# Patient Record
Sex: FEMALE | Race: WHITE | Employment: OTHER | URBAN - METROPOLITAN AREA
[De-identification: names, ages, dates, MRNs, and addresses within clinical notes are randomized per-mention and may not be internally consistent; named-entity substitution may affect disease eponyms.]

---

## 2017-04-27 ENCOUNTER — OFFICE VISIT (OUTPATIENT)
Dept: ORTHOPEDIC SURGERY | Age: 72
End: 2017-04-27

## 2017-04-27 VITALS
SYSTOLIC BLOOD PRESSURE: 132 MMHG | BODY MASS INDEX: 36.96 KG/M2 | HEART RATE: 65 BPM | HEIGHT: 66 IN | RESPIRATION RATE: 16 BRPM | WEIGHT: 230 LBS | TEMPERATURE: 97.1 F | DIASTOLIC BLOOD PRESSURE: 60 MMHG

## 2017-04-27 DIAGNOSIS — Z96.642 H/O TOTAL HIP ARTHROPLASTY, LEFT: Primary | ICD-10-CM

## 2017-04-27 PROCEDURE — 99212 OFFICE O/P EST SF 10 MIN: CPT | Performed by: PHYSICIAN ASSISTANT

## 2017-05-16 ENCOUNTER — HOSPITAL ENCOUNTER (OUTPATIENT)
Dept: OTHER | Age: 72
Discharge: HOME | End: 2017-05-17

## 2017-05-17 ENCOUNTER — HOSPITAL ENCOUNTER (OUTPATIENT)
Dept: MAMMOGRAPHY | Age: 72
Discharge: HOME | End: 2017-05-17

## 2017-05-17 DIAGNOSIS — Z12.31 VISIT FOR SCREENING MAMMOGRAM: ICD-10-CM

## 2018-04-26 ENCOUNTER — OFFICE VISIT (OUTPATIENT)
Dept: ORTHOPEDIC SURGERY | Age: 73
End: 2018-04-26

## 2018-04-26 VITALS
SYSTOLIC BLOOD PRESSURE: 125 MMHG | DIASTOLIC BLOOD PRESSURE: 69 MMHG | TEMPERATURE: 97.6 F | WEIGHT: 230 LBS | HEIGHT: 66 IN | HEART RATE: 69 BPM | BODY MASS INDEX: 36.96 KG/M2 | RESPIRATION RATE: 16 BRPM

## 2018-04-26 DIAGNOSIS — Z96.642 HISTORY OF TOTAL LEFT HIP REPLACEMENT: Primary | ICD-10-CM

## 2018-04-26 PROCEDURE — 99213 OFFICE O/P EST LOW 20 MIN: CPT | Performed by: PHYSICIAN ASSISTANT

## 2018-08-01 ENCOUNTER — OFFICE VISIT (OUTPATIENT)
Dept: ORTHOPEDIC SURGERY | Age: 73
End: 2018-08-01

## 2018-08-01 VITALS — BODY MASS INDEX: 36.96 KG/M2 | TEMPERATURE: 97.4 F | RESPIRATION RATE: 18 BRPM | WEIGHT: 230 LBS | HEIGHT: 66 IN

## 2018-08-01 DIAGNOSIS — M17.11 ARTHRITIS OF RIGHT KNEE: ICD-10-CM

## 2018-08-01 DIAGNOSIS — M25.561 RIGHT KNEE PAIN, UNSPECIFIED CHRONICITY: Primary | ICD-10-CM

## 2018-08-01 PROCEDURE — 99213 OFFICE O/P EST LOW 20 MIN: CPT | Performed by: PHYSICIAN ASSISTANT

## 2018-08-01 NOTE — LETTER
PRE-OP ORDERS:  ? CBC WITH DIFFERENTIAL                                                ? TYPE AND SCREEN                                                            ? HgB A1C                                                                               ? EKG                                                                                        ? NASAL CULUTRE MRSA  ? UAR/if positive repeat UAR on admission  ? BMP           ALBUMIN AND PREALBUMIN           VITAMIN D LEVELS  ? COAG PROFILE  ? SED RATE  ? PT/OT EVAL AND TEACHING  ? INSTRUCT PT TO STOP ALL NSAIDS, ASPIRIN, BLOOD THINNERS 7 DAYS PRIOR SURGERY  DAY OF SURGERY  ? CEFAZOLIN 2 GM IVPB; IF PATIENT WEIGHS > 80 KG AND SERUM CREATININE <2.5 mg/dl, GIVE 2 GM DOSE WITHIN 1 HOUR OF INCISION. ? IF THE PRE-OP NASAL CULTURE FOR MRSA WAS POSITIVE:   REPEAT NASAL SWAB ON ADMISSION AND ADMINISTER VANCOMYCIN 1 GM IVPB, REDUCE THE DOSE OF VANCOMYCIN  MG IVPB IF PT < 55 KG OR SERUM CREATININE > 2mg/dl; also to get Cefazolin 2 GM or wt based  ? All patients will receive preop Cefazolin 2 GM or wt based   ? APPLY KNEE HIGH ANTI-EMBOLIC AND PNEUMO-BOOTS TO UNOPERATIVE  LEG  ? CELEBREX  200 MG  ORALLY  DAY OF SURGERY  ? ROXICODONE 10MG  ORALLY DAY OF SURGERY  OTHER ORDERS:_______________________________________________________PHYSICIAN SIGNATURE: __ 8/1/18                                                                                                       11:04 AM  ________________________DATE:                                                    Supplemental Confidentiality & Payment Agreement & Supplemental HIPAA Notice for Shared Medical Appointments        During shared medical appointments, you will hear about other participants health issues and personal information.   As a matter of trust, it is your duty to keep everything you hear confidential.  Nothing that identifies a participant in any way (including job, ethnicity, Anabaptist, etc.) can be shared outside of this group setting. I have read and agree to the following statements:        · I agree to meet with a group of patients and my doctor. I understand that I have the choice to be seen by my physician in this group or individually and that I may leave the group visit anytime I feel uncomfortable. · I understand that discussions will occur regarding individual identifiable health information during the shared medical appointment and I will maintain the confidentiality of [facility] health information or other information heard during the appointment. · I am committed to maintaining this confidentiality even if I am no longer participating in shared medical appointments. · I understand that the information I share with the physician and staff will be heard by the other participants and there is a possibility that the information disclosed in the shared medical appointment may be re-disclosed by other participants. I have been notified of this potential disclosure and I voluntarily agree to participate in the shared medical appointment. · I know that I dont have to share any personal information with the group or health care providers unless, I choose to do so. · Like any doctors appointment, I agree to be responsible for the bill and / or co-payment associated with this physician appointment. Shared Medical Appointment              THIS SUPPLEMENTAL NOTICE SUPPLEMENTS AND DOES NOT REPLACE THE [facility] CONSENT, PATIENT RESPONSIBILITY AND NOTICE OF PRIVACY PRACTICE. Dago Mcgraw    1945        Patients Signature: ____________________________________________________         DATE: ____/____/___      Courtney Slate / Support Persons Signature (if applicable) _________________________     DATE: __/__/__    **Each person will be asked to sign this commitment before each Shared Medical Appointment. Thank You ! SURGERY SCHEDULING TIMES                                                                                                                                                      Regine                                                                                        1945                                                                           SURGERY DATE: ___/___/___                                                                      SURGERY TIME:  ___:___ AM/PM                                                                      ARRIVAL TIME:   ___:___  AM/PM                                                                                                                        **PLEASE REPORT TO THE                                                                                                   INFORMATION DESK IN THE MAIN LOBBY                                                          OF THE HOSPITAL.         [facility]                                                                                                      JET INFO               PT NAME:  Regine               Patient Phone:  [phone] (home)                                           1945    PCP:  PCP:  FAY Arriola CNP                APPT DATE:  ___/___/___      DATE:  18        H&P __________    LABS: _________                      NEEDED:  __________    EKG:   _________                     NEEDED:  __________                   JET DATE:         SURG DATE:

## 2018-08-02 PROBLEM — M17.11 ARTHRITIS OF RIGHT KNEE: Status: ACTIVE | Noted: 2018-08-02

## 2018-08-02 NOTE — PROGRESS NOTES
Subjective:      Patient ID: Noel Bowling is a 74 y.o. female. Chief Complaint   Patient presents with    Knee Pain     Right knee        HPI:   She is here for an initial evaluation of a new problem. Right knee pain. This pain is been present for several months or year. Pain is progressively worsening. No history of previous significant traumatic injury or prior knee surgery. She does have a history of osteoarthritis. She is status post left total hip arthroplasty. She does have degenerative arthritis of the right hip. Right knee pain Scale 7/10 VAS. Location of pain medial and anterior aspect right knee. Pain is worse with weightbearing activities. Pain improves with rest and elevation. Previous treatments have included ice and Tylenol with mild relief. Review Of Systems:   As outlined in the HPI. Negative for fever or chills. Positive for joint pain, swelling and stiffness. Negative for numbness or tingling.      Past Medical History:   Diagnosis Date    Acid reflux     Arthritis     Cataract     High blood pressure     Hyperlipidemia     mild-no meds    PONV (postoperative nausea and vomiting)     after hysterectomy-got sick next morning    Thyroid disease        Family History   Problem Relation Age of Onset    Arthritis Other     Cancer Other     Diabetes Other     Heart Disease Other     High Blood Pressure Other     Stroke Other        Past Surgical History:   Procedure Laterality Date    CATARACT REMOVAL WITH IMPLANT Bilateral     CHOLECYSTECTOMY      CYST REMOVAL      on foot    HYSTERECTOMY      JOINT REPLACEMENT Left     hip    THYROID SURGERY Left        Social History     Occupational History    Light house work      Social History Main Topics    Smoking status: Never Smoker    Smokeless tobacco: Never Used    Alcohol use No    Drug use: No    Sexual activity: Not on file       Current Outpatient Prescriptions   Medication Sig Dispense Refill    Cranberry 1000 MG CAPS Take 1 capsule by mouth 2 times daily. 30 capsule 1    metFORMIN (GLUCOPHAGE) 500 MG tablet Take 500 mg by mouth 2 times daily (with meals).  potassium chloride SA (KLOR-CON M10) 10 MEQ tablet Take 20 mEq by mouth 2 times daily.  ranitidine (ZANTAC) 150 MG tablet Take 150 mg by mouth 2 times daily.  Ferrous Sulfate (IRON) 325 (65 FE) MG TABS Take 1 tablet by mouth every evening.  Ascorbic Acid (VITAMIN C) 500 MG CAPS Take 1 tablet by mouth 2 times daily.  Garlic 8969 MG CAPS Take 1 capsule by mouth 2 times daily.  Lactobacillus (PROBIOTIC ACIDOPHILUS PO) Take 1 capsule by mouth 2 times daily.  levothyroxine (SYNTHROID) 50 MCG tablet Take 50 mcg by mouth daily. No current facility-administered medications for this visit. Objective:     She is alert, oriented x 3, pleasant, well nourished, developed and in no   acute distress. Temp 97.4 °F (36.3 °C) (Temporal)   Resp 18   Ht 5' 6\" (1.676 m)   Wt 230 lb (104.3 kg)   BMI 37.12 kg/m²        Examination of the right knee shows: The alignment of the knee is mild varus. There is not erythema. There mild soft tissue swelling. There is mild effusion. ROM-  Extension 0          -   Flexion  125   There moderate pain associated with ROM testing. Medial joint line is tender to palpation. Lateral joint line is somewhat tender to palpation. Retro patellar crepitus is present. There is is crepitus along the joint line with ROM testing. Varus Stress testing does produce pain,                                     does not show laxity. Valgus Stress testing does not produce pain,                                       does not show laxity. Lachman's test- negative. Anterior Drawer test- negative. Posterior Drawer test- negative. Carlos A's Test- negative. Patellar Compression testing does produce pain. Extensor Mechanism is  intact.        Examination of the lower extremities are intact with sensation to light touch. Motor testing  5/5 in all major motor groups of the lower extremities. Gait is normal heel to toe. Gait is antalgic. Negative Robles's Sign. SLR negative. Examination of the lower extremities shows intact perfusion to all extremities. No cyanosis. Digits are warm to touch, capillary refill is less than 2 seconds. Minimal edema noted. Examination of the skin over both lower extremities reveals: The skin to be intact without lacerations or abrasions. No significant erythema. No rashes or skin lesions. X Rays: performed in the office today:   AP Standing, Lateral and Sunrise views of right knee: There is advanced osteoarthritic findings of the right knee noted with significant medial joint space narrowing of about 90%. Sclerosis and osteophyte formation noted of the medial compartment and to a lesser degree lateral compartment. She is also noted to have significant lateral facet arthritis of the patellofemoral joint. No acute fractures or dislocations noted. Additional Tests reviewed: NONE  Additional Outside Records reviewed: NONE    Diagnosis:       ICD-10-CM ICD-9-CM    1. Right knee pain, unspecified chronicity M25.561 719.46 XR KNEE RIGHT (3 VIEWS)   2. Arthritis of right knee M17.11 716.96         Assessment and Plan:     The natural history of the patient's diagnosis as well as the treatment options were discussed in full and questions were answered. Risks and benefits of the treatment options also reviewed in detail. She has pretty much end-stage osteoarthritis of the right knee. She is symptomatic with her knee arthritis. She has failed conservative treatment including anti-inflammatory medications, Tylenol, therapy through a home exercise program and elastic sleeve. I'm recommending right knee cortisone injection today to see what kind of relief she gets from the injection.   She will like to go ahead and get on the surgical schedule 3 months out from now for right knee arthroplasty with Dr. Aaron Santo. I have agreed to give her right knee cortisone injection today at no charge as she is a self-pay patient. Risk and benefits of corticosteroid intra-articular injection was discussed today. All questions were answered to her satisfaction. She verbally consented to proceed with intra-articular injection today. Cortisone Injection                                                       PROCEDURE NOTE:     Pre op Diagnosis:  right knee pain     Post op Diagnosis: Same  With the patient's permission, her right knee was prepped  in standard sterile fashion with  Alcohol and 2 cc of 0.25% Marcaine and 1 cc of Kenalog 40 mg was injected into the right lateral compartment  without difficulty. The patient tolerated this well without difficulty. A band-aid was applied. The patient was advised to ice the knee for 15-20 minutes to relieve any injection site related pain. Follow Up: 4-6 weeks with Dr. Aaron Santo to discuss right knee symptoms and potential right knee arthroplasty. Call or return to clinic prn if these symptoms worsen or fail to improve as anticipated.

## 2018-08-28 ENCOUNTER — OFFICE VISIT (OUTPATIENT)
Dept: ORTHOPEDIC SURGERY | Age: 73
End: 2018-08-28

## 2018-08-28 VITALS
HEART RATE: 69 BPM | BODY MASS INDEX: 36.96 KG/M2 | WEIGHT: 230 LBS | SYSTOLIC BLOOD PRESSURE: 127 MMHG | DIASTOLIC BLOOD PRESSURE: 70 MMHG | HEIGHT: 66 IN | TEMPERATURE: 97.2 F

## 2018-08-28 DIAGNOSIS — M17.11 ARTHRITIS OF RIGHT KNEE: Primary | ICD-10-CM

## 2018-08-28 PROCEDURE — 99999 PR OFFICE/OUTPT VISIT,PROCEDURE ONLY: CPT | Performed by: PHYSICIAN ASSISTANT

## 2018-08-30 NOTE — PROGRESS NOTES
Ferrous Sulfate (IRON) 325 (65 FE) MG TABS Take 1 tablet by mouth every evening.  Ascorbic Acid (VITAMIN C) 500 MG CAPS Take 1 tablet by mouth 2 times daily.  Garlic 2100 MG CAPS Take 1 capsule by mouth 2 times daily.  Lactobacillus (PROBIOTIC ACIDOPHILUS PO) Take 1 capsule by mouth 2 times daily.  levothyroxine (SYNTHROID) 50 MCG tablet Take 50 mcg by mouth daily. No current facility-administered medications for this visit. Objective:   She   is alert, oriented x 3, pleasant, well nourished, developed and in no acute distress. /70   Pulse 69   Temp 97.2 °F (36.2 °C)   Ht 5' 6\" (1.676 m)   Wt 230 lb (104.3 kg)   BMI 37.12 kg/m²      KNEE EXAM:  Examination of the right knee shows: There is not erythema. There mild soft tissue swelling. There is mild effusion. There moderate pain associated with ROM testing. Extensor Mechanism is  intact. X Rays: not performed in the office today:     Assessment:       ICD-10-CM ICD-9-CM    1. Arthritis of right knee M17.11 716.96         Plan:     The natural history of the patient's diagnosis as well as the treatment options were discussed in full and questions were answered. Risks and benefits of the treatment options also reviewed in detail. She is tentatively scheduled for right total knee arthroplasty early November which will be 3 months post right knee cortisone injection. She will follow up with Dr. Violette Carlson for preoperative discussion, right total knee arthroplasty. Follow Up:  Call or return to clinic prn if these symptoms worsen or fail to improve as anticipated.

## 2018-10-04 ENCOUNTER — OFFICE VISIT (OUTPATIENT)
Dept: ORTHOPEDIC SURGERY | Age: 73
End: 2018-10-04

## 2018-10-04 VITALS
RESPIRATION RATE: 16 BRPM | WEIGHT: 213 LBS | HEART RATE: 68 BPM | TEMPERATURE: 97.5 F | HEIGHT: 66 IN | BODY MASS INDEX: 34.23 KG/M2 | SYSTOLIC BLOOD PRESSURE: 143 MMHG | DIASTOLIC BLOOD PRESSURE: 69 MMHG

## 2018-10-04 DIAGNOSIS — M17.11 ARTHRITIS OF RIGHT KNEE: Primary | ICD-10-CM

## 2018-10-04 PROCEDURE — 99213 OFFICE O/P EST LOW 20 MIN: CPT | Performed by: ORTHOPAEDIC SURGERY

## 2018-10-08 ENCOUNTER — PREP FOR PROCEDURE (OUTPATIENT)
Dept: ORTHOPEDICS UNIT | Age: 73
End: 2018-10-08

## 2018-10-08 RX ORDER — OXYCODONE HYDROCHLORIDE 5 MG/1
10 TABLET ORAL ONCE
Status: CANCELLED | OUTPATIENT
Start: 2018-10-08 | End: 2018-10-08

## 2018-10-08 RX ORDER — CELECOXIB 200 MG/1
200 CAPSULE ORAL ONCE
Status: CANCELLED | OUTPATIENT
Start: 2018-10-08 | End: 2018-10-08

## 2018-10-23 ENCOUNTER — TELEPHONE (OUTPATIENT)
Dept: ORTHOPEDIC SURGERY | Age: 73
End: 2018-10-23

## 2018-11-28 ENCOUNTER — TELEPHONE (OUTPATIENT)
Dept: ORTHOPEDIC SURGERY | Age: 73
End: 2018-11-28

## 2019-01-03 ENCOUNTER — PREP FOR PROCEDURE (OUTPATIENT)
Dept: ORTHOPEDICS UNIT | Age: 74
End: 2019-01-03

## 2019-01-07 RX ORDER — OXYCODONE HYDROCHLORIDE 5 MG/1
10 TABLET ORAL ONCE
Status: CANCELLED | OUTPATIENT
Start: 2019-01-07 | End: 2019-01-07

## 2019-01-07 RX ORDER — CELECOXIB 200 MG/1
200 CAPSULE ORAL ONCE
Status: CANCELLED | OUTPATIENT
Start: 2019-01-07 | End: 2019-01-07

## 2019-01-22 ENCOUNTER — PREP FOR PROCEDURE (OUTPATIENT)
Dept: ORTHOPEDIC SURGERY | Age: 74
End: 2019-01-22

## 2019-01-22 DIAGNOSIS — M17.11 PRIMARY OSTEOARTHRITIS OF RIGHT KNEE: Primary | ICD-10-CM

## 2019-01-23 ENCOUNTER — HOSPITAL ENCOUNTER (OUTPATIENT)
Dept: PREADMISSION TESTING | Age: 74
Discharge: HOME | End: 2019-01-27

## 2019-01-23 DIAGNOSIS — M17.11 PRIMARY OSTEOARTHRITIS OF RIGHT KNEE: ICD-10-CM

## 2019-01-23 LAB
ABO/RH: NORMAL
ALBUMIN SERPL-MCNC: 4.6 G/DL (ref 3.4–5)
ANTIBODY SCREEN: NORMAL
BACTERIA: ABNORMAL /HPF
BILIRUBIN URINE: NEGATIVE
BLOOD, URINE: ABNORMAL
CLARITY: CLEAR
COLOR: YELLOW
EKG ATRIAL RATE: 73 BPM
EKG DIAGNOSIS: NORMAL
EKG P AXIS: 54 DEGREES
EKG P-R INTERVAL: 162 MS
EKG Q-T INTERVAL: 388 MS
EKG QRS DURATION: 76 MS
EKG QTC CALCULATION (BAZETT): 427 MS
EKG R AXIS: 13 DEGREES
EKG T AXIS: 23 DEGREES
EKG VENTRICULAR RATE: 73 BPM
EPITHELIAL CELLS, UA: 1 /HPF (ref 0–5)
ESTIMATED AVERAGE GLUCOSE: 128.4 MG/DL
GLUCOSE URINE: NEGATIVE MG/DL
HBA1C MFR BLD: 6.1 %
HYALINE CASTS: 0 /LPF (ref 0–8)
KETONES, URINE: NEGATIVE MG/DL
LEUKOCYTE ESTERASE, URINE: ABNORMAL
MICROSCOPIC EXAMINATION: YES
NITRITE, URINE: NEGATIVE
PH UA: 6.5
PREALBUMIN: 22.8 MG/DL (ref 20–40)
PROTEIN UA: NEGATIVE MG/DL
RBC UA: 2 /HPF (ref 0–4)
SPECIFIC GRAVITY UA: 1.01
URINE REFLEX TO CULTURE: YES
URINE TYPE: ABNORMAL
UROBILINOGEN, URINE: 0.2 E.U./DL
VITAMIN D 25-HYDROXY: 8.7 NG/ML
WBC UA: 1 /HPF (ref 0–5)

## 2019-01-23 PROCEDURE — 86901 BLOOD TYPING SEROLOGIC RH(D): CPT

## 2019-01-23 PROCEDURE — 93010 ELECTROCARDIOGRAM REPORT: CPT | Performed by: INTERNAL MEDICINE

## 2019-01-23 PROCEDURE — 81001 URINALYSIS AUTO W/SCOPE: CPT

## 2019-01-23 PROCEDURE — 86900 BLOOD TYPING SEROLOGIC ABO: CPT

## 2019-01-23 PROCEDURE — 87086 URINE CULTURE/COLONY COUNT: CPT

## 2019-01-23 PROCEDURE — 93005 ELECTROCARDIOGRAM TRACING: CPT

## 2019-01-23 PROCEDURE — 87077 CULTURE AEROBIC IDENTIFY: CPT

## 2019-01-23 PROCEDURE — 86850 RBC ANTIBODY SCREEN: CPT

## 2019-01-23 PROCEDURE — 87641 MR-STAPH DNA AMP PROBE: CPT

## 2019-01-23 PROCEDURE — 82306 VITAMIN D 25 HYDROXY: CPT

## 2019-01-23 PROCEDURE — 83036 HEMOGLOBIN GLYCOSYLATED A1C: CPT

## 2019-01-23 PROCEDURE — 87186 SC STD MICRODIL/AGAR DIL: CPT

## 2019-01-23 PROCEDURE — 82040 ASSAY OF SERUM ALBUMIN: CPT

## 2019-01-23 PROCEDURE — 84134 ASSAY OF PREALBUMIN: CPT

## 2019-01-24 ENCOUNTER — OFFICE VISIT (OUTPATIENT)
Dept: ORTHOPEDIC SURGERY | Age: 74
End: 2019-01-24

## 2019-01-24 ENCOUNTER — PREP FOR PROCEDURE (OUTPATIENT)
Dept: ORTHOPEDICS UNIT | Age: 74
End: 2019-01-24

## 2019-01-24 DIAGNOSIS — M17.11 ARTHRITIS OF RIGHT KNEE: Primary | ICD-10-CM

## 2019-01-24 LAB — MRSA SCREEN RT-PCR: NORMAL

## 2019-01-24 PROCEDURE — 99999 PR OFFICE/OUTPT VISIT,PROCEDURE ONLY: CPT | Performed by: ORTHOPAEDIC SURGERY

## 2019-01-24 RX ORDER — CIPROFLOXACIN 500 MG/1
500 TABLET, FILM COATED ORAL 2 TIMES DAILY
Qty: 20 TABLET | Refills: 0 | Status: SHIPPED | OUTPATIENT
Start: 2019-01-24 | End: 2019-01-28 | Stop reason: ALTCHOICE

## 2019-01-24 RX ORDER — CIPROFLOXACIN 500 MG/1
500 TABLET, FILM COATED ORAL 2 TIMES DAILY
Qty: 20 TABLET | Refills: 0 | Status: SHIPPED | OUTPATIENT
Start: 2019-01-24 | End: 2019-02-03

## 2019-01-25 LAB
ORGANISM: ABNORMAL
URINE CULTURE, ROUTINE: ABNORMAL
URINE CULTURE, ROUTINE: ABNORMAL

## 2019-01-28 ENCOUNTER — ANESTHESIA EVENT (OUTPATIENT)
Dept: OPERATING ROOM | Age: 74
End: 2019-01-28

## 2019-01-28 RX ORDER — INDOMETHACIN 25 MG/1
CAPSULE ORAL PRN
Status: ON HOLD | COMMUNITY
End: 2019-01-30 | Stop reason: HOSPADM

## 2019-01-28 RX ORDER — BENAZEPRIL/HYDROCHLOROTHIAZIDE 20 MG-25MG
1 TABLET ORAL 2 TIMES DAILY
Refills: 1 | COMMUNITY
Start: 2018-12-18 | End: 2021-01-25

## 2019-01-28 RX ORDER — AMLODIPINE BESYLATE 5 MG/1
5 TABLET ORAL DAILY
COMMUNITY

## 2019-01-30 ENCOUNTER — HOSPITAL ENCOUNTER (OUTPATIENT)
Age: 74
LOS: 1 days | Discharge: HOME | End: 2019-01-31
Attending: ORTHOPAEDIC SURGERY | Admitting: ORTHOPAEDIC SURGERY

## 2019-01-30 ENCOUNTER — APPOINTMENT (OUTPATIENT)
Dept: GENERAL RADIOLOGY | Age: 74
End: 2019-01-30
Attending: ORTHOPAEDIC SURGERY

## 2019-01-30 ENCOUNTER — ANESTHESIA (OUTPATIENT)
Dept: OPERATING ROOM | Age: 74
End: 2019-01-30

## 2019-01-30 VITALS
SYSTOLIC BLOOD PRESSURE: 100 MMHG | OXYGEN SATURATION: 100 % | DIASTOLIC BLOOD PRESSURE: 53 MMHG | RESPIRATION RATE: 10 BRPM | TEMPERATURE: 97.2 F

## 2019-01-30 DIAGNOSIS — M17.11 ARTHRITIS OF RIGHT KNEE: ICD-10-CM

## 2019-01-30 LAB
ABO/RH: NORMAL
ANTIBODY SCREEN: NORMAL
BILIRUBIN URINE: NEGATIVE
BLOOD, URINE: NEGATIVE
CLARITY: CLEAR
COLOR: YELLOW
GLUCOSE BLD-MCNC: 111 MG/DL (ref 70–99)
GLUCOSE BLD-MCNC: 113 MG/DL (ref 70–99)
GLUCOSE BLD-MCNC: 200 MG/DL (ref 70–99)
GLUCOSE URINE: NEGATIVE MG/DL
KETONES, URINE: NEGATIVE MG/DL
LEUKOCYTE ESTERASE, URINE: NEGATIVE
MICROSCOPIC EXAMINATION: NORMAL
NITRITE, URINE: NEGATIVE
PERFORMED ON: ABNORMAL
PH UA: 7
PROTEIN UA: NEGATIVE MG/DL
SPECIFIC GRAVITY UA: 1.01
URINE REFLEX TO CULTURE: NORMAL
URINE TYPE: NORMAL
UROBILINOGEN, URINE: 0.2 E.U./DL

## 2019-01-30 PROCEDURE — 2580000003 HC RX 258: Performed by: ANESTHESIOLOGY

## 2019-01-30 PROCEDURE — 2500000003 HC RX 250 WO HCPCS: Performed by: ORTHOPAEDIC SURGERY

## 2019-01-30 PROCEDURE — 2709999900 HC NON-CHARGEABLE SUPPLY: Performed by: ORTHOPAEDIC SURGERY

## 2019-01-30 PROCEDURE — G8979 MOBILITY GOAL STATUS: HCPCS

## 2019-01-30 PROCEDURE — 97116 GAIT TRAINING THERAPY: CPT

## 2019-01-30 PROCEDURE — 6360000002 HC RX W HCPCS

## 2019-01-30 PROCEDURE — 2580000003 HC RX 258: Performed by: ORTHOPAEDIC SURGERY

## 2019-01-30 PROCEDURE — 88305 TISSUE EXAM BY PATHOLOGIST: CPT

## 2019-01-30 PROCEDURE — 6360000002 HC RX W HCPCS: Performed by: ANESTHESIOLOGY

## 2019-01-30 PROCEDURE — C1776 JOINT DEVICE (IMPLANTABLE): HCPCS | Performed by: ORTHOPAEDIC SURGERY

## 2019-01-30 PROCEDURE — 2500000003 HC RX 250 WO HCPCS

## 2019-01-30 PROCEDURE — 3700000001 HC ADD 15 MINUTES (ANESTHESIA): Performed by: ORTHOPAEDIC SURGERY

## 2019-01-30 PROCEDURE — 3600000005 HC SURGERY LEVEL 5 BASE: Performed by: ORTHOPAEDIC SURGERY

## 2019-01-30 PROCEDURE — 3600000015 HC SURGERY LEVEL 5 ADDTL 15MIN: Performed by: ORTHOPAEDIC SURGERY

## 2019-01-30 PROCEDURE — 94760 N-INVAS EAR/PLS OXIMETRY 1: CPT

## 2019-01-30 PROCEDURE — 6360000002 HC RX W HCPCS: Performed by: ORTHOPAEDIC SURGERY

## 2019-01-30 PROCEDURE — 97530 THERAPEUTIC ACTIVITIES: CPT

## 2019-01-30 PROCEDURE — 86850 RBC ANTIBODY SCREEN: CPT

## 2019-01-30 PROCEDURE — G8978 MOBILITY CURRENT STATUS: HCPCS

## 2019-01-30 PROCEDURE — 73560 X-RAY EXAM OF KNEE 1 OR 2: CPT

## 2019-01-30 PROCEDURE — 6370000000 HC RX 637 (ALT 250 FOR IP): Performed by: ORTHOPAEDIC SURGERY

## 2019-01-30 PROCEDURE — 2700000000 HC OXYGEN THERAPY PER DAY

## 2019-01-30 PROCEDURE — 88311 DECALCIFY TISSUE: CPT

## 2019-01-30 PROCEDURE — 81003 URINALYSIS AUTO W/O SCOPE: CPT

## 2019-01-30 PROCEDURE — 86900 BLOOD TYPING SEROLOGIC ABO: CPT

## 2019-01-30 PROCEDURE — 1200000000 HC SEMI PRIVATE

## 2019-01-30 PROCEDURE — 2500000003 HC RX 250 WO HCPCS: Performed by: ANESTHESIOLOGY

## 2019-01-30 PROCEDURE — 97165 OT EVAL LOW COMPLEX 30 MIN: CPT

## 2019-01-30 PROCEDURE — 97162 PT EVAL MOD COMPLEX 30 MIN: CPT

## 2019-01-30 PROCEDURE — 7100000001 HC PACU RECOVERY - ADDTL 15 MIN: Performed by: ORTHOPAEDIC SURGERY

## 2019-01-30 PROCEDURE — C1713 ANCHOR/SCREW BN/BN,TIS/BN: HCPCS | Performed by: ORTHOPAEDIC SURGERY

## 2019-01-30 PROCEDURE — 94664 DEMO&/EVAL PT USE INHALER: CPT

## 2019-01-30 PROCEDURE — 3700000000 HC ANESTHESIA ATTENDED CARE: Performed by: ORTHOPAEDIC SURGERY

## 2019-01-30 PROCEDURE — 2720000010 HC SURG SUPPLY STERILE: Performed by: ORTHOPAEDIC SURGERY

## 2019-01-30 PROCEDURE — 86901 BLOOD TYPING SEROLOGIC RH(D): CPT

## 2019-01-30 PROCEDURE — 97535 SELF CARE MNGMENT TRAINING: CPT

## 2019-01-30 PROCEDURE — 7100000000 HC PACU RECOVERY - FIRST 15 MIN: Performed by: ORTHOPAEDIC SURGERY

## 2019-01-30 DEVICE — COMPONENT PAT DIA35MM THK9MM KNEE POLY CEM CONVENTIONAL: Type: IMPLANTABLE DEVICE | Site: KNEE | Status: FUNCTIONAL

## 2019-01-30 DEVICE — IMPLANTABLE DEVICE: Type: IMPLANTABLE DEVICE | Site: KNEE | Status: FUNCTIONAL

## 2019-01-30 DEVICE — NEXGEN PRECOAT STEMMED TIBIAL PLATE SZ 3: Type: IMPLANTABLE DEVICE | Site: KNEE | Status: FUNCTIONAL

## 2019-01-30 DEVICE — CEMENT BNE 40GM W/ GENT HI VISC RADPQ FOR REV SURG: Type: IMPLANTABLE DEVICE | Site: KNEE | Status: FUNCTIONAL

## 2019-01-30 RX ORDER — ONDANSETRON 2 MG/ML
INJECTION INTRAMUSCULAR; INTRAVENOUS PRN
Status: DISCONTINUED | OUTPATIENT
Start: 2019-01-30 | End: 2019-01-30 | Stop reason: SDUPTHER

## 2019-01-30 RX ORDER — INSULIN GLARGINE 100 [IU]/ML
0.25 INJECTION, SOLUTION SUBCUTANEOUS NIGHTLY
Status: DISCONTINUED | OUTPATIENT
Start: 2019-01-30 | End: 2019-01-31 | Stop reason: HOSPADM

## 2019-01-30 RX ORDER — BENAZEPRIL/HYDROCHLOROTHIAZIDE 20 MG-25MG
1 TABLET ORAL 2 TIMES DAILY
Status: DISCONTINUED | OUTPATIENT
Start: 2019-01-30 | End: 2019-01-30 | Stop reason: CLARIF

## 2019-01-30 RX ORDER — SUCCINYLCHOLINE CHLORIDE 20 MG/ML
INJECTION INTRAMUSCULAR; INTRAVENOUS PRN
Status: DISCONTINUED | OUTPATIENT
Start: 2019-01-30 | End: 2019-01-30 | Stop reason: SDUPTHER

## 2019-01-30 RX ORDER — DEXAMETHASONE SODIUM PHOSPHATE 4 MG/ML
INJECTION, SOLUTION INTRA-ARTICULAR; INTRALESIONAL; INTRAMUSCULAR; INTRAVENOUS; SOFT TISSUE PRN
Status: DISCONTINUED | OUTPATIENT
Start: 2019-01-30 | End: 2019-01-30 | Stop reason: SDUPTHER

## 2019-01-30 RX ORDER — PROPOFOL 10 MG/ML
INJECTION, EMULSION INTRAVENOUS PRN
Status: DISCONTINUED | OUTPATIENT
Start: 2019-01-30 | End: 2019-01-30 | Stop reason: SDUPTHER

## 2019-01-30 RX ORDER — LISINOPRIL AND HYDROCHLOROTHIAZIDE 12.5; 1 MG/1; MG/1
2 TABLET ORAL 2 TIMES DAILY
Status: DISCONTINUED | OUTPATIENT
Start: 2019-01-30 | End: 2019-01-31 | Stop reason: HOSPADM

## 2019-01-30 RX ORDER — NICOTINE POLACRILEX 4 MG
15 LOZENGE BUCCAL PRN
Status: DISCONTINUED | OUTPATIENT
Start: 2019-01-30 | End: 2019-01-31 | Stop reason: HOSPADM

## 2019-01-30 RX ORDER — AMLODIPINE BESYLATE 5 MG/1
5 TABLET ORAL DAILY
Status: DISCONTINUED | OUTPATIENT
Start: 2019-01-31 | End: 2019-01-31 | Stop reason: HOSPADM

## 2019-01-30 RX ORDER — DEXTROSE MONOHYDRATE 25 G/50ML
12.5 INJECTION, SOLUTION INTRAVENOUS PRN
Status: DISCONTINUED | OUTPATIENT
Start: 2019-01-30 | End: 2019-01-31 | Stop reason: HOSPADM

## 2019-01-30 RX ORDER — OXYCODONE HYDROCHLORIDE AND ACETAMINOPHEN 5; 325 MG/1; MG/1
1 TABLET ORAL PRN
Status: DISCONTINUED | OUTPATIENT
Start: 2019-01-30 | End: 2019-01-30 | Stop reason: HOSPADM

## 2019-01-30 RX ORDER — SODIUM CHLORIDE 0.9 % (FLUSH) 0.9 %
10 SYRINGE (ML) INJECTION EVERY 12 HOURS SCHEDULED
Status: DISCONTINUED | OUTPATIENT
Start: 2019-01-30 | End: 2019-01-31 | Stop reason: HOSPADM

## 2019-01-30 RX ORDER — LIDOCAINE HYDROCHLORIDE 20 MG/ML
INJECTION, SOLUTION INFILTRATION; PERINEURAL PRN
Status: DISCONTINUED | OUTPATIENT
Start: 2019-01-30 | End: 2019-01-30 | Stop reason: SDUPTHER

## 2019-01-30 RX ORDER — FERROUS SULFATE TAB EC 324 MG (65 MG FE EQUIVALENT) 324 (65 FE) MG
324 TABLET DELAYED RESPONSE ORAL EVERY EVENING
Status: DISCONTINUED | OUTPATIENT
Start: 2019-01-30 | End: 2019-01-31 | Stop reason: HOSPADM

## 2019-01-30 RX ORDER — OXYCODONE HYDROCHLORIDE AND ACETAMINOPHEN 5; 325 MG/1; MG/1
1 TABLET ORAL EVERY 4 HOURS PRN
Status: DISCONTINUED | OUTPATIENT
Start: 2019-01-30 | End: 2019-01-31 | Stop reason: HOSPADM

## 2019-01-30 RX ORDER — CELECOXIB 200 MG/1
200 CAPSULE ORAL EVERY 24 HOURS
Status: DISCONTINUED | OUTPATIENT
Start: 2019-01-31 | End: 2019-01-31 | Stop reason: HOSPADM

## 2019-01-30 RX ORDER — MORPHINE SULFATE 2 MG/ML
2 INJECTION, SOLUTION INTRAMUSCULAR; INTRAVENOUS EVERY 5 MIN PRN
Status: DISCONTINUED | OUTPATIENT
Start: 2019-01-30 | End: 2019-01-30 | Stop reason: HOSPADM

## 2019-01-30 RX ORDER — APREPITANT 40 MG/1
40 CAPSULE ORAL ONCE
Status: COMPLETED | OUTPATIENT
Start: 2019-01-30 | End: 2019-01-30

## 2019-01-30 RX ORDER — TRANEXAMIC ACID 100 MG/ML
INJECTION, SOLUTION INTRAVENOUS PRN
Status: DISCONTINUED | OUTPATIENT
Start: 2019-01-30 | End: 2019-01-30 | Stop reason: SDUPTHER

## 2019-01-30 RX ORDER — LEVOTHYROXINE SODIUM 0.07 MG/1
75 TABLET ORAL DAILY
Status: DISCONTINUED | OUTPATIENT
Start: 2019-01-31 | End: 2019-01-31 | Stop reason: HOSPADM

## 2019-01-30 RX ORDER — MORPHINE SULFATE 2 MG/ML
1 INJECTION, SOLUTION INTRAMUSCULAR; INTRAVENOUS EVERY 5 MIN PRN
Status: DISCONTINUED | OUTPATIENT
Start: 2019-01-30 | End: 2019-01-30 | Stop reason: HOSPADM

## 2019-01-30 RX ORDER — POLYETHYLENE GLYCOL 3350 17 G/17G
17 POWDER, FOR SOLUTION ORAL NIGHTLY PRN
Status: DISCONTINUED | OUTPATIENT
Start: 2019-01-30 | End: 2019-01-31 | Stop reason: HOSPADM

## 2019-01-30 RX ORDER — POTASSIUM CHLORIDE 20 MEQ/1
20 TABLET, EXTENDED RELEASE ORAL 2 TIMES DAILY
Status: DISCONTINUED | OUTPATIENT
Start: 2019-01-30 | End: 2019-01-31 | Stop reason: HOSPADM

## 2019-01-30 RX ORDER — ONDANSETRON 2 MG/ML
4 INJECTION INTRAMUSCULAR; INTRAVENOUS
Status: DISCONTINUED | OUTPATIENT
Start: 2019-01-30 | End: 2019-01-30 | Stop reason: HOSPADM

## 2019-01-30 RX ORDER — SODIUM CHLORIDE 0.9 % (FLUSH) 0.9 %
10 SYRINGE (ML) INJECTION PRN
Status: DISCONTINUED | OUTPATIENT
Start: 2019-01-30 | End: 2019-01-31 | Stop reason: HOSPADM

## 2019-01-30 RX ORDER — FENTANYL CITRATE 50 UG/ML
50 INJECTION, SOLUTION INTRAMUSCULAR; INTRAVENOUS EVERY 5 MIN PRN
Status: DISCONTINUED | OUTPATIENT
Start: 2019-01-30 | End: 2019-01-30 | Stop reason: HOSPADM

## 2019-01-30 RX ORDER — FAMOTIDINE 20 MG/1
20 TABLET, FILM COATED ORAL DAILY
Status: DISCONTINUED | OUTPATIENT
Start: 2019-01-30 | End: 2019-01-30

## 2019-01-30 RX ORDER — MEPERIDINE HYDROCHLORIDE 25 MG/ML
12.5 INJECTION INTRAMUSCULAR; INTRAVENOUS; SUBCUTANEOUS EVERY 5 MIN PRN
Status: DISCONTINUED | OUTPATIENT
Start: 2019-01-30 | End: 2019-01-30 | Stop reason: HOSPADM

## 2019-01-30 RX ORDER — SODIUM CHLORIDE 450 MG/100ML
INJECTION, SOLUTION INTRAVENOUS CONTINUOUS
Status: DISCONTINUED | OUTPATIENT
Start: 2019-01-30 | End: 2019-01-31 | Stop reason: HOSPADM

## 2019-01-30 RX ORDER — SODIUM CHLORIDE 0.9 % (FLUSH) 0.9 %
10 SYRINGE (ML) INJECTION PRN
Status: DISCONTINUED | OUTPATIENT
Start: 2019-01-30 | End: 2019-01-30 | Stop reason: HOSPADM

## 2019-01-30 RX ORDER — OXYCODONE HYDROCHLORIDE AND ACETAMINOPHEN 5; 325 MG/1; MG/1
2 TABLET ORAL PRN
Status: DISCONTINUED | OUTPATIENT
Start: 2019-01-30 | End: 2019-01-30 | Stop reason: HOSPADM

## 2019-01-30 RX ORDER — FENTANYL CITRATE 50 UG/ML
25 INJECTION, SOLUTION INTRAMUSCULAR; INTRAVENOUS EVERY 5 MIN PRN
Status: DISCONTINUED | OUTPATIENT
Start: 2019-01-30 | End: 2019-01-30 | Stop reason: HOSPADM

## 2019-01-30 RX ORDER — OXYCODONE HYDROCHLORIDE AND ACETAMINOPHEN 5; 325 MG/1; MG/1
1-2 TABLET ORAL EVERY 4 HOURS PRN
Qty: 50 TABLET | Refills: 0 | Status: SHIPPED | OUTPATIENT
Start: 2019-01-30 | End: 2019-02-06

## 2019-01-30 RX ORDER — DOCUSATE SODIUM 100 MG/1
100 CAPSULE, LIQUID FILLED ORAL 2 TIMES DAILY
Status: DISCONTINUED | OUTPATIENT
Start: 2019-01-30 | End: 2019-01-31 | Stop reason: HOSPADM

## 2019-01-30 RX ORDER — SODIUM CHLORIDE 9 MG/ML
INJECTION, SOLUTION INTRAVENOUS CONTINUOUS
Status: DISCONTINUED | OUTPATIENT
Start: 2019-01-30 | End: 2019-01-30

## 2019-01-30 RX ORDER — MORPHINE SULFATE 2 MG/ML
2 INJECTION, SOLUTION INTRAMUSCULAR; INTRAVENOUS
Status: DISCONTINUED | OUTPATIENT
Start: 2019-01-30 | End: 2019-01-31 | Stop reason: HOSPADM

## 2019-01-30 RX ORDER — EPHEDRINE SULFATE 50 MG/ML
INJECTION INTRAVENOUS PRN
Status: DISCONTINUED | OUTPATIENT
Start: 2019-01-30 | End: 2019-01-30 | Stop reason: SDUPTHER

## 2019-01-30 RX ORDER — MORPHINE SULFATE 4 MG/ML
4 INJECTION, SOLUTION INTRAMUSCULAR; INTRAVENOUS
Status: DISCONTINUED | OUTPATIENT
Start: 2019-01-30 | End: 2019-01-31 | Stop reason: HOSPADM

## 2019-01-30 RX ORDER — TRANEXAMIC ACID 100 MG/ML
INJECTION, SOLUTION INTRAVENOUS
Status: COMPLETED | OUTPATIENT
Start: 2019-01-30 | End: 2019-01-30

## 2019-01-30 RX ORDER — SODIUM CHLORIDE 0.9 % (FLUSH) 0.9 %
10 SYRINGE (ML) INJECTION EVERY 12 HOURS SCHEDULED
Status: DISCONTINUED | OUTPATIENT
Start: 2019-01-30 | End: 2019-01-30 | Stop reason: HOSPADM

## 2019-01-30 RX ORDER — GLYCOPYRROLATE 0.2 MG/ML
INJECTION INTRAMUSCULAR; INTRAVENOUS PRN
Status: DISCONTINUED | OUTPATIENT
Start: 2019-01-30 | End: 2019-01-30 | Stop reason: SDUPTHER

## 2019-01-30 RX ORDER — DEXTROSE MONOHYDRATE 50 MG/ML
100 INJECTION, SOLUTION INTRAVENOUS PRN
Status: DISCONTINUED | OUTPATIENT
Start: 2019-01-30 | End: 2019-01-31 | Stop reason: HOSPADM

## 2019-01-30 RX ORDER — CELECOXIB 200 MG/1
200 CAPSULE ORAL ONCE
Status: COMPLETED | OUTPATIENT
Start: 2019-01-30 | End: 2019-01-30

## 2019-01-30 RX ORDER — FENTANYL CITRATE 50 UG/ML
INJECTION, SOLUTION INTRAMUSCULAR; INTRAVENOUS PRN
Status: DISCONTINUED | OUTPATIENT
Start: 2019-01-30 | End: 2019-01-30 | Stop reason: SDUPTHER

## 2019-01-30 RX ORDER — BISACODYL 10 MG
10 SUPPOSITORY, RECTAL RECTAL DAILY PRN
Status: DISCONTINUED | OUTPATIENT
Start: 2019-01-30 | End: 2019-01-31 | Stop reason: HOSPADM

## 2019-01-30 RX ORDER — OXYCODONE HYDROCHLORIDE AND ACETAMINOPHEN 5; 325 MG/1; MG/1
2 TABLET ORAL EVERY 4 HOURS PRN
Status: DISCONTINUED | OUTPATIENT
Start: 2019-01-30 | End: 2019-01-31 | Stop reason: HOSPADM

## 2019-01-30 RX ORDER — ONDANSETRON 2 MG/ML
4 INJECTION INTRAMUSCULAR; INTRAVENOUS EVERY 6 HOURS PRN
Status: DISCONTINUED | OUTPATIENT
Start: 2019-01-30 | End: 2019-01-31 | Stop reason: HOSPADM

## 2019-01-30 RX ORDER — OXYCODONE HYDROCHLORIDE 5 MG/1
10 TABLET ORAL ONCE
Status: COMPLETED | OUTPATIENT
Start: 2019-01-30 | End: 2019-01-30

## 2019-01-30 RX ORDER — ROCURONIUM BROMIDE 10 MG/ML
INJECTION, SOLUTION INTRAVENOUS PRN
Status: DISCONTINUED | OUTPATIENT
Start: 2019-01-30 | End: 2019-01-30 | Stop reason: SDUPTHER

## 2019-01-30 RX ADMIN — SUCCINYLCHOLINE CHLORIDE 100 MG: 20 INJECTION, SOLUTION INTRAMUSCULAR; INTRAVENOUS at 13:44

## 2019-01-30 RX ADMIN — EPHEDRINE SULFATE 10 MG: 50 INJECTION INTRAVENOUS at 13:53

## 2019-01-30 RX ADMIN — TRANEXAMIC ACID 1000 MG: 100 INJECTION, SOLUTION INTRAVENOUS at 13:54

## 2019-01-30 RX ADMIN — LIDOCAINE HYDROCHLORIDE 60 MG: 20 INJECTION, SOLUTION INFILTRATION; PERINEURAL at 13:44

## 2019-01-30 RX ADMIN — CELECOXIB 200 MG: 200 CAPSULE ORAL at 11:47

## 2019-01-30 RX ADMIN — METFORMIN HYDROCHLORIDE 500 MG: 500 TABLET ORAL at 17:28

## 2019-01-30 RX ADMIN — Medication 2 G: at 20:34

## 2019-01-30 RX ADMIN — Medication 2 G: at 13:37

## 2019-01-30 RX ADMIN — FAMOTIDINE 20 MG: 10 INJECTION, SOLUTION INTRAVENOUS at 20:34

## 2019-01-30 RX ADMIN — DOCUSATE SODIUM 100 MG: 100 CAPSULE, LIQUID FILLED ORAL at 20:34

## 2019-01-30 RX ADMIN — FAMOTIDINE 20 MG: 10 INJECTION, SOLUTION INTRAVENOUS at 11:48

## 2019-01-30 RX ADMIN — GLYCOPYRROLATE 0.1 MG: 0.2 INJECTION, SOLUTION INTRAMUSCULAR; INTRAVENOUS at 13:50

## 2019-01-30 RX ADMIN — SODIUM CHLORIDE: 9 INJECTION, SOLUTION INTRAVENOUS at 11:48

## 2019-01-30 RX ADMIN — TRANEXAMIC ACID 1000 MG: 100 INJECTION, SOLUTION INTRAVENOUS at 13:00

## 2019-01-30 RX ADMIN — SODIUM CHLORIDE, PRESERVATIVE FREE 10 ML: 5 INJECTION INTRAVENOUS at 12:00

## 2019-01-30 RX ADMIN — SUGAMMADEX 200 MG: 100 INJECTION, SOLUTION INTRAVENOUS at 14:48

## 2019-01-30 RX ADMIN — DEXAMETHASONE SODIUM PHOSPHATE 8 MG: 4 INJECTION, SOLUTION INTRAMUSCULAR; INTRAVENOUS at 14:00

## 2019-01-30 RX ADMIN — FENTANYL CITRATE 50 MCG: 50 INJECTION INTRAMUSCULAR; INTRAVENOUS at 14:11

## 2019-01-30 RX ADMIN — OXYCODONE HYDROCHLORIDE 10 MG: 5 TABLET ORAL at 11:47

## 2019-01-30 RX ADMIN — APREPITANT 40 MG: 40 CAPSULE ORAL at 11:47

## 2019-01-30 RX ADMIN — INSULIN GLARGINE 24 UNITS: 100 INJECTION, SOLUTION SUBCUTANEOUS at 21:43

## 2019-01-30 RX ADMIN — OXYCODONE AND ACETAMINOPHEN 2 TABLET: 5; 325 TABLET ORAL at 20:34

## 2019-01-30 RX ADMIN — SODIUM CHLORIDE: 4.5 INJECTION, SOLUTION INTRAVENOUS at 17:28

## 2019-01-30 RX ADMIN — ONDANSETRON 4 MG: 2 INJECTION INTRAMUSCULAR; INTRAVENOUS at 14:48

## 2019-01-30 RX ADMIN — EPHEDRINE SULFATE 10 MG: 50 INJECTION INTRAVENOUS at 13:52

## 2019-01-30 RX ADMIN — ROCURONIUM BROMIDE 40 MG: 10 INJECTION INTRAVENOUS at 13:50

## 2019-01-30 RX ADMIN — LISINOPRIL AND HYDROCHLOROTHIAZIDE 2 TABLET: 12.5; 1 TABLET ORAL at 20:33

## 2019-01-30 RX ADMIN — FERROUS SULFATE TAB EC 324 MG (65 MG FE EQUIVALENT) 324 MG: 324 (65 FE) TABLET DELAYED RESPONSE at 17:28

## 2019-01-30 RX ADMIN — FENTANYL CITRATE 50 MCG: 50 INJECTION INTRAMUSCULAR; INTRAVENOUS at 13:44

## 2019-01-30 RX ADMIN — Medication 10 ML: at 20:34

## 2019-01-30 RX ADMIN — OXYCODONE AND ACETAMINOPHEN 1 TABLET: 5; 325 TABLET ORAL at 16:13

## 2019-01-30 RX ADMIN — POTASSIUM CHLORIDE 20 MEQ: 20 TABLET, EXTENDED RELEASE ORAL at 20:33

## 2019-01-30 RX ADMIN — METOPROLOL TARTRATE 25 MG: 25 TABLET ORAL at 20:33

## 2019-01-30 RX ADMIN — INSULIN LISPRO 1 UNITS: 100 INJECTION, SOLUTION INTRAVENOUS; SUBCUTANEOUS at 21:43

## 2019-01-30 RX ADMIN — PROPOFOL 150 MG: 10 INJECTION, EMULSION INTRAVENOUS at 13:44

## 2019-01-30 ASSESSMENT — PULMONARY FUNCTION TESTS
PIF_VALUE: 19
PIF_VALUE: 20
PIF_VALUE: 19
PIF_VALUE: 19
PIF_VALUE: 25
PIF_VALUE: 17
PIF_VALUE: 20
PIF_VALUE: 19
PIF_VALUE: 1
PIF_VALUE: 19
PIF_VALUE: 4
PIF_VALUE: 19
PIF_VALUE: 2
PIF_VALUE: 19
PIF_VALUE: 20
PIF_VALUE: 17
PIF_VALUE: 19
PIF_VALUE: 16
PIF_VALUE: 20
PIF_VALUE: 20
PIF_VALUE: 1
PIF_VALUE: 1
PIF_VALUE: 19
PIF_VALUE: 20
PIF_VALUE: 20
PIF_VALUE: 19
PIF_VALUE: 2
PIF_VALUE: 17
PIF_VALUE: 18
PIF_VALUE: 3
PIF_VALUE: 20
PIF_VALUE: 19
PIF_VALUE: 1
PIF_VALUE: 1
PIF_VALUE: 20
PIF_VALUE: 1
PIF_VALUE: 19
PIF_VALUE: 18
PIF_VALUE: 16
PIF_VALUE: 20
PIF_VALUE: 17
PIF_VALUE: 19
PIF_VALUE: 19
PIF_VALUE: 3
PIF_VALUE: 25
PIF_VALUE: 32
PIF_VALUE: 20
PIF_VALUE: 19
PIF_VALUE: 20
PIF_VALUE: 19
PIF_VALUE: 16
PIF_VALUE: 19
PIF_VALUE: 17
PIF_VALUE: 16
PIF_VALUE: 19
PIF_VALUE: 19
PIF_VALUE: 20
PIF_VALUE: 19
PIF_VALUE: 19
PIF_VALUE: 20
PIF_VALUE: 19
PIF_VALUE: 20
PIF_VALUE: 19
PIF_VALUE: 18
PIF_VALUE: 19
PIF_VALUE: 20
PIF_VALUE: 1
PIF_VALUE: 3
PIF_VALUE: 19
PIF_VALUE: 19
PIF_VALUE: 20
PIF_VALUE: 19
PIF_VALUE: 1

## 2019-01-30 ASSESSMENT — PAIN DESCRIPTION - PAIN TYPE
TYPE: ACUTE PAIN
TYPE: SURGICAL PAIN
TYPE: ACUTE PAIN
TYPE: ACUTE PAIN;SURGICAL PAIN
TYPE: SURGICAL PAIN

## 2019-01-30 ASSESSMENT — PAIN DESCRIPTION - ORIENTATION
ORIENTATION: RIGHT

## 2019-01-30 ASSESSMENT — PAIN DESCRIPTION - ONSET
ONSET: ON-GOING
ONSET: ON-GOING

## 2019-01-30 ASSESSMENT — PAIN SCALES - GENERAL
PAINLEVEL_OUTOF10: 4
PAINLEVEL_OUTOF10: 0
PAINLEVEL_OUTOF10: 0
PAINLEVEL_OUTOF10: 8
PAINLEVEL_OUTOF10: 4
PAINLEVEL_OUTOF10: 7
PAINLEVEL_OUTOF10: 4
PAINLEVEL_OUTOF10: 3

## 2019-01-30 ASSESSMENT — PAIN DESCRIPTION - FREQUENCY
FREQUENCY: CONTINUOUS
FREQUENCY: CONTINUOUS

## 2019-01-30 ASSESSMENT — PAIN - FUNCTIONAL ASSESSMENT
PAIN_FUNCTIONAL_ASSESSMENT: 0-10
PAIN_FUNCTIONAL_ASSESSMENT: PREVENTS OR INTERFERES SOME ACTIVE ACTIVITIES AND ADLS

## 2019-01-30 ASSESSMENT — PAIN DESCRIPTION - DESCRIPTORS
DESCRIPTORS: ACHING
DESCRIPTORS: ACHING

## 2019-01-30 ASSESSMENT — PAIN DESCRIPTION - PROGRESSION: CLINICAL_PROGRESSION: GRADUALLY WORSENING

## 2019-01-30 ASSESSMENT — PAIN DESCRIPTION - LOCATION
LOCATION: KNEE

## 2019-01-30 ASSESSMENT — LIFESTYLE VARIABLES: SMOKING_STATUS: 0

## 2019-01-31 VITALS
SYSTOLIC BLOOD PRESSURE: 122 MMHG | OXYGEN SATURATION: 94 % | HEIGHT: 66 IN | BODY MASS INDEX: 33.66 KG/M2 | WEIGHT: 209.44 LBS | HEART RATE: 70 BPM | RESPIRATION RATE: 16 BRPM | TEMPERATURE: 98.7 F | DIASTOLIC BLOOD PRESSURE: 65 MMHG

## 2019-01-31 LAB
ESTIMATED AVERAGE GLUCOSE: 125.5 MG/DL
GLUCOSE BLD-MCNC: 123 MG/DL (ref 70–99)
GLUCOSE BLD-MCNC: 177 MG/DL (ref 70–99)
HBA1C MFR BLD: 6 %
HCT VFR BLD CALC: 36.9 % (ref 36–48)
HEMOGLOBIN: 12.6 G/DL (ref 12–16)
PERFORMED ON: ABNORMAL
PERFORMED ON: ABNORMAL

## 2019-01-31 PROCEDURE — 6370000000 HC RX 637 (ALT 250 FOR IP): Performed by: NURSE PRACTITIONER

## 2019-01-31 PROCEDURE — 6360000002 HC RX W HCPCS: Performed by: ORTHOPAEDIC SURGERY

## 2019-01-31 PROCEDURE — 97116 GAIT TRAINING THERAPY: CPT

## 2019-01-31 PROCEDURE — 94760 N-INVAS EAR/PLS OXIMETRY 1: CPT

## 2019-01-31 PROCEDURE — 83036 HEMOGLOBIN GLYCOSYLATED A1C: CPT

## 2019-01-31 PROCEDURE — 85014 HEMATOCRIT: CPT

## 2019-01-31 PROCEDURE — G8978 MOBILITY CURRENT STATUS: HCPCS

## 2019-01-31 PROCEDURE — 97530 THERAPEUTIC ACTIVITIES: CPT

## 2019-01-31 PROCEDURE — 36415 COLL VENOUS BLD VENIPUNCTURE: CPT

## 2019-01-31 PROCEDURE — 97535 SELF CARE MNGMENT TRAINING: CPT

## 2019-01-31 PROCEDURE — 97110 THERAPEUTIC EXERCISES: CPT

## 2019-01-31 PROCEDURE — 6370000000 HC RX 637 (ALT 250 FOR IP): Performed by: ORTHOPAEDIC SURGERY

## 2019-01-31 PROCEDURE — 85018 HEMOGLOBIN: CPT

## 2019-01-31 RX ORDER — FAMOTIDINE 20 MG/1
20 TABLET, FILM COATED ORAL 2 TIMES DAILY
Status: DISCONTINUED | OUTPATIENT
Start: 2019-01-31 | End: 2019-01-31 | Stop reason: HOSPADM

## 2019-01-31 RX ADMIN — INSULIN LISPRO 1 UNITS: 100 INJECTION, SOLUTION INTRAVENOUS; SUBCUTANEOUS at 09:15

## 2019-01-31 RX ADMIN — LEVOTHYROXINE SODIUM 75 MCG: 0.07 TABLET ORAL at 05:31

## 2019-01-31 RX ADMIN — METOPROLOL TARTRATE 25 MG: 25 TABLET ORAL at 09:13

## 2019-01-31 RX ADMIN — INSULIN LISPRO 8 UNITS: 100 INJECTION, SOLUTION INTRAVENOUS; SUBCUTANEOUS at 09:18

## 2019-01-31 RX ADMIN — ENOXAPARIN SODIUM 30 MG: 30 INJECTION SUBCUTANEOUS at 09:13

## 2019-01-31 RX ADMIN — CELECOXIB 200 MG: 200 CAPSULE ORAL at 05:31

## 2019-01-31 RX ADMIN — FAMOTIDINE 20 MG: 20 TABLET, FILM COATED ORAL at 09:13

## 2019-01-31 RX ADMIN — OXYCODONE AND ACETAMINOPHEN 2 TABLET: 5; 325 TABLET ORAL at 10:16

## 2019-01-31 RX ADMIN — LISINOPRIL AND HYDROCHLOROTHIAZIDE 2 TABLET: 12.5; 1 TABLET ORAL at 09:13

## 2019-01-31 RX ADMIN — Medication 2 G: at 05:31

## 2019-01-31 RX ADMIN — POTASSIUM CHLORIDE 20 MEQ: 20 TABLET, EXTENDED RELEASE ORAL at 09:13

## 2019-01-31 RX ADMIN — OXYCODONE AND ACETAMINOPHEN 2 TABLET: 5; 325 TABLET ORAL at 02:50

## 2019-01-31 RX ADMIN — AMLODIPINE BESYLATE 5 MG: 5 TABLET ORAL at 09:13

## 2019-01-31 RX ADMIN — DOCUSATE SODIUM 100 MG: 100 CAPSULE, LIQUID FILLED ORAL at 09:13

## 2019-01-31 ASSESSMENT — PAIN DESCRIPTION - LOCATION
LOCATION: KNEE

## 2019-01-31 ASSESSMENT — PAIN SCALES - GENERAL
PAINLEVEL_OUTOF10: 2
PAINLEVEL_OUTOF10: 4
PAINLEVEL_OUTOF10: 5
PAINLEVEL_OUTOF10: 4
PAINLEVEL_OUTOF10: 7
PAINLEVEL_OUTOF10: 5
PAINLEVEL_OUTOF10: 7
PAINLEVEL_OUTOF10: 6

## 2019-01-31 ASSESSMENT — PAIN DESCRIPTION - PROGRESSION
CLINICAL_PROGRESSION: GRADUALLY IMPROVING
CLINICAL_PROGRESSION: GRADUALLY IMPROVING

## 2019-01-31 ASSESSMENT — PAIN - FUNCTIONAL ASSESSMENT
PAIN_FUNCTIONAL_ASSESSMENT: PREVENTS OR INTERFERES SOME ACTIVE ACTIVITIES AND ADLS
PAIN_FUNCTIONAL_ASSESSMENT: PREVENTS OR INTERFERES SOME ACTIVE ACTIVITIES AND ADLS

## 2019-01-31 ASSESSMENT — PAIN DESCRIPTION - PAIN TYPE
TYPE: ACUTE PAIN
TYPE: ACUTE PAIN;SURGICAL PAIN
TYPE: ACUTE PAIN;SURGICAL PAIN
TYPE: ACUTE PAIN
TYPE: ACUTE PAIN;SURGICAL PAIN

## 2019-01-31 ASSESSMENT — PAIN DESCRIPTION - ORIENTATION
ORIENTATION: RIGHT

## 2019-01-31 ASSESSMENT — PAIN DESCRIPTION - ONSET
ONSET: ON-GOING

## 2019-01-31 ASSESSMENT — PAIN DESCRIPTION - FREQUENCY
FREQUENCY: CONTINUOUS

## 2019-01-31 ASSESSMENT — PAIN DESCRIPTION - DESCRIPTORS
DESCRIPTORS: ACHING

## 2019-02-04 ENCOUNTER — FOLLOWUP TELEPHONE ENCOUNTER (OUTPATIENT)
Dept: ORTHOPEDICS UNIT | Age: 74
End: 2019-02-04

## 2019-02-14 ENCOUNTER — OFFICE VISIT (OUTPATIENT)
Dept: ORTHOPEDIC SURGERY | Age: 74
End: 2019-02-14

## 2019-02-14 VITALS — RESPIRATION RATE: 16 BRPM | BODY MASS INDEX: 34.23 KG/M2 | HEIGHT: 66 IN | WEIGHT: 213 LBS | TEMPERATURE: 97.1 F

## 2019-02-14 DIAGNOSIS — Z96.651 H/O TOTAL KNEE REPLACEMENT, RIGHT: Primary | ICD-10-CM

## 2019-02-14 PROCEDURE — 99024 POSTOP FOLLOW-UP VISIT: CPT | Performed by: ORTHOPAEDIC SURGERY

## 2019-02-28 ENCOUNTER — OFFICE VISIT (OUTPATIENT)
Dept: ORTHOPEDIC SURGERY | Age: 74
End: 2019-02-28

## 2019-02-28 VITALS — TEMPERATURE: 97.5 F | BODY MASS INDEX: 34.23 KG/M2 | RESPIRATION RATE: 16 BRPM | HEIGHT: 66 IN | WEIGHT: 213 LBS

## 2019-02-28 DIAGNOSIS — Z96.651 H/O TOTAL KNEE REPLACEMENT, RIGHT: Primary | ICD-10-CM

## 2019-02-28 PROCEDURE — APPSS15 APP SPLIT SHARED TIME 0-15 MINUTES: Performed by: PHYSICIAN ASSISTANT

## 2019-02-28 PROCEDURE — 99024 POSTOP FOLLOW-UP VISIT: CPT | Performed by: PHYSICIAN ASSISTANT

## 2019-03-14 ENCOUNTER — HOSPITAL ENCOUNTER (OUTPATIENT)
Dept: MAMMOGRAPHY | Age: 74
Discharge: HOME | End: 2019-03-19

## 2019-03-14 DIAGNOSIS — Z12.31 VISIT FOR SCREENING MAMMOGRAM: ICD-10-CM

## 2019-03-14 PROCEDURE — 77067 SCR MAMMO BI INCL CAD: CPT

## 2019-04-11 ENCOUNTER — OFFICE VISIT (OUTPATIENT)
Dept: ORTHOPEDIC SURGERY | Age: 74
End: 2019-04-11

## 2019-04-11 VITALS
SYSTOLIC BLOOD PRESSURE: 136 MMHG | DIASTOLIC BLOOD PRESSURE: 69 MMHG | BODY MASS INDEX: 33.75 KG/M2 | HEART RATE: 80 BPM | TEMPERATURE: 98.6 F | WEIGHT: 210 LBS | HEIGHT: 66 IN

## 2019-04-11 DIAGNOSIS — Z96.642 H/O TOTAL HIP ARTHROPLASTY, LEFT: ICD-10-CM

## 2019-04-11 DIAGNOSIS — Z96.651 H/O TOTAL KNEE REPLACEMENT, RIGHT: Primary | ICD-10-CM

## 2019-04-11 PROCEDURE — 99024 POSTOP FOLLOW-UP VISIT: CPT | Performed by: PHYSICIAN ASSISTANT

## 2019-04-11 NOTE — PROGRESS NOTES
Subjective:      Patient ID: Sridevi Carlisle is a 66 y.o. female. Chief Complaint   Patient presents with    Post-Op Check     Right TKA 1.30.2019    Hip Problem     Left RODRIGO 4.7.2015        HPI:   She for follow up visit. S/P right knee arthroplasty. The date of procedure(s) 1/30/19. Pain is mild in nature. Tylenol is controlling the pain. She is status post a left hip arthroplasty. In 2015. Review of Systems:   She denies fever or chills. She  denies any other complaints.       Past Medical History:   Diagnosis Date    Acid reflux     Arthritis     [facility] [address]     Chronic lymphocytic leukemia (CLL) genetic mutation variant ([facility] [address] 2018    Diabetes mellitus ([facility] [address] [facility]     High blood pressure     Hyperlipidemia     mild-no meds    PONV (postoperative nausea and vomiting)     after hysterectomy-got sick next morning    Thyroid disease     UTI (urinary tract infection) 01/2019    currently on cipro    Wears glasses        Family History   Problem Relation Age of Onset    High Blood Pressure Mother     Heart Disease Mother     Cancer Father         acute leukemia    Arthritis Other     Cancer Other     Diabetes Other     Heart Disease Other     High Blood Pressure Other     Stroke Other        Past Surgical History:   Procedure Laterality Date    CHOLECYSTECTOMY      CYST REMOVAL Right     on foot    EYE SURGERY      cataract removal with lens implants    HYSTERECTOMY      JOINT REPLACEMENT Left     hip    JOINT REPLACEMENT Right 01/30/2019      Right Total Knee Replacement    THYROID SURGERY Left     TOTAL KNEE ARTHROPLASTY Right 1/30/2019    RIGHT TOTAL KNEE REPLACEMENT performed by Papito Davis MD at [address] [place] [address] History     Occupational History    Occupation: Light house work   Tobacco Use    Smoking status: Never Smoker    Smokeless tobacco: Never Used   Substance and Sexual Activity    Alcohol use: No    Drug use: No    Sexual activity: Never       Current Outpatient Medications   Medication Sig Dispense Refill    metoprolol tartrate (LOPRESSOR) 25 MG tablet Take 25 mg by mouth 2 times daily      benazepril-hydrochlorthiazide (LOTENSIN HCT) 20-25 MG per tablet Take 1 tablet by mouth 2 times daily  1    amLODIPine (NORVASC) 5 MG tablet Take 5 mg by mouth daily      Cranberry 1000 MG CAPS Take 1 capsule by mouth 2 times daily. 30 capsule 1    metFORMIN (GLUCOPHAGE) 500 MG tablet Take 500 mg by mouth 2 times daily (with meals).  potassium chloride SA (KLOR-CON M10) 10 MEQ tablet Take 20 mEq by mouth 2 times daily.  ranitidine (ZANTAC) 150 MG tablet Take 150 mg by mouth 2 times daily.  Ferrous Sulfate (IRON) 325 (65 FE) MG TABS Take 1 tablet by mouth every evening.  Ascorbic Acid (VITAMIN C) 500 MG CAPS Take 1 tablet by mouth daily       Garlic 4519 MG CAPS Take 1 capsule by mouth 2 times daily.  Lactobacillus (PROBIOTIC ACIDOPHILUS PO) Take 1 capsule by mouth 2 times daily.  levothyroxine (SYNTHROID) 75 MCG tablet Take 75 mcg by mouth daily        No current facility-administered medications for this visit. Objective:   She  is alert, oriented x 3, pleasant, well nourished, developed and in no acute distress. /69   Pulse 80   Temp 98.6 °F (37 °C) (Temporal)   Ht 5' 6\" (1.676 m)   Wt 210 lb (95.3 kg)   BMI 33.89 kg/m²    KNEE EXAM:  Examination of the right knee shows: There is no erythema. There is no drainage. There is mild soft tissue swelling. ROM-  Extension  0          -   Flexion      130   Extensor Mechanism is intact. Calf is not swollen. X Rays: was performed in the office today:   AP Standing, Lateral and Sunrise  Right Knee: There is a right prosthetic total knee arthroplasty present. The alignment is satisfactory. There are no signs of failure or loosening. AP Pelvis, AP and Frog Leg Lateral  Left Hip: There is a left prosthetic total hip arthroplasty present.  The alignment is satisfactory. There are no signs of failure, dislocation or loosening. Diagnosis:        ICD-10-CM    1. H/O total knee replacement, right 1/30/19 Z96.651 XR KNEE RIGHT (3 VIEWS)   2. H/O total hip arthroplasty, left 4/7/2015 Z96.642 XR HIP 2-3 VW W PELVIS LEFT        Assessment/plan:        Clinically stable right knee arthroplasty. Radiographically stable right knee and left hip arthroplasties. Continue with physical therapy progressing towards a home exercise program.     Rest, Ice, Compression and Elevation  Activity restriction/ Modification discussed. Discussed the need for future dental prophylaxis. Follow Up: 12 months. Call or return to clinic prn if these symptoms worsen or fail to improve as anticipated.

## 2020-03-25 PROBLEM — M17.11 ARTHRITIS OF RIGHT KNEE: Status: RESOLVED | Noted: 2018-08-02 | Resolved: 2020-03-24

## 2020-06-04 ENCOUNTER — OFFICE VISIT (OUTPATIENT)
Dept: ORTHOPEDIC SURGERY | Age: 75
End: 2020-06-04

## 2020-06-04 VITALS — TEMPERATURE: 97.4 F

## 2020-06-04 PROBLEM — M25.551 RIGHT HIP PAIN: Status: ACTIVE | Noted: 2020-06-04

## 2020-06-04 PROCEDURE — 99213 OFFICE O/P EST LOW 20 MIN: CPT | Performed by: PHYSICIAN ASSISTANT

## 2020-06-04 NOTE — PROGRESS NOTES
Subjective:      Patient ID: Basil Funk is a 66 y.o. female. Chief Complaint   Patient presents with    Knee Problem     Right TKA 1.30.2019    Hip Problem     Left RODRIGO 4.7.2015        HPI: She is here for annual follow up on right knee and left hip arthroplasty. The date of procedure(s) right knee arthroplasty 1/30/2019, left hip arthroplasty 4/7/2015. No new complaints or issues. There is minimal to no discomfort with ambulation. There is minimal to no discomfort at rest.   Steps can be performed in reciprocal fashion. Occasional right groin pain. Review of Systems:   A 14 point review of systems and history form completed by the patient has been reviewed. This form is scanned in the media tab of the patient's chart under today's date.       Past Medical History:   Diagnosis Date    Acid reflux     Arthritis     [facility] [address]     Chronic lymphocytic leukemia (CLL) genetic mutation variant ([facility] [address] 2018    Diabetes mellitus ([facility] [address]     High blood pressure     Hyperlipidemia     mild-no meds    PONV (postoperative nausea and vomiting)     after hysterectomy-got sick next morning    Thyroid disease     UTI (urinary tract infection) 01/2019    currently on cipro    Wears glasses        Family History   Problem Relation Age of Onset    High Blood Pressure Mother     Heart Disease Mother     Cancer Father         acute leukemia    Arthritis Other     Cancer Other     Diabetes Other     Heart Disease Other     High Blood Pressure Other     Stroke Other        Past Surgical History:   Procedure Laterality Date    CHOLECYSTECTOMY      CYST REMOVAL Right     on foot    EYE SURGERY      cataract removal with lens implants    HYSTERECTOMY      JOINT REPLACEMENT Left     hip    JOINT REPLACEMENT Right 01/30/2019      Right Total Knee Replacement    THYROID SURGERY Left     TOTAL KNEE ARTHROPLASTY Right 1/30/2019    RIGHT TOTAL KNEE REPLACEMENT performed by Dixie Duenas MD at [facility]       Social History     Occupational History    Occupation: Light house work   Tobacco Use    Smoking status: Never Smoker    Smokeless tobacco: Never Used   Substance and Sexual Activity    Alcohol use: No    Drug use: No    Sexual activity: Never       Current Outpatient Medications   Medication Sig Dispense Refill    metoprolol tartrate (LOPRESSOR) 25 MG tablet Take 25 mg by mouth 2 times daily      benazepril-hydrochlorthiazide (LOTENSIN HCT) 20-25 MG per tablet Take 1 tablet by mouth 2 times daily  1    amLODIPine (NORVASC) 5 MG tablet Take 5 mg by mouth daily      Cranberry 1000 MG CAPS Take 1 capsule by mouth 2 times daily. 30 capsule 1    metFORMIN (GLUCOPHAGE) 500 MG tablet Take 500 mg by mouth 2 times daily (with meals).  potassium chloride SA (KLOR-CON M10) 10 MEQ tablet Take 20 mEq by mouth 2 times daily.  ranitidine (ZANTAC) 150 MG tablet Take 150 mg by mouth 2 times daily.  Ferrous Sulfate (IRON) 325 (65 FE) MG TABS Take 1 tablet by mouth every evening.  Ascorbic Acid (VITAMIN C) 500 MG CAPS Take 1 tablet by mouth daily       Garlic 7693 MG CAPS Take 1 capsule by mouth 2 times daily.  Lactobacillus (PROBIOTIC ACIDOPHILUS PO) Take 1 capsule by mouth 2 times daily.  levothyroxine (SYNTHROID) 75 MCG tablet Take 75 mcg by mouth daily        No current facility-administered medications for this visit. Objective:     She is alert, oriented x 3, pleasant, well nourished, developed and in no acute distress. Temp 97.4 °F (36.3 °C) (Temporal)      Examination of the right knee shows: There is  no obvious deformity. There is not erythema. There is minimal to no soft tissue swelling. There is no significant joint effusion. AROM-  Extension 0                     Flexion  120  There is no pain associated with ROM testing. Medial joint line is not tender to palpation. Lateral joint line is not tender to palpation.    There is not crepitus

## 2021-01-25 ENCOUNTER — APPOINTMENT (OUTPATIENT)
Dept: CT IMAGING | Age: 76
DRG: 640 | End: 2021-01-25

## 2021-01-25 ENCOUNTER — APPOINTMENT (OUTPATIENT)
Dept: GENERAL RADIOLOGY | Age: 76
DRG: 640 | End: 2021-01-25

## 2021-01-25 ENCOUNTER — HOSPITAL ENCOUNTER (INPATIENT)
Age: 76
LOS: 2 days | Discharge: HOME | DRG: 640 | End: 2021-01-27
Attending: EMERGENCY MEDICINE | Admitting: INTERNAL MEDICINE

## 2021-01-25 DIAGNOSIS — N39.0 URINARY TRACT INFECTION WITHOUT HEMATURIA, SITE UNSPECIFIED: ICD-10-CM

## 2021-01-25 DIAGNOSIS — D72.829 LEUKOCYTOSIS, UNSPECIFIED TYPE: ICD-10-CM

## 2021-01-25 DIAGNOSIS — E87.1 HYPONATREMIA: ICD-10-CM

## 2021-01-25 DIAGNOSIS — U07.1 COVID-19: Primary | ICD-10-CM

## 2021-01-25 PROBLEM — C91.10 CLL (CHRONIC LYMPHOCYTIC LEUKEMIA) (HCC): Status: ACTIVE | Noted: 2021-01-25

## 2021-01-25 PROBLEM — R55 SYNCOPE: Status: ACTIVE | Noted: 2021-01-25

## 2021-01-25 LAB
A/G RATIO: 2 (ref 1.1–2.2)
ABO/RH: NORMAL
ALBUMIN SERPL-MCNC: 4.3 G/DL (ref 3.4–5)
ALP BLD-CCNC: 102 U/L (ref 40–129)
ALT SERPL-CCNC: 14 U/L (ref 10–40)
ANION GAP SERPL CALCULATED.3IONS-SCNC: 14 MMOL/L (ref 3–16)
ANISOCYTOSIS: ABNORMAL
ANTIBODY SCREEN: NORMAL
AST SERPL-CCNC: 19 U/L (ref 15–37)
ATYPICAL LYMPHOCYTE RELATIVE PERCENT: 3 % (ref 0–6)
BACTERIA: ABNORMAL /HPF
BASOPHILS ABSOLUTE: 0 K/UL (ref 0–0.2)
BASOPHILS RELATIVE PERCENT: 0 %
BILIRUB SERPL-MCNC: 0.5 MG/DL (ref 0–1)
BILIRUBIN URINE: NEGATIVE
BLOOD, URINE: NEGATIVE
BUN BLDV-MCNC: 9 MG/DL (ref 7–20)
C-REACTIVE PROTEIN: 8.6 MG/L (ref 0–5.1)
CALCIUM SERPL-MCNC: 8.8 MG/DL (ref 8.3–10.6)
CHLORIDE BLD-SCNC: 81 MMOL/L (ref 99–110)
CLARITY: CLEAR
CO2: 25 MMOL/L (ref 21–32)
COLOR: YELLOW
CREAT SERPL-MCNC: <0.5 MG/DL (ref 0.6–1.2)
D DIMER: 362 NG/ML DDU (ref 0–229)
D DIMER: 431 NG/ML DDU (ref 0–229)
EKG ATRIAL RATE: 75 BPM
EKG DIAGNOSIS: NORMAL
EKG P AXIS: 56 DEGREES
EKG P-R INTERVAL: 172 MS
EKG Q-T INTERVAL: 404 MS
EKG QRS DURATION: 84 MS
EKG QTC CALCULATION (BAZETT): 451 MS
EKG R AXIS: 16 DEGREES
EKG T AXIS: 31 DEGREES
EKG VENTRICULAR RATE: 75 BPM
EOSINOPHILS ABSOLUTE: 0 K/UL (ref 0–0.6)
EOSINOPHILS RELATIVE PERCENT: 0 %
EPITHELIAL CELLS, UA: 1 /HPF (ref 0–5)
FERRITIN: 975.1 NG/ML (ref 15–150)
FIBRINOGEN: 269 MG/DL (ref 200–397)
FIBRINOGEN: 302 MG/DL (ref 200–397)
GFR AFRICAN AMERICAN: >60
GFR NON-AFRICAN AMERICAN: >60
GLOBULIN: 2.2 G/DL
GLUCOSE BLD-MCNC: 119 MG/DL (ref 70–99)
GLUCOSE BLD-MCNC: 121 MG/DL (ref 70–99)
GLUCOSE BLD-MCNC: 156 MG/DL (ref 70–99)
GLUCOSE URINE: NEGATIVE MG/DL
HCT VFR BLD CALC: 36.5 % (ref 36–48)
HEMATOLOGY PATH CONSULT: YES
HEMOGLOBIN: 12.6 G/DL (ref 12–16)
HYALINE CASTS: 12 /LPF (ref 0–8)
KETONES, URINE: 15 MG/DL
LACTATE DEHYDROGENASE: 219 U/L (ref 100–190)
LACTIC ACID, SEPSIS: 0.7 MMOL/L (ref 0.4–1.9)
LACTIC ACID, SEPSIS: 0.8 MMOL/L (ref 0.4–1.9)
LEUKOCYTE ESTERASE, URINE: ABNORMAL
LYMPHOCYTES ABSOLUTE: 20.2 K/UL (ref 1–5.1)
LYMPHOCYTES RELATIVE PERCENT: 74 %
MCH RBC QN AUTO: 30.8 PG (ref 26–34)
MCHC RBC AUTO-ENTMCNC: 34.5 G/DL (ref 31–36)
MCV RBC AUTO: 89.3 FL (ref 80–100)
MICROSCOPIC EXAMINATION: YES
MONOCYTES ABSOLUTE: 0.5 K/UL (ref 0–1.3)
MONOCYTES RELATIVE PERCENT: 2 %
NEUTROPHILS ABSOLUTE: 5.5 K/UL (ref 1.7–7.7)
NEUTROPHILS RELATIVE PERCENT: 21 %
NITRITE, URINE: POSITIVE
OVALOCYTES: ABNORMAL
PDW BLD-RTO: 13.4 % (ref 12.4–15.4)
PERFORMED ON: ABNORMAL
PERFORMED ON: ABNORMAL
PH UA: 7 (ref 5–8)
PLATELET # BLD: 100 K/UL (ref 135–450)
PMV BLD AUTO: 7.1 FL (ref 5–10.5)
POTASSIUM REFLEX MAGNESIUM: 4 MMOL/L (ref 3.5–5.1)
PROCALCITONIN: 0.05 NG/ML (ref 0–0.15)
PROTEIN UA: NEGATIVE MG/DL
RBC # BLD: 4.09 M/UL (ref 4–5.2)
RBC UA: 7 /HPF (ref 0–4)
SARS-COV-2, NAAT: DETECTED
SMUDGE CELLS: PRESENT
SODIUM BLD-SCNC: 120 MMOL/L (ref 136–145)
SODIUM BLD-SCNC: 122 MMOL/L (ref 136–145)
SODIUM BLD-SCNC: 122 MMOL/L (ref 136–145)
SPECIFIC GRAVITY UA: 1.01 (ref 1–1.03)
TOTAL PROTEIN: 6.5 G/DL (ref 6.4–8.2)
TROPONIN: <0.01 NG/ML
URINE REFLEX TO CULTURE: YES
URINE TYPE: ABNORMAL
UROBILINOGEN, URINE: 0.2 E.U./DL
WBC # BLD: 26.2 K/UL (ref 4–11)
WBC UA: 55 /HPF (ref 0–5)

## 2021-01-25 PROCEDURE — 72125 CT NECK SPINE W/O DYE: CPT

## 2021-01-25 PROCEDURE — 84145 PROCALCITONIN (PCT): CPT

## 2021-01-25 PROCEDURE — 93010 ELECTROCARDIOGRAM REPORT: CPT | Performed by: INTERNAL MEDICINE

## 2021-01-25 PROCEDURE — 87186 SC STD MICRODIL/AGAR DIL: CPT

## 2021-01-25 PROCEDURE — 71045 X-RAY EXAM CHEST 1 VIEW: CPT

## 2021-01-25 PROCEDURE — 84295 ASSAY OF SERUM SODIUM: CPT

## 2021-01-25 PROCEDURE — U0002 COVID-19 LAB TEST NON-CDC: HCPCS

## 2021-01-25 PROCEDURE — 87184 SC STD DISK METHOD PER PLATE: CPT

## 2021-01-25 PROCEDURE — 96365 THER/PROPH/DIAG IV INF INIT: CPT

## 2021-01-25 PROCEDURE — 85025 COMPLETE CBC W/AUTO DIFF WBC: CPT

## 2021-01-25 PROCEDURE — 36415 COLL VENOUS BLD VENIPUNCTURE: CPT

## 2021-01-25 PROCEDURE — 87086 URINE CULTURE/COLONY COUNT: CPT

## 2021-01-25 PROCEDURE — 86140 C-REACTIVE PROTEIN: CPT

## 2021-01-25 PROCEDURE — 2580000003 HC RX 258: Performed by: PHYSICIAN ASSISTANT

## 2021-01-25 PROCEDURE — 6360000004 HC RX CONTRAST MEDICATION: Performed by: EMERGENCY MEDICINE

## 2021-01-25 PROCEDURE — 82728 ASSAY OF FERRITIN: CPT

## 2021-01-25 PROCEDURE — 87077 CULTURE AEROBIC IDENTIFY: CPT

## 2021-01-25 PROCEDURE — 2060000000 HC ICU INTERMEDIATE R&B

## 2021-01-25 PROCEDURE — 93005 ELECTROCARDIOGRAM TRACING: CPT | Performed by: PHYSICIAN ASSISTANT

## 2021-01-25 PROCEDURE — 86850 RBC ANTIBODY SCREEN: CPT

## 2021-01-25 PROCEDURE — 83615 LACTATE (LD) (LDH) ENZYME: CPT

## 2021-01-25 PROCEDURE — 85379 FIBRIN DEGRADATION QUANT: CPT

## 2021-01-25 PROCEDURE — 85384 FIBRINOGEN ACTIVITY: CPT

## 2021-01-25 PROCEDURE — 87040 BLOOD CULTURE FOR BACTERIA: CPT

## 2021-01-25 PROCEDURE — 6370000000 HC RX 637 (ALT 250 FOR IP): Performed by: INTERNAL MEDICINE

## 2021-01-25 PROCEDURE — 99283 EMERGENCY DEPT VISIT LOW MDM: CPT

## 2021-01-25 PROCEDURE — 86901 BLOOD TYPING SEROLOGIC RH(D): CPT

## 2021-01-25 PROCEDURE — 80053 COMPREHEN METABOLIC PANEL: CPT

## 2021-01-25 PROCEDURE — 6360000002 HC RX W HCPCS: Performed by: PHYSICIAN ASSISTANT

## 2021-01-25 PROCEDURE — 71260 CT THORAX DX C+: CPT

## 2021-01-25 PROCEDURE — 84484 ASSAY OF TROPONIN QUANT: CPT

## 2021-01-25 PROCEDURE — 81001 URINALYSIS AUTO W/SCOPE: CPT

## 2021-01-25 PROCEDURE — 2580000003 HC RX 258: Performed by: INTERNAL MEDICINE

## 2021-01-25 PROCEDURE — 83605 ASSAY OF LACTIC ACID: CPT

## 2021-01-25 PROCEDURE — 70450 CT HEAD/BRAIN W/O DYE: CPT

## 2021-01-25 PROCEDURE — 86900 BLOOD TYPING SEROLOGIC ABO: CPT

## 2021-01-25 RX ORDER — NICOTINE POLACRILEX 4 MG
15 LOZENGE BUCCAL PRN
Status: DISCONTINUED | OUTPATIENT
Start: 2021-01-25 | End: 2021-01-28 | Stop reason: HOSPADM

## 2021-01-25 RX ORDER — POTASSIUM CHLORIDE 20 MEQ/1
40 TABLET, EXTENDED RELEASE ORAL PRN
Status: DISCONTINUED | OUTPATIENT
Start: 2021-01-25 | End: 2021-01-28 | Stop reason: HOSPADM

## 2021-01-25 RX ORDER — FAMOTIDINE 20 MG/1
20 TABLET, FILM COATED ORAL 2 TIMES DAILY
Status: DISCONTINUED | OUTPATIENT
Start: 2021-01-25 | End: 2021-01-28 | Stop reason: HOSPADM

## 2021-01-25 RX ORDER — LISINOPRIL AND HYDROCHLOROTHIAZIDE 25; 20 MG/1; MG/1
1 TABLET ORAL DAILY
Status: ON HOLD | COMMUNITY
End: 2021-01-27 | Stop reason: HOSPADM

## 2021-01-25 RX ORDER — AMLODIPINE BESYLATE 5 MG/1
5 TABLET ORAL DAILY
Status: DISCONTINUED | OUTPATIENT
Start: 2021-01-26 | End: 2021-01-28 | Stop reason: HOSPADM

## 2021-01-25 RX ORDER — POTASSIUM CHLORIDE 7.45 MG/ML
10 INJECTION INTRAVENOUS PRN
Status: DISCONTINUED | OUTPATIENT
Start: 2021-01-25 | End: 2021-01-28 | Stop reason: HOSPADM

## 2021-01-25 RX ORDER — LEVOTHYROXINE SODIUM 0.07 MG/1
75 TABLET ORAL DAILY
Status: DISCONTINUED | OUTPATIENT
Start: 2021-01-26 | End: 2021-01-28 | Stop reason: HOSPADM

## 2021-01-25 RX ORDER — ACETAMINOPHEN 325 MG/1
650 TABLET ORAL EVERY 6 HOURS PRN
Status: DISCONTINUED | OUTPATIENT
Start: 2021-01-25 | End: 2021-01-28 | Stop reason: HOSPADM

## 2021-01-25 RX ORDER — DEXTROSE MONOHYDRATE 50 MG/ML
100 INJECTION, SOLUTION INTRAVENOUS PRN
Status: DISCONTINUED | OUTPATIENT
Start: 2021-01-25 | End: 2021-01-28 | Stop reason: HOSPADM

## 2021-01-25 RX ORDER — ONDANSETRON 2 MG/ML
4 INJECTION INTRAMUSCULAR; INTRAVENOUS EVERY 6 HOURS PRN
Status: DISCONTINUED | OUTPATIENT
Start: 2021-01-25 | End: 2021-01-28 | Stop reason: HOSPADM

## 2021-01-25 RX ORDER — INSULIN GLARGINE 100 [IU]/ML
0.15 INJECTION, SOLUTION SUBCUTANEOUS NIGHTLY
Status: DISCONTINUED | OUTPATIENT
Start: 2021-01-25 | End: 2021-01-28 | Stop reason: HOSPADM

## 2021-01-25 RX ORDER — MAGNESIUM SULFATE 1 G/100ML
1000 INJECTION INTRAVENOUS PRN
Status: DISCONTINUED | OUTPATIENT
Start: 2021-01-25 | End: 2021-01-28 | Stop reason: HOSPADM

## 2021-01-25 RX ORDER — ACETAMINOPHEN 650 MG/1
650 SUPPOSITORY RECTAL EVERY 6 HOURS PRN
Status: DISCONTINUED | OUTPATIENT
Start: 2021-01-25 | End: 2021-01-28 | Stop reason: HOSPADM

## 2021-01-25 RX ORDER — SODIUM CHLORIDE 9 MG/ML
INJECTION, SOLUTION INTRAVENOUS CONTINUOUS
Status: DISCONTINUED | OUTPATIENT
Start: 2021-01-25 | End: 2021-01-25

## 2021-01-25 RX ORDER — PROMETHAZINE HYDROCHLORIDE 25 MG/1
12.5 TABLET ORAL EVERY 6 HOURS PRN
Status: DISCONTINUED | OUTPATIENT
Start: 2021-01-25 | End: 2021-01-28 | Stop reason: HOSPADM

## 2021-01-25 RX ORDER — SODIUM CHLORIDE 0.9 % (FLUSH) 0.9 %
10 SYRINGE (ML) INJECTION PRN
Status: DISCONTINUED | OUTPATIENT
Start: 2021-01-25 | End: 2021-01-28 | Stop reason: HOSPADM

## 2021-01-25 RX ORDER — LANOLIN ALCOHOL/MO/W.PET/CERES
3 CREAM (GRAM) TOPICAL NIGHTLY PRN
Status: DISCONTINUED | OUTPATIENT
Start: 2021-01-25 | End: 2021-01-28 | Stop reason: HOSPADM

## 2021-01-25 RX ORDER — 0.9 % SODIUM CHLORIDE 0.9 %
1000 INTRAVENOUS SOLUTION INTRAVENOUS ONCE
Status: COMPLETED | OUTPATIENT
Start: 2021-01-25 | End: 2021-01-25

## 2021-01-25 RX ORDER — DEXTROSE MONOHYDRATE 25 G/50ML
12.5 INJECTION, SOLUTION INTRAVENOUS PRN
Status: DISCONTINUED | OUTPATIENT
Start: 2021-01-25 | End: 2021-01-28 | Stop reason: HOSPADM

## 2021-01-25 RX ADMIN — INSULIN LISPRO 1 UNITS: 100 INJECTION, SOLUTION INTRAVENOUS; SUBCUTANEOUS at 21:08

## 2021-01-25 RX ADMIN — Medication 10 ML: at 21:07

## 2021-01-25 RX ADMIN — CEFTRIAXONE 1000 MG: 1 INJECTION, POWDER, FOR SOLUTION INTRAMUSCULAR; INTRAVENOUS at 13:30

## 2021-01-25 RX ADMIN — INSULIN GLARGINE 14 UNITS: 100 INJECTION, SOLUTION SUBCUTANEOUS at 21:08

## 2021-01-25 RX ADMIN — IOPAMIDOL 75 ML: 755 INJECTION, SOLUTION INTRAVENOUS at 12:25

## 2021-01-25 RX ADMIN — SODIUM CHLORIDE 1000 ML: 9 INJECTION, SOLUTION INTRAVENOUS at 13:30

## 2021-01-25 RX ADMIN — FAMOTIDINE 20 MG: 20 TABLET, FILM COATED ORAL at 21:07

## 2021-01-25 RX ADMIN — METOPROLOL TARTRATE 25 MG: 25 TABLET, FILM COATED ORAL at 21:07

## 2021-01-25 ASSESSMENT — PAIN SCALES - GENERAL
PAINLEVEL_OUTOF10: 0
PAINLEVEL_OUTOF10: 0

## 2021-01-25 NOTE — PLAN OF CARE
Problem: Airway Clearance - Ineffective  Goal: Achieve or maintain patent airway  Outcome: Ongoing     Problem: Gas Exchange - Impaired  Goal: Absence of hypoxia  Outcome: Ongoing  Goal: Promote optimal lung function  Outcome: Ongoing     Problem: Breathing Pattern - Ineffective  Goal: Ability to achieve and maintain a regular respiratory rate  Outcome: Ongoing     Problem:  Body Temperature -  Risk of, Imbalanced  Goal: Ability to maintain a body temperature within defined limits  Outcome: Ongoing  Goal: Will regain or maintain usual level of consciousness  Outcome: Ongoing  Goal: Complications related to the disease process, condition or treatment will be avoided or minimized  Outcome: Ongoing     Problem: Isolation Precautions - Risk of Spread of Infection  Goal: Prevent transmission of infection  Outcome: Ongoing     Problem: Nutrition Deficits  Goal: Optimize nutritional status  Outcome: Ongoing     Problem: Risk for Fluid Volume Deficit  Goal: Maintain normal heart rhythm  Outcome: Ongoing  Goal: Maintain absence of muscle cramping  Outcome: Ongoing  Goal: Maintain normal serum potassium, sodium, calcium, phosphorus, and pH  Outcome: Ongoing     Problem: Loneliness or Risk for Loneliness  Goal: Demonstrate positive use of time alone when socialization is not possible  Outcome: Ongoing     Problem: Fatigue  Goal: Verbalize increase energy and improved vitality  Outcome: Ongoing

## 2021-01-25 NOTE — ED PROVIDER NOTES
Attending Supervisory Note/Shared Visit   I have personally performed a face to face diagnostic evaluation on this patient. I have reviewed the mid-levels findings and agree. History and Exam by me shows her white female no acute distress. She remembers sitting on the toilet. She had a bowel movement. She felt hot and flushed and maybe a little dizzy. The next thing she remembers is waking up on the floor. Her daughters at heard her fall. She did bump the left side of her head. No other injuries or complaints. HEENT: Pupils equal round reactive. Extraocular movements are intact. No facial asymmetry. Neck: Supple, nontender. Heart: Regular rate and rhythm. No murmurs or gallops noted. Lungs: Breath sounds equal bilaterally and clear. Abdomen: Obese, soft, nontender. No masses organomegaly. Neuro: Awake, alert, oriented. Symmetrical reactive pupils. Intact extraocular movements. No facial asymmetry. Symmetrical motor function. EKG: Normal sinus rhythm, rate of 75, no acute ST-T wave changes. Rhythm strip shows a sinus rhythm with a rate of 75, MA interval 172 ms, QRS of 84 ms with no other ectopy as interpreted by me. This compared to an EKG dated 1/23/2019, no significant change noted. Lab reviewed. Covid test positive. H&H is normal.  White blood cell count 26,200. Sodium 120 with potassium 4.0.  BUN of 9 with a creatinine of less than 0.5. Glucose of 119. Liver enzymes are normal.  Mono less than 0.01. Urine nitrite positive, 55 white cells, 7 red cells. CT pulmonary embolism study: No evidence of pulmonary embolism. Mildly enlarged lymphadenopathy in the mediastinum, tone, and upper abdomen along with splenomegaly concerning for lymphoproliferative disorder or chronic inflammatory process. Small patchy groundglass opacities in the peripheral left lower lobe and trace left effusion. Benign-appearing hypoattenuated liver lesions. IV normal saline was initiated.   IV antibiotics were ordered. The hospitalist was consulted for admission.     Diagnosis: Syncope  Hyponatremia  COVID-19 virus infection  Urinary tract infection  Mediastinal lymphadenopathy    Disposition: Admit      (Please note that portions of this note were completed with a voice recognition program.  Efforts were made to edit the dictations but occasionally words are mis-transcribed.)    Lisa Singh MD  Attending Emergency Physician        Kelsey Francis MD  01/25/21 [id number]

## 2021-01-25 NOTE — ED PROVIDER NOTES
(laceration). Negative for difficulty hearing. Respiratory:  Negative for cough, shortness of breath. Cardiovascular:  Negative for chest pain, palpitations. Gastrointestinal:  Negative for nausea, vomiting, abdominal pain. Genitourinary:  Negative for dysuria, hematuria, flank pain, and pelvic pain. Musculoskeletal:  Negative for myalgias, arthralgias, neck pain and neck stiffness. Neurological: Positive for syncope. Negative for focal weakness, numbness, confusion. Except as noted above the remainder of the review of systems was reviewed and negative. PAST MEDICAL HISTORY         Diagnosis Date    Acid reflux     Arthritis     [facility]     Chronic lymphocytic leukemia (CLL) genetic mutation variant ([facility] 2018    [facility]     High blood pressure     Hyperlipidemia     mild-no meds    PONV (postoperative nausea and vomiting)     after hysterectomy-got sick next morning    Thyroid disease     UTI (urinary tract infection) 01/2019    currently on cipro    Wears glasses        SURGICAL HISTORY           Procedure Laterality Date    CHOLECYSTECTOMY      CYST REMOVAL Right     on foot    EYE SURGERY      cataract removal with lens implants    HYSTERECTOMY      JOINT REPLACEMENT Left     hip    JOINT REPLACEMENT Right 01/30/2019      Right Total Knee Replacement    THYROID SURGERY Left     TOTAL KNEE ARTHROPLASTY Right 1/30/2019    RIGHT TOTAL KNEE REPLACEMENT performed by Colin Mendez MD at [address]       Previous Medications    AMLODIPINE (NORVASC) 5 MG TABLET    Take 5 mg by mouth daily    ASCORBIC ACID (VITAMIN C) 500 MG CAPS    Take 1 tablet by mouth daily     CRANBERRY 1000 MG CAPS    Take 1 capsule by mouth 2 times daily. FERROUS SULFATE (IRON) 325 (65 FE) MG TABS    Take 1 tablet by mouth every evening. GARLIC 4412 MG CAPS    Take 1 capsule by mouth 2 times daily.     LACTOBACILLUS (PROBIOTIC ACIDOPHILUS PO)    Take 1 capsule by mouth 2 times daily. LEVOTHYROXINE (SYNTHROID) 75 MCG TABLET    Take 75 mcg by mouth daily     LISINOPRIL-HYDROCHLOROTHIAZIDE (ZESTORETIC) 20-25 MG PER TABLET    Take 1 tablet by mouth daily    METFORMIN (GLUCOPHAGE) 500 MG TABLET    Take 500 mg by mouth 2 times daily (with meals). METOPROLOL TARTRATE (LOPRESSOR) 25 MG TABLET    Take 25 mg by mouth 2 times daily    POTASSIUM CHLORIDE SA (KLOR-CON M10) 10 MEQ TABLET    Take 20 mEq by mouth 2 times daily. RANITIDINE (ZANTAC) 150 MG TABLET    Take 150 mg by mouth 2 times daily. ALLERGIES     Patient has no known allergies. FAMILY HISTORY           Problem Relation Age of Onset    High Blood Pressure Mother     Heart Disease Mother     Cancer Father         acute leukemia    Arthritis Other     Cancer Other     Diabetes Other     Heart Disease Other     High Blood Pressure Other     Stroke Other      Family Status   Relation Name Status    Mother      Father     [place] Other  (Not Specified)    Other  (Not Specified)    Other  (Not Specified)    Other  (Not Specified)    Other  (Not Specified)    Other  (Not Specified)        SOCIAL HISTORY      reports that she has never smoked. She has never used smokeless tobacco. She reports that she does not drink alcohol or use drugs. PHYSICAL EXAM    (up to 7 for level 4, 8 or more for level 5)     ED Triage Vitals [21 1023]   BP Temp Temp Source Pulse Resp SpO2 Height Weight   (!) 163/73 97.6 °F (36.4 °C) Oral 82 20 96 % 5' 6\" (1.676 m) 201 lb 15.1 oz (91.6 kg)       Constitutional:  Appearing well-developed and well-nourished. No distress. HENT:  Normocephalic with small abrasion behind left ear without foreign body, mild edema around abrasion TTP. Cardiovascular:  Normal rate, regular rhythm, normal heart sounds and intact distal pulses. Pulmonary/Chest:  Effort normal and breath sounds normal. No respiratory distress.    Musculoskeletal:  Normal range of motion. No edema exhibited. Abdomen: Soft, non tender without guarding, rebound tenderness. Bowel sounds in all 4 quadrants. Neurological:  Oriented to person, place, and time. No cranial nerve deficit. Skin:  Skin is warm and dry. Not diaphoretic. Psychiatric:  Normal mood, affect, behavior, judgment and thought content. DIAGNOSTIC RESULTS     RADIOLOGY:     Interpretation per the Radiologist below, if available at the time of this note:    CT CHEST PULMONARY EMBOLISM W CONTRAST   Final Result   1. No evidence for acute pulmonary embolism. 2.  Mildly enlarged lymphadenopathy in the mediastinum, tone and upper   abdomen along with splenomegaly is concerning for lymphoproliferative   disorder or chronic inflammatory process. 3.  Small patchy area of ground-glass in the peripheral left lower lobe and   trace left effusion. This may be due to an inflammatory process or possibly   developing infection. 4.  Benign-appearing hypoattenuating liver lesions, favoring cysts. CT Head WO Contrast   Final Result   No acute intracranial abnormality. Opacification of the left mastoid either   due to acute mastoiditis or large mastoid effusion. No acute abnormality the cervical spine. Moderate to severe cervical   spondylosis and facet arthropathy. Small rounded lucency in the right C1   vertebra of uncertain etiology and significance. CT Cervical Spine WO Contrast   Final Result   No acute intracranial abnormality. Opacification of the left mastoid either   due to acute mastoiditis or large mastoid effusion. No acute abnormality the cervical spine. Moderate to severe cervical   spondylosis and facet arthropathy. Small rounded lucency in the right C1   vertebra of uncertain etiology and significance. XR CHEST PORTABLE   Final Result   No acute airspace disease identified.              LABS:  Labs Reviewed   CBC WITH AUTO DIFFERENTIAL - Abnormal; Notable for the following components:       Result Value    WBC 26.2 (*)     Platelets 855 (*)     Lymphocytes Absolute 20.2 (*)     Smudge Cells Present (*)     Anisocytosis Occasional (*)     Ovalocytes Occasional (*)     All other components within normal limits    Narrative:     Performed at:  [address] [employer]   Phone [phone]   COMPREHENSIVE METABOLIC PANEL W/ REFLEX TO MG FOR LOW K - Abnormal; Notable for the following components:    Sodium 120 (*)     Chloride 81 (*)     Glucose 119 (*)     CREATININE <0.5 (*)     All other components within normal limits    Narrative:     Performed at:  [address] [address]   Phone [phone]   URINE RT REFLEX TO CULTURE - Abnormal; Notable for the following components:    Ketones, Urine 15 (*)     Nitrite, Urine POSITIVE (*)     Leukocyte Esterase, Urine SMALL (*)     All other components within normal limits    Narrative:     Performed at:  [address] [facility] [address]   Phone [phone]   COVID-19 - Abnormal; Notable for the following components:    SARS-CoV-2, NAAT DETECTED (*)     All other components within normal limits    Narrative:     Performed at:  [address] [facility] [address]   Phone [phone]   MICROSCOPIC URINALYSIS - Abnormal; Notable for the following components:    Bacteria, UA 3+ (*)     Hyaline Casts, UA 12 (*)     WBC, UA 55 (*)     RBC, UA 7 (*)     All other components within normal limits    Narrative:     Performed at:  [address] [address]   Phone [phone]   CULTURE, URINE   CULTURE, BLOOD 1   CULTURE, BLOOD 2   TROPONIN    Narrative:     Performed at:  [address] [place] [address] [place] [address]   Phone [phone]   LACTATE, SEPSIS    Narrative:     Performed at:  [facility]  [address] [place], [place] [address]   Phone [phone]   LACTATE, SEPSIS   C-REACTIVE PROTEIN   D-DIMER, QUANTITATIVE   FERRITIN   FIBRINOGEN   LACTATE DEHYDROGENASE   PROCALCITONIN   TYPE AND SCREEN       All other labs were within normal range or not returned as of this dictation. EMERGENCY DEPARTMENT COURSE and DIFFERENTIAL DIAGNOSIS/MDM:   Vitals:    Vitals:    01/25/21 1023   BP: (!) 163/73   Pulse: 82   Resp: 20   Temp: 97.6 °F (36.4 °C)   TempSrc: Oral   SpO2: 96%   Weight: 201 lb 15.1 oz (91.6 kg)   Height: 5' 6\" (1.676 m)       The patient's condition in the ED was stable, the patient was afebrile and nontoxic in appearance, and the patient's physical exam was unremarkable. Vital signs were normal.  No neurological deficits on exam.  Patient had no present complaints. Her rapid COVID-19 test returned positive. Laboratory work-up was notable for UTI, a leukocytosis of 26.2, and a serum sodium of only 120. Serum lactate was normal.  She was given IV fluids and antibiotics in the ED. She is in need of hospital admission. I consulted the hospitalist, who agreed to accept and admit the patient. PROCEDURES:  None    FINAL IMPRESSION      1. COVID-19    2. Hyponatremia    3. Urinary tract infection without hematuria, site unspecified    4. Leukocytosis, unspecified type          DISPOSITION/PLAN   DISPOSITION Admitted 01/25/2021 03:43:11 PM      PATIENT REFERRED TO:  No follow-up provider specified.     DISCHARGE MEDICATIONS:  New Prescriptions    No medications on file       (Please note that portions of this note were completed with a voice recognition program.  Efforts were made to edit the dictations but occasionally words are mis-transcribed.)    Mercedes Granger, [address] [place], [place]  01/25/21 7734 [place], [place]  01/25/21 7141

## 2021-01-25 NOTE — H&P
Hospital Medicine History & Physical      Patient:  Mony Brooke  :   1945  MRN:   [id number]  Date of Service: 21    CHIEF COMPLAINT:  syncope    HISTORY OF PRESENT ILLNESS:    Mony Boroke is a 64 y.o. female. She presented to the ER from home today following a syncopal episode in her home bathroom. She said this occurred more or less without warning. She seems to minimize most of her symptoms. She relates she has not felt particularly ill lately. Her appetite is chronically on the low side and this has not changed. She denies rigors, sweats, nausea, vomiting, diarrhea, cough, and dyspnea. She also denies problems with urinary urgency or burning dysuria, but she does report urinary frequency. Review of Systems:  All pertinent positives and negatives are as noted in the HPI section. All other systems were reviewed and are negative. Past Medical History:   Diagnosis Date    Acid reflux     Arthritis     Cancer ([facility]     Chronic lymphocytic leukemia (CLL) genetic mutation variant ([facility] 2018    Diabetes mellitus ([facility]     High blood pressure     Hyperlipidemia     mild-no meds    PONV (postoperative nausea and vomiting)     after hysterectomy-got sick next morning    Thyroid disease     UTI (urinary tract infection) 2019    currently on cipro    Wears glasses        Past Surgical History:   Procedure Laterality Date    CHOLECYSTECTOMY      CYST REMOVAL Right     on foot    EYE SURGERY      cataract removal with lens implants    HYSTERECTOMY      JOINT REPLACEMENT Left     hip    JOINT REPLACEMENT Right 2019      Right Total Knee Replacement    THYROID SURGERY Left     TOTAL KNEE ARTHROPLASTY Right 2019    RIGHT TOTAL KNEE REPLACEMENT performed by Tierney Cabrera MD at [place] [address]         Prior to Admission medications    Medication Sig Start Date End Date Taking?  Authorizing Provider   lisinopril-hydroCHLOROthiazide (ZESTORETIC) 20-25 MG per tablet Take 1 °C) Oral 82 20 96 % 5' 6\" (1.676 m) 201 lb 15.1 oz (91.6 kg)     No intake or output data in the 24 hours ending 01/25/21 1543    Vent Settings:  Room air    GEN:  Appearance:  Pleasant elderly female in NAD . Level of Consciousness:  alert . Orientation:  full    HEENT: Sclera anicteric.  no conjunctival chemosis. moist mucus membranes. no specific or diagnostic oral lesions. NECK:  no signs of meningismus. Jugular veins not distended. Carotid pulses  2+.  no cervical lymphadenopathy. no thyromegaly. CV:  regular rhythm. normal S1 & S2.    3/6 ejection pattern systolic murmur. no rub.  no gallop. PULM:  Chest excursion is symmetric. Breath sounds are vesicular. Adventitious sounds:  none    AB:  Abdominal shape is normal.  Bowel sounds are active. Generally soft to palpation. no tenderness is present. no involuntary guarding. no rebound guarding. EXTR:  Skin is warm. Capillary refill brisk. no specific or pathognomic rash. no clubbing. no pitting edema. no active wound or ulcer. LABS:  Lab Results   Component Value Date    WBC 26.2 (H) 01/25/2021    HGB 12.6 01/25/2021    HCT 36.5 01/25/2021    MCV 89.3 01/25/2021     (L) 01/25/2021     Lab Results   Component Value Date    CREATININE <0.5 (L) 01/25/2021    BUN 9 01/25/2021     (L) 01/25/2021    K 4.0 01/25/2021    CL 81 (L) 01/25/2021    CO2 25 01/25/2021     Lab Results   Component Value Date    ALT 14 01/25/2021    AST 19 01/25/2021    ALKPHOS 102 01/25/2021    BILITOT 0.5 01/25/2021     Lab Results   Component Value Date    TROPONINI <0.01 01/25/2021     No results for input(s): PHART, GZT6PEU, PO2ART in the last 72 hours.     IMAGING:  Ct Head Wo Contrast    Result Date: 1/25/2021  EXAMINATION: CT OF THE HEAD WITHOUT CONTRAST; CT OF THE CERVICAL SPINE WITHOUT CONTRAST 1/25/2021 11:01 am TECHNIQUE: CT of the head was performed without the administration of intravenous contrast. Dose modulation, iterative reconstruction, and/or weight based adjustment of the mA/kV was utilized to reduce the radiation dose to as low as reasonably achievable.; CT of the cervical spine was performed without the administration of intravenous contrast. Multiplanar reformatted images are provided for review. Dose modulation, iterative reconstruction, and/or weight based adjustment of the mA/kV was utilized to reduce the radiation dose to as low as reasonably achievable. COMPARISON: None. HISTORY: ORDERING SYSTEM PROVIDED HISTORY: syncope TECHNOLOGIST PROVIDED HISTORY: Reason for exam:->syncope Has a \"code stroke\" or \"stroke alert\" been called? ->No Decision Support Exception->Emergency Medical Condition (MA) Reason for Exam: loc/head trauma Acuity: Acute Type of Exam: Initial; ORDERING SYSTEM PROVIDED HISTORY: head trauma TECHNOLOGIST PROVIDED HISTORY: Reason for exam:->head trauma Decision Support Exception->Emergency Medical Condition (MA) Reason for Exam: loc/head trauma Acuity: Acute Type of Exam: Initial FINDINGS: BRAIN/VENTRICLES: There is no acute intracranial hemorrhage, mass effect or midline shift. No abnormal extra-axial fluid collection. The gray-white differentiation is maintained without evidence of an acute infarct. There is no evidence of hydrocephalus. ORBITS: The visualized portion of the orbits demonstrate no acute abnormality. SINUSES: The visualized paranasal sinuses demonstrate no acute abnormality. Opacification of the left mastoid is present. Mild chronic bilateral ethmoid sinus disease also present. SOFT TISSUES/SKULL:  No acute abnormality of the visualized skull or soft tissues. CERVICAL SPINE: Bones and alignment: No traumatic malalignment or acute fracture. Small rounded lucency in the right body of C1. Degenerative changes: Moderate interspace narrowing with large endplate bony spurs. Severe facet arthropathy. Moderate spinal stenosis at C4-C5 due to posterior spurring.   Severe spinal stenosis at C5-C6 from suspected chronic central and left central disc protrusion. Paraspinal soft tissues: No paraspinal or epidural hematoma. No acute intracranial abnormality. Opacification of the left mastoid either due to acute mastoiditis or large mastoid effusion. No acute abnormality the cervical spine. Moderate to severe cervical spondylosis and facet arthropathy. Small rounded lucency in the right C1 vertebra of uncertain etiology and significance. Ct Cervical Spine Wo Contrast    Result Date: 1/25/2021  EXAMINATION: CT OF THE HEAD WITHOUT CONTRAST; CT OF THE CERVICAL SPINE WITHOUT CONTRAST 1/25/2021 11:01 am TECHNIQUE: CT of the head was performed without the administration of intravenous contrast. Dose modulation, iterative reconstruction, and/or weight based adjustment of the mA/kV was utilized to reduce the radiation dose to as low as reasonably achievable.; CT of the cervical spine was performed without the administration of intravenous contrast. Multiplanar reformatted images are provided for review. Dose modulation, iterative reconstruction, and/or weight based adjustment of the mA/kV was utilized to reduce the radiation dose to as low as reasonably achievable. COMPARISON: None. HISTORY: ORDERING SYSTEM PROVIDED HISTORY: syncope TECHNOLOGIST PROVIDED HISTORY: Reason for exam:->syncope Has a \"code stroke\" or \"stroke alert\" been called? ->No Decision Support Exception->Emergency Medical Condition (MA) Reason for Exam: loc/head trauma Acuity: Acute Type of Exam: Initial; ORDERING SYSTEM PROVIDED HISTORY: head trauma TECHNOLOGIST PROVIDED HISTORY: Reason for exam:->head trauma Decision Support Exception->Emergency Medical Condition (MA) Reason for Exam: loc/head trauma Acuity: Acute Type of Exam: Initial FINDINGS: BRAIN/VENTRICLES: There is no acute intracranial hemorrhage, mass effect or midline shift. No abnormal extra-axial fluid collection.   The gray-white differentiation is maintained without evidence of an acute infarct. There is no evidence of hydrocephalus. ORBITS: The visualized portion of the orbits demonstrate no acute abnormality. SINUSES: The visualized paranasal sinuses demonstrate no acute abnormality. Opacification of the left mastoid is present. Mild chronic bilateral ethmoid sinus disease also present. SOFT TISSUES/SKULL:  No acute abnormality of the visualized skull or soft tissues. CERVICAL SPINE: Bones and alignment: No traumatic malalignment or acute fracture. Small rounded lucency in the right body of C1. Degenerative changes: Moderate interspace narrowing with large endplate bony spurs. Severe facet arthropathy. Moderate spinal stenosis at C4-C5 due to posterior spurring. Severe spinal stenosis at C5-C6 from suspected chronic central and left central disc protrusion. Paraspinal soft tissues: No paraspinal or epidural hematoma. No acute intracranial abnormality. Opacification of the left mastoid either due to acute mastoiditis or large mastoid effusion. No acute abnormality the cervical spine. Moderate to severe cervical spondylosis and facet arthropathy. Small rounded lucency in the right C1 vertebra of uncertain etiology and significance. Xr Chest Portable    Result Date: 1/25/2021  EXAMINATION: ONE XRAY VIEW OF THE CHEST 1/25/2021 11:00 am COMPARISON: None. HISTORY: ORDERING SYSTEM PROVIDED HISTORY: syncope TECHNOLOGIST PROVIDED HISTORY: Reason for exam:->syncope Reason for Exam: Loss of Consciousness (after using the bathroom, on the toilet still; fell off, hit left side of head on bathtub Acuity: Acute Type of Exam: Initial FINDINGS: The cardiomediastinal silhouette is unchanged in appearance. Calcified atheromatous plaque. Generalized interstitial prominence is noted. There is no consolidation, pneumothorax, or evidence of edema. No effusion is appreciated. The osseous structures are unchanged in appearance.      No acute airspace disease

## 2021-01-25 NOTE — PROGRESS NOTES
Patient to room 4269 via stretcher. Patient is alert, oriented, and steady on her feet with assisttance from a cane. VSS (see flowsheet). Droplet plus precautions in place due to patient being COVID +. Oriented patient to room and call light system. Educated patient on how to order meals and use room telephone. Bed is in lowest position. Call light and room phone are within reach. Will continue to monitor.     Electronically signed by Paola Carter RN on 1/25/2021 at 6:29 PM

## 2021-01-25 NOTE — PROGRESS NOTES
Medication Reconciliation    List of medications patient is currently taking is complete. Source of information: 1. Conversation with patient                                       2. EPIC records      Allergies  Patient has no known allergies. Notes regarding home medications:   1. Patient received all of her morning home medications prior to arrival to the emergency department today.     Cynthia Lloyd PharmD, BCPS  1/25/2021 2:48 PM

## 2021-01-26 LAB
ANION GAP SERPL CALCULATED.3IONS-SCNC: 11 MMOL/L (ref 3–16)
ANION GAP SERPL CALCULATED.3IONS-SCNC: 14 MMOL/L (ref 3–16)
BASOPHILS ABSOLUTE: 0 K/UL (ref 0–0.2)
BASOPHILS RELATIVE PERCENT: 0 %
BUN BLDV-MCNC: 7 MG/DL (ref 7–20)
BUN BLDV-MCNC: 9 MG/DL (ref 7–20)
CALCIUM SERPL-MCNC: 8.7 MG/DL (ref 8.3–10.6)
CALCIUM SERPL-MCNC: 9 MG/DL (ref 8.3–10.6)
CHLORIDE BLD-SCNC: 84 MMOL/L (ref 99–110)
CHLORIDE BLD-SCNC: 87 MMOL/L (ref 99–110)
CO2: 25 MMOL/L (ref 21–32)
CO2: 29 MMOL/L (ref 21–32)
CREAT SERPL-MCNC: 0.8 MG/DL (ref 0.6–1.2)
CREAT SERPL-MCNC: <0.5 MG/DL (ref 0.6–1.2)
EOSINOPHILS ABSOLUTE: 0.2 K/UL (ref 0–0.6)
EOSINOPHILS RELATIVE PERCENT: 1 %
ESTIMATED AVERAGE GLUCOSE: 131.2 MG/DL
GFR AFRICAN AMERICAN: >60
GFR AFRICAN AMERICAN: >60
GFR NON-AFRICAN AMERICAN: >60
GFR NON-AFRICAN AMERICAN: >60
GLUCOSE BLD-MCNC: 103 MG/DL (ref 70–99)
GLUCOSE BLD-MCNC: 116 MG/DL (ref 70–99)
GLUCOSE BLD-MCNC: 152 MG/DL (ref 70–99)
GLUCOSE BLD-MCNC: 85 MG/DL (ref 70–99)
GLUCOSE BLD-MCNC: 88 MG/DL (ref 70–99)
GLUCOSE BLD-MCNC: 95 MG/DL (ref 70–99)
HBA1C MFR BLD: 6.2 %
HCT VFR BLD CALC: 35.8 % (ref 36–48)
HEMATOLOGY PATH CONSULT: NO
HEMATOLOGY PATH CONSULT: NORMAL
HEMOGLOBIN: 12.3 G/DL (ref 12–16)
LYMPHOCYTES ABSOLUTE: 20.9 K/UL (ref 1–5.1)
LYMPHOCYTES RELATIVE PERCENT: 88 %
MAGNESIUM: 1.3 MG/DL (ref 1.8–2.4)
MCH RBC QN AUTO: 31 PG (ref 26–34)
MCHC RBC AUTO-ENTMCNC: 34.2 G/DL (ref 31–36)
MCV RBC AUTO: 90.6 FL (ref 80–100)
MONOCYTES ABSOLUTE: 0.7 K/UL (ref 0–1.3)
MONOCYTES RELATIVE PERCENT: 3 %
NEUTROPHILS ABSOLUTE: 1.9 K/UL (ref 1.7–7.7)
NEUTROPHILS RELATIVE PERCENT: 8 %
OSMOLALITY URINE: 164 MOSM/KG (ref 390–1070)
OSMOLALITY: 256 MOSM/KG (ref 280–301)
PDW BLD-RTO: 13.5 % (ref 12.4–15.4)
PERFORMED ON: ABNORMAL
PERFORMED ON: ABNORMAL
PERFORMED ON: NORMAL
PERFORMED ON: NORMAL
PLATELET # BLD: 90 K/UL (ref 135–450)
PMV BLD AUTO: 8.1 FL (ref 5–10.5)
POTASSIUM SERPL-SCNC: 3.4 MMOL/L (ref 3.5–5.1)
POTASSIUM SERPL-SCNC: 3.7 MMOL/L (ref 3.5–5.1)
RBC # BLD: 3.95 M/UL (ref 4–5.2)
RBC # BLD: NORMAL 10*6/UL
SMUDGE CELLS: PRESENT
SODIUM BLD-SCNC: 123 MMOL/L (ref 136–145)
SODIUM BLD-SCNC: 123 MMOL/L (ref 136–145)
SODIUM BLD-SCNC: 127 MMOL/L (ref 136–145)
SODIUM URINE: 29 MMOL/L
WBC # BLD: 23.8 K/UL (ref 4–11)

## 2021-01-26 PROCEDURE — 84295 ASSAY OF SERUM SODIUM: CPT

## 2021-01-26 PROCEDURE — 2580000003 HC RX 258: Performed by: INTERNAL MEDICINE

## 2021-01-26 PROCEDURE — 6370000000 HC RX 637 (ALT 250 FOR IP): Performed by: INTERNAL MEDICINE

## 2021-01-26 PROCEDURE — 85025 COMPLETE CBC W/AUTO DIFF WBC: CPT

## 2021-01-26 PROCEDURE — 6360000002 HC RX W HCPCS: Performed by: INTERNAL MEDICINE

## 2021-01-26 PROCEDURE — 84300 ASSAY OF URINE SODIUM: CPT

## 2021-01-26 PROCEDURE — 80048 BASIC METABOLIC PNL TOTAL CA: CPT

## 2021-01-26 PROCEDURE — 2060000000 HC ICU INTERMEDIATE R&B

## 2021-01-26 PROCEDURE — 6360000002 HC RX W HCPCS: Performed by: FAMILY MEDICINE

## 2021-01-26 PROCEDURE — 83036 HEMOGLOBIN GLYCOSYLATED A1C: CPT

## 2021-01-26 PROCEDURE — 83935 ASSAY OF URINE OSMOLALITY: CPT

## 2021-01-26 PROCEDURE — 83930 ASSAY OF BLOOD OSMOLALITY: CPT

## 2021-01-26 PROCEDURE — 36415 COLL VENOUS BLD VENIPUNCTURE: CPT

## 2021-01-26 PROCEDURE — 83735 ASSAY OF MAGNESIUM: CPT

## 2021-01-26 PROCEDURE — 94760 N-INVAS EAR/PLS OXIMETRY 1: CPT

## 2021-01-26 RX ADMIN — ENOXAPARIN SODIUM 40 MG: 40 INJECTION SUBCUTANEOUS at 08:59

## 2021-01-26 RX ADMIN — POTASSIUM CHLORIDE 40 MEQ: 1500 TABLET, EXTENDED RELEASE ORAL at 10:59

## 2021-01-26 RX ADMIN — ENOXAPARIN SODIUM 30 MG: 30 INJECTION SUBCUTANEOUS at 20:58

## 2021-01-26 RX ADMIN — METOPROLOL TARTRATE 25 MG: 25 TABLET, FILM COATED ORAL at 20:58

## 2021-01-26 RX ADMIN — Medication 10 ML: at 20:58

## 2021-01-26 RX ADMIN — METOPROLOL TARTRATE 25 MG: 25 TABLET, FILM COATED ORAL at 08:58

## 2021-01-26 RX ADMIN — CEFTRIAXONE 1000 MG: 1 INJECTION, POWDER, FOR SOLUTION INTRAMUSCULAR; INTRAVENOUS at 08:59

## 2021-01-26 RX ADMIN — Medication 10 ML: at 08:59

## 2021-01-26 RX ADMIN — INSULIN GLARGINE 14 UNITS: 100 INJECTION, SOLUTION SUBCUTANEOUS at 20:59

## 2021-01-26 RX ADMIN — FAMOTIDINE 20 MG: 20 TABLET, FILM COATED ORAL at 20:58

## 2021-01-26 RX ADMIN — FAMOTIDINE 20 MG: 20 TABLET, FILM COATED ORAL at 08:58

## 2021-01-26 RX ADMIN — LEVOTHYROXINE SODIUM 75 MCG: 0.07 TABLET ORAL at 08:58

## 2021-01-26 RX ADMIN — AMLODIPINE BESYLATE 5 MG: 5 TABLET ORAL at 08:58

## 2021-01-26 ASSESSMENT — PAIN SCALES - GENERAL: PAINLEVEL_OUTOF10: 0

## 2021-01-26 NOTE — PROGRESS NOTES
Hospitalist Progress Note    CC: <principal problem not specified>      Admit date: 1/25/2021  Days in hospital:  1    Subjective/interval history: Pt S/E. Pt now on room air. She was on 2L O2 overnight. She also states she snores and believes she has sleep apnea. ROS:   Pertinent items are noted in HPI. Objective:    /65   Pulse 78   Temp 97.9 °F (36.6 °C) (Oral)   Resp 16   Ht 5' 6\" (1.676 m)   Wt 203 lb 14.8 oz (92.5 kg)   SpO2 95%   BMI 32.91 kg/m²     Gen: alert, NAD  HEENT: NC/AT, moist mucous membranes, no oropharyngeal erythema or exudate  Neck: supple, trachea midline, no anterior cervical or SC LAD  Heart: Normal s1/s2, RRR, no murmurs, gallops, or rubs. Lungs: clear bilaterally, no wheezing, no rales, no rhonchi, no use of accessory muscles  Abd: bowel sounds present, soft, nontender, nondistended, no masses  Extrem: No clubbing, cyanosis, no edema  Skin: no rashes or lesions  Psych: A & O x3, affect appropriate  Neuro: grossly intact, moves all four extremities spontaneously.   Cap refill: +2 sec    Medications:  Scheduled Meds:   levothyroxine  75 mcg Oral Daily    famotidine  20 mg Oral BID    metoprolol tartrate  25 mg Oral BID    amLODIPine  5 mg Oral Daily    enoxaparin  40 mg Subcutaneous Daily    insulin glargine  0.15 Units/kg Subcutaneous Nightly    insulin lispro  0.05 Units/kg Subcutaneous TID WC    insulin lispro  0-6 Units Subcutaneous TID WC    insulin lispro  0-3 Units Subcutaneous Nightly    cefTRIAXone (ROCEPHIN) IV  1,000 mg Intravenous Q24H       PRN Meds:  glucose, dextrose, glucagon (rDNA), dextrose, sodium chloride flush, potassium chloride **OR** potassium alternative oral replacement **OR** potassium chloride, magnesium sulfate, promethazine **OR** ondansetron, acetaminophen **OR** acetaminophen, melatonin    IV:   dextrose           Intake/Output Summary (Last 24 hours) at 1/26/2021 8994  Last data filed at 1/25/2021 2100  Gross per 24 hour   Intake 360 ml   Output --   Net 360 ml       Results:  CBC:   Recent Labs     01/25/21  1108 01/26/21  0721   WBC 26.2* 23.8*   HGB 12.6 12.3   HCT 36.5 35.8*   MCV 89.3 90.6   * 90*     BMP:   Recent Labs     01/25/21  1108 01/25/21  1108 01/25/21  2144 01/26/21  0250 01/26/21  0721   *   < > 122* 123* 123*   K 4.0  --   --   --  3.4*   CL 81*  --   --   --  84*   CO2 25  --   --   --  25   BUN 9  --   --   --  7   CREATININE <0.5*  --   --   --  <0.5*    < > = values in this interval not displayed. Mag: No results for input(s): MAG in the last 72 hours. Phos: No results found for: PHOS  No components found for: GLU    LIVER PROFILE:   Recent Labs     01/25/21  1108   AST 19   ALT 14   BILITOT 0.5   ALKPHOS 102     PT/INR: No results for input(s): PROTIME, INR in the last 72 hours. APTT: No results for input(s): APTT in the last 72 hours. UA:  Recent Labs     01/25/21  1108   COLORU YELLOW   PHUR 7.0   WBCUA 55*   RBCUA 7*   BACTERIA 3+*   CLARITYU Clear   SPECGRAV 1.015   LEUKOCYTESUR SMALL*   UROBILINOGEN 0.2   BILIRUBINUR Negative   BLOODU Negative   GLUCOSEU Negative       Invalid input(s): ABG  Lab Results   Component Value Date    CALCIUM 8.7 01/26/2021       Assessment:    Active Problems:    2019 novel coronavirus disease (COVID-19)    Hyponatremia    UTI (urinary tract infection)    Syncope    CLL (chronic lymphocytic leukemia) (HCC)  Resolved Problems:    * No resolved hospital problems. [facility] course: a 63 yo female admitted after a syncopal event. Ed work up with + covid 19. Plan:    Hyponatremia 120 -> 123  -  Hold lisinopril/HCTZ - hctz should be stopped at discharge as this is not a good choice of medications in her age group  -  Received 1L NS in the ER. No more ordered. - 1.5L/day free water restriction  - check bmp at 5pm    Syncope  - orthostatic vitals are variable, significantly lower with sitting but not with standing, HR did not change.  This is not likely positive     UTI  - UCx sent  - rocephin day 2     COVID-19  -  Incidental finding. No definite clinical syndrome at this time. No O2 requirement. Direct treatmet not indicated at this time.     CLL  -  Being monitored. She is not significantly anemia nor is she neutropenic. She is mildly thrombocytopenic. Monitor platelet counts while receiving lovenox.     Presumed tamia  - cont O2 qhs here   - Pt is refusing a sleep study or home O2 for night only    Code status:  full  DVT prophylaxis: [] Lovenox  [] SQ Heparin  [] SCDs because of  [] warfarin/oraldirect thrombin inhibitor [] Encourage ambulation      Disposition:  [] Home [] Rehab [] Psych [] SNF  [] LTAC  [] Transfer to ICU  [] Transfer to PCU [] Other: in pt, can dc when Ns normalizes as she does not need O2      Electronically signed by Mikel Wing DO on 1/26/2021 at 9:39 AM

## 2021-01-26 NOTE — ACP (ADVANCE CARE PLANNING)
Advance Care Planning     Advance Care Planning Activator (Inpatient)  Conversation Note      Date of ACP Conversation: 1/25/2021    Conversation Conducted with: Patient with Decision Making Capacity    ACP Activator: [id number] Decision Maker:     Current Designated Health Care Decision Maker:     Primary Decision Maker: Kelton Low [phone]    Secondary Decision Maker: Nataliia Butler Child - [phone]    Supplemental (Other) Decision Maker: Tong Degroot Child - [phone]    Supplemental (Other) Decision Maker: Schwertner Josefina - Child - [phone]    Care Preferences    Ventilation: \"If you were in your present state of health and suddenly became very ill and were unable to breathe on your own, what would your preference be about the use of a ventilator (breathing machine) if it were available to you? \"      Would the patient desire the use of ventilator (breathing machine)?: Yes    \"If your health worsens and it becomes clear that your chance of recovery is unlikely, what would your preference be about the use of a ventilator (breathing machine) if it were available to you? \"     Would the patient desire the use of ventilator (breathing machine)?: No      Resuscitation  \"CPR works best to restart the heart when there is a sudden event, like a heart attack, in someone who is otherwise healthy. Unfortunately, CPR does not typically restart the heart for people who have serious health conditions or who are very sick. \"    \"In the event your heart stopped as a result of an underlying serious health condition, would you want attempts to be made to restart your heart (answer \"yes\" for attempt to resuscitate) or would you prefer a natural death (answer \"no\" for do not attempt to resuscitate)? \" yes       [] Yes   [] No   Educated Patient / Lupe Chinese regarding differences between Advance Directives and portable DNR orders.     Length of ACP Conversation in minutes:

## 2021-01-26 NOTE — PROGRESS NOTES
Pt had several random brief periods of her 02 dropping into the high 60's/low 70's. Upon checking her she was sleeping soundly with occasional long pauses between breaths. I asked her if she has ever been diagnosed with sleep apnea and she was unsure. Placed 02 on her at 2L setting, sat her bed up to 45 degree and pt is resting comfortably. Will continue to monitor.

## 2021-01-26 NOTE — FLOWSHEET NOTE
01/26/21 9741   Encounter Summary   Services provided to: Patient   Referral/Consult From: Nurse; Other    Continue Visiting   (Bible given to RN for pt 1/26 CL)   Complexity of Encounter Low   Length of Encounter 15 minutes   Routine   Type Initial   Assessment   (pt not available)   Electronically signed by May Duenas on 1/26/2021 at 9:08 AM

## 2021-01-26 NOTE — PLAN OF CARE
Problem: Airway Clearance - Ineffective  Goal: Achieve or maintain patent airway  1/26/2021 0751 by Pedrito Singh RN  Outcome: Ongoing     Problem: Gas Exchange - Impaired  Goal: Absence of hypoxia  1/26/2021 0751 by Pedrito Singh RN  Outcome: Ongoing     Problem: Gas Exchange - Impaired  Goal: Promote optimal lung function  1/26/2021 0751 by Pedrito Singh RN  Outcome: Ongoing     Problem: Breathing Pattern - Ineffective  Goal: Ability to achieve and maintain a regular respiratory rate  1/26/2021 0751 by Pedrito Singh RN  Outcome: Ongoing     Problem: Body Temperature -  Risk of, Imbalanced  Goal: Ability to maintain a body temperature within defined limits  1/26/2021 0751 by Pedrito Singh RN  Outcome: Ongoing     Problem: Body Temperature -  Risk of, Imbalanced  Goal: Will regain or maintain usual level of consciousness  1/26/2021 0751 by Pedrito Singh RN  Outcome: Ongoing     Problem:  Body Temperature -  Risk of, Imbalanced  Goal: Complications related to the disease process, condition or treatment will be avoided or minimized  1/26/2021 0751 by Pedrito Singh RN  Outcome: Ongoing     Problem: Isolation Precautions - Risk of Spread of Infection  Goal: Prevent transmission of infection  1/26/2021 0751 by Pedrito Singh RN  Outcome: Ongoing     Problem: Nutrition Deficits  Goal: Optimize nutritional status  1/26/2021 0751 by Pedrito Singh RN  Outcome: Ongoing     Problem: Risk for Fluid Volume Deficit  Goal: Maintain normal heart rhythm  1/26/2021 0751 by Pedrito Singh RN  Outcome: Ongoing     Problem: Fatigue  Goal: Verbalize increase energy and improved vitality  1/26/2021 0751 by Pedrito Singh RN  Outcome: Ongoing     Problem: Patient Education: Go to Patient Education Activity  Goal: Patient/Family Education  1/26/2021 0751 by Pedrito Singh RN  Outcome: Ongoing     Problem: Falls - Risk of:  Goal: Will remain free from falls  Description: Will remain free from falls  1/26/2021 0751 by Pedrito Singh RN  Outcome: Ongoing     Problem: Falls - Risk of:  Goal: Absence of physical injury  Description: Absence of physical injury  1/26/2021 0751 by Lani Gauthier RN  Outcome: Ongoing

## 2021-01-26 NOTE — PROGRESS NOTES
Urine sample collected and sent to lab    Electronically signed by Gifty Barahona RN on 1/26/2021 at 12:01 PM

## 2021-01-26 NOTE — PLAN OF CARE
Problem: Airway Clearance - Ineffective  Goal: Achieve or maintain patent airway  1/26/2021 0420 by Sheri Neely RN  Outcome: Ongoing  1/25/2021 1838 by John Gonzalez RN  Outcome: Ongoing     Problem: Gas Exchange - Impaired  Goal: Absence of hypoxia  1/26/2021 0420 by Sheri Neely RN  Outcome: Ongoing  1/25/2021 1838 by John Gonzalez RN  Outcome: Ongoing  Goal: Promote optimal lung function  1/26/2021 0420 by Sheri Neely RN  Outcome: Ongoing  1/25/2021 1838 by John Gonzalez RN  Outcome: Ongoing     Problem: Breathing Pattern - Ineffective  Goal: Ability to achieve and maintain a regular respiratory rate  1/26/2021 0420 by Sheri Neely RN  Outcome: Ongoing  1/25/2021 1838 by John Gonzalez RN  Outcome: Ongoing     Problem:  Body Temperature -  Risk of, Imbalanced  Goal: Ability to maintain a body temperature within defined limits  1/26/2021 0420 by Sheri Neely RN  Outcome: Ongoing  1/25/2021 1838 by John Gonzalez RN  Outcome: Ongoing  Goal: Will regain or maintain usual level of consciousness  1/26/2021 0420 by Sheri Neely RN  Outcome: Ongoing  1/25/2021 1838 by John Gonzalez RN  Outcome: Ongoing  Goal: Complications related to the disease process, condition or treatment will be avoided or minimized  1/26/2021 0420 by Sheri Neely RN  Outcome: Ongoing  1/25/2021 1838 by John Gonzalez RN  Outcome: Ongoing     Problem: Isolation Precautions - Risk of Spread of Infection  Goal: Prevent transmission of infection  1/26/2021 0420 by Sheri Neely RN  Outcome: Ongoing  1/25/2021 1838 by John Gonzalez RN  Outcome: Ongoing     Problem: Nutrition Deficits  Goal: Optimize nutritional status  1/26/2021 0420 by Sheri Neely RN  Outcome: Ongoing  1/25/2021 1838 by John Gonzalez RN  Outcome: Ongoing     Problem: Risk for Fluid Volume Deficit  Goal: Maintain normal heart rhythm  1/26/2021 0420 by Sheri Neely RN  Outcome: Ongoing  1/25/2021 1838 by John Gonzalez RN  Outcome: Ongoing  Goal: Maintain absence of muscle cramping  1/26/2021 0420 by Holden Brasher RN  Outcome: Ongoing  1/25/2021 1838 by Ovidio South RN  Outcome: Ongoing  Goal: Maintain normal serum potassium, sodium, calcium, phosphorus, and pH  1/26/2021 0420 by Holden Brasher RN  Outcome: Ongoing  1/25/2021 1838 by Ovidio South RN  Outcome: Ongoing     Problem: Loneliness or Risk for Loneliness  Goal: Demonstrate positive use of time alone when socialization is not possible  1/26/2021 0420 by Holden Brasher RN  Outcome: Ongoing  1/25/2021 1838 by Ovidio South RN  Outcome: Ongoing     Problem: Fatigue  Goal: Verbalize increase energy and improved vitality  1/26/2021 0420 by Holden Brasher RN  Outcome: Ongoing  1/25/2021 1838 by Ovidio South RN  Outcome: Ongoing     Problem: Patient Education: Go to Patient Education Activity  Goal: Patient/Family Education  1/26/2021 0420 by Holden Brasher RN  Outcome: Ongoing  1/25/2021 1838 by Ovidio South RN  Outcome: Ongoing     Problem: Falls - Risk of:  Goal: Will remain free from falls  Description: Will remain free from falls  1/26/2021 0420 by Holden Brasher RN  Outcome: Ongoing  1/25/2021 1838 by Ovidio South RN  Outcome: Ongoing  Goal: Absence of physical injury  Description: Absence of physical injury  1/26/2021 0420 by Holden Brasher RN  Outcome: Ongoing  1/25/2021 1838 by Ovidio South RN  Outcome: Ongoing

## 2021-01-26 NOTE — PROGRESS NOTES
Potassium level 3.4 potassium given per PRN order (see MAR)    Electronically signed by Martha Wilson RN on 1/26/2021 at 11:46 AM

## 2021-01-26 NOTE — CARE COORDINATION
INITIAL CASE MANAGEMENT ASSESSMENT    Reviewed chart, met with patient to assess possible discharge needs. Explained Case Management role/services. SW interviewed patient via telephone as she is currently placed in droplet/plus precautions. Living Situation: Patient reports that she resides in a double wide trailer with her , three daughters and 1 dog. There is a ramped entrance and prior to medical admission she had no trouble getting in and out of the property. ADLs: Prior to medical admission patient reports that she was independent. She stated that she doesn't anticipate any needs at discharge. DME:  Prior to medical admission patient reports that she used a cane to ambulate. She is currently on oxygen at bedside and we will continue to monitor until discharge. PT/OT Recs:  Since medical admission patient reports that she has been ambulating without assistance. She stated that she doesn't anticipate any needs at discharge. Active Services: Prior to medical admission patient reports that she used no active services. It should be noted for the record that she ha sa history with [facility] if there are needs. Transportation:  Prior to medical admission patient reports that used a horse and buggy to get back and forth to appointments and errands. She stated that family and/or friends will likely transport her home at discharge. Medications: Patient receives medications from [facility]. At this time she reports no issues with access or affordability. PCP: FAY Epps [phone] (phone) and [phone] (fax number). Last appointment was one week ago. PLAN/COMMENTS:   1) Monitor for home oxygen and therapy needs. SW provided contact information for patient or family to call with any questions. SW will follow and assist as needed. Respectfully submitted,    Kristen OLIVARES, LISW-S  [address] [facility] Worker  [phone]    Electronically signed by BERTHA Land on 1/26/2021 at 9:38 AM

## 2021-01-27 VITALS
SYSTOLIC BLOOD PRESSURE: 133 MMHG | DIASTOLIC BLOOD PRESSURE: 71 MMHG | TEMPERATURE: 98 F | HEIGHT: 66 IN | WEIGHT: 204.81 LBS | OXYGEN SATURATION: 98 % | HEART RATE: 98 BPM | RESPIRATION RATE: 18 BRPM | BODY MASS INDEX: 32.92 KG/M2

## 2021-01-27 LAB
ALBUMIN SERPL-MCNC: 4 G/DL (ref 3.4–5)
ANION GAP SERPL CALCULATED.3IONS-SCNC: 10 MMOL/L (ref 3–16)
ANION GAP SERPL CALCULATED.3IONS-SCNC: 9 MMOL/L (ref 3–16)
BASOPHILS ABSOLUTE: 0.3 K/UL (ref 0–0.2)
BASOPHILS RELATIVE PERCENT: 1.3 %
BUN BLDV-MCNC: 11 MG/DL (ref 7–20)
BUN BLDV-MCNC: 13 MG/DL (ref 7–20)
CALCIUM SERPL-MCNC: 8.6 MG/DL (ref 8.3–10.6)
CALCIUM SERPL-MCNC: 9 MG/DL (ref 8.3–10.6)
CHLORIDE BLD-SCNC: 90 MMOL/L (ref 99–110)
CHLORIDE BLD-SCNC: 93 MMOL/L (ref 99–110)
CO2: 28 MMOL/L (ref 21–32)
CO2: 29 MMOL/L (ref 21–32)
CREAT SERPL-MCNC: 0.6 MG/DL (ref 0.6–1.2)
CREAT SERPL-MCNC: 0.8 MG/DL (ref 0.6–1.2)
EOSINOPHILS ABSOLUTE: 0.1 K/UL (ref 0–0.6)
EOSINOPHILS RELATIVE PERCENT: 0.3 %
GFR AFRICAN AMERICAN: >60
GFR AFRICAN AMERICAN: >60
GFR NON-AFRICAN AMERICAN: >60
GFR NON-AFRICAN AMERICAN: >60
GLUCOSE BLD-MCNC: 109 MG/DL (ref 70–99)
GLUCOSE BLD-MCNC: 124 MG/DL (ref 70–99)
GLUCOSE BLD-MCNC: 83 MG/DL (ref 70–99)
GLUCOSE BLD-MCNC: 88 MG/DL (ref 70–99)
GLUCOSE BLD-MCNC: 96 MG/DL (ref 70–99)
HCT VFR BLD CALC: 37.6 % (ref 36–48)
HEMATOLOGY PATH CONSULT: NO
HEMOGLOBIN: 13.1 G/DL (ref 12–16)
LYMPHOCYTES ABSOLUTE: 19.8 K/UL (ref 1–5.1)
LYMPHOCYTES RELATIVE PERCENT: 84.4 %
MAGNESIUM: 1.6 MG/DL (ref 1.8–2.4)
MCH RBC QN AUTO: 31.4 PG (ref 26–34)
MCHC RBC AUTO-ENTMCNC: 34.8 G/DL (ref 31–36)
MCV RBC AUTO: 90.3 FL (ref 80–100)
MONOCYTES ABSOLUTE: 0.1 K/UL (ref 0–1.3)
MONOCYTES RELATIVE PERCENT: 0.6 %
NEUTROPHILS ABSOLUTE: 3.1 K/UL (ref 1.7–7.7)
NEUTROPHILS RELATIVE PERCENT: 13.4 %
PDW BLD-RTO: 13.7 % (ref 12.4–15.4)
PERFORMED ON: ABNORMAL
PERFORMED ON: NORMAL
PERFORMED ON: NORMAL
PHOSPHORUS: 3.3 MG/DL (ref 2.5–4.9)
PLATELET # BLD: 99 K/UL (ref 135–450)
PMV BLD AUTO: 8.2 FL (ref 5–10.5)
POTASSIUM SERPL-SCNC: 3.7 MMOL/L (ref 3.5–5.1)
POTASSIUM SERPL-SCNC: 3.8 MMOL/L (ref 3.5–5.1)
RBC # BLD: 4.16 M/UL (ref 4–5.2)
SODIUM BLD-SCNC: 129 MMOL/L (ref 136–145)
SODIUM BLD-SCNC: 130 MMOL/L (ref 136–145)
WBC # BLD: 23.5 K/UL (ref 4–11)

## 2021-01-27 PROCEDURE — 6370000000 HC RX 637 (ALT 250 FOR IP): Performed by: INTERNAL MEDICINE

## 2021-01-27 PROCEDURE — 94760 N-INVAS EAR/PLS OXIMETRY 1: CPT

## 2021-01-27 PROCEDURE — 6360000002 HC RX W HCPCS: Performed by: FAMILY MEDICINE

## 2021-01-27 PROCEDURE — 6360000002 HC RX W HCPCS: Performed by: INTERNAL MEDICINE

## 2021-01-27 PROCEDURE — 83735 ASSAY OF MAGNESIUM: CPT

## 2021-01-27 PROCEDURE — 85025 COMPLETE CBC W/AUTO DIFF WBC: CPT

## 2021-01-27 PROCEDURE — 2580000003 HC RX 258: Performed by: INTERNAL MEDICINE

## 2021-01-27 PROCEDURE — 80069 RENAL FUNCTION PANEL: CPT

## 2021-01-27 PROCEDURE — 36415 COLL VENOUS BLD VENIPUNCTURE: CPT

## 2021-01-27 RX ORDER — MAGNESIUM SULFATE IN WATER 40 MG/ML
2000 INJECTION, SOLUTION INTRAVENOUS ONCE
Status: COMPLETED | OUTPATIENT
Start: 2021-01-27 | End: 2021-01-27

## 2021-01-27 RX ORDER — LEVOFLOXACIN 500 MG/1
500 TABLET, FILM COATED ORAL EVERY 24 HOURS
Status: DISCONTINUED | OUTPATIENT
Start: 2021-01-27 | End: 2021-01-28 | Stop reason: HOSPADM

## 2021-01-27 RX ORDER — LEVOFLOXACIN 500 MG/1
500 TABLET, FILM COATED ORAL DAILY
Qty: 5 TABLET | Refills: 0 | Status: SHIPPED | OUTPATIENT
Start: 2021-01-27 | End: 2021-02-01

## 2021-01-27 RX ORDER — SODIUM CHLORIDE 9 MG/ML
INJECTION, SOLUTION INTRAVENOUS CONTINUOUS
Status: DISCONTINUED | OUTPATIENT
Start: 2021-01-27 | End: 2021-01-28 | Stop reason: HOSPADM

## 2021-01-27 RX ORDER — LEVOFLOXACIN 500 MG/1
500 TABLET, FILM COATED ORAL DAILY
Qty: 5 TABLET | Refills: 0 | Status: SHIPPED | OUTPATIENT
Start: 2021-01-27 | End: 2021-01-27 | Stop reason: SDUPTHER

## 2021-01-27 RX ADMIN — MAGNESIUM SULFATE HEPTAHYDRATE 2000 MG: 40 INJECTION, SOLUTION INTRAVENOUS at 12:30

## 2021-01-27 RX ADMIN — LEVOTHYROXINE SODIUM 75 MCG: 0.07 TABLET ORAL at 08:34

## 2021-01-27 RX ADMIN — INSULIN LISPRO 5 UNITS: 100 INJECTION, SOLUTION INTRAVENOUS; SUBCUTANEOUS at 17:57

## 2021-01-27 RX ADMIN — METOPROLOL TARTRATE 25 MG: 25 TABLET, FILM COATED ORAL at 08:34

## 2021-01-27 RX ADMIN — Medication 10 ML: at 08:35

## 2021-01-27 RX ADMIN — FAMOTIDINE 20 MG: 20 TABLET, FILM COATED ORAL at 08:34

## 2021-01-27 RX ADMIN — LEVOFLOXACIN 500 MG: 500 TABLET, FILM COATED ORAL at 14:38

## 2021-01-27 RX ADMIN — AMLODIPINE BESYLATE 5 MG: 5 TABLET ORAL at 08:34

## 2021-01-27 RX ADMIN — CEFTRIAXONE 1000 MG: 1 INJECTION, POWDER, FOR SOLUTION INTRAMUSCULAR; INTRAVENOUS at 08:34

## 2021-01-27 RX ADMIN — ENOXAPARIN SODIUM 30 MG: 30 INJECTION SUBCUTANEOUS at 08:34

## 2021-01-27 RX ADMIN — INSULIN LISPRO 5 UNITS: 100 INJECTION, SOLUTION INTRAVENOUS; SUBCUTANEOUS at 08:30

## 2021-01-27 RX ADMIN — SODIUM CHLORIDE: 9 INJECTION, SOLUTION INTRAVENOUS at 12:38

## 2021-01-27 ASSESSMENT — PAIN SCALES - GENERAL: PAINLEVEL_OUTOF10: 0

## 2021-01-27 NOTE — PLAN OF CARE
Problem: Airway Clearance - Ineffective  Goal: Achieve or maintain patent airway  1/27/2021 1428 by Breana Vaz RN  Outcome: Ongoing   O2 continuously monitored. Problem: Gas Exchange - Impaired  Goal: Absence of hypoxia  1/27/2021 1428 by Breana Vaz RN  Outcome: Ongoing     Problem: Gas Exchange - Impaired  Goal: Promote optimal lung function  1/27/2021 1428 by Breana Vaz RN  Outcome: Ongoing  Educated on deep breathing exercises, ambulating when possible. Problem: Breathing Pattern - Ineffective  Goal: Ability to achieve and maintain a regular respiratory rate  1/27/2021 1428 by Breana Vaz RN  Outcome: Ongoing     Problem: Body Temperature -  Risk of, Imbalanced  Goal: Ability to maintain a body temperature within defined limits  1/27/2021 1428 by Breana Vaz RN  Outcome: Ongoing     Problem: Body Temperature -  Risk of, Imbalanced  Goal: Will regain or maintain usual level of consciousness  1/27/2021 1428 by Breana Vaz RN  Outcome: Ongoing     Problem: Body Temperature -  Risk of, Imbalanced  Goal: Complications related to the disease process, condition or treatment will be avoided or minimized  1/27/2021 1428 by Breana Vaz RN  Outcome: Ongoing   Continual monitoring of progression/recovering signs/symptoms of illness. Problem: Isolation Precautions - Risk of Spread of Infection  Goal: Prevent transmission of infection  1/27/2021 1428 by Breana Vaz RN  Outcome: Ongoing   Educated on infection control measures, wearing mask in hallway. Problem: Nutrition Deficits  Goal: Optimize nutritional status  1/27/2021 1428 by Breana Vaz RN  Outcome: Ongoing   Educated on optimal nutrition promoting healing.     Problem: Risk for Fluid Volume Deficit  Goal: Maintain normal heart rhythm  1/27/2021 1428 by Breana Vaz RN  Outcome: Ongoing     Problem: Risk for Fluid Volume Deficit  Goal: Maintain absence of muscle cramping  1/27/2021 1428 by Breana Vaz RN  Outcome: Ongoing Problem: Risk for Fluid Volume Deficit  Goal: Maintain normal serum potassium, sodium, calcium, phosphorus, and pH  1/27/2021 1428 by Key Ortiz RN  Outcome: Ongoing     Problem: Loneliness or Risk for Loneliness  Goal: Demonstrate positive use of time alone when socialization is not possible  1/27/2021 1428 by Key Ortiz RN  Outcome: Ongoing   Entertainment available, hourly rounding in place. Problem: Fatigue  Goal: Verbalize increase energy and improved vitality  1/27/2021 1428 by Key Ortiz RN  Outcome: Ongoing     Problem: Patient Education: Go to Patient Education Activity  Goal: Patient/Family Education  1/27/2021 1428 by Key Ortiz RN  Outcome: Ongoing     Problem: Falls - Risk of:  Goal: Will remain free from falls  Description: Will remain free from falls  1/27/2021 1428 by Key Ortiz RN  Outcome: Ongoing   Bed in lowest position, call light in reach, nonskid footwear on pt, calls appropriately, independent in room with cane, steady gait, hourly rounding in place.      Problem: Falls - Risk of:  Goal: Absence of physical injury  Description: Absence of physical injury  1/27/2021 1428 by Key Ortiz RN  Outcome: Ongoing

## 2021-01-27 NOTE — DISCHARGE INSTR - DIET

## 2021-01-27 NOTE — PROGRESS NOTES
Discharge paperwork reviewed with pt, pt agrees and understands instructions. IV removed, no complication noted, pt tolerated well. Pt waiting for  from family. Family lives one hour away and are now on their way to pickup pt.  Electronically signed by Victor Manuel Murillo RN on 1/27/2021 at 5:48 PM

## 2021-01-27 NOTE — CARE COORDINATION
Attempted to speak with the patient regarding D/C needs, but her phone was busy. Spoke with her nurse, she reports that the patient is on the phone calling for her family to pick her up. She does not have any D/C needs.   Sandor Loco RN, BSN, Case Management  Phone: [phone]  Electronically signed by Sandor Loco RN on 1/27/2021 at 5:27 PM

## 2021-01-27 NOTE — DISCHARGE SUMMARY
Hospital Medicine Discharge Summary    Patient ID: Gonzalo Sky      Patient's PCP: Noel Betancourt, APRN - CNP    Admit Date: 1/25/2021     Discharge Date:   1/27/2021    Admitting Physician: Blair Matos MD     Discharge Physician: Sukhdev Kunz MD     Discharge Diagnoses: Active Hospital Problems    Diagnosis    2019 novel coronavirus disease (COVID-19) [U07.1]    Hyponatremia [E87.1]    UTI (urinary tract infection) [N39.0]    Syncope [R55]    CLL (chronic lymphocytic leukemia) (HCC) [C91.10]       The patient was seen and examined on day of discharge and this discharge summary is in conjunction with any daily progress note from day of discharge. Hospital Course: 65 yo female admitted after a syncopal event. Ed work up with + covid 19. Hyponatremia.            Hyponatremia 120 -> 123  -  Hold lisinopril/HCTZ - hctz should be stopped at discharge as this is not a good choice of medications in her age group  - Na 130 today - improving steady - suspect HCTZ related hyponatremia - med stopped.        Syncope  - possibly vasovagal event - while on the commode. - no acute events on telemetry. - Hyponatremia as above.        UTI  - UCx sent  - rocephin to PO levaquin for 5 days on discharge.        COVID-19  -  Incidental finding.  No definite clinical syndrome at this time.  No O2 requirement.  Direct treatmet not indicated at this time.     CLL  Follows with Dr Andrew Anderson. WBC mid 20K - this is actually improved compared to her Sept WBC counts. Diff lymphocyte predominant as expected with smudge cells. Platelet count stable.      Presumed tamia  - cont O2 qhs here   - Pt is refusing a sleep study or home O2 for night only             Physical Exam Performed:     /74   Pulse 118   Temp 98.1 °F (36.7 °C) (Oral)   Resp 16   Ht 5' 6\" (1.676 m)   Wt 204 lb 12.9 oz (92.9 kg)   SpO2 98%   BMI 33.06 kg/m²       General appearance:  No apparent distress, appears stated age and cooperative. HEENT:  Normal cephalic, atraumatic without obvious deformity. Pupils equal, round, and reactive to light. Extra ocular muscles intact. Conjunctivae/corneas clear. Neck: Supple, with full range of motion. No jugular venous distention. Trachea midline. Respiratory:  Normal respiratory effort. Clear to auscultation, bilaterally without Rales/Wheezes/Rhonchi. Cardiovascular:  Regular rate and rhythm with normal S1/S2 without murmurs, rubs or gallops. Abdomen: Soft, non-tender, non-distended with normal bowel sounds. Musculoskeletal:  No clubbing, cyanosis or edema bilaterally. Full range of motion without deformity. Skin: Skin color, texture, turgor normal.  No rashes or lesions. Neurologic:  Neurovascularly intact without any focal sensory/motor deficits. Cranial nerves: II-XII intact, grossly non-focal.  Psychiatric:  Alert and oriented, thought content appropriate, normal insight  Capillary Refill: Brisk,< 3 seconds   Peripheral Pulses: +2 palpable, equal bilaterally       Labs: For convenience and continuity at follow-up the following most recent labs are provided:      CBC:    Lab Results   Component Value Date    WBC 23.5 01/27/2021    HGB 13.1 01/27/2021    HCT 37.6 01/27/2021    PLT 99 01/27/2021       Renal:    Lab Results   Component Value Date     01/27/2021    K 3.7 01/27/2021    K 4.0 01/25/2021    CL 93 01/27/2021    CO2 28 01/27/2021    BUN 13 01/27/2021    CREATININE 0.8 01/27/2021    CALCIUM 8.6 01/27/2021    PHOS 3.3 01/27/2021         Significant Diagnostic Studies    Radiology:   CT CHEST PULMONARY EMBOLISM W CONTRAST   Final Result   1. No evidence for acute pulmonary embolism. 2.  Mildly enlarged lymphadenopathy in the mediastinum, tone and upper   abdomen along with splenomegaly is concerning for lymphoproliferative   disorder or chronic inflammatory process.       3.  Small patchy area of ground-glass in the peripheral left lower lobe and   trace left effusion. This may be due to an inflammatory process or possibly   developing infection. 4.  Benign-appearing hypoattenuating liver lesions, favoring cysts. CT Head WO Contrast   Final Result   No acute intracranial abnormality. Opacification of the left mastoid either   due to acute mastoiditis or large mastoid effusion. No acute abnormality the cervical spine. Moderate to severe cervical   spondylosis and facet arthropathy. Small rounded lucency in the right C1   vertebra of uncertain etiology and significance. CT Cervical Spine WO Contrast   Final Result   No acute intracranial abnormality. Opacification of the left mastoid either   due to acute mastoiditis or large mastoid effusion. No acute abnormality the cervical spine. Moderate to severe cervical   spondylosis and facet arthropathy. Small rounded lucency in the right C1   vertebra of uncertain etiology and significance. XR CHEST PORTABLE   Final Result   No acute airspace disease identified. Consults:     None    Disposition:  home    Condition at Discharge: Stable    Discharge Instructions/Follow-up:  PCP 1 week renal panel check. Code Status:  Full Code     Activity: activity as tolerated    Diet: regular diet      Discharge Medications:     Current Discharge Medication List           Details   lisinopril-hydroCHLOROthiazide (ZESTORETIC) 20-25 MG per tablet Take 1 tablet by mouth daily      metoprolol tartrate (LOPRESSOR) 25 MG tablet Take 25 mg by mouth 2 times daily      amLODIPine (NORVASC) 5 MG tablet Take 5 mg by mouth daily      Cranberry 1000 MG CAPS Take 1 capsule by mouth 2 times daily. Qty: 30 capsule, Refills: 1      metFORMIN (GLUCOPHAGE) 500 MG tablet Take 500 mg by mouth 2 times daily (with meals). potassium chloride SA (KLOR-CON M10) 10 MEQ tablet Take 20 mEq by mouth 2 times daily. ranitidine (ZANTAC) 150 MG tablet Take 150 mg by mouth 2 times daily.       Ferrous Sulfate (IRON) 325 (65 FE) MG TABS Take 1 tablet by mouth every evening. Ascorbic Acid (VITAMIN C) 500 MG CAPS Take 1 tablet by mouth daily       Garlic 9158 MG CAPS Take 1 capsule by mouth 2 times daily. Lactobacillus (PROBIOTIC ACIDOPHILUS PO) Take 1 capsule by mouth 2 times daily. levothyroxine (SYNTHROID) 75 MCG tablet Take 75 mcg by mouth daily              Time Spent on discharge is more than 30 minutes in the examination, evaluation, counseling and review of medications and discharge plan. Signed:    Josh Cm MD   1/27/2021      Thank you FAY Hogue - RHEA for the opportunity to be involved in this patient's care. If you have any questions or concerns please feel free to contact me at [phone].

## 2021-01-27 NOTE — PROGRESS NOTES
Physician Progress Note      PATIENT:               Aaron Booker  CSN #:                  [id number]  :                       1945  ADMIT DATE:       2021 10:24 AM  DISCH DATE:  RESPONDING  PROVIDER #:        Deyanira Rivera MD          QUERY TEXT:    Dear Dr. Moulton Nurse,  Patient admitted with Hyponatremia, Syncope and UTI, noted to have(+ Covid,   noted as incidental finding) and CT findings of Small patchy area of   ground-glass in the peripheral left lower lobe. If possible, please document   in progress notes and discharge summary if you are evaluating and/or treating   any of the following: The medical record reflects the following:  Risk Factors: Hx CLL,Arthritis, DM, HTN, HLD  Clinical Indicators:  Presents to ED after syncopal episode, dizzy,   lightheaded, woke up on the bathroom floor, Covid test + detected, CT with   \"Small patchy area of ground-glass in the peripheral left lower lobe and trace   left effusion. WBC=26.2, Snb=690, Procal=0.05, CRP=8.6, BX=538, D-ojgob=292,   H and P notes Covid 19-  Incidental finding. No definite clinical syndrome at   this time. No O2 requirement. Direct treatmet not indicated at this time\"     RN notes brief periods of O2 sat dropping into upper 60s-low 70s, O2   placed on @ 2L at night and PCP notes \" presumed  Treatment: Labs, telemetry, O2 sat monitoring and prn O2  Thank you,  Meghana Kramer RN, [email or web address]  Options provided:  -- This patient has Covid-19 viral pneumonia  -- CT finding of patchy area of ground glass not clinically significant  -- Other - I will add my own diagnosis  -- Disagree - Not applicable / Not valid  -- Disagree - Clinically unable to determine / Unknown  -- Refer to Clinical Documentation Reviewer    PROVIDER RESPONSE TEXT:    CT finding of patchy area of ground glass is not clinically significant. Query created by:  Vilma Babcock on 2021 11:25 AM Electronically signed by:  Deyanira Rivera MD 1/27/2021 5:53 PM

## 2021-01-27 NOTE — DISCHARGE INSTR - COC
Continuity of Care Form    Patient Name: James Godwin   :  1945  MRN:  [id number]    Admit date:  2021  Discharge date:  ***    Code Status Order: Full Code   Advance Directives:   Advance Care Flowsheet Documentation     Date/Time Healthcare Directive Type of Healthcare Directive Copy in [address] Agent's Name Healthcare Agent's Phone Number    [phone]  Yes, patient has an advance directive for healthcare treatment  Durable power of  for health care;Living will  No, copy requested from family  Healthcare power [address]  --          Admitting Physician:  Libertad Jaramillo MD  PCP: FAY Silverman - CNP    Discharging Nurse: [facility] Unit/Room#: I4Q-5720/7735-01  Discharging Unit Phone Number: ***    Emergency Contact:   Extended Emergency Contact Information  Primary Emergency Contact: Macy West of [address] [place] [address] Phone: [phone]  Mobile Phone: [phone]  Relation: Child  Secondary Emergency Contact: Sharona Valiente  Address: DAUGHTER IN [facility] of [address] [place] [address] Phone: [phone]  Relation: Child    Past Surgical History:  Past Surgical History:   Procedure Laterality Date    CHOLECYSTECTOMY      CYST REMOVAL Right     on foot    EYE SURGERY      cataract removal with lens implants    HYSTERECTOMY      JOINT REPLACEMENT Left     hip    JOINT REPLACEMENT Right 2019      Right Total Knee Replacement    THYROID SURGERY Left     TOTAL KNEE ARTHROPLASTY Right 2019    RIGHT TOTAL KNEE REPLACEMENT performed by Marcia Patrick MD at [facility] [address]       Immunization History: There is no immunization history on file for this patient. Active Problems:  Patient Active Problem List   Diagnosis Code    Primary localized osteoarthrosis, lower leg M17.10    Obesity (BMI 30-39. 9) E66.9    Primary localized osteoarthrosis, pelvic region and thigh M16.10    History of total hip replacement Z96.649    H/O total hip arthroplasty, left B78.376    Primary osteoarthritis of left hip M16.12    Arthritis of right knee M17.11    H/O total knee replacement, right 1/30/19 Z96.651    Right hip pain M25.551    2019 novel coronavirus disease (COVID-19) U07.1    Hyponatremia E87.1    UTI (urinary tract infection) N39.0    Syncope R55    CLL (chronic lymphocytic leukemia) ([facility]) C91.10       Isolation/Infection:   Isolation          Droplet Plus        Patient Infection Status     Infection Onset Added Last Indicated Last Indicated By Review Planned Expiration Resolved Resolved By    COVID-19 01/25/21 01/25/21 01/25/21 COVID-19 02/01/21 02/08/21      Resolved    COVID-19 Rule Out 01/25/21 01/25/21 01/25/21 COVID-19 (Ordered)   01/25/21 Rule-Out Test Resulted          Nurse Assessment:  Last Vital Signs: /74   Pulse 118   Temp 98.1 °F (36.7 °C) (Oral)   Resp 16   Ht 5' 6\" (1.676 m)   Wt 204 lb 12.9 oz (92.9 kg)   SpO2 98%   BMI 33.06 kg/m²     Last documented pain score (0-10 scale): Pain Level: 0  Last Weight:   Wt Readings from Last 1 Encounters:   01/27/21 204 lb 12.9 oz (92.9 kg)     Mental Status:  {IP PT MENTAL STATUS:77918}    IV Access:  { KARL IV ACCESS:508963508}    Nursing Mobility/ADLs:  Walking   {[facility] DME ZOOZ:284804967}  Transfer  {P DME USLU:115293643}  Bathing  {[facility] DME KUOF:148292797}  Dressing  {P DME PNRX:484753342}  Toileting  {P DME NDPJ:024503333}  Feeding  {[facility] DME JTHC:588852869}  Med Admin  {P DME VMJQ:451367573}  Med Delivery   { KARL MED Delivery:406186829}    Wound Care Documentation and Therapy:  Incision 04/07/15 Hip Left (Active)   Number of days: 2122        Elimination:  Continence:   · Bowel: {YES / IW:58844}  · Bladder: {YES / WT:19332}  Urinary Catheter: {Urinary Catheter:423721470}   Colostomy/Ileostomy/Ileal Conduit: {YES / SY:13123}       Date of Last BM: ***    Intake/Output Summary (Last 24 hours) at 1/27/2021 1730  Last data filed at 2021 1635  Gross per 24 hour   Intake 1300 ml   Output 800 ml   Net 500 ml     I/O last 3 completed shifts:   In: 12 [P.O.:950]  Out: 800 [Urine:800]    Safety Concerns:     [address] BARAJAS Safety Concerns:688817153}    Impairments/Disabilities:      [address] BARAJAS Impairments/Disabilities:142897703}    Nutrition Therapy:  Current Nutrition Therapy:   [address] KARL Diet List:425462547}    Routes of Feeding: {[facility]P DME Other Feedings:519993900}  Liquids: {Slp liquid thickness:21843}  Daily Fluid Restriction: {CHP DME Yes amt example:849065093}  Last Modified Barium Swallow with Video (Video Swallowing Test): {Done Not Done MMMP:950613589}    Treatments at the Time of Hospital Discharge:   Respiratory Treatments: ***  Oxygen Therapy:  {Therapy; copd oxygen:86931}  Ventilator:    {MH CC Vent NGYB:242369318}    Rehab Therapies: {THERAPEUTIC INTERVENTION:2561621453}  Weight Bearing Status/Restrictions: { CC Weight Bearin}  Other Medical Equipment (for information only, NOT a DME order):  {EQUIPMENT:197270904}  Other Treatments: ***    Patient's personal belongings (please select all that are sent with patient):  {[facility] DME Belongings:692385783}    RN SIGNATURE:  {Esignature:537765241}    CASE MANAGEMENT/SOCIAL WORK SECTION    Inpatient Status Date: ***    Readmission Risk Assessment Score:  Readmission Risk              Risk of Unplanned Readmission:        16           Discharging to Facility/ Agency   · Name:   · Address:  · Phone:  · Fax:    Dialysis Facility (if applicable)   · Name:  · Address:  · Dialysis Schedule:  · Phone:  · Fax:    / signature: {Esignature:472471756}    PHYSICIAN SECTION    Prognosis: {Prognosis:7529337786}    Condition at Discharge: [address] Patient Condition:545572807}    Rehab Potential (if transferring to Rehab): {Prognosis:7572702549}    Recommended Labs or Other Treatments After Discharge: ***    Physician Certification: I certify the above information and transfer of Violette Sloan  is necessary for the continuing treatment of the diagnosis listed and that she requires {Admit to Appropriate Level of Care:71329} for {GREATER/LESS:378771106} 30 days.      Update Admission H&P: {CHP DME Changes in PBPZJ:975655825}    PHYSICIAN SIGNATURE:  {Esignature:435351523}

## 2021-01-28 ENCOUNTER — CARE COORDINATION (OUTPATIENT)
Dept: CASE MANAGEMENT | Age: 76
End: 2021-01-28

## 2021-01-28 LAB
ORGANISM: ABNORMAL
URINE CULTURE, ROUTINE: ABNORMAL

## 2021-01-28 NOTE — CARE COORDINATION
Challenges to be reviewed by the provider   Additional needs identified to be addressed with provider No  none    Discussed COVID-19 related testing which was available at this time. Test results were positive. Patient informed of results, if available? Yes         Method of communication with provider : none    Advance Care Planning:   Does patient have an Advance Directive:  not on file. Was this a readmission? No    Care Transition Nurse (CTN) contacted the patient by telephone to perform post hospital discharge assessment. Verified name and  with patient as identifiers. Provided introduction to self, and explanation of the CTN role. CTN reviewed discharge instructions, medical action plan and red flags with patient who verbalized understanding. Patient given an opportunity to ask questions and does not have any further questions or concerns at this time. Were discharge instructions available to patient? Yes. Reviewed appropriate site of care based on symptoms and resources available to patient including: PCP. The patient agrees to contact the PCP office for questions related to their healthcare. Medication review of new and stopped medications was performed with patient, who verbalizes understanding of administration of home medications. Advised obtaining a 90-day supply of all daily and as-needed medications. Covid Risk Education    Patient has following risk factors of: diabetes. Education provided regarding infection prevention, and signs and symptoms of COVID-19 and when to seek medical attention with patient who verbalized understanding. Discussed exposure protocols and quarantine From CDC: Are you at higher risk for severe illness?   and given an opportunity for questions and concerns. The patient agrees to contact PCP office for questions related to COVID-19.      Patient/family/caregiver given information for [facility] and agrees to enroll no  Patient's preferred e-mail: declines  Patient's preferred phone number: declines    Discussed follow-up appointments. If no appointment was previously scheduled, appointment scheduling offered: pt stated would call tomorrow. Is follow up appointment scheduled within 7 days of discharge? NA  Non-[facility] follow up appointment(s):     Plan for follow-up call in 7-10 days based on severity of symptoms and risk factors. Plan for next call: follow up appointment-with NP  CTN provided contact information for future needs. Writer spoke with patient, Manoj Ma. She stated she feels fine, has no S&S of covid, no sign of a fever. She stated she has cough but she thinks that's from her sinuses. She stated she is urinating without any issues. She stated on fluid restriction because of her low sodium.

## 2021-01-29 LAB
BLOOD CULTURE, ROUTINE: NORMAL
CULTURE, BLOOD 2: NORMAL

## 2021-02-16 ENCOUNTER — CARE COORDINATION (OUTPATIENT)
Dept: CASE MANAGEMENT | Age: 76
End: 2021-02-16

## 2021-02-24 PROBLEM — N39.0 UTI (URINARY TRACT INFECTION): Status: RESOLVED | Noted: 2021-01-25 | Resolved: 2021-02-24

## 2021-06-10 ENCOUNTER — OFFICE VISIT (OUTPATIENT)
Dept: ORTHOPEDIC SURGERY | Age: 76
End: 2021-06-10

## 2021-06-10 VITALS
DIASTOLIC BLOOD PRESSURE: 62 MMHG | BODY MASS INDEX: 32.78 KG/M2 | SYSTOLIC BLOOD PRESSURE: 126 MMHG | WEIGHT: 204 LBS | HEIGHT: 66 IN | HEART RATE: 66 BPM

## 2021-06-10 DIAGNOSIS — Z96.642 H/O TOTAL HIP ARTHROPLASTY, LEFT: ICD-10-CM

## 2021-06-10 DIAGNOSIS — M16.11 PRIMARY OSTEOARTHRITIS OF RIGHT HIP: ICD-10-CM

## 2021-06-10 DIAGNOSIS — Z96.651 H/O TOTAL KNEE REPLACEMENT, RIGHT: Primary | ICD-10-CM

## 2021-06-10 PROCEDURE — 99213 OFFICE O/P EST LOW 20 MIN: CPT | Performed by: PHYSICIAN ASSISTANT

## 2021-06-10 NOTE — PROGRESS NOTES
Subjective:      Patient ID: Rashad Jang is a 64 y.o.  female. Chief Complaint   Patient presents with    Post-Op Check     s/p right TKA 1/30/19 and left RODRIGO 4/7/15        HPI:  Here for annual follow up visit. S/P right knee arthroplasty 1/30/2019. S/P left total hip arthroplasty 4/7/2015. She is having increased right hip pain due to right hip arthritis. Right groin pain 5/10 VAS. Review of Systems:   A 14 point review of systems and history form completed by the patient has been reviewed. This form is scanned in the media tab of the patient's chart under today's date.      Past Medical History:   Diagnosis Date    Acid reflux     Arthritis     [facility]     Chronic lymphocytic leukemia (CLL) genetic mutation variant ([facility] 2018    Diabetes mellitus ([facility]     High blood pressure     Hyperlipidemia     mild-no meds    Multiple drug resistant organism (MDRO) culture positive 01/25/2021    urine    PONV (postoperative nausea and vomiting)     after hysterectomy-got sick next morning    Thyroid disease     UTI (urinary tract infection) 01/2019    currently on cipro    Wears glasses        Family History   Problem Relation Age of Onset    High Blood Pressure Mother     Heart Disease Mother     Cancer Father         acute leukemia    Arthritis Other     Cancer Other     Diabetes Other     Heart Disease Other     High Blood Pressure Other     Stroke Other        Past Surgical History:   Procedure Laterality Date    CHOLECYSTECTOMY      CYST REMOVAL Right     on foot    EYE SURGERY      cataract removal with lens implants    HYSTERECTOMY      JOINT REPLACEMENT Left     hip    JOINT REPLACEMENT Right 01/30/2019      Right Total Knee Replacement    THYROID SURGERY Left     TOTAL KNEE ARTHROPLASTY Right 1/30/2019    RIGHT TOTAL KNEE REPLACEMENT performed by Saravanan Muñoz MD at [address] [place] [address] History     Occupational History    Occupation: Light house work   Tobacco Use    Smoking status: Never Smoker    Smokeless tobacco: Never Used   Vaping Use    Vaping Use: Never used   Substance and Sexual Activity    Alcohol use: No    Drug use: No    Sexual activity: Never       Current Outpatient Medications   Medication Sig Dispense Refill    metoprolol tartrate (LOPRESSOR) 25 MG tablet Take 25 mg by mouth 2 times daily      amLODIPine (NORVASC) 5 MG tablet Take 5 mg by mouth daily      Cranberry 1000 MG CAPS Take 1 capsule by mouth 2 times daily. 30 capsule 1    metFORMIN (GLUCOPHAGE) 500 MG tablet Take 500 mg by mouth 2 times daily (with meals).  potassium chloride SA (KLOR-CON M10) 10 MEQ tablet Take 20 mEq by mouth 2 times daily.  ranitidine (ZANTAC) 150 MG tablet Take 150 mg by mouth 2 times daily.  Ferrous Sulfate (IRON) 325 (65 FE) MG TABS Take 1 tablet by mouth every evening.  Ascorbic Acid (VITAMIN C) 500 MG CAPS Take 1 tablet by mouth daily       Garlic 1588 MG CAPS Take 1 capsule by mouth 2 times daily.  Lactobacillus (PROBIOTIC ACIDOPHILUS PO) Take 1 capsule by mouth 2 times daily.  levothyroxine (SYNTHROID) 75 MCG tablet Take 75 mcg by mouth daily        No current facility-administered medications for this visit. Objective:   She is alert, oriented x 3, pleasant, well nourished, developed and in no acute distress. /62   Pulse 66   Ht 5' 6\" (1.676 m)   Wt 204 lb (92.5 kg)   BMI 32.93 kg/m²      Examination of the right knee: Inspection of the skin demonstrates a well-healed midline incision. Inspection of the soft tissues without significant swelling or erythema. The overall alignment of the knee is neutral.  There is minimal intra-articular effusion. AROM     Extension 0     Flexion  120   There no pain associated with ROM testing. Pes anserine bursa non-tender to palpation. Patellar tendon non-tender to palpation. Quadriceps tendon non-tender to palpation.   Collateral ligaments non-tender to palpation. Popliteal fossa non-tender to palpation. Retro patellar crepitus is not present. There is no instability with varus/valgus stress testing in full extension or 90 degrees of flexion. There is no instability with anterior drawer testing. Extensor Mechanism is intact. Examination of the left hip:  No pain with active ROM. No pain with passive ROM. No instability, crepitus with ROM. No pain with weight bearing. Gait is normal.      Examination of the right hip shows:  No discoloration, wounds or gross deformity. Palpation of the greater trochanter/bursa-nontender. Palpation of the anterior superior iliac spine-nontender. Palpation of the ischial tuberosity-nontender. Palpation over the sacroiliac joint-nontender. ROM:  -Flexion 125                      (Nml 120-135 degrees)  -Extension 10                  (Nml 20-30 degrees)  -Abduction 30                  (Nml 40-50 degrees)  -Internal rotation 20         (Nml 30 degrees)  -External rotation 40        (Nml 50 degrees)    FADIR test (labral tear or DEION) negative. FLOR test ( aka Hieu\"s test) negative. Stinchfeld resisted hip flexion test positive. Mainor's test negative. Log roll test negative. Gait ( Nml, Antalgic, Trendelenberg)-mildly antalgic on the right only. Examination of the lower extremities are intact with sensation to light touch. Motor testing  5/5 in all major motor groups of the lower extremities. SLR negative. Examination of the lower extremities shows intact perfusion to all extremities. No cyanosis. Digits are warm to touch, capillary refill is less than 2 seconds. There is mild edema noted. Examination of the skin over both lower extremities reveals: The skin to be intact without lacerations or abrasions. No significant erythema. No rashes or skin lesions. X Rays: was performed in the office today:   AP Standing, Lateral and Sunrise Right Knee:  There is a right cemented total knee arthroplasty present. The alignment is satisfactory. There are no signs of failure or loosening. AP Pelvis, AP and Frog Leg Lateral Right and Left Hip:   There is a left total hip arthroplasty present. The alignment is satisfactory. There are no signs of failure, dislocation or loosening. Moderate degenerative changes right hip noted with joint space narrowing. Diagnosis:        ICD-10-CM    1. H/O total knee replacement, right  Z96.651 XR KNEE RIGHT (3 VIEWS)   2. H/O total hip arthroplasty, left  Z96.642 XR HIP 3-4 VW W PELVIS BILATERAL   3. Primary osteoarthritis of right hip  M16.11 XR HIP 3-4 VW W PELVIS BILATERAL          Assessment/ Plan:     Assessment:    Clinically and radiographically stable right knee and left hip arthroplasties. Right hip arthritis with mild to moderate disability. Not interested in hip arthroplasty at this time. Plan:    Weight loss, activity modification, home exercise therapy program, NSAID'S, dietary changes have been discussed as a means to help control the symptoms. Non surgical options including cortisone injections were discussed. Surgical option, hip arthroplasty discussed. Medications-Tylenol as needed pain    PT-   A home exercise program was instructed today including ROM exercises and strengthening exercises. The patient verbalized understanding of these exercises as well as the importance of the exercise program to promote return of normal function. If pain intensifies or other problems arise you are to notify the office. Follow up-annually with x-rays and clinical exam of the right knee, left hip. As needed for the right hip. Call or return to clinic if these symptoms worsen or fail to improve as anticipated.

## 2021-08-24 ENCOUNTER — OFFICE VISIT (OUTPATIENT)
Dept: ENT CLINIC | Age: 76
End: 2021-08-24

## 2021-08-24 ENCOUNTER — HOSPITAL ENCOUNTER (OUTPATIENT)
Dept: CT IMAGING | Age: 76
Discharge: HOME | End: 2021-08-24

## 2021-08-24 VITALS
WEIGHT: 202 LBS | DIASTOLIC BLOOD PRESSURE: 80 MMHG | BODY MASS INDEX: 32.47 KG/M2 | HEIGHT: 66 IN | SYSTOLIC BLOOD PRESSURE: 142 MMHG

## 2021-08-24 DIAGNOSIS — J31.0 CHRONIC RHINITIS: ICD-10-CM

## 2021-08-24 DIAGNOSIS — R59.0 CERVICAL ADENOPATHY: ICD-10-CM

## 2021-08-24 DIAGNOSIS — U07.1 2019 NOVEL CORONAVIRUS DISEASE (COVID-19): ICD-10-CM

## 2021-08-24 DIAGNOSIS — J39.2 NASOPHARYNGEAL MASS: ICD-10-CM

## 2021-08-24 DIAGNOSIS — E87.1 HYPONATREMIA: ICD-10-CM

## 2021-08-24 DIAGNOSIS — J34.2 DEVIATED NASAL SEPTUM: ICD-10-CM

## 2021-08-24 DIAGNOSIS — J34.3 HYPERTROPHY OF BOTH INFERIOR NASAL TURBINATES: ICD-10-CM

## 2021-08-24 DIAGNOSIS — C91.10 CLL (CHRONIC LYMPHOCYTIC LEUKEMIA) (HCC): ICD-10-CM

## 2021-08-24 DIAGNOSIS — J39.2 NASOPHARYNGEAL MASS: Primary | ICD-10-CM

## 2021-08-24 LAB
GFR AFRICAN AMERICAN: >60
GFR NON-AFRICAN AMERICAN: >60
PERFORMED ON: NORMAL
POC CREATININE: 0.7 MG/DL (ref 0.6–1.2)
POC SAMPLE TYPE: NORMAL

## 2021-08-24 PROCEDURE — 6360000004 HC RX CONTRAST MEDICATION: Performed by: STUDENT IN AN ORGANIZED HEALTH CARE EDUCATION/TRAINING PROGRAM

## 2021-08-24 PROCEDURE — 70487 CT MAXILLOFACIAL W/DYE: CPT

## 2021-08-24 PROCEDURE — 31231 NASAL ENDOSCOPY DX: CPT | Performed by: STUDENT IN AN ORGANIZED HEALTH CARE EDUCATION/TRAINING PROGRAM

## 2021-08-24 PROCEDURE — 82565 ASSAY OF CREATININE: CPT

## 2021-08-24 PROCEDURE — 70491 CT SOFT TISSUE NECK W/DYE: CPT

## 2021-08-24 PROCEDURE — 99204 OFFICE O/P NEW MOD 45 MIN: CPT | Performed by: STUDENT IN AN ORGANIZED HEALTH CARE EDUCATION/TRAINING PROGRAM

## 2021-08-24 RX ADMIN — IOPAMIDOL 75 ML: 755 INJECTION, SOLUTION INTRAVENOUS at 14:49

## 2021-08-25 NOTE — PROGRESS NOTES
[facility] NECK SURGERY  CONSULT      Enid Putnam (:  1945) is a 64 y.o. female, here for evaluation of the following chief complaint(s):  Sinus Problem      ASSESSMENT/PLAN:  1. Nasopharyngeal mass  -     CT SOFT TISSUE NECK W CONTRAST; Future  -     CT SINUS W CONTRAST; Future  2. 2019 novel coronavirus disease (COVID-19)  3. Hyponatremia  4. CLL (chronic lymphocytic leukemia) (HCC)  5. Cervical adenopathy  6. Chronic rhinitis  7. Deviated nasal septum  8. Hypertrophy of both inferior nasal turbinates      This is a very pleasant 64 y.o. female here today for evaluation of the the above-noted complaints. On exam, the patient has evidence of significant fullness of her nasopharynx which extends down into the oropharynx. This is causing her significant nasal obstruction and also causing her middle ear effusions by blocking the eustachian tubes. Based on its appearance, I am certainly concerned about a primary nasopharyngeal malignancy, lymphoma or perhaps an oropharyngeal malignancy which is extended up into her nasopharynx. I obtained a a CT scan of her neck and sinuses with contrast.  This showed evidence of fullness of the bilateral fossa of Jamie Lyle, bilateral mastoid effusions left greater than right, retropharyngeal lymphadenopathy as well as bilateral anterior posterior cervical adenopathy. There is no erosion or clearly defined mass, but rather diffuse enlargement. We discussed different treatment options, and I think the best thing for her going forward is to perform a nasal endoscopy with biopsy and possible direct laryngoscopy with biopsy. If these are negative for malignancy, then we may consider cervical lymph node biopsy to see if this is related to her CLL or perhaps a new lymphoma.     The risk benefits and alternatives to surgery were discussed with the patient in detail, including but not limited to the risk of bleeding, infection, scarring, the need for repeat interventions, weakness of sensory or motor nerves of the neck, injury to the teeth lips or gums and other rare potential complications. Also understands that anesthesia is in of itself a separate risk and will be discussed separately on the day of surgery. The patient was counseled at length about the risks of brooke Covid-19 during their perioperative period and any recovery window from their procedure. The patient was made aware that brooke Covid-19  may worsen their prognosis for recovering from their procedure  and lend to a higher morbidity and/or mortality risk. All material risks, benefits, and reasonable alternatives including postponing the procedure were discussed. The patient does wish to proceed with the procedure at this time. SUBJECTIVE/OBJECTIVE:  HPI    Jasvir Eng is here today for evaluation of issues related not being able to breathe through her nose. This is been going on since at least memorial day but maybe before that. She gets constant nasal drainage which is clear. She has been treated with antibiotics with no significant improvement. She is now having some difficulty swallowing and this is affecting her speech. She does not have any pain. She is having difficulty hearing out of her left ear. REVIEW OF SYSTEMS  The following systems were reviewed and revealed the following in addition to any already discussed in the HPI:    PHYSICAL EXAM    GENERAL: No acute distress, alert and oriented, no hoarseness, hypernasal voice  EYES: EOMI, Anti-icteric  HENT:   Head: Normocephalic and atraumatic.    Face:  Symmetric, facial nerve intact, no sinus tenderness  Right Ear: Normal external ear, normal external auditory canal, intact tympanic membrane with normal mobility and aerated middle ear  Left Ear: Normal external ear, normal external auditory canal, intact tympanic membrane with normal mobility and aerated middle ear  Mouth/Oral Cavity: normal lips, Uvula is midline, no mucosal lesions, no trismus, normal dentition, normal salivary quality/flow  Oropharynx/Larynx:  normal oropharynx, significantly enlarged bulky tissue of the bilateral oropharynx   Nose:Normal external nasal appearance. Anterior rhinoscopy shows  a deviated septum preventing view posteriorly. Hypertrophy of turbinates. Normal mucosa   NECK: Normal range of motion, no thyromegaly, trachea is midline, no lymphadenopathy, no neck masses, no crepitus  CHEST: Normal respiratory effort, no retractions, breathing comfortably  SKIN: No rashes, normal appearing skin, no evidence of skin lesions/tumors  Neuro:  cranial nerve II-XII intact; normal gait  Cardio:  no edema    Left middle ear effusion, difficult to appreciate any cervical neck adenopathy due to body habitus. PROCEDURE  Nasal Endoscopy (CPT code 17758)    Preop: chronic rhinitis  Postop: Same    Verbal consent was received. After topical anesthesia and decongestion had been obtained using aerosolized 1% lidocaine and oxymetazoline, a 45 degree rigid endoscope was placed into both nares with the patient in a sitting position.  The following was observed:    Right Nasal Cavity and Paranasal Sinuses:  Polyp score = 0 (0 = no polyps, 1 = small polyps in middle meatus not reaching below the inferior border of the middle kathi, 2 = polyps reaching below the middle border of the middle turbinate, 3= large polyps reaching the lower border of the inferior turbinate or polyps medial to the middle kathi, 4= large polyps causing almost complete congestion/obstruction of the interior meatus)  Edema score = 1 (0 = absent, 1 = mild, 2 = severe)  Discharge score = 1 (0 = no discharge, 1 = clear thin discharge, 2 = thick purulent discharge)    Left Nasal Cavity and Paranasal Sinuses:    Polyp score = 0 (0 = no polyps, 1 = small polyps in middle meatus not reaching below the inferior border of the middle kathi, 2 = polyps reaching below the middle border of the middle turbinate, 3= large polyps reaching the lower border of the inferior turbinate or polyps medial to the middle kathi, 4= large polyps causing almost complete congestion/obstruction of the interior meatus)  Edema score = 1 (0 = absent, 1 = mild, 2 = severe)  Discharge score = 1 (0 = no discharge, 1 = clear thin discharge, 2 = thick purulent discharge)    Septum: intact and deviated   Other:   -The inferior and middle turbinates were examined. The middle meatus, and sphenoethmoid recess was examined bilaterally.    -There is evidence of a large submucosal mass filling the nasopharynx and extending down into the oropharynx. This impinges upon the bilateral eustachian tubes. -There were no complications. Tolerated well without complication. I attest that I was present for and did the entire procedure myself. This note was generated completely or in part utilizing Dragon dictation speech recognition software. Occasionally, words are mistranscribed and despite editing, the text may contain inaccuracies due to incorrect word recognition. If further clarification is needed please contact the office at [phone]. An electronic signature was used to authenticate this note.     --Melvina Jarquin MD

## 2021-08-30 ENCOUNTER — TELEPHONE (OUTPATIENT)
Dept: ENT CLINIC | Age: 76
End: 2021-08-30

## 2021-09-08 NOTE — PROGRESS NOTES
[facility] time_0600___________        Surgery time_0730___________    Take the following medications with a sip of water: Follow your MD/Surgeons pre-procedure instructions regarding your medications    Do not eat or drink anything after 12:00 midnight prior to your surgery. This includes water chewing gum, mints and ice chips. You may brush your teeth and gargle the morning of your surgery, but do not swallow the water     Please see your family doctor/pediatrician for a history and physical and/or concerning medications. Bring any test results/reports from your physicians office. If you are under the care of a heart doctor or specialist doctor, please be aware that you may be asked to them for clearance    You may be asked to stop blood thinners such as Coumadin, Plavix, Fragmin, Lovenox, etc., or any anti-inflammatories such as:  Aspirin, Ibuprofen, Advil, Naproxen prior to your surgery. We also ask that you stop any OTC medications such as fish oil, vitamin E, glucosamine, garlic, Multivitamins, COQ 10, etc. MAY TAKE TYLENOL    We ask that you do not smoke 24 hours prior to surgery  We ask that you do not  drink any alcoholic beverages 24 hours prior to surgery     You must make arrangements for a responsible adult to take you home after your surgery. For your safety you will not be allowed to leave alone or drive yourself home. Your surgery will be cancelled if you do not have a ride home. Also for your safety, it is strongly suggested that someone stay with you the first 24 hours after your surgery. A parent or legal guardian must accompany a child scheduled for surgery and plan to stay at the hospital until the child is discharged. Please do not bring other children with you. For your comfort, please wear simple loose fitting clothing to the hospital.  Please do not bring valuables.     Do not wear any make-up or nail polish on your fingers or toes      For your safety, please do not wear any jewelry or body piercing's on the day of surgery. All jewelry must be removed. If you have dentures, they will be removed before going to operating room. For your convenience, we will provide you with a container. If you wear contact lenses or glasses, they will be removed, please bring a case for them. If you have a living will and a durable power of  for healthcare, please bring in a copy. As part of our patient safety program to minimize surgical site infections, we ask you to do the following:    · Please notify your surgeon if you develop any illness between         now and the  day of your surgery. · This includes a cough, cold, fever, sore throat, nausea,         or vomiting, and diarrhea, etc.  ·  Please notify your surgeon if you experience dizziness, shortness         of breath or blurred vision between now and the time of your surgery. Do not shave your operative site 96 hours prior to surgery. For face and neck surgery, men may use an electric razor 48 hours   prior to surgery. You may shower the night before surgery or the morning of   your surgery with an antibacterial soap. You will need to bring a photo ID and insurance card    [facility] has an onsite pharmacy, would you like to utilize our pharmacy     If you will be staying overnight and use a C-pap machine, please bring   your C-pap to hospital     Our goal is to provide you with excellent care, therefore, visitors will be limited to two(2) in the room at a time so that we may focus on providing this care for you. Please contact pre-admission testing if you have any further questions. [facility] phone number:  [phone] PAT fax number:  [phone]  Please note these are generalized instructions for all surgical cases, you may be provided with more specific instructions according to your surgery.     Follow your MD/Surgeons pre-procedure instructions regarding your medicationsSAFETY FIRST. .call before you fall

## 2021-09-08 NOTE — PROGRESS NOTES
C-Difficile admission screening and protocol:       * Admitted with diarrhea? [] YES    [x]  NO     *Prior history of C-Diff. In last 3 months? [] YES    [x]  NO     *Antibiotic use in the past 6-8 weeks? [x]  NO    []  YES                 If yes, which ANTIBIOTIC AND REASON______     *Prior hospitalization or nursing home in the last month?  []  YES    [x]  NO

## 2021-09-09 ENCOUNTER — TELEPHONE (OUTPATIENT)
Dept: ENT CLINIC | Age: 76
End: 2021-09-09

## 2021-09-09 ENCOUNTER — ANESTHESIA EVENT (OUTPATIENT)
Dept: OPERATING ROOM | Age: 76
End: 2021-09-09

## 2021-09-09 ASSESSMENT — LIFESTYLE VARIABLES: SMOKING_STATUS: 0

## 2021-09-09 NOTE — ANESTHESIA PRE PROCEDURE
Department of Anesthesiology  Preprocedure Note       Name:  John Harding   Age:  64 y.o.  :  1945                                          MRN:  [id number]         Date:  9/10/2021      Surgeon: Shayla Grier):  Nina Landeros MD    Procedure: NASAL ENDOSCOPY WITH BIOPSY (N/A )  DIRECT LARYNGOSCOPY WITH BIOPSY (N/A )    Medications prior to admission:   Prior to Admission medications    Medication Sig Start Date End Date Taking? Authorizing Provider   metoprolol tartrate (LOPRESSOR) 25 MG tablet Take 50 mg by mouth 2 times daily    Yes Historical Provider, MD   amLODIPine (NORVASC) 5 MG tablet Take 5 mg by mouth daily   Yes Historical Provider, MD   Cranberry 1000 MG CAPS Take 1 capsule by mouth 2 times daily. 4/8/15  Yes FAY Uribe - CNP   metFORMIN (GLUCOPHAGE) 500 MG tablet Take 500 mg by mouth 2 times daily (with meals). Yes Historical Provider, MD   potassium chloride SA (KLOR-CON M10) 10 MEQ tablet Take 10 mEq by mouth daily    Yes Historical Provider, MD   Ferrous Sulfate (IRON) 325 (65 FE) MG TABS Take 1 tablet by mouth every evening. Yes Historical Provider, MD   Ascorbic Acid (VITAMIN C) 500 MG CAPS Take 1 tablet by mouth daily    Yes Historical Provider, MD   levothyroxine (SYNTHROID) 75 MCG tablet Take 75 mcg by mouth daily  14  Yes Historical Provider, MD   Lactobacillus (PROBIOTIC ACIDOPHILUS PO) Take 1 capsule by mouth 2 times daily.     Historical Provider, MD       Current medications:    Current Facility-Administered Medications   Medication Dose Route Frequency Provider Last Rate Last Admin    ceFAZolin (ANCEF) 2000 mg in dextrose 5 % 100 mL IVPB  2,000 mg IntraVENous Once Nina Landeros MD        0.9 % sodium chloride infusion   IntraVENous Continuous Henriette Meckel, MD        sodium chloride flush 0.9 % injection 10 mL  10 mL IntraVENous 2 times per day Henriette Meckel, MD        sodium chloride flush 0.9 % injection 10 mL  10 mL IntraVENous PRN Henriette Meckel, MD Dorena Catalan Social History     Tobacco Use    Smoking status: Never Smoker    Smokeless tobacco: Never Used   Substance Use Topics    Alcohol use: No                                Counseling given: Not Answered      Vital Signs (Current):   Vitals:    09/08/21 1612 09/10/21 0553   BP:  (!) 184/67   Pulse:  83   Resp:  17   Temp:  97.3 °F (36.3 °C)   TempSrc:  Temporal   SpO2:  98%   Weight: 196 lb (88.9 kg) 196 lb (88.9 kg)   Height: 5' 6\" (1.676 m) 5' 6\" (1.676 m)                                              BP Readings from Last 3 Encounters:   09/10/21 (!) 184/67   08/24/21 (!) 142/80   06/10/21 126/62       NPO Status: Time of last liquid consumption: [id number], CRANBERRY JUICE, 4 OZ., SEE MAR FOR AM MEDS                        Time of last solid consumption: 2300                        Date of last liquid consumption: 09/09/21                        Date of last solid food consumption: 09/09/21    BMI:   Wt Readings from Last 3 Encounters:   09/10/21 196 lb (88.9 kg)   08/24/21 202 lb (91.6 kg)   06/10/21 204 lb (92.5 kg)     Body mass index is 31.64 kg/m².     CBC:   Lab Results   Component Value Date    WBC 29.3 09/10/2021    RBC 4.08 09/10/2021    HGB 12.5 09/10/2021    HCT 36.9 09/10/2021    MCV 90.4 09/10/2021    RDW 14.3 09/10/2021     09/10/2021       CMP:   Lab Results   Component Value Date     08/24/2021    K 5.5 08/24/2021    K 4.0 01/25/2021    CL 93 08/24/2021    CO2 27 08/24/2021    BUN 9 08/24/2021    CREATININE 0.7 08/24/2021    CREATININE 0.8 08/24/2021    GFRAA >60 08/24/2021    AGRATIO 2.0 01/25/2021    LABGLOM >60 08/24/2021    GLUCOSE 103 08/24/2021    PROT 6.5 01/25/2021    CALCIUM 9.2 08/24/2021    BILITOT 0.5 01/25/2021    ALKPHOS 102 01/25/2021    AST 19 01/25/2021    ALT 14 01/25/2021       POC Tests:   Recent Labs     09/10/21  0624   POCGLU 125*       Coags:   Lab Results   Component Value Date    PROTIME 14.2 04/09/2015    INR 1.30 04/09/2015    APTT 29.8 03/31/2015       HCG (If Applicable): No results found for: PREGTESTUR, PREGSERUM, HCG, HCGQUANT     ABGs: No results found for: PHART, PO2ART, SNC9GDB, VJJ2GIC, BEART, Y2TOMBWY     Type & Screen (If Applicable):  No results found for: LABABO, [address]    Anesthesia Evaluation  Patient summary reviewed   history of anesthetic complications (partially controlled in past with scopolamine patch): PONV. Airway: Mallampati: II  TM distance: >3 FB   Neck ROM: full  Comment: Prior intubation:  Placement date 01/30/19; Placement time 1346; Preoxygenation Yes; Technique Direct laryngoscopy, Stylet; Type Cuffed; Tube size 7 mm; Laryngoscope Mac; Blade size 3; Location Oral; Grade view 1; Insertion attempts 1; Atraumatic Yes    Severe nasal congestion, unable to breath through nose  Mouth opening: > = 3 FB Dental:    (+) upper dentures, lower dentures and edentulous  Comment: Patient to remove dentures prior to OR    Pulmonary:Negative Pulmonary ROS breath sounds clear to auscultation      (-) COPD, asthma, sleep apnea, rhonchi, wheezes, rales and not a current smoker                           Cardiovascular:  Exercise tolerance: no interval change,   (+) hypertension:, murmur, peripheral edema (+1/+2), hyperlipidemia    (-) pacemaker, past MI, CAD, CABG/stent and dysrhythmias    ECG reviewed  Rhythm: regular  Rate: normal           Beta Blocker:  Dose within 24 Hrs        PE comment: Hypertensive   Neuro/Psych:   Negative Neuro/Psych ROS     (-) seizures, TIA and CVA            ROS comment: CT Cervical Spine  No acute intracranial abnormality.  Opacification of the left mastoid either due to acute mastoiditis or large mastoid effusion.     No acute abnormality the cervical spine.  Moderate to severe cervical spondylosis and facet arthropathy.  Small rounded lucency in the right C1 vertebra of uncertain etiology and significance.  GI/Hepatic/Renal:   (+) GERD:,      (-) liver disease and no renal disease       Endo/Other:    (+) DiabetesType II DM, , hypothyroidism (synthroid): arthritis: OA., malignancy/cancer (CLL). (-) hyperthyroidism                ROS comment: CT Sinuses  Fullness of the fossa of Rosenmuller and adenoids bilaterally left greater than right.  This is causing bilateral mastoid effusions left greater than right questionable prominent retropharyngeal lymph nodes identified. No  dominant mass identified.  However, given the mastoid effusions and adenopathy, further evaluation with direct visual inspection is recommended. Bilateral anterior and left posterior chain changes cervical adenopathy in superior mediastinum adenopathy consistent with known diagnosis of CLL. Covid-19 + 1/2021 Abdominal:   (+) obese,     Abdomen: soft. Vascular: negative vascular ROS. Other Findings: Alopecia noted. Anesthesia Plan      general     ASA 3     (Plan scopolamine patch and emend.)  Induction: intravenous. MIPS: Postoperative opioids intended and Prophylactic antiemetics administered. Anesthetic plan and risks discussed with patient. Use of blood products discussed with patient whom consented to blood products. Plan discussed with CRNA. This pre-anesthesia assessment may be used as a history and physical.    DOS STAFF ADDENDUM:    Pt seen and examined, chart reviewed (including anesthesia, drug and allergy history). No interval changes to history and physical examination. Anesthetic plan, risks, benefits, alternatives, and personnel involved discussed with patient. Patient verbalized an understanding and agrees to proceed.       Roger Ren MD  September 10, 2021  6:58 AM

## 2021-09-10 ENCOUNTER — ANESTHESIA (OUTPATIENT)
Dept: OPERATING ROOM | Age: 76
End: 2021-09-10

## 2021-09-10 ENCOUNTER — HOSPITAL ENCOUNTER (OUTPATIENT)
Age: 76
Setting detail: OUTPATIENT SURGERY
Discharge: HOME | End: 2021-09-10
Attending: STUDENT IN AN ORGANIZED HEALTH CARE EDUCATION/TRAINING PROGRAM | Admitting: STUDENT IN AN ORGANIZED HEALTH CARE EDUCATION/TRAINING PROGRAM

## 2021-09-10 VITALS
HEIGHT: 66 IN | HEART RATE: 78 BPM | SYSTOLIC BLOOD PRESSURE: 131 MMHG | OXYGEN SATURATION: 98 % | RESPIRATION RATE: 18 BRPM | TEMPERATURE: 97 F | WEIGHT: 196 LBS | DIASTOLIC BLOOD PRESSURE: 64 MMHG | BODY MASS INDEX: 31.5 KG/M2

## 2021-09-10 VITALS
RESPIRATION RATE: 12 BRPM | SYSTOLIC BLOOD PRESSURE: 117 MMHG | TEMPERATURE: 98.6 F | DIASTOLIC BLOOD PRESSURE: 54 MMHG | OXYGEN SATURATION: 100 %

## 2021-09-10 DIAGNOSIS — J39.2 NASOPHARYNGEAL MASS: ICD-10-CM

## 2021-09-10 DIAGNOSIS — J39.2 OROPHARYNGEAL MASS: ICD-10-CM

## 2021-09-10 LAB
ANION GAP SERPL CALCULATED.3IONS-SCNC: 13 MMOL/L (ref 3–16)
BUN BLDV-MCNC: 9 MG/DL (ref 7–20)
CALCIUM SERPL-MCNC: 8.9 MG/DL (ref 8.3–10.6)
CHLORIDE BLD-SCNC: 87 MMOL/L (ref 99–110)
CO2: 24 MMOL/L (ref 21–32)
CREAT SERPL-MCNC: <0.5 MG/DL (ref 0.6–1.2)
GFR AFRICAN AMERICAN: >60
GFR NON-AFRICAN AMERICAN: >60
GLUCOSE BLD-MCNC: 112 MG/DL (ref 70–99)
GLUCOSE BLD-MCNC: 121 MG/DL (ref 70–99)
GLUCOSE BLD-MCNC: 125 MG/DL (ref 70–99)
HCT VFR BLD CALC: 36.9 % (ref 36–48)
HEMOGLOBIN: 12.5 G/DL (ref 12–16)
MCH RBC QN AUTO: 30.6 PG (ref 26–34)
MCHC RBC AUTO-ENTMCNC: 33.9 G/DL (ref 31–36)
MCV RBC AUTO: 90.4 FL (ref 80–100)
PDW BLD-RTO: 14.3 % (ref 12.4–15.4)
PERFORMED ON: ABNORMAL
PERFORMED ON: ABNORMAL
PLATELET # BLD: 123 K/UL (ref 135–450)
PMV BLD AUTO: 6.1 FL (ref 5–10.5)
POTASSIUM REFLEX MAGNESIUM: 5.5 MMOL/L (ref 3.5–5.1)
RBC # BLD: 4.08 M/UL (ref 4–5.2)
SODIUM BLD-SCNC: 124 MMOL/L (ref 136–145)
WBC # BLD: 29.3 K/UL (ref 4–11)

## 2021-09-10 PROCEDURE — 6360000002 HC RX W HCPCS: Performed by: STUDENT IN AN ORGANIZED HEALTH CARE EDUCATION/TRAINING PROGRAM

## 2021-09-10 PROCEDURE — 7100000011 HC PHASE II RECOVERY - ADDTL 15 MIN: Performed by: STUDENT IN AN ORGANIZED HEALTH CARE EDUCATION/TRAINING PROGRAM

## 2021-09-10 PROCEDURE — 3600000014 HC SURGERY LEVEL 4 ADDTL 15MIN: Performed by: STUDENT IN AN ORGANIZED HEALTH CARE EDUCATION/TRAINING PROGRAM

## 2021-09-10 PROCEDURE — 88305 TISSUE EXAM BY PATHOLOGIST: CPT

## 2021-09-10 PROCEDURE — 2580000003 HC RX 258: Performed by: ANESTHESIOLOGY

## 2021-09-10 PROCEDURE — 88342 IMHCHEM/IMCYTCHM 1ST ANTB: CPT

## 2021-09-10 PROCEDURE — 88184 FLOWCYTOMETRY/ TC 1 MARKER: CPT

## 2021-09-10 PROCEDURE — 6360000002 HC RX W HCPCS: Performed by: NURSE ANESTHETIST, CERTIFIED REGISTERED

## 2021-09-10 PROCEDURE — 7100000001 HC PACU RECOVERY - ADDTL 15 MIN: Performed by: STUDENT IN AN ORGANIZED HEALTH CARE EDUCATION/TRAINING PROGRAM

## 2021-09-10 PROCEDURE — 7100000000 HC PACU RECOVERY - FIRST 15 MIN: Performed by: STUDENT IN AN ORGANIZED HEALTH CARE EDUCATION/TRAINING PROGRAM

## 2021-09-10 PROCEDURE — 2580000003 HC RX 258: Performed by: STUDENT IN AN ORGANIZED HEALTH CARE EDUCATION/TRAINING PROGRAM

## 2021-09-10 PROCEDURE — 3700000001 HC ADD 15 MINUTES (ANESTHESIA): Performed by: STUDENT IN AN ORGANIZED HEALTH CARE EDUCATION/TRAINING PROGRAM

## 2021-09-10 PROCEDURE — 3600000004 HC SURGERY LEVEL 4 BASE: Performed by: STUDENT IN AN ORGANIZED HEALTH CARE EDUCATION/TRAINING PROGRAM

## 2021-09-10 PROCEDURE — 2709999900 HC NON-CHARGEABLE SUPPLY: Performed by: STUDENT IN AN ORGANIZED HEALTH CARE EDUCATION/TRAINING PROGRAM

## 2021-09-10 PROCEDURE — 7100000010 HC PHASE II RECOVERY - FIRST 15 MIN: Performed by: STUDENT IN AN ORGANIZED HEALTH CARE EDUCATION/TRAINING PROGRAM

## 2021-09-10 PROCEDURE — 6370000000 HC RX 637 (ALT 250 FOR IP): Performed by: STUDENT IN AN ORGANIZED HEALTH CARE EDUCATION/TRAINING PROGRAM

## 2021-09-10 PROCEDURE — 31535 LARYNGOSCOPY W/BIOPSY: CPT | Performed by: STUDENT IN AN ORGANIZED HEALTH CARE EDUCATION/TRAINING PROGRAM

## 2021-09-10 PROCEDURE — 88331 PATH CONSLTJ SURG 1 BLK 1SPC: CPT

## 2021-09-10 PROCEDURE — 2500000003 HC RX 250 WO HCPCS: Performed by: NURSE ANESTHETIST, CERTIFIED REGISTERED

## 2021-09-10 PROCEDURE — 85027 COMPLETE CBC AUTOMATED: CPT

## 2021-09-10 PROCEDURE — 3700000000 HC ANESTHESIA ATTENDED CARE: Performed by: STUDENT IN AN ORGANIZED HEALTH CARE EDUCATION/TRAINING PROGRAM

## 2021-09-10 PROCEDURE — 31237 NSL/SINS NDSC SURG BX POLYPC: CPT | Performed by: STUDENT IN AN ORGANIZED HEALTH CARE EDUCATION/TRAINING PROGRAM

## 2021-09-10 PROCEDURE — 30930 THER FX NASAL INF TURBINATE: CPT | Performed by: STUDENT IN AN ORGANIZED HEALTH CARE EDUCATION/TRAINING PROGRAM

## 2021-09-10 PROCEDURE — 6370000000 HC RX 637 (ALT 250 FOR IP): Performed by: NURSE ANESTHETIST, CERTIFIED REGISTERED

## 2021-09-10 PROCEDURE — 80048 BASIC METABOLIC PNL TOTAL CA: CPT

## 2021-09-10 PROCEDURE — 88185 FLOWCYTOMETRY/TC ADD-ON: CPT

## 2021-09-10 PROCEDURE — 88341 IMHCHEM/IMCYTCHM EA ADD ANTB: CPT

## 2021-09-10 RX ORDER — PROCHLORPERAZINE EDISYLATE 5 MG/ML
5 INJECTION INTRAMUSCULAR; INTRAVENOUS
Status: CANCELLED | OUTPATIENT
Start: 2021-09-10 | End: 2021-09-10

## 2021-09-10 RX ORDER — DEXAMETHASONE SODIUM PHOSPHATE 4 MG/ML
INJECTION, SOLUTION INTRA-ARTICULAR; INTRALESIONAL; INTRAMUSCULAR; INTRAVENOUS; SOFT TISSUE PRN
Status: DISCONTINUED | OUTPATIENT
Start: 2021-09-10 | End: 2021-09-10 | Stop reason: SDUPTHER

## 2021-09-10 RX ORDER — MAGNESIUM HYDROXIDE 1200 MG/15ML
LIQUID ORAL CONTINUOUS PRN
Status: COMPLETED | OUTPATIENT
Start: 2021-09-10 | End: 2021-09-10

## 2021-09-10 RX ORDER — ONDANSETRON 2 MG/ML
4 INJECTION INTRAMUSCULAR; INTRAVENOUS
Status: CANCELLED | OUTPATIENT
Start: 2021-09-10 | End: 2021-09-10

## 2021-09-10 RX ORDER — PROPOFOL 10 MG/ML
INJECTION, EMULSION INTRAVENOUS PRN
Status: DISCONTINUED | OUTPATIENT
Start: 2021-09-10 | End: 2021-09-10 | Stop reason: SDUPTHER

## 2021-09-10 RX ORDER — FENTANYL CITRATE 50 UG/ML
50 INJECTION, SOLUTION INTRAMUSCULAR; INTRAVENOUS EVERY 5 MIN PRN
Status: CANCELLED | OUTPATIENT
Start: 2021-09-10

## 2021-09-10 RX ORDER — APREPITANT 40 MG/1
40 CAPSULE ORAL ONCE
Status: COMPLETED | OUTPATIENT
Start: 2021-09-10 | End: 2021-09-10

## 2021-09-10 RX ORDER — ROCURONIUM BROMIDE 10 MG/ML
INJECTION, SOLUTION INTRAVENOUS PRN
Status: DISCONTINUED | OUTPATIENT
Start: 2021-09-10 | End: 2021-09-10 | Stop reason: SDUPTHER

## 2021-09-10 RX ORDER — LIDOCAINE HYDROCHLORIDE 40 MG/ML
SOLUTION TOPICAL PRN
Status: DISCONTINUED | OUTPATIENT
Start: 2021-09-10 | End: 2021-09-10 | Stop reason: SDUPTHER

## 2021-09-10 RX ORDER — LABETALOL HYDROCHLORIDE 5 MG/ML
5 INJECTION, SOLUTION INTRAVENOUS EVERY 10 MIN PRN
Status: DISCONTINUED | OUTPATIENT
Start: 2021-09-10 | End: 2021-09-10 | Stop reason: HOSPADM

## 2021-09-10 RX ORDER — SUCCINYLCHOLINE/SOD CL,ISO/PF 200MG/10ML
SYRINGE (ML) INTRAVENOUS PRN
Status: DISCONTINUED | OUTPATIENT
Start: 2021-09-10 | End: 2021-09-10 | Stop reason: SDUPTHER

## 2021-09-10 RX ORDER — FENTANYL CITRATE 50 UG/ML
25 INJECTION, SOLUTION INTRAMUSCULAR; INTRAVENOUS EVERY 5 MIN PRN
Status: CANCELLED | OUTPATIENT
Start: 2021-09-10

## 2021-09-10 RX ORDER — LIDOCAINE HYDROCHLORIDE 20 MG/ML
INJECTION, SOLUTION EPIDURAL; INFILTRATION; INTRACAUDAL; PERINEURAL PRN
Status: DISCONTINUED | OUTPATIENT
Start: 2021-09-10 | End: 2021-09-10 | Stop reason: SDUPTHER

## 2021-09-10 RX ORDER — EPINEPHRINE 1 MG/ML
INJECTION, SOLUTION, CONCENTRATE INTRAVENOUS
Status: COMPLETED | OUTPATIENT
Start: 2021-09-10 | End: 2021-09-10

## 2021-09-10 RX ORDER — HYDRALAZINE HYDROCHLORIDE 20 MG/ML
5 INJECTION INTRAMUSCULAR; INTRAVENOUS EVERY 10 MIN PRN
Status: DISCONTINUED | OUTPATIENT
Start: 2021-09-10 | End: 2021-09-10 | Stop reason: HOSPADM

## 2021-09-10 RX ORDER — ONDANSETRON 2 MG/ML
INJECTION INTRAMUSCULAR; INTRAVENOUS PRN
Status: DISCONTINUED | OUTPATIENT
Start: 2021-09-10 | End: 2021-09-10 | Stop reason: SDUPTHER

## 2021-09-10 RX ORDER — GLYCOPYRROLATE 0.2 MG/ML
INJECTION INTRAMUSCULAR; INTRAVENOUS PRN
Status: DISCONTINUED | OUTPATIENT
Start: 2021-09-10 | End: 2021-09-10 | Stop reason: SDUPTHER

## 2021-09-10 RX ORDER — FENTANYL CITRATE 50 UG/ML
INJECTION, SOLUTION INTRAMUSCULAR; INTRAVENOUS PRN
Status: DISCONTINUED | OUTPATIENT
Start: 2021-09-10 | End: 2021-09-10 | Stop reason: SDUPTHER

## 2021-09-10 RX ORDER — SCOLOPAMINE TRANSDERMAL SYSTEM 1 MG/1
1 PATCH, EXTENDED RELEASE TRANSDERMAL ONCE
Status: DISCONTINUED | OUTPATIENT
Start: 2021-09-10 | End: 2021-09-10 | Stop reason: HOSPADM

## 2021-09-10 RX ORDER — SODIUM CHLORIDE 9 MG/ML
INJECTION, SOLUTION INTRAVENOUS CONTINUOUS
Status: DISCONTINUED | OUTPATIENT
Start: 2021-09-10 | End: 2021-09-10 | Stop reason: HOSPADM

## 2021-09-10 RX ORDER — SODIUM CHLORIDE 9 MG/ML
25 INJECTION, SOLUTION INTRAVENOUS PRN
Status: DISCONTINUED | OUTPATIENT
Start: 2021-09-10 | End: 2021-09-10 | Stop reason: HOSPADM

## 2021-09-10 RX ORDER — SODIUM CHLORIDE 0.9 % (FLUSH) 0.9 %
10 SYRINGE (ML) INJECTION EVERY 12 HOURS SCHEDULED
Status: DISCONTINUED | OUTPATIENT
Start: 2021-09-10 | End: 2021-09-10 | Stop reason: HOSPADM

## 2021-09-10 RX ORDER — PHENYLEPHRINE HCL IN 0.9% NACL 1 MG/10 ML
SYRINGE (ML) INTRAVENOUS PRN
Status: DISCONTINUED | OUTPATIENT
Start: 2021-09-10 | End: 2021-09-10 | Stop reason: SDUPTHER

## 2021-09-10 RX ORDER — SODIUM CHLORIDE 0.9 % (FLUSH) 0.9 %
10 SYRINGE (ML) INJECTION PRN
Status: DISCONTINUED | OUTPATIENT
Start: 2021-09-10 | End: 2021-09-10 | Stop reason: HOSPADM

## 2021-09-10 RX ADMIN — DEXAMETHASONE SODIUM PHOSPHATE 10 MG: 4 INJECTION, SOLUTION INTRAMUSCULAR; INTRAVENOUS at 07:52

## 2021-09-10 RX ADMIN — LIDOCAINE HYDROCHLORIDE 4 ML: 40 SOLUTION TOPICAL at 07:41

## 2021-09-10 RX ADMIN — FENTANYL CITRATE 100 MCG: 50 INJECTION INTRAMUSCULAR; INTRAVENOUS at 07:37

## 2021-09-10 RX ADMIN — ROCURONIUM BROMIDE 25 MG: 10 INJECTION INTRAVENOUS at 07:50

## 2021-09-10 RX ADMIN — ONDANSETRON 4 MG: 2 INJECTION INTRAMUSCULAR; INTRAVENOUS at 08:34

## 2021-09-10 RX ADMIN — PROPOFOL 40 MG: 10 INJECTION, EMULSION INTRAVENOUS at 07:40

## 2021-09-10 RX ADMIN — PROPOFOL 60 MG: 10 INJECTION, EMULSION INTRAVENOUS at 08:20

## 2021-09-10 RX ADMIN — PROPOFOL 100 MG: 10 INJECTION, EMULSION INTRAVENOUS at 07:39

## 2021-09-10 RX ADMIN — LIDOCAINE HYDROCHLORIDE 80 MG: 20 INJECTION, SOLUTION EPIDURAL; INFILTRATION; INTRACAUDAL; PERINEURAL at 07:39

## 2021-09-10 RX ADMIN — GLYCOPYRROLATE 0.2 MG: 0.2 INJECTION, SOLUTION INTRAMUSCULAR; INTRAVENOUS at 07:52

## 2021-09-10 RX ADMIN — Medication 100 MCG: at 08:20

## 2021-09-10 RX ADMIN — CEFAZOLIN 2000 MG: 10 INJECTION, POWDER, FOR SOLUTION INTRAVENOUS at 07:31

## 2021-09-10 RX ADMIN — APREPITANT 40 MG: 40 CAPSULE ORAL at 07:12

## 2021-09-10 RX ADMIN — SUGAMMADEX 200 MG: 100 INJECTION, SOLUTION INTRAVENOUS at 08:34

## 2021-09-10 RX ADMIN — Medication 140 MG: at 07:40

## 2021-09-10 RX ADMIN — ROCURONIUM BROMIDE 5 MG: 10 INJECTION INTRAVENOUS at 07:39

## 2021-09-10 RX ADMIN — Medication 100 MCG: at 08:08

## 2021-09-10 RX ADMIN — SODIUM CHLORIDE: 9 INJECTION, SOLUTION INTRAVENOUS at 07:16

## 2021-09-10 RX ADMIN — Medication 150 MCG: at 07:54

## 2021-09-10 RX ADMIN — Medication 150 MCG: at 07:47

## 2021-09-10 RX ADMIN — Medication 100 MCG: at 08:31

## 2021-09-10 ASSESSMENT — PULMONARY FUNCTION TESTS
PIF_VALUE: 17
PIF_VALUE: 20
PIF_VALUE: 15
PIF_VALUE: 19
PIF_VALUE: 17
PIF_VALUE: 20
PIF_VALUE: 20
PIF_VALUE: 17
PIF_VALUE: 17
PIF_VALUE: 0
PIF_VALUE: 15
PIF_VALUE: 18
PIF_VALUE: 17
PIF_VALUE: 1
PIF_VALUE: 17
PIF_VALUE: 19
PIF_VALUE: 18
PIF_VALUE: 18
PIF_VALUE: 1
PIF_VALUE: 19
PIF_VALUE: 6
PIF_VALUE: 20
PIF_VALUE: 1
PIF_VALUE: 17
PIF_VALUE: 5
PIF_VALUE: 17
PIF_VALUE: 1
PIF_VALUE: 19
PIF_VALUE: 7
PIF_VALUE: 2
PIF_VALUE: 19
PIF_VALUE: 19
PIF_VALUE: 17
PIF_VALUE: 20
PIF_VALUE: 18
PIF_VALUE: 20
PIF_VALUE: 20
PIF_VALUE: 17
PIF_VALUE: 17
PIF_VALUE: 4
PIF_VALUE: 16
PIF_VALUE: 17
PIF_VALUE: 19
PIF_VALUE: 17
PIF_VALUE: 17
PIF_VALUE: 19
PIF_VALUE: 17
PIF_VALUE: 17
PIF_VALUE: 18
PIF_VALUE: 17
PIF_VALUE: 18
PIF_VALUE: 17
PIF_VALUE: 0
PIF_VALUE: 18
PIF_VALUE: 6
PIF_VALUE: 17
PIF_VALUE: 1
PIF_VALUE: 2
PIF_VALUE: 23
PIF_VALUE: 1
PIF_VALUE: 1
PIF_VALUE: 17
PIF_VALUE: 0
PIF_VALUE: 1
PIF_VALUE: 20
PIF_VALUE: 15
PIF_VALUE: 2
PIF_VALUE: 20
PIF_VALUE: 20
PIF_VALUE: 4
PIF_VALUE: 19
PIF_VALUE: 17

## 2021-09-10 ASSESSMENT — PAIN DESCRIPTION - PROGRESSION: CLINICAL_PROGRESSION: GRADUALLY WORSENING

## 2021-09-10 ASSESSMENT — PAIN - FUNCTIONAL ASSESSMENT
PAIN_FUNCTIONAL_ASSESSMENT: 0-10
PAIN_FUNCTIONAL_ASSESSMENT: ACTIVITIES ARE NOT PREVENTED

## 2021-09-10 ASSESSMENT — PAIN SCALES - GENERAL
PAINLEVEL_OUTOF10: 3
PAINLEVEL_OUTOF10: 0

## 2021-09-10 ASSESSMENT — PAIN DESCRIPTION - ORIENTATION: ORIENTATION: ANTERIOR

## 2021-09-10 ASSESSMENT — PAIN DESCRIPTION - DESCRIPTORS: DESCRIPTORS: ACHING

## 2021-09-10 ASSESSMENT — PAIN DESCRIPTION - LOCATION: LOCATION: HEAD

## 2021-09-10 ASSESSMENT — PAIN DESCRIPTION - FREQUENCY: FREQUENCY: CONTINUOUS

## 2021-09-10 ASSESSMENT — PAIN DESCRIPTION - PAIN TYPE: TYPE: SURGICAL PAIN

## 2021-09-10 ASSESSMENT — PAIN DESCRIPTION - ONSET: ONSET: ON-GOING

## 2021-09-10 NOTE — PROGRESS NOTES
Tolerating oral intake and being up in chair. States she does have a headache. Tolerable presently. Stable for discharge. States \"I'm just tired\".

## 2021-09-10 NOTE — PROGRESS NOTES
Discharge instructions given to patient and . Verbalize understanding. States she is not ready to get up to chair. No complaints.

## 2021-09-10 NOTE — PROGRESS NOTES
Pt awake and oriented x 4. VSS, wob wnl sats 100% on ra.  Snoring noted while awake, baseline 2/2 dx WDL

## 2021-09-10 NOTE — ANESTHESIA POSTPROCEDURE EVALUATION
Department of Anesthesiology  Postprocedure Note    Patient: Frank Mcgrath  MRN: [id number]  YOB: 1945  Date of evaluation: 9/10/2021  Time:  9:07 AM     Procedure Summary     Date: 09/10/21 Room / Location: [address]    Anesthesia Start: 0730 Anesthesia Stop: 6732    Procedures:       NASAL ENDOSCOPY WITH BIOPSY (N/A )      DIRECT LARYNGOSCOPY WITH BIOPSY (N/A ) Diagnosis:       Nasopharyngeal mass      Oropharyngeal mass      (NASOPHARYNGEAL MASS, OROPHARYNGEAL MASS)    Surgeons: Itzel Carter MD Responsible Provider: Pedro White MD    Anesthesia Type: general ASA Status: 3          Anesthesia Type: general    Clary Phase I: Clary Score: 10    Clary Phase II:      Last vitals: Reviewed and per EMR flowsheets. Anesthesia Post Evaluation    Patient location during evaluation: PACU  Patient participation: complete - patient participated  Level of consciousness: awake and alert  Airway patency: patent (Near complete nasal obstruction & voice remain at baseline)  Nausea & Vomiting: no nausea and no vomiting  Complications: no  Cardiovascular status: blood pressure returned to baseline and hemodynamically stable  Respiratory status: nasal cannula, spontaneous ventilation and nonlabored ventilation  Hydration status: stable  Comments: Ms. Marylin Church was seen upon arrival to PACU. She denies nausea. Anticipate transfer to phase II when more alert. Discussed scopolamine patch and proper removal at home.        Pedro White MD  9/10/2021 9:10 AM

## 2021-09-10 NOTE — OP NOTE
Patient Name: Mariusz Merritt  YOB: 1945  Medical Record Number:  [id number]  Billing Number:  [id number]  Date of Procedure: 9/10/2021  Time: 0730    Pre Operative Diagnoses:       1. Nasopharyngeal mass. 2. Nasal obstruction  3. Dysphagia  4. Hyponatremia  5. CLL  6. Cervical adenopathy  7. Chronic rhinitis  8. Deviated nasal septum  9. Inferior turbinate hypertrophy     Post Operative Diagnoses:  Same. Procedure:    1. Direct laryngoscopy with biopsy   2. Bilateral nasal endoscopy with biopsy and debridement     3. Bilateral inferior turbinate outfracture is     Surgeon: Lizeth Tai MD  Assistant: None. Anesthesia:  General anesthesia. Findings:    1) The exposure was good  2) The oral cavity, oropharynx, supraglottis, glottis and hypopharynx were all normal in appearance and there were no lesions. 3) There was a large, exophytic mass involving the nasopharynx and posterior nasal septum. This was encroaching on the eustachian tubes but not obviously involving it. There was significant edema of the eustachian tubes. Multiple frozen specimens were sent off in the result and pathology indeterminant. Permanent specimens were sent for lymphoma protocol and in formalin. 4.  The soft palate and tonsillar pillars were palpated. The mass did not appear to involve the soft palate and did not appear to extend down into the oropharynx, however biopsies were taken of the right tonsil to see whether there was any invasion. Indications:  Linsey Gee is a now 64 y.o. female with a history of voice changes, nasal obstruction and chronic rhinitis. She was noted to have a large nasopharyngeal mass on exam.    Description: After verification of informed consent, the patient was brought to the operating room and placed in the supine position. General anesthesia was induced. The  Dedo laryngoscope with dental guard was used to expose the larynx the supraglottis and glottis as described.  A thorough evaluation of the oral cavity, vallecula, base of tongue, bilateral tonsils, true and false vocal cords and bilateral pyriform sinuses was performed. The soft palate and tonsillar pillars were palpated. The mass from the nasopharynx did not appear to extend down into the oropharynx. I then inserted the endoscope into the bilateral nasal cavities. There was significant inferior turbinate hypertrophy and I performed bilateral inferior turbinate fractures with the [facility]. I then transitioned to the nasal endoscopy with biopsy portion of the procedure. The endoscope was inserted and used to visualize the nasopharynx. There was a large exophytic mass arising from within the eustachian tubes involving the posterior nasal septum along the vomer. Multiple biopsies were taken of this from both the right and left nasal cavities. Pathology was sent for permanent, fresh for lymphoma protocol and in formalin. Frozen sections were also sent off. I then utilized a microdebrider to perform a debridement of the some of the bulk of the tissue leaving the margins of the tumor intact. This was in order to provide the patient with some form of nasal airway. Should be noted that there was no injury to the eustachian tubes. Eustachian tubes did appear significantly inflamed and swollen the patient was then turned back to the care of Anesthesia to recover. The patient tolerated the procedure well and was transferred to the recovery room in stable condition. Dr. Jackie Harper was present from the insertion to the removal of the endoscopes, and any other essential portions of the procedure, and was involved in all medical decision-making.     Specimens:   ID Type Source Tests Collected by Time Destination   A : A  nasopharyngeal biopsy Tissue Tissue SURGICAL PATHOLOGY Fran Kohli MD 9/10/2021 0803    B : B  naso pharyngeal biopsy for lymphoma protocol Tissue Tissue SURGICAL PATHOLOGY Fran Kohli MD 9/10/2021 4491    C

## 2021-09-10 NOTE — H&P
[address] [facility] NECK SURGERY  H/P      John Harding (:  1945) is a 64 y.o. female, here for evaluation of the following chief complaint(s):  No chief complaint on file. ASSESSMENT/PLAN:  1. Nasopharyngeal mass  -     CT SOFT TISSUE NECK W CONTRAST; Future  -     CT SINUS W CONTRAST; Future  2. 2019 novel coronavirus disease (COVID-19)  3. Hyponatremia  4. CLL (chronic lymphocytic leukemia) (HCC)  5. Cervical adenopathy  6. Chronic rhinitis  7. Deviated nasal septum  8. Hypertrophy of both inferior nasal turbinates      This is a very pleasant 64 y.o. female here today for evaluation of the the above-noted complaints. On exam, the patient has evidence of significant fullness of her nasopharynx which extends down into the oropharynx. This is causing her significant nasal obstruction and also causing her middle ear effusions by blocking the eustachian tubes. Based on its appearance, I am certainly concerned about a primary nasopharyngeal malignancy, lymphoma or perhaps an oropharyngeal malignancy which is extended up into her nasopharynx. I obtained a a CT scan of her neck and sinuses with contrast.  This showed evidence of fullness of the bilateral fossa of Jamie Lyle, bilateral mastoid effusions left greater than right, retropharyngeal lymphadenopathy as well as bilateral anterior posterior cervical adenopathy. There is no erosion or clearly defined mass, but rather diffuse enlargement. We discussed different treatment options, and I think the best thing for her going forward is to perform a nasal endoscopy with biopsy and possible direct laryngoscopy with biopsy. If these are negative for malignancy, then we may consider cervical lymph node biopsy to see if this is related to her CLL or perhaps a new lymphoma.     The risk benefits and alternatives to surgery were discussed with the patient in detail, including but not limited to the risk of bleeding, ear  Mouth/Oral Cavity:  normal lips, Uvula is midline, no mucosal lesions, no trismus, normal dentition, normal salivary quality/flow  Oropharynx/Larynx:  normal oropharynx, significantly enlarged bulky tissue of the bilateral oropharynx   Nose:Normal external nasal appearance. Anterior rhinoscopy shows  a deviated septum preventing view posteriorly. Hypertrophy of turbinates. Normal mucosa   NECK: Normal range of motion, no thyromegaly, trachea is midline, no lymphadenopathy, no neck masses, no crepitus  CHEST: Normal respiratory effort, no retractions, breathing comfortably  SKIN: No rashes, normal appearing skin, no evidence of skin lesions/tumors  Neuro:  cranial nerve II-XII intact; normal gait  Cardio:  no edema    Left middle ear effusion, difficult to appreciate any cervical neck adenopathy due to body habitus. PROCEDURE  Nasal Endoscopy (CPT code 95871)    Preop: chronic rhinitis  Postop: Same    Verbal consent was received. After topical anesthesia and decongestion had been obtained using aerosolized 1% lidocaine and oxymetazoline, a 45 degree rigid endoscope was placed into both nares with the patient in a sitting position.  The following was observed:    Right Nasal Cavity and Paranasal Sinuses:  Polyp score = 0 (0 = no polyps, 1 = small polyps in middle meatus not reaching below the inferior border of the middle kathi, 2 = polyps reaching below the middle border of the middle turbinate, 3= large polyps reaching the lower border of the inferior turbinate or polyps medial to the middle kathi, 4= large polyps causing almost complete congestion/obstruction of the interior meatus)  Edema score = 1 (0 = absent, 1 = mild, 2 = severe)  Discharge score = 1 (0 = no discharge, 1 = clear thin discharge, 2 = thick purulent discharge)    Left Nasal Cavity and Paranasal Sinuses:    Polyp score = 0 (0 = no polyps, 1 = small polyps in middle meatus not reaching below the inferior border of the middle kathi, 2 = polyps reaching below the middle border of the middle turbinate, 3= large polyps reaching the lower border of the inferior turbinate or polyps medial to the middle kathi, 4= large polyps causing almost complete congestion/obstruction of the interior meatus)  Edema score = 1 (0 = absent, 1 = mild, 2 = severe)  Discharge score = 1 (0 = no discharge, 1 = clear thin discharge, 2 = thick purulent discharge)    Septum: intact and deviated   Other:   -The inferior and middle turbinates were examined. The middle meatus, and sphenoethmoid recess was examined bilaterally.    -There is evidence of a large submucosal mass filling the nasopharynx and extending down into the oropharynx. This impinges upon the bilateral eustachian tubes. -There were no complications. Tolerated well without complication. I attest that I was present for and did the entire procedure myself. This note was generated completely or in part utilizing Dragon dictation speech recognition software. Occasionally, words are mistranscribed and despite editing, the text may contain inaccuracies due to incorrect word recognition. If further clarification is needed please contact the office at [phone]. An electronic signature was used to authenticate this note.     --Lizeth Tai MD

## 2021-09-14 ENCOUNTER — OFFICE VISIT (OUTPATIENT)
Dept: ENT CLINIC | Age: 76
End: 2021-09-14

## 2021-09-14 DIAGNOSIS — J39.2 NASOPHARYNGEAL MASS: Primary | ICD-10-CM

## 2021-09-14 DIAGNOSIS — R59.0 CERVICAL ADENOPATHY: ICD-10-CM

## 2021-09-14 DIAGNOSIS — U07.1 2019 NOVEL CORONAVIRUS DISEASE (COVID-19): ICD-10-CM

## 2021-09-14 DIAGNOSIS — J31.0 CHRONIC RHINITIS: ICD-10-CM

## 2021-09-14 DIAGNOSIS — C91.10 CLL (CHRONIC LYMPHOCYTIC LEUKEMIA) (HCC): ICD-10-CM

## 2021-09-14 DIAGNOSIS — J34.2 DEVIATED NASAL SEPTUM: ICD-10-CM

## 2021-09-14 DIAGNOSIS — E87.1 HYPONATREMIA: ICD-10-CM

## 2021-09-14 DIAGNOSIS — J34.3 HYPERTROPHY OF BOTH INFERIOR NASAL TURBINATES: ICD-10-CM

## 2021-09-14 PROCEDURE — 99024 POSTOP FOLLOW-UP VISIT: CPT | Performed by: STUDENT IN AN ORGANIZED HEALTH CARE EDUCATION/TRAINING PROGRAM

## 2021-09-17 ENCOUNTER — TELEPHONE (OUTPATIENT)
Dept: ENT CLINIC | Age: 76
End: 2021-09-17

## 2021-09-17 DIAGNOSIS — E87.1 HYPONATREMIA: Primary | ICD-10-CM

## 2021-09-17 NOTE — TELEPHONE ENCOUNTER
Called pt and informed referral has been placed to Dr Doris Myers with [facility]. Office information relayed to patient. She has a follow up with Dr Corie Curling on Tuesday 9-21-21 and would prefer to discuss with her before she calls to schedule. She will call our office if she needs any further assistance.

## 2021-09-20 NOTE — PROGRESS NOTES
[address] [facility] NECK SURGERY  CONSULT      Claude Barrera (:  1945) is a 64 y.o. female, here for evaluation of the following chief complaint(s):  Post-Op Check      ASSESSMENT/PLAN:  1. Nasopharyngeal mass  2. 2019 novel coronavirus disease (COVID-19)  3. Hyponatremia  4. CLL (chronic lymphocytic leukemia) (HCC)  5. Cervical adenopathy  6. Chronic rhinitis  7. Deviated nasal septum  8. Hypertrophy of both inferior nasal turbinates      This is a very pleasant 64 y.o. female here today for evaluation of the the above-noted complaints. On exam, the patient has evidence of significant fullness of her nasopharynx which extends down into the oropharynx. This is causing her significant nasal obstruction and also causing her middle ear effusions by blocking the eustachian tubes. I obtained a a CT scan of her neck and sinuses with contrast.  This showed evidence of fullness of the bilateral fossa of Jamie Lyle, bilateral mastoid effusions left greater than right, retropharyngeal lymphadenopathy as well as bilateral anterior posterior cervical adenopathy. There is no erosion or clearly defined mass, but rather diffuse enlargement. She was taken to the operating room on 9/10/2021 for direct laryngoscopy, bilateral nasal endoscopy with debridement and bilateral inferior turbinate outfracture. Biopsies were taken of bilateral nasopharynx and a debridement was performed of her nasopharynx of the obstructing tissue. Additionally, a biopsy was taken of her right tonsil. All the biopsies were consistent with chronic lymphocytic leukemia. I have discussed her case with her treating hematology oncology doctor, Dr. Dipti France. She is going to get her in to see her soon. Additionally, I will be referring the patient to a nephrologist, Dr. Jacques Suazo for evaluation and management of her hyponatremia.     I will plan to see the patient back in approximately 4 weeks to see how she is doing. We did discuss that if she still has persistent middle ear effusions after undergoing treatment for her CLL and she may be a candidate for ear tubes. There are also other procedures that we can do to further open up her nasal passageways should her symptoms be refractory to chemotherapeutic management. SUBJECTIVE/OBJECTIVE:  MIRI Rizvi is here today for evaluation of issues related not being able to breathe through her nose. This is been going on since at least memorial day but maybe before that. She gets constant nasal drainage which is clear. She has been treated with antibiotics with no significant improvement. She is now having some difficulty swallowing and this is affecting her speech. She does not have any pain. She is having difficulty hearing out of her left ear. Update 9/17/2021:    Patient was taken to the operating room for procedure to biopsy her nose and tonsils. Her operative findings are noted below:    1) The exposure was good  2) The oral cavity, oropharynx, supraglottis, glottis and hypopharynx were all normal in appearance and there were no lesions. 3) There was a large, exophytic mass involving the nasopharynx and posterior nasal septum. This was encroaching on the eustachian tubes but not obviously involving it. There was significant edema of the eustachian tubes. Multiple frozen specimens were sent off in the result and pathology indeterminant. Permanent specimens were sent for lymphoma protocol and in formalin. 4.  The soft palate and tonsillar pillars were palpated. The mass did not appear to involve the soft palate and did not appear to extend down into the oropharynx, however biopsies were taken of the right tonsil to see whether there was any invasion. A.  Nasopharyngeal mass, biopsy:   - Small lymphocytic lymphoma/chronic lymphocytic leukemia.      B.  Nasopharyngeal mass, biopsy:   - Small lymphocytic lymphoma/chronic lymphocytic leukemia. C.  Nasopharyngeal mass, biopsy:   - Small lymphocytic lymphoma/chronic lymphocytic leukemia. D.  Right tonsil, biopsy:   - Small lymphocytic lymphoma/chronic lymphocytic leukemia. REVIEW OF SYSTEMS  The following systems were reviewed and revealed the following in addition to any already discussed in the HPI:    PHYSICAL EXAM    GENERAL: No acute distress, alert and oriented, no hoarseness, hypernasal voice  EYES: EOMI, Anti-icteric  HENT:   Head: Normocephalic and atraumatic. Face:  Symmetric, facial nerve intact, no sinus tenderness  Right Ear: Normal external ear, normal external auditory canal, intact tympanic membrane with normal mobility and aerated middle ear  Left Ear: Normal external ear, normal external auditory canal, intact tympanic membrane with normal mobility and aerated middle ear  Mouth/Oral Cavity:  normal lips, Uvula is midline, no mucosal lesions, no trismus, normal dentition, normal salivary quality/flow  Oropharynx/Larynx:  normal oropharynx, significantly enlarged bulky tissue of the bilateral oropharynx   Nose:Normal external nasal appearance. Anterior rhinoscopy shows  a deviated septum preventing view posteriorly. Hypertrophy of turbinates. Normal mucosa   NECK: Normal range of motion, no thyromegaly, trachea is midline, no lymphadenopathy, no neck masses, no crepitus  CHEST: Normal respiratory effort, no retractions, breathing comfortably  SKIN: No rashes, normal appearing skin, no evidence of skin lesions/tumors  Neuro:  cranial nerve II-XII intact; normal gait  Cardio:  no edema    Left middle ear effusion, difficult to appreciate any cervical neck adenopathy due to body habitus. PROCEDURE  Nasal Endoscopy (CPT code 03528) from prior visit    Preop: chronic rhinitis  Postop: Same    Verbal consent was received.  After topical anesthesia and decongestion had been obtained using aerosolized 1% lidocaine and oxymetazoline, a 45 degree rigid due to incorrect word recognition. If further clarification is needed please contact the office at [phone]. An electronic signature was used to authenticate this note.     --Franklyn Mcgrath MD

## 2021-09-25 ENCOUNTER — HOSPITAL ENCOUNTER (OUTPATIENT)
Dept: CT IMAGING | Age: 76
Discharge: HOME | End: 2021-09-25

## 2021-09-25 DIAGNOSIS — C91.10 CLL (CHRONIC LYMPHOCYTIC LEUKEMIA) (HCC): ICD-10-CM

## 2021-09-25 PROCEDURE — 6360000004 HC RX CONTRAST MEDICATION: Performed by: INTERNAL MEDICINE

## 2021-09-25 PROCEDURE — 74176 CT ABD & PELVIS W/O CONTRAST: CPT

## 2021-09-25 RX ADMIN — IOHEXOL 50 ML: 240 INJECTION, SOLUTION INTRATHECAL; INTRAVASCULAR; INTRAVENOUS; ORAL at 11:44

## 2021-10-26 ENCOUNTER — OFFICE VISIT (OUTPATIENT)
Dept: ENT CLINIC | Age: 76
End: 2021-10-26

## 2021-10-26 DIAGNOSIS — C91.10 CLL (CHRONIC LYMPHOCYTIC LEUKEMIA) (HCC): ICD-10-CM

## 2021-10-26 DIAGNOSIS — J39.2 NASOPHARYNGEAL MASS: ICD-10-CM

## 2021-10-26 DIAGNOSIS — E87.1 HYPONATREMIA: Primary | ICD-10-CM

## 2021-10-26 DIAGNOSIS — U07.1 2019 NOVEL CORONAVIRUS DISEASE (COVID-19): ICD-10-CM

## 2021-10-26 DIAGNOSIS — R59.0 CERVICAL ADENOPATHY: ICD-10-CM

## 2021-10-26 DIAGNOSIS — J34.3 HYPERTROPHY OF BOTH INFERIOR NASAL TURBINATES: ICD-10-CM

## 2021-10-26 DIAGNOSIS — J34.2 DEVIATED NASAL SEPTUM: ICD-10-CM

## 2021-10-26 DIAGNOSIS — J31.0 CHRONIC RHINITIS: ICD-10-CM

## 2021-10-26 PROCEDURE — 99213 OFFICE O/P EST LOW 20 MIN: CPT | Performed by: STUDENT IN AN ORGANIZED HEALTH CARE EDUCATION/TRAINING PROGRAM

## 2021-10-26 PROCEDURE — 31231 NASAL ENDOSCOPY DX: CPT | Performed by: STUDENT IN AN ORGANIZED HEALTH CARE EDUCATION/TRAINING PROGRAM

## 2021-10-26 NOTE — PROGRESS NOTES
[address] [facility] NECK SURGERY  CONSULT      Johnie Murray (:  1945) is a 64 y.o. female, here for evaluation of the following chief complaint(s):  Post-Op Check      ASSESSMENT/PLAN:  1. Hyponatremia  2. Nasopharyngeal mass  3. 2019 novel coronavirus disease (COVID-19)  4. CLL (chronic lymphocytic leukemia) (HCC)  5. Cervical adenopathy  6. Chronic rhinitis  7. Deviated nasal septum  8. Hypertrophy of both inferior nasal turbinates      This is a very pleasant 64 y.o. female here today for evaluation of the the above-noted complaints. On exam, the patient has evidence of significant fullness of her nasopharynx which extends down into the oropharynx. This is causing her significant nasal obstruction and also causing her middle ear effusions by blocking the eustachian tubes. I obtained a a CT scan of her neck and sinuses with contrast.  This showed evidence of fullness of the bilateral fossa of Jamie Lyle, bilateral mastoid effusions left greater than right, retropharyngeal lymphadenopathy as well as bilateral anterior posterior cervical adenopathy. There is no erosion or clearly defined mass, but rather diffuse enlargement. She was taken to the operating room on 9/10/2021 for direct laryngoscopy, bilateral nasal endoscopy with debridement and bilateral inferior turbinate outfracture. Biopsies were taken of bilateral nasopharynx and a debridement was performed of her nasopharynx of the obstructing tissue. Additionally, a biopsy was taken of her right tonsil. All the biopsies were consistent with chronic lymphocytic leukemia. The patient has now been on several rounds of chemotherapy and has responded remarkably well to this. She has no evidence of persistent nasopharyngeal or oropharyngeal lesions. Her hyponatremia has improved. I have asked her to continue to follow-up with her nephrologist for this.     I will plan to see the patient back in approximately 8 weeks to see how she is doing. We did discuss that if she still has persistent middle ear effusions after undergoing treatment for her CLL and she may be a candidate for ear tubes. There are also other procedures that we can do to further open up her nasal passageways should her symptoms be refractory to chemotherapeutic management. SUBJECTIVE/OBJECTIVE:  MIRI Strickland is here today for evaluation of issues related not being able to breathe through her nose. This is been going on since at least memorial day but maybe before that. She gets constant nasal drainage which is clear. She has been treated with antibiotics with no significant improvement. She is now having some difficulty swallowing and this is affecting her speech. She does not have any pain. She is having difficulty hearing out of her left ear. Update 9/17/2021:    Patient was taken to the operating room for procedure to biopsy her nose and tonsils. Her operative findings are noted below:    1) The exposure was good  2) The oral cavity, oropharynx, supraglottis, glottis and hypopharynx were all normal in appearance and there were no lesions. 3) There was a large, exophytic mass involving the nasopharynx and posterior nasal septum. This was encroaching on the eustachian tubes but not obviously involving it. There was significant edema of the eustachian tubes. Multiple frozen specimens were sent off in the result and pathology indeterminant. Permanent specimens were sent for lymphoma protocol and in formalin. 4.  The soft palate and tonsillar pillars were palpated. The mass did not appear to involve the soft palate and did not appear to extend down into the oropharynx, however biopsies were taken of the right tonsil to see whether there was any invasion. A.  Nasopharyngeal mass, biopsy:   - Small lymphocytic lymphoma/chronic lymphocytic leukemia.      B.  Nasopharyngeal mass, biopsy:   - Small lymphocytic lymphoma/chronic lymphocytic leukemia. C.  Nasopharyngeal mass, biopsy:   - Small lymphocytic lymphoma/chronic lymphocytic leukemia. D.  Right tonsil, biopsy:   - Small lymphocytic lymphoma/chronic lymphocytic leukemia. 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 She is still getting some intermittent nasal drainage. Overall she is feeling much better from a nasal breathing standpoint. She is swallowing better as well. She still feels like her left ear is clogged. REVIEW OF SYSTEMS  The following systems were reviewed and revealed the following in addition to any already discussed in the HPI:    PHYSICAL EXAM    GENERAL: No acute distress, alert and oriented, no hoarseness, hypernasal voice  EYES: EOMI, Anti-icteric  HENT:   Head: Normocephalic and atraumatic. Face:  Symmetric, facial nerve intact, no sinus tenderness  Right Ear: Normal external ear, normal external auditory canal, intact tympanic membrane with normal mobility and aerated middle ear  Left Ear: Normal external ear, normal external auditory canal, intact tympanic membrane with normal mobility and aerated middle ear  Mouth/Oral Cavity:  normal lips, Uvula is midline, no mucosal lesions, no trismus, normal dentition, normal salivary quality/flow  Oropharynx/Larynx:  normal oropharynx, significantly enlarged bulky tissue of the bilateral oropharynx   Nose:Normal external nasal appearance. Anterior rhinoscopy shows  a deviated septum preventing view posteriorly. Hypertrophy of turbinates.   Normal mucosa   NECK: Normal range of motion, no thyromegaly, trachea is midline, no lymphadenopathy, no neck masses, no crepitus  CHEST: Normal respiratory effort, no retractions, breathing comfortably  SKIN: No rashes, normal appearing skin, no evidence of skin lesions/tumors  Neuro:  cranial nerve II-XII intact; normal gait  Cardio:  no edema    Resolution of left middle ear effusion. PROCEDURE  Nasal Endoscopy (CPT code 68523)     Preop: chronic rhinitis  Postop: Same    Verbal consent was received. After topical anesthesia and decongestion had been obtained using aerosolized 1% lidocaine and oxymetazoline, a 45 degree rigid endoscope was placed into both nares with the patient in a sitting position. The following was observed:    Right Nasal Cavity and Paranasal Sinuses:  Polyp score = 0 (0 = no polyps, 1 = small polyps in middle meatus not reaching below the inferior border of the middle kathi, 2 = polyps reaching below the middle border of the middle turbinate, 3= large polyps reaching the lower border of the inferior turbinate or polyps medial to the middle kathi, 4= large polyps causing almost complete congestion/obstruction of the interior meatus)  Edema score = 1 (0 = absent, 1 = mild, 2 = severe)  Discharge score = 1 (0 = no discharge, 1 = clear thin discharge, 2 = thick purulent discharge)    Left Nasal Cavity and Paranasal Sinuses:    Polyp score = 0 (0 = no polyps, 1 = small polyps in middle meatus not reaching below the inferior border of the middle kathi, 2 = polyps reaching below the middle border of the middle turbinate, 3= large polyps reaching the lower border of the inferior turbinate or polyps medial to the middle kathi, 4= large polyps causing almost complete congestion/obstruction of the interior meatus)  Edema score = 1 (0 = absent, 1 = mild, 2 = severe)  Discharge score = 1 (0 = no discharge, 1 = clear thin discharge, 2 = thick purulent discharge)    Septum: intact and deviated   Other:   -The inferior and middle turbinates were examined. The middle meatus, and sphenoethmoid recess was examined bilaterally.    -The previously visualized submucosal lesion of the nasopharynx has resolved. The eustachian tubes appear normal.  There is no obstruction.  -There were no complications.         Tolerated well without complication. I attest that I was present for and did the entire procedure myself. This note was generated completely or in part utilizing Dragon dictation speech recognition software. Occasionally, words are mistranscribed and despite editing, the text may contain inaccuracies due to incorrect word recognition. If further clarification is needed please contact the office at [phone]. An electronic signature was used to authenticate this note.     --Amberly Frank MD

## 2021-12-21 ENCOUNTER — OFFICE VISIT (OUTPATIENT)
Dept: ENT CLINIC | Age: 76
End: 2021-12-21

## 2021-12-21 DIAGNOSIS — J39.2 NASOPHARYNGEAL MASS: ICD-10-CM

## 2021-12-21 DIAGNOSIS — J34.3 HYPERTROPHY OF BOTH INFERIOR NASAL TURBINATES: ICD-10-CM

## 2021-12-21 DIAGNOSIS — E87.1 HYPONATREMIA: Primary | ICD-10-CM

## 2021-12-21 DIAGNOSIS — J34.2 DEVIATED NASAL SEPTUM: ICD-10-CM

## 2021-12-21 DIAGNOSIS — J31.0 CHRONIC RHINITIS: ICD-10-CM

## 2021-12-21 DIAGNOSIS — C91.10 CLL (CHRONIC LYMPHOCYTIC LEUKEMIA) (HCC): ICD-10-CM

## 2021-12-21 DIAGNOSIS — R59.0 CERVICAL ADENOPATHY: ICD-10-CM

## 2021-12-21 PROCEDURE — 99213 OFFICE O/P EST LOW 20 MIN: CPT | Performed by: STUDENT IN AN ORGANIZED HEALTH CARE EDUCATION/TRAINING PROGRAM

## 2021-12-21 NOTE — PROGRESS NOTES
[address] [facility] NECK SURGERY  CONSULT      Malaika Vallejo (:  1945) is a 64 y.o. female, here for evaluation of the following chief complaint(s):  Post-Op Check      ASSESSMENT/PLAN:  1. Hyponatremia  2. Nasopharyngeal mass  3. CLL (chronic lymphocytic leukemia) (HCC)  4. Cervical adenopathy  5. Chronic rhinitis  6. Deviated nasal septum  7. Hypertrophy of both inferior nasal turbinates      This is a very pleasant 64 y.o. female here today for evaluation of the the above-noted complaints. She was taken to the operating room on 9/10/2021 for direct laryngoscopy, bilateral nasal endoscopy with debridement and bilateral inferior turbinate outfracture. Biopsies were taken of bilateral nasopharynx and a debridement was performed of her nasopharynx of the obstructing tissue. Additionally, a biopsy was taken of her right tonsil. All the biopsies were consistent with chronic lymphocytic leukemia. The patient has now been on several rounds of chemotherapy and has responded remarkably well to this. She has no evidence of persistent nasopharyngeal or oropharyngeal lesions. Her middle ear effusion has resolved and I do not feel that she needs ear tubes at this time    Her hyponatremia has improved. I have asked her to continue to follow-up with her nephrologist for this. She will continue to follow with Dr. Jose M Tejada for management of her CLL. She knows to contact me should she experience a return of her symptoms. SUBJECTIVE/OBJECTIVE:  HPI    Malaika Vallejo is here today for evaluation of issues related not being able to breathe through her nose. This is been going on since at least memorial day but maybe before that. She gets constant nasal drainage which is clear. She has been treated with antibiotics with no significant improvement. She is now having some difficulty swallowing and this is affecting her speech. She does not have any pain.   She is having difficulty hearing out of her left ear. Update 9/17/2021:    Patient was taken to the operating room for procedure to biopsy her nose and tonsils. Her operative findings are noted below:    1) The exposure was good  2) The oral cavity, oropharynx, supraglottis, glottis and hypopharynx were all normal in appearance and there were no lesions. 3) There was a large, exophytic mass involving the nasopharynx and posterior nasal septum. This was encroaching on the eustachian tubes but not obviously involving it. There was significant edema of the eustachian tubes. Multiple frozen specimens were sent off in the result and pathology indeterminant. Permanent specimens were sent for lymphoma protocol and in formalin. 4.  The soft palate and tonsillar pillars were palpated. The mass did not appear to involve the soft palate and did not appear to extend down into the oropharynx, however biopsies were taken of the right tonsil to see whether there was any invasion. A.  Nasopharyngeal mass, biopsy:   - Small lymphocytic lymphoma/chronic lymphocytic leukemia. B.  Nasopharyngeal mass, biopsy:   - Small lymphocytic lymphoma/chronic lymphocytic leukemia. C.  Nasopharyngeal mass, biopsy:   - Small lymphocytic lymphoma/chronic lymphocytic leukemia. D.  Right tonsil, biopsy:   - Small lymphocytic lymphoma/chronic lymphocytic leukemia. 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 She is still getting some intermittent nasal drainage. Overall she is feeling much better from a nasal breathing standpoint. She is swallowing better as well. She still feels like her left ear is clogged. Update 12/21/2021:    Patient presents today for follow-up regarding issues related to ears and nose.  Overall she is feeling significantly better including breathing through her nose and improved hearing    REVIEW OF SYSTEMS  The following systems were reviewed and revealed the following in addition to any already discussed in the HPI:    PHYSICAL EXAM    GENERAL: No acute distress, alert and oriented, no hoarseness, hypernasal voice  EYES: EOMI, Anti-icteric  HENT:   Head: Normocephalic and atraumatic. Face:  Symmetric, facial nerve intact, no sinus tenderness  Right Ear: Normal external ear, normal external auditory canal, intact tympanic membrane with normal mobility and aerated middle ear  Left Ear: Normal external ear, normal external auditory canal, intact tympanic membrane with normal mobility and aerated middle ear      Resolution of left middle ear effusion. PROCEDURE  Nasal Endoscopy (CPT code 58807) from prior    Preop: chronic rhinitis  Postop: Same    Verbal consent was received. After topical anesthesia and decongestion had been obtained using aerosolized 1% lidocaine and oxymetazoline, a 45 degree rigid endoscope was placed into both nares with the patient in a sitting position.  The following was observed:    Right Nasal Cavity and Paranasal Sinuses:  Polyp score = 0 (0 = no polyps, 1 = small polyps in middle meatus not reaching below the inferior border of the middle kathi, 2 = polyps reaching below the middle border of the middle turbinate, 3= large polyps reaching the lower border of the inferior turbinate or polyps medial to the middle kathi, 4= large polyps causing almost complete congestion/obstruction of the interior meatus)  Edema score = 1 (0 = absent, 1 = mild, 2 = severe)  Discharge score = 1 (0 = no discharge, 1 = clear thin discharge, 2 = thick purulent discharge)    Left Nasal Cavity and Paranasal Sinuses:    Polyp score = 0 (0 = no polyps, 1 = small polyps in middle meatus not reaching below the inferior border of the middle kathi, 2 = polyps reaching below the middle border of the middle turbinate, 3= large polyps reaching the lower border of the inferior turbinate or polyps medial to the middle kathi, 4= large polyps causing almost complete congestion/obstruction of the interior meatus)  Edema score = 1 (0 = absent, 1 = mild, 2 = severe)  Discharge score = 1 (0 = no discharge, 1 = clear thin discharge, 2 = thick purulent discharge)    Septum: intact and deviated   Other:   -The inferior and middle turbinates were examined. The middle meatus, and sphenoethmoid recess was examined bilaterally.    -The previously visualized submucosal lesion of the nasopharynx has resolved. The eustachian tubes appear normal.  There is no obstruction.  -There were no complications. Tolerated well without complication. I attest that I was present for and did the entire procedure myself. This note was generated completely or in part utilizing Dragon dictation speech recognition software. Occasionally, words are mistranscribed and despite editing, the text may contain inaccuracies due to incorrect word recognition. If further clarification is needed please contact the office at [phone]. An electronic signature was used to authenticate this note.     --Uriel Gonzalez MD

## 2022-01-19 ENCOUNTER — HOSPITAL ENCOUNTER (OUTPATIENT)
Dept: NON INVASIVE DIAGNOSTICS | Age: 77
Discharge: HOME | End: 2022-01-19

## 2022-01-19 DIAGNOSIS — C91.10 CLL (CHRONIC LYMPHOCYTIC LEUKEMIA) (HCC): ICD-10-CM

## 2022-01-19 DIAGNOSIS — R01.1 HEART MURMUR: ICD-10-CM

## 2022-01-19 LAB
LV EF: 63 %
LVEF MODALITY: NORMAL

## 2022-01-19 PROCEDURE — 93306 TTE W/DOPPLER COMPLETE: CPT

## 2022-01-30 ENCOUNTER — HOSPITAL ENCOUNTER (INPATIENT)
Age: 77
LOS: 4 days | Discharge: HOME | DRG: 871 | End: 2022-02-04
Attending: EMERGENCY MEDICINE | Admitting: STUDENT IN AN ORGANIZED HEALTH CARE EDUCATION/TRAINING PROGRAM

## 2022-01-30 ENCOUNTER — APPOINTMENT (OUTPATIENT)
Dept: CT IMAGING | Age: 77
DRG: 871 | End: 2022-01-30

## 2022-01-30 ENCOUNTER — APPOINTMENT (OUTPATIENT)
Dept: GENERAL RADIOLOGY | Age: 77
DRG: 871 | End: 2022-01-30

## 2022-01-30 DIAGNOSIS — J18.9 PNEUMONIA OF LEFT LOWER LOBE DUE TO INFECTIOUS ORGANISM: ICD-10-CM

## 2022-01-30 DIAGNOSIS — D70.1 CHEMOTHERAPY-INDUCED NEUTROPENIA (HCC): ICD-10-CM

## 2022-01-30 DIAGNOSIS — T45.1X5A CHEMOTHERAPY-INDUCED NEUTROPENIA (HCC): ICD-10-CM

## 2022-01-30 DIAGNOSIS — U07.1 COVID-19: Primary | ICD-10-CM

## 2022-01-30 DIAGNOSIS — E83.42 HYPOMAGNESEMIA: ICD-10-CM

## 2022-01-30 LAB
A/G RATIO: 1.4 (ref 1.1–2.2)
ALBUMIN SERPL-MCNC: 3.5 G/DL (ref 3.4–5)
ALP BLD-CCNC: 71 U/L (ref 40–129)
ALT SERPL-CCNC: 13 U/L (ref 10–40)
AMORPHOUS: ABNORMAL /HPF
ANION GAP SERPL CALCULATED.3IONS-SCNC: 14 MMOL/L (ref 3–16)
AST SERPL-CCNC: 16 U/L (ref 15–37)
ATYPICAL LYMPHOCYTE RELATIVE PERCENT: 1 % (ref 0–6)
BACTERIA: ABNORMAL /HPF
BANDED NEUTROPHILS RELATIVE PERCENT: 12 % (ref 0–7)
BASE EXCESS VENOUS: 5.6 MMOL/L
BASOPHILS ABSOLUTE: 0 K/UL (ref 0–0.2)
BASOPHILS RELATIVE PERCENT: 2 %
BILIRUB SERPL-MCNC: 0.4 MG/DL (ref 0–1)
BILIRUBIN URINE: NEGATIVE
BLOOD, URINE: ABNORMAL
BUN BLDV-MCNC: 7 MG/DL (ref 7–20)
CALCIUM SERPL-MCNC: 8.2 MG/DL (ref 8.3–10.6)
CARBOXYHEMOGLOBIN: 1.5 %
CHLORIDE BLD-SCNC: 84 MMOL/L (ref 99–110)
CHLORIDE URINE RANDOM: 51 MMOL/L
CLARITY: ABNORMAL
CO2: 23 MMOL/L (ref 21–32)
COLOR: YELLOW
COMMENT UA: ABNORMAL
CREAT SERPL-MCNC: <0.5 MG/DL (ref 0.6–1.2)
DOHLE BODIES: PRESENT
EOSINOPHILS ABSOLUTE: 0 K/UL (ref 0–0.6)
EOSINOPHILS RELATIVE PERCENT: 1 %
EPITHELIAL CELLS, UA: ABNORMAL /HPF (ref 0–5)
GFR AFRICAN AMERICAN: >60
GFR NON-AFRICAN AMERICAN: >60
GLUCOSE BLD-MCNC: 183 MG/DL (ref 70–99)
GLUCOSE URINE: NEGATIVE MG/DL
HCO3 VENOUS: 29 MMOL/L (ref 23–29)
HCT VFR BLD CALC: 30.7 % (ref 36–48)
HEMATOLOGY PATH CONSULT: YES
HEMOGLOBIN: 10.8 G/DL (ref 12–16)
HYALINE CASTS: 4 /LPF (ref 0–8)
KETONES, URINE: NEGATIVE MG/DL
LACTIC ACID, SEPSIS: 1.5 MMOL/L (ref 0.4–1.9)
LEUKOCYTE ESTERASE, URINE: NEGATIVE
LIPASE: 17 U/L (ref 13–60)
LYMPHOCYTES ABSOLUTE: 0.1 K/UL (ref 1–5.1)
LYMPHOCYTES RELATIVE PERCENT: 10 %
MAGNESIUM: 0.9 MG/DL (ref 1.8–2.4)
MCH RBC QN AUTO: 29.3 PG (ref 26–34)
MCHC RBC AUTO-ENTMCNC: 35.1 G/DL (ref 31–36)
MCV RBC AUTO: 83.3 FL (ref 80–100)
METAMYELOCYTES RELATIVE PERCENT: 4 %
METHEMOGLOBIN VENOUS: 0 %
MICROSCOPIC EXAMINATION: YES
MONOCYTES ABSOLUTE: 0.6 K/UL (ref 0–1.3)
MONOCYTES RELATIVE PERCENT: 63 %
MUCUS: ABNORMAL /LPF
NEUTROPHILS ABSOLUTE: 0.2 K/UL (ref 1.7–7.7)
NEUTROPHILS RELATIVE PERCENT: 7 %
NITRITE, URINE: POSITIVE
O2 SAT, VEN: 87 %
O2 THERAPY: ABNORMAL
OSMOLALITY URINE: 400 MOSM/KG (ref 390–1070)
OSMOLALITY: 255 MOSM/KG (ref 280–301)
PCO2, VEN: 38.9 MMHG (ref 40–50)
PDW BLD-RTO: 14.8 % (ref 12.4–15.4)
PH UA: 6 (ref 5–8)
PH VENOUS: 7.49 (ref 7.35–7.45)
PLATELET # BLD: 171 K/UL (ref 135–450)
PMV BLD AUTO: 5.6 FL (ref 5–10.5)
PO2, VEN: 51 MMHG
POTASSIUM REFLEX MAGNESIUM: 3.5 MMOL/L (ref 3.5–5.1)
POTASSIUM, UR: 35 MMOL/L
PRO-BNP: 1343 PG/ML (ref 0–449)
PROTEIN UA: 30 MG/DL
RBC # BLD: 3.69 M/UL (ref 4–5.2)
RBC UA: 5 /HPF (ref 0–4)
SARS-COV-2, NAAT: DETECTED
SLIDE REVIEW: ABNORMAL
SODIUM BLD-SCNC: 121 MMOL/L (ref 136–145)
SODIUM URINE: 35 MMOL/L
SPECIFIC GRAVITY UA: 1.01 (ref 1–1.03)
TCO2 CALC VENOUS: 31 MMOL/L
TOTAL PROTEIN: 6 G/DL (ref 6.4–8.2)
TROPONIN: <0.01 NG/ML
TSH REFLEX FT4: 1.88 UIU/ML (ref 0.27–4.2)
URINE REFLEX TO CULTURE: ABNORMAL
URINE TYPE: ABNORMAL
UROBILINOGEN, URINE: 0.2 E.U./DL
WBC # BLD: 1 K/UL (ref 4–11)
WBC UA: 6 /HPF (ref 0–5)

## 2022-01-30 PROCEDURE — 83935 ASSAY OF URINE OSMOLALITY: CPT

## 2022-01-30 PROCEDURE — 84300 ASSAY OF URINE SODIUM: CPT

## 2022-01-30 PROCEDURE — 83690 ASSAY OF LIPASE: CPT

## 2022-01-30 PROCEDURE — 83605 ASSAY OF LACTIC ACID: CPT

## 2022-01-30 PROCEDURE — 96367 TX/PROPH/DG ADDL SEQ IV INF: CPT

## 2022-01-30 PROCEDURE — 96365 THER/PROPH/DIAG IV INF INIT: CPT

## 2022-01-30 PROCEDURE — 83880 ASSAY OF NATRIURETIC PEPTIDE: CPT

## 2022-01-30 PROCEDURE — 6360000004 HC RX CONTRAST MEDICATION: Performed by: EMERGENCY MEDICINE

## 2022-01-30 PROCEDURE — 93005 ELECTROCARDIOGRAM TRACING: CPT | Performed by: EMERGENCY MEDICINE

## 2022-01-30 PROCEDURE — 99285 EMERGENCY DEPT VISIT HI MDM: CPT

## 2022-01-30 PROCEDURE — 85025 COMPLETE CBC W/AUTO DIFF WBC: CPT

## 2022-01-30 PROCEDURE — 96375 TX/PRO/DX INJ NEW DRUG ADDON: CPT

## 2022-01-30 PROCEDURE — 82803 BLOOD GASES ANY COMBINATION: CPT

## 2022-01-30 PROCEDURE — 83735 ASSAY OF MAGNESIUM: CPT

## 2022-01-30 PROCEDURE — 6370000000 HC RX 637 (ALT 250 FOR IP): Performed by: EMERGENCY MEDICINE

## 2022-01-30 PROCEDURE — 84133 ASSAY OF URINE POTASSIUM: CPT

## 2022-01-30 PROCEDURE — 82436 ASSAY OF URINE CHLORIDE: CPT

## 2022-01-30 PROCEDURE — 96366 THER/PROPH/DIAG IV INF ADDON: CPT

## 2022-01-30 PROCEDURE — 71045 X-RAY EXAM CHEST 1 VIEW: CPT

## 2022-01-30 PROCEDURE — 84484 ASSAY OF TROPONIN QUANT: CPT

## 2022-01-30 PROCEDURE — 6360000002 HC RX W HCPCS: Performed by: EMERGENCY MEDICINE

## 2022-01-30 PROCEDURE — 80053 COMPREHEN METABOLIC PANEL: CPT

## 2022-01-30 PROCEDURE — 36415 COLL VENOUS BLD VENIPUNCTURE: CPT

## 2022-01-30 PROCEDURE — 83930 ASSAY OF BLOOD OSMOLALITY: CPT

## 2022-01-30 PROCEDURE — 2580000003 HC RX 258: Performed by: EMERGENCY MEDICINE

## 2022-01-30 PROCEDURE — 71260 CT THORAX DX C+: CPT

## 2022-01-30 PROCEDURE — 87040 BLOOD CULTURE FOR BACTERIA: CPT

## 2022-01-30 PROCEDURE — 87635 SARS-COV-2 COVID-19 AMP PRB: CPT

## 2022-01-30 PROCEDURE — 84443 ASSAY THYROID STIM HORMONE: CPT

## 2022-01-30 PROCEDURE — 81001 URINALYSIS AUTO W/SCOPE: CPT

## 2022-01-30 PROCEDURE — 87150 DNA/RNA AMPLIFIED PROBE: CPT

## 2022-01-30 RX ORDER — BENZONATATE 100 MG/1
100 CAPSULE ORAL ONCE
Status: COMPLETED | OUTPATIENT
Start: 2022-01-30 | End: 2022-01-30

## 2022-01-30 RX ORDER — MAGNESIUM SULFATE IN WATER 40 MG/ML
4000 INJECTION, SOLUTION INTRAVENOUS ONCE
Status: COMPLETED | OUTPATIENT
Start: 2022-01-30 | End: 2022-01-31

## 2022-01-30 RX ORDER — IPRATROPIUM BROMIDE AND ALBUTEROL SULFATE 2.5; .5 MG/3ML; MG/3ML
1 SOLUTION RESPIRATORY (INHALATION)
Status: DISCONTINUED | OUTPATIENT
Start: 2022-01-31 | End: 2022-01-31

## 2022-01-30 RX ORDER — MAGNESIUM SULFATE IN WATER 40 MG/ML
2000 INJECTION, SOLUTION INTRAVENOUS ONCE
Status: COMPLETED | OUTPATIENT
Start: 2022-01-30 | End: 2022-01-31

## 2022-01-30 RX ORDER — ACETAMINOPHEN 500 MG
1000 TABLET ORAL ONCE
Status: COMPLETED | OUTPATIENT
Start: 2022-01-30 | End: 2022-01-30

## 2022-01-30 RX ORDER — 0.9 % SODIUM CHLORIDE 0.9 %
1000 INTRAVENOUS SOLUTION INTRAVENOUS ONCE
Status: COMPLETED | OUTPATIENT
Start: 2022-01-30 | End: 2022-01-31

## 2022-01-30 RX ORDER — ONDANSETRON 2 MG/ML
4 INJECTION INTRAMUSCULAR; INTRAVENOUS ONCE
Status: COMPLETED | OUTPATIENT
Start: 2022-01-30 | End: 2022-01-30

## 2022-01-30 RX ADMIN — VANCOMYCIN HYDROCHLORIDE 1000 MG: 1 INJECTION, POWDER, LYOPHILIZED, FOR SOLUTION INTRAVENOUS at 23:30

## 2022-01-30 RX ADMIN — IOPAMIDOL 75 ML: 755 INJECTION, SOLUTION INTRAVENOUS at 23:21

## 2022-01-30 RX ADMIN — ONDANSETRON 4 MG: 2 INJECTION INTRAMUSCULAR; INTRAVENOUS at 21:39

## 2022-01-30 RX ADMIN — CEFEPIME HYDROCHLORIDE 2000 MG: 2 INJECTION, POWDER, FOR SOLUTION INTRAVENOUS at 22:17

## 2022-01-30 RX ADMIN — BENZONATATE 100 MG: 100 CAPSULE ORAL at 22:20

## 2022-01-30 RX ADMIN — ACETAMINOPHEN 1000 MG: 500 TABLET ORAL at 21:38

## 2022-01-30 ASSESSMENT — PAIN SCALES - GENERAL: PAINLEVEL_OUTOF10: 0

## 2022-01-31 PROBLEM — J18.9 PNEUMONIA: Status: ACTIVE | Noted: 2022-01-31

## 2022-01-31 PROBLEM — R50.81 NEUTROPENIC FEVER (HCC): Status: ACTIVE | Noted: 2022-01-31

## 2022-01-31 PROBLEM — J12.82 PNEUMONIA DUE TO COVID-19 VIRUS: Status: ACTIVE | Noted: 2022-01-31

## 2022-01-31 PROBLEM — D70.9 NEUTROPENIC FEVER (HCC): Status: ACTIVE | Noted: 2022-01-31

## 2022-01-31 PROBLEM — A41.9 SEPSIS (HCC): Status: ACTIVE | Noted: 2022-01-31

## 2022-01-31 PROBLEM — U07.1 PNEUMONIA DUE TO COVID-19 VIRUS: Status: ACTIVE | Noted: 2022-01-31

## 2022-01-31 LAB
A/G RATIO: 1.2 (ref 1.1–2.2)
ALBUMIN SERPL-MCNC: 3.2 G/DL (ref 3.4–5)
ALP BLD-CCNC: 67 U/L (ref 40–129)
ALT SERPL-CCNC: 14 U/L (ref 10–40)
ANION GAP SERPL CALCULATED.3IONS-SCNC: 13 MMOL/L (ref 3–16)
ANISOCYTOSIS: ABNORMAL
AST SERPL-CCNC: 16 U/L (ref 15–37)
BANDED NEUTROPHILS RELATIVE PERCENT: 5 % (ref 0–7)
BASOPHILS ABSOLUTE: 0 K/UL (ref 0–0.2)
BASOPHILS RELATIVE PERCENT: 1 %
BILIRUB SERPL-MCNC: 0.3 MG/DL (ref 0–1)
BUN BLDV-MCNC: 5 MG/DL (ref 7–20)
C-REACTIVE PROTEIN: 143.8 MG/L (ref 0–5.1)
CALCIUM SERPL-MCNC: 8.3 MG/DL (ref 8.3–10.6)
CHLORIDE BLD-SCNC: 88 MMOL/L (ref 99–110)
CO2: 22 MMOL/L (ref 21–32)
CREAT SERPL-MCNC: <0.5 MG/DL (ref 0.6–1.2)
EKG ATRIAL RATE: 116 BPM
EKG DIAGNOSIS: NORMAL
EKG P AXIS: 58 DEGREES
EKG P-R INTERVAL: 158 MS
EKG Q-T INTERVAL: 306 MS
EKG QRS DURATION: 72 MS
EKG QTC CALCULATION (BAZETT): 425 MS
EKG R AXIS: 18 DEGREES
EKG T AXIS: 54 DEGREES
EKG VENTRICULAR RATE: 116 BPM
EOSINOPHILS ABSOLUTE: 0.1 K/UL (ref 0–0.6)
EOSINOPHILS RELATIVE PERCENT: 7 %
GFR AFRICAN AMERICAN: >60
GFR NON-AFRICAN AMERICAN: >60
GLUCOSE BLD-MCNC: 131 MG/DL (ref 70–99)
HCT VFR BLD CALC: 30.9 % (ref 36–48)
HEMATOLOGY PATH CONSULT: NO
HEMATOLOGY PATH CONSULT: NORMAL
HEMOGLOBIN: 10.9 G/DL (ref 12–16)
LACTIC ACID, SEPSIS: 1.3 MMOL/L (ref 0.4–1.9)
LYMPHOCYTES ABSOLUTE: 0.1 K/UL (ref 1–5.1)
LYMPHOCYTES RELATIVE PERCENT: 14 %
MAGNESIUM: 1.9 MG/DL (ref 1.8–2.4)
MAGNESIUM: 2.1 MG/DL (ref 1.8–2.4)
MCH RBC QN AUTO: 29.4 PG (ref 26–34)
MCHC RBC AUTO-ENTMCNC: 35.1 G/DL (ref 31–36)
MCV RBC AUTO: 83.8 FL (ref 80–100)
MONOCYTES ABSOLUTE: 0.7 K/UL (ref 0–1.3)
MONOCYTES RELATIVE PERCENT: 65 %
NEUTROPHILS ABSOLUTE: 0.1 K/UL (ref 1.7–7.7)
NEUTROPHILS RELATIVE PERCENT: 8 %
PDW BLD-RTO: 15.1 % (ref 12.4–15.4)
PLATELET # BLD: 176 K/UL (ref 135–450)
PLATELET SLIDE REVIEW: ADEQUATE
PMV BLD AUTO: 5.6 FL (ref 5–10.5)
POTASSIUM SERPL-SCNC: 3.5 MMOL/L (ref 3.5–5.1)
PROCALCITONIN: 0.12 NG/ML (ref 0–0.15)
RBC # BLD: 3.69 M/UL (ref 4–5.2)
SLIDE REVIEW: ABNORMAL
SODIUM BLD-SCNC: 123 MMOL/L (ref 136–145)
TOTAL PROTEIN: 5.9 G/DL (ref 6.4–8.2)
WBC # BLD: 1 K/UL (ref 4–11)

## 2022-01-31 PROCEDURE — 6360000002 HC RX W HCPCS: Performed by: EMERGENCY MEDICINE

## 2022-01-31 PROCEDURE — 80053 COMPREHEN METABOLIC PANEL: CPT

## 2022-01-31 PROCEDURE — 83605 ASSAY OF LACTIC ACID: CPT

## 2022-01-31 PROCEDURE — 6360000002 HC RX W HCPCS: Performed by: STUDENT IN AN ORGANIZED HEALTH CARE EDUCATION/TRAINING PROGRAM

## 2022-01-31 PROCEDURE — 36415 COLL VENOUS BLD VENIPUNCTURE: CPT

## 2022-01-31 PROCEDURE — 84145 PROCALCITONIN (PCT): CPT

## 2022-01-31 PROCEDURE — 86140 C-REACTIVE PROTEIN: CPT

## 2022-01-31 PROCEDURE — 6370000000 HC RX 637 (ALT 250 FOR IP): Performed by: STUDENT IN AN ORGANIZED HEALTH CARE EDUCATION/TRAINING PROGRAM

## 2022-01-31 PROCEDURE — 85025 COMPLETE CBC W/AUTO DIFF WBC: CPT

## 2022-01-31 PROCEDURE — 83735 ASSAY OF MAGNESIUM: CPT

## 2022-01-31 PROCEDURE — 6370000000 HC RX 637 (ALT 250 FOR IP): Performed by: INTERNAL MEDICINE

## 2022-01-31 PROCEDURE — 6360000002 HC RX W HCPCS: Performed by: INTERNAL MEDICINE

## 2022-01-31 PROCEDURE — 94640 AIRWAY INHALATION TREATMENT: CPT

## 2022-01-31 PROCEDURE — 1200000000 HC SEMI PRIVATE

## 2022-01-31 PROCEDURE — 2580000003 HC RX 258: Performed by: STUDENT IN AN ORGANIZED HEALTH CARE EDUCATION/TRAINING PROGRAM

## 2022-01-31 PROCEDURE — 99223 1ST HOSP IP/OBS HIGH 75: CPT | Performed by: INTERNAL MEDICINE

## 2022-01-31 PROCEDURE — 87086 URINE CULTURE/COLONY COUNT: CPT

## 2022-01-31 PROCEDURE — 2580000003 HC RX 258: Performed by: EMERGENCY MEDICINE

## 2022-01-31 PROCEDURE — 93010 ELECTROCARDIOGRAM REPORT: CPT | Performed by: INTERNAL MEDICINE

## 2022-01-31 RX ORDER — ACETAMINOPHEN 325 MG/1
650 TABLET ORAL EVERY 6 HOURS PRN
Status: DISCONTINUED | OUTPATIENT
Start: 2022-01-31 | End: 2022-02-04 | Stop reason: HOSPADM

## 2022-01-31 RX ORDER — ONDANSETRON 2 MG/ML
4 INJECTION INTRAMUSCULAR; INTRAVENOUS EVERY 6 HOURS PRN
Status: DISCONTINUED | OUTPATIENT
Start: 2022-01-31 | End: 2022-02-04 | Stop reason: HOSPADM

## 2022-01-31 RX ORDER — LEVOFLOXACIN 5 MG/ML
750 INJECTION, SOLUTION INTRAVENOUS EVERY 24 HOURS
Status: DISCONTINUED | OUTPATIENT
Start: 2022-01-31 | End: 2022-02-02

## 2022-01-31 RX ORDER — SODIUM CHLORIDE 9 MG/ML
25 INJECTION, SOLUTION INTRAVENOUS PRN
Status: DISCONTINUED | OUTPATIENT
Start: 2022-01-31 | End: 2022-02-04 | Stop reason: HOSPADM

## 2022-01-31 RX ORDER — SODIUM CHLORIDE 1000 MG
1 TABLET, SOLUBLE MISCELLANEOUS 3 TIMES DAILY
COMMUNITY

## 2022-01-31 RX ORDER — METOPROLOL TARTRATE 50 MG/1
50 TABLET, FILM COATED ORAL 2 TIMES DAILY
Status: DISCONTINUED | OUTPATIENT
Start: 2022-01-31 | End: 2022-02-04 | Stop reason: HOSPADM

## 2022-01-31 RX ORDER — LEVOTHYROXINE SODIUM 0.07 MG/1
75 TABLET ORAL DAILY
Status: DISCONTINUED | OUTPATIENT
Start: 2022-01-31 | End: 2022-02-04 | Stop reason: HOSPADM

## 2022-01-31 RX ORDER — ACETAMINOPHEN 650 MG/1
650 SUPPOSITORY RECTAL EVERY 6 HOURS PRN
Status: DISCONTINUED | OUTPATIENT
Start: 2022-01-31 | End: 2022-02-04 | Stop reason: HOSPADM

## 2022-01-31 RX ORDER — AMLODIPINE BESYLATE 5 MG/1
5 TABLET ORAL DAILY
Status: DISCONTINUED | OUTPATIENT
Start: 2022-01-31 | End: 2022-02-04 | Stop reason: HOSPADM

## 2022-01-31 RX ORDER — ALBUTEROL SULFATE 90 UG/1
2 AEROSOL, METERED RESPIRATORY (INHALATION)
Status: DISCONTINUED | OUTPATIENT
Start: 2022-01-31 | End: 2022-01-31

## 2022-01-31 RX ORDER — GUAIFENESIN/DEXTROMETHORPHAN 100-10MG/5
5 SYRUP ORAL EVERY 4 HOURS PRN
Status: DISCONTINUED | OUTPATIENT
Start: 2022-01-31 | End: 2022-02-04 | Stop reason: HOSPADM

## 2022-01-31 RX ORDER — SODIUM CHLORIDE 0.9 % (FLUSH) 0.9 %
5-40 SYRINGE (ML) INJECTION EVERY 12 HOURS SCHEDULED
Status: DISCONTINUED | OUTPATIENT
Start: 2022-01-31 | End: 2022-02-04 | Stop reason: HOSPADM

## 2022-01-31 RX ORDER — ALBUTEROL SULFATE 90 UG/1
2 AEROSOL, METERED RESPIRATORY (INHALATION) 2 TIMES DAILY
Status: DISCONTINUED | OUTPATIENT
Start: 2022-01-31 | End: 2022-02-04 | Stop reason: HOSPADM

## 2022-01-31 RX ORDER — LISINOPRIL 20 MG/1
20 TABLET ORAL DAILY
Status: ON HOLD | COMMUNITY
End: 2022-02-04 | Stop reason: HOSPADM

## 2022-01-31 RX ORDER — SODIUM CHLORIDE 0.9 % (FLUSH) 0.9 %
5-40 SYRINGE (ML) INJECTION PRN
Status: DISCONTINUED | OUTPATIENT
Start: 2022-01-31 | End: 2022-02-04 | Stop reason: HOSPADM

## 2022-01-31 RX ORDER — GUAIFENESIN 600 MG/1
600 TABLET, EXTENDED RELEASE ORAL 2 TIMES DAILY
Status: DISCONTINUED | OUTPATIENT
Start: 2022-02-01 | End: 2022-02-04 | Stop reason: HOSPADM

## 2022-01-31 RX ORDER — ALBUTEROL SULFATE 90 UG/1
2 AEROSOL, METERED RESPIRATORY (INHALATION) EVERY 4 HOURS PRN
Status: DISCONTINUED | OUTPATIENT
Start: 2022-01-31 | End: 2022-02-04 | Stop reason: HOSPADM

## 2022-01-31 RX ORDER — BENZONATATE 100 MG/1
100 CAPSULE ORAL 3 TIMES DAILY PRN
Status: DISCONTINUED | OUTPATIENT
Start: 2022-01-31 | End: 2022-02-04 | Stop reason: HOSPADM

## 2022-01-31 RX ADMIN — GUAIFENESIN SYRUP AND DEXTROMETHORPHAN 5 ML: 100; 10 SYRUP ORAL at 20:54

## 2022-01-31 RX ADMIN — ENOXAPARIN SODIUM 30 MG: 100 INJECTION SUBCUTANEOUS at 08:12

## 2022-01-31 RX ADMIN — IPRATROPIUM BROMIDE 2 PUFF: 17 AEROSOL, METERED RESPIRATORY (INHALATION) at 20:36

## 2022-01-31 RX ADMIN — CEFEPIME HYDROCHLORIDE 2000 MG: 2 INJECTION, POWDER, FOR SOLUTION INTRAVENOUS at 20:59

## 2022-01-31 RX ADMIN — METOPROLOL TARTRATE 50 MG: 50 TABLET, FILM COATED ORAL at 08:11

## 2022-01-31 RX ADMIN — LEVOTHYROXINE SODIUM 75 MCG: 0.07 TABLET ORAL at 06:40

## 2022-01-31 RX ADMIN — MAGNESIUM SULFATE HEPTAHYDRATE 4000 MG: 40 INJECTION, SOLUTION INTRAVENOUS at 01:54

## 2022-01-31 RX ADMIN — SODIUM CHLORIDE, PRESERVATIVE FREE 10 ML: 5 INJECTION INTRAVENOUS at 20:54

## 2022-01-31 RX ADMIN — CEFEPIME HYDROCHLORIDE 2000 MG: 2 INJECTION, POWDER, FOR SOLUTION INTRAVENOUS at 06:38

## 2022-01-31 RX ADMIN — SODIUM CHLORIDE 1000 ML: 9 INJECTION, SOLUTION INTRAVENOUS at 01:49

## 2022-01-31 RX ADMIN — MAGNESIUM SULFATE HEPTAHYDRATE 2000 MG: 2 INJECTION, SOLUTION INTRAVENOUS at 06:47

## 2022-01-31 RX ADMIN — METOPROLOL TARTRATE 50 MG: 50 TABLET, FILM COATED ORAL at 20:55

## 2022-01-31 RX ADMIN — AMLODIPINE BESYLATE 5 MG: 5 TABLET ORAL at 08:11

## 2022-01-31 RX ADMIN — ENOXAPARIN SODIUM 30 MG: 100 INJECTION SUBCUTANEOUS at 20:55

## 2022-01-31 RX ADMIN — ALBUTEROL SULFATE 2 PUFF: 90 AEROSOL, METERED RESPIRATORY (INHALATION) at 20:36

## 2022-01-31 RX ADMIN — LEVOFLOXACIN 750 MG: 5 INJECTION, SOLUTION INTRAVENOUS at 06:39

## 2022-01-31 RX ADMIN — GUAIFENESIN SYRUP AND DEXTROMETHORPHAN 5 ML: 100; 10 SYRUP ORAL at 05:21

## 2022-01-31 RX ADMIN — CEFEPIME HYDROCHLORIDE 2000 MG: 2 INJECTION, POWDER, FOR SOLUTION INTRAVENOUS at 13:28

## 2022-01-31 RX ADMIN — GUAIFENESIN SYRUP AND DEXTROMETHORPHAN 5 ML: 100; 10 SYRUP ORAL at 11:25

## 2022-01-31 RX ADMIN — ACETAMINOPHEN 650 MG: 325 TABLET ORAL at 20:54

## 2022-01-31 RX ADMIN — TBO-FILGRASTIM 300 MCG: 300 INJECTION, SOLUTION SUBCUTANEOUS at 09:15

## 2022-01-31 ASSESSMENT — PAIN SCALES - GENERAL
PAINLEVEL_OUTOF10: 0

## 2022-01-31 NOTE — H&P
Hospital Medicine History & Physical      PCP: FAY Goins CNP    Date of Admission: 1/30/2022    Date of Service: Pt seen/examined on 1/31/2022 and Admitted to Inpatient     Chief Complaint: Syncope, intermittent fevers at home, cough, decreased oral intake      History Of Present Illness: The patient is a 76 y.o. female with past medical history as below but mainly CLL who is currently receiving chemotherapy via oncology with her last treatment being about a week or so ago who presents to [facility] being brought from home apparently because patient had a very brief syncopal episode while playing [employer] with her  and family. Patient remembers that she was initially playing the game and that apparently she was out for at least a minute according to what she was told by family. She does not remember the entire event but does recall that she not been feeling entirely well the last few days. She did not think contact anyone sick but she is not vaccinated for Covid. She notes that for the last few days she had intermittent fevers with a temperature as high as at least 101 but as low as around  Fahrenheit. She did also develop a slight cough and has had decreased appetite the last few days. Apart from the fevers and decreased appetite and cough, patient denies other symptoms of dizziness, dysuria, blood in urine/stool/sputum, abdominal pain/nausea/vomiting/diarrhea of any significance, rashes, leg swelling. She had never had any syncopal episodes before and denies any chest pain or shortness of breath prior to the episode occurring. She never had any seizures before either.     Past Medical History:        Diagnosis Date    Acid reflux     Arthritis     [facility] ([facility] [address]     Chronic lymphocytic leukemia (CLL) genetic mutation variant ([facility] [address] 2018    Diabetes mellitus ([facility] [address]     BORDERLINE    High blood pressure     Hyperlipidemia     mild-no meds    Multiple drug resistant organism (MDRO) culture positive 01/25/2021    urine    PONV (postoperative nausea and vomiting)     after hysterectomy-got sick next morning    Thyroid disease     UTI (urinary tract infection) 01/2019    currently on cipro    Wears glasses        Past Surgical History:        Procedure Laterality Date    CHOLECYSTECTOMY      CYST REMOVAL Right     on foot    EYE SURGERY      cataract removal with lens implants    HYSTERECTOMY      JOINT REPLACEMENT Left     hip    JOINT REPLACEMENT Right 01/30/2019      Right Total Knee Replacement    LARYNGOSCOPY N/A 9/10/2021    DIRECT LARYNGOSCOPY WITH BIOPSY performed by Yessenia Ricci MD at [place] [address] N/A 9/10/2021    NASAL ENDOSCOPY WITH BIOPSY performed by Yessenia Ricci MD at [facility] Left     TOTAL KNEE ARTHROPLASTY Right 1/30/2019    RIGHT TOTAL KNEE REPLACEMENT performed by Paty Thomas MD at [place] [id number]       Medications Prior to Admission:    Prior to Admission medications    Medication Sig Start Date End Date Taking? Authorizing Provider   metoprolol tartrate (LOPRESSOR) 25 MG tablet Take 50 mg by mouth 2 times daily    Yes Historical Provider, MD   amLODIPine (NORVASC) 5 MG tablet Take 5 mg by mouth daily   Yes Historical Provider, MD   Cranberry 1000 MG CAPS Take 1 capsule by mouth 2 times daily. 4/8/15  Yes FAY Akins - CNP   metFORMIN (GLUCOPHAGE) 500 MG tablet Take 500 mg by mouth 2 times daily (with meals). Yes Historical Provider, MD   Ferrous Sulfate (IRON) 325 (65 FE) MG TABS Take 1 tablet by mouth every evening. Yes Historical Provider, MD   Ascorbic Acid (VITAMIN C) 500 MG CAPS Take 1 tablet by mouth daily    Yes Historical Provider, MD   Lactobacillus (PROBIOTIC ACIDOPHILUS PO) Take 1 capsule by mouth 2 times daily.    Yes Historical Provider, MD   levothyroxine (SYNTHROID) 75 MCG tablet Take 75 mcg by mouth daily  12/30/14  Yes Historical Provider, MD       Allergies:  Patient has no known allergies. Social History:  The patient currently lives home    TOBACCO:   reports that she has never smoked. She has never used smokeless tobacco.  ETOH:   reports no history of alcohol use. Family History:  Reviewed in detail and negative for DM, Early CAD, Cancer, CVA. Positive as follows:        Problem Relation Age of Onset    High Blood Pressure Mother     Heart Disease Mother     Cancer Father         acute leukemia       REVIEW OF SYSTEMS:   as noted in the HPI. All other systems reviewed and negative. PHYSICAL EXAM:    /63   Pulse 112   Temp 98 °F (36.7 °C) (Oral)   Resp 18   Ht 5' 6\" (1.676 m)   Wt 189 lb 2.5 oz (85.8 kg)   SpO2 92%   BMI 30.53 kg/m²     General appearance: Alert and oriented x4, no nasal cannula oxygen requirement, pleasant and conversational, still having some slight cough  HEENT Normal cephalic, atraumatic without obvious deformity. Pupils equal, round, and reactive to light. Extra ocular muscles intact. Conjunctivae/corneas clear. Mildly dry mucous membranes  Neck: Supple, no JVD  Lungs: Diminished breath sounds, crackles mainly noted to the left greater than right lobes  Heart: Regular rate and rhythm with Normal S1/S2 without murmurs, rubs or gallops, point of maximum impulse non-displaced  Abdomen: Soft, nontender, nondistended, active bowel sounds  Extremities: No edema  Skin no rashes  Neurologic: Alert and oriented X 3, neurovascularly intact with sensory/motor intact upper extremities/lower extremities, bilaterally. Cranial nerves: II-XII intact, grossly non-focal.  Mental status: Alert, oriented, thought content appropriate.   Capillary Refill: Acceptable  < 3 seconds  Peripheral Pulses: +3 Easily felt, not easily obliterated with pressure    CT chest pulmonary embolism IV contrast:  No acute or chronic pulmonary embolism.       Lower lobe infiltrates bilaterally and left perihilar infiltrates concerning   for pneumonia. CBC   Recent Labs     01/30/22 2123 01/31/22  0530   WBC 1.0* 1.0*   HGB 10.8* 10.9*   HCT 30.7* 30.9*    176      RENAL  Recent Labs     01/30/22 2123 01/31/22  0529   * 123*   K 3.5 3.5   CL 84* 88*   CO2 23 22   BUN 7 5*   CREATININE <0.5* <0.5*     LFT'S  Recent Labs     01/30/22 2123 01/31/22  0529   AST 16 16   ALT 13 14   BILITOT 0.4 0.3   ALKPHOS 71 67     COAG  No results for input(s): INR in the last 72 hours.   CARDIAC ENZYMES  Recent Labs     01/30/22 2123   TROPONINI <0.01       U/A:    Lab Results   Component Value Date    COLORU YELLOW 01/30/2022    WBCUA 6 01/30/2022    RBCUA 5 01/30/2022    MUCUS 1+ 01/30/2022    BACTERIA 4+ 01/30/2022    CLARITYU CLOUDY 01/30/2022    SPECGRAV 1.015 01/30/2022    LEUKOCYTESUR Negative 01/30/2022    BLOODU TRACE 01/30/2022    GLUCOSEU Negative 01/30/2022    AMORPHOUS Rare 01/30/2022       ABG  No results found for: KCD5ZMB, BEART, B2RWROLR, PHART, THGBART, VYJ0KLP, PO2ART, Shaka [address]ers[address] Problems    Diagnosis Date Noted    Pneumonia due to COVID-19 virus [U07.1, J12.82] 01/31/2022    Pneumonia [J18.9] 01/31/2022    Neutropenic fever ([facility] [address] [D70.9, R50.81] 01/31/2022    Sepsis ([facility] [address] [A41.9] 01/31/2022    CLL (chronic lymphocytic leukemia) ([facility] [address] [C91.10] 01/25/2021    UTI (urinary tract infection) [N39.0] 01/25/2021    Hyponatremia [E87.1] 01/25/2021         PHYSICIANS CERTIFICATION:    I certify that Chamorro Arrington is expected to be hospitalized for greater than 2 midnights based on the following assessment and plan:      ASSESSMENT/PLAN:  · Sepsis  · Neutropenic fever  · Pneumonia  · Pneumonia due to COVID-19 virus  · Syncope  · UTI  · CLL  · Hyponatremia    Plan:  · Patient sepsis is more considerably demonstratable from possible bacterial pneumonia, patient is also Covid positive but is not hypoxic, patient may also have suspicion for UTI although does not seem to have specific urinary tract symptoms. Patient currently has not had syncope in the ED and I suspect that most likely her presentation is secondary due to dehydration and some component of infections. Hyponatremia is likely a component of decreased fluid intake  · Start patient on broad-spectrum antibiotics with cefepime and Levaquin  · At this time patient is not hypoxic, will not start any steroid therapy for Covid at this time  · Restart patient's home medications  · Infectious these consult   · Oncology consult  · Repeat labs in the morning    DVT Prophylaxis: SCDs  Diet: ADULT DIET; Regular; 4 carb choices (60 gm/meal); Low Sodium (2 gm)  Code Status: Full Code  PT/OT Eval Status: Ambulatory    Dispo -pending clinical course       Stephen Gomez DO    Thank you FAY Clancy CNP for the opportunity to be involved in this patient's care. If you have any questions or concerns please feel free to contact me at [phone].

## 2022-01-31 NOTE — RT PROTOCOL NOTE
RT Inhaler-Nebulizer Bronchodilator Protocol Note    There is a bronchodilator order in the chart from a provider indicating to follow the RT Bronchodilator Protocol and there is an Initiate RT Inhaler-Nebulizer Bronchodilator Protocol order as well (see protocol at bottom of note). CXR Findings:  XR CHEST PORTABLE    Result Date: 1/30/2022  Nonspecific subsegmental left basilar opacity. The findings from the last RT Protocol Assessment were as follows:   History Pulmonary Disease: None or smoker <15 pack years  Respiratory Pattern: Regular pattern and RR 12-20 bpm  Breath Sounds: Intermittent or unilateral wheezes  Cough: Strong, spontaneous, non-productive  Indication for Bronchodilator Therapy: Wheezing associated with pulm disorder  Bronchodilator Assessment Score: 4    Aerosolized bronchodilator medication orders have been revised according to the RT Inhaler-Nebulizer Bronchodilator Protocol below. Respiratory Therapist to perform RT Therapy Protocol Assessment initially then follow the protocol. Repeat RT Therapy Protocol Assessment PRN for score 0-3 or on second treatment, BID, and PRN for scores above 3. No Indications - adjust the frequency to every 6 hours PRN wheezing or bronchospasm, if no treatments needed after 48 hours then discontinue using Per Protocol order mode. If indication present, adjust the RT bronchodilator orders based on the Bronchodilator Assessment Score as indicated below. Use Inhaler orders unless patient has one or more of the following: on home nebulizer, not able to hold breath for 10 seconds, is not alert and oriented, cannot activate and use MDI correctly, or respiratory rate 25 breaths per minute or more, then use the equivalent nebulizer order(s) with same Frequency and PRN reasons based on the score. If a patient is on this medication at home then do not decrease Frequency below that used at home.     0-3 - enter or revise RT bronchodilator order(s) to equivalent RT Bronchodilator order with Frequency of every 4 hours PRN for wheezing or increased work of breathing using Per Protocol order mode. 4-6 - enter or revise RT Bronchodilator order(s) to two equivalent RT bronchodilator orders with one order with BID Frequency and one order with Frequency of every 4 hours PRN wheezing or increased work of breathing using Per Protocol order mode. 7-10 - enter or revise RT Bronchodilator order(s) to two equivalent RT bronchodilator orders with one order with TID Frequency and one order with Frequency of every 4 hours PRN wheezing or increased work of breathing using Per Protocol order mode. 11-13 - enter or revise RT Bronchodilator order(s) to one equivalent RT bronchodilator order with QID Frequency and an Albuterol order with Frequency of every 4 hours PRN wheezing or increased work of breathing using Per Protocol order mode. Greater than 13 - enter or revise RT Bronchodilator order(s) to one equivalent RT bronchodilator order with every 4 hours Frequency and an Albuterol order with Frequency of every 2 hours PRN wheezing or increased work of breathing using Per Protocol order mode. RT to enter RT Home Evaluation for COPD & MDI Assessment order using Per Protocol order mode.     Electronically signed by Susan Jones RCP on 1/31/2022 at 11:49 AM

## 2022-01-31 NOTE — CONSULTS
[facility]    Consult Note      Requesting Physician: Batool Mujica  CHIEF COMPLAINT: syncope       HISTORY OF PRESENT ILLNESS:      Ms. Rachael Barba  is a 76 y.o. female we are seeing in consultation for known cll/sll.  THe pt is currently on chemo with bendamustine rituxan and has had about 4-5 cycles  She has done well with it in terms of tolerance-has not had severe nv diarrhea or any infections uptill now  The pt is not vaccinated against covid and we had many discussions on this   She did test positive for covid  Its noted she is neutropenic and febrile   abx iv have been started       Past Medical History:        Diagnosis Date    Acid reflux     Arthritis     [facility] [address] [facility]     Chronic lymphocytic leukemia (CLL) genetic mutation variant ([facility] [address] [facility] 2018    Diabetes mellitus ([facility] [address]     BORDERLINE    High blood pressure     Hyperlipidemia     mild-no meds    Multiple drug resistant organism (MDRO) culture positive 01/25/2021    urine    PONV (postoperative nausea and vomiting)     after hysterectomy-got sick next morning    Thyroid disease     UTI (urinary tract infection) 01/2019    currently on cipro    Wears glasses      Past Surgical History:        Procedure Laterality Date    CHOLECYSTECTOMY      CYST REMOVAL Right     on foot    EYE SURGERY      cataract removal with lens implants    HYSTERECTOMY      JOINT REPLACEMENT Left     hip    JOINT REPLACEMENT Right 01/30/2019      Right Total Knee Replacement    LARYNGOSCOPY N/A 9/10/2021    DIRECT LARYNGOSCOPY WITH BIOPSY performed by Carol Pollock MD at [place] [address] N/A 9/10/2021    NASAL ENDOSCOPY WITH BIOPSY performed by Carol Pollock MD at [facility] Left     TOTAL KNEE ARTHROPLASTY Right 1/30/2019    RIGHT TOTAL KNEE REPLACEMENT performed by Avel Martin MD at [place] [address]       Current Medications:    Current Facility-Administered Medications: cefepime (MAXIPIME) 2000 mg IVPB minibag, 2,000 Sexual activity: Never   Other Topics Concern    Not on file   Social History Narrative    Not on file     Social Determinants of Health     Financial Resource Strain:     Difficulty of Paying Living Expenses: Not on file   Food Insecurity:     Worried About Running Out of Food in the Last Year: Not on file    Pooja of Food in the Last Year: Not on file   Transportation Needs:     Lack of Transportation (Medical): Not on file    Lack of Transportation (Non-Medical):  Not on file   Physical Activity:     Days of Exercise per Week: Not on file    Minutes of Exercise per Session: Not on file   Stress:     Feeling of Stress : Not on file   Social Connections:     Frequency of Communication with Friends and Family: Not on file    Frequency of Social Gatherings with Friends and Family: Not on file    Attends Latter-day Services: Not on file    Active Member of [address] [place] [address] Taskforce or Organizations: Not on file    Attends Club or Organization Meetings: Not on file    Marital Status: Not on file   Intimate Partner Violence:     Fear of Current or Ex-Partner: Not on file    Emotionally Abused: Not on file    Physically Abused: Not on file    Sexually Abused: Not on file   Housing Stability:     Unable to Pay for Housing in the Last Year: Not on file    Number of [place] in the Last Year: Not on file    Unstable Housing in the Last Year: Not on file          Family History:         Problem Relation Age of Onset    High Blood Pressure Mother     Heart Disease Mother     Cancer Father         acute leukemia     REVIEW OF SYSTEMS:    ROS per the HPI   Otherwise  10 point ROS negative     PHYSICAL EXAM:      Vitals:  BP (!) 155/74   Pulse 97   Temp 98.6 °F (37 °C) (Oral)   Resp 18   Ht 5' 6\" (1.676 m)   Wt 189 lb 2.5 oz (85.8 kg)   SpO2 95%   BMI 30.53 kg/m²     CONSTITUTIONAL:  awake, alert, cooperative, no apparent distress, and appears stated age NAD  EYES:  pupils equal, round and reactive to light, extra ocular muscles intact, sclera clear, conjunctiva normal  NECK:  Supple, symmetrical, trachea midline, no adenopathy, thyroid symmetric, not enlarged and no tenderness, skin normal  HEMATOLOGIC/LYMPHATICS:  no cervical lymphadenopathy, no supraclavicular lymphadenopathy, LUNGS:  No increased work of breathing, good air exchange, clear to auscultation bilaterally, no crackles or wheezing  CARDIOVASCULAR:  , regular rate and rhythm, normal S1 and S2, no S3 or S4, and no murmur noted  ABDOMEN:  No scars, normal bowel sounds, soft, non-distended, non-tender, no masses palpated, no hepatosplenomegally      MUSCULOSKELETAL:  There is no redness, warmth, or swelling of the joints. Full range of motion noted. Motor strength is 5 out of 5 all extremities bilaterally. NEUROLOGIC:  Awake, alert, oriented to name, place and time. Cranial nerves II-XII are grossly intact. Motor is 5 out of 5 bilaterally. SKIN:  no bruising or bleeding      DATA:    PT/INR:    Recent Labs     01/30/22  2123 01/31/22  0529 02/01/22  0519   PROT 6.0* 5.9* 5.8*     PTT:  No results for input(s): APTT in the last 72 hours.   CMP:    Lab Results   Component Value Date     02/01/2022    K 3.5 02/01/2022    K 3.5 01/30/2022    CL 86 02/01/2022    CO2 26 02/01/2022    BUN 5 02/01/2022    PROT 5.8 02/01/2022     Magnesium:    Lab Results   Component Value Date    MG 1.40 02/01/2022     Phosphorus:  No components found for: PO4  Calcium:  No results found for: CA  CBC:    Lab Results   Component Value Date    WBC 1.2 02/01/2022    RBC 3.58 02/01/2022    HGB 10.4 02/01/2022    HCT 29.8 02/01/2022    MCV 83.3 02/01/2022    RDW 15.1 02/01/2022     02/01/2022     DIFF:    Lab Results   Component Value Date    MCV 83.3 02/01/2022    RDW 15.1 02/01/2022      LDH:  @labcrnt(LDH)@  Uric Acid:  @labcrnt(URIC)@    Radiology Review: ECHO Complete 2D W Doppler W Color    Result Date: 1/19/2022  Transthoracic Echocardiography Report (TTE) Demographics   Patient Name       Fermin Perez   Date of Study      01/19/2022         Gender              Female   Patient Number     [id number]         Date of Birth       1945   Visit Number       [id number]          Age                 76 year(s)   Accession Number   [id number]         Room Number   Corporate ID       [id number]           Radha Kahn,                                                            [facility]   Ordering Physician Chaparro Garay MD          Physician           Kellee Melendez MD  Procedure Type of Study   TTE procedure:ECHOCARDIOGRAM COMPLETE 2D W DOPPLER W COLOR. Procedure Date Date: 01/19/2022 Start: 08:26 AM Study Location: [facility] Echo Lab Technical Quality: Adequate visualization Indications:Heart murmur. Additional Indications:Leukemia DM HTN HLD . Patient Status: Routine Height: 66 inches Weight: 196 pounds BSA: 1.98 m2 BMI: 31.64 kg/m2 Rhythm: Within normal limits HR: 76 bpm BP: 147/61 mmHg  Conclusions   Summary  Ejection fraction is visually estimated to be 60-65%. No regional wall motion abnormalities are noted. Grade II diastoli  The left atrium is mildly dilated. Moderate aortic stenosis with a peak velocity of 3.4m/s and a mean pressure  gradient of 30mmHg. No evidence of aortic valve regurgitation. Normal right ventricular size. Right ventricular systolic function is normal.   Signature   ------------------------------------------------------------------  Electronically signed by Kellee Melendez MD (Interpreting  physician) on 01/19/2022 at 11:37 AM  ------------------------------------------------------------------   Findings   Left Ventricle  Left ventricular cavity size is normal.  Normal left ventricular wall thickness. Ejection fraction is visually estimated to be 60-65%. No regional wall motion abnormalities are noted.   Grade II diastolic dysfunction with elevated LV filling pressures. GLS= -15.7%   Mitral Valve  Mitral annular calcification is present. Calcification of the leaflets of the mitral valve. Trivial mitral regurgitation. No evidence of mitral stenosis. Left Atrium  The left atrium is mildly dilated. Aortic Valve  Moderate aortic stenosis with a peak velocity of 3.4m/s and a mean pressure  gradient of 30mmHg. No evidence of aortic valve regurgitation. Aorta  The aortic root is normal in size. The ascending aorta is normal in size. Right Ventricle  Normal right ventricular size. Right ventricular systolic function is normal.  TAPSE= 2.4cm   Tricuspid Valve  Trivial tricuspid regurgitation. No evidence of tricuspid stenosis. Right Atrium  The right atrium is normal in size. Pulmonic Valve  Mild pulmonic regurgitation present. No evidence of pulmonic valve stenosis. Pericardial Effusion  No pericardial effusion noted. Pleural Effusion  No pleural effusion. Miscellaneous  IVC size is dilated (>2.1 cm) but collapses > 50% with respiration  consistent with elevated RA pressure (8 mmHg). Unable to estimate pulmonary artery pressure secondary to incomplete TR jet  envelope.   M-Mode/2D Measurements (cm)   LV Diastolic Dimension: 1.28 cm LV Systolic Dimension: 7.60 cm  LV Septum Diastolic: 9.65 cm  LV PW Diastolic: 3.05 cm        AO Root Dimension: 3 cm  RV Diastolic Dimension: 7.91 cm                                  LA Area: 26.2 cm2  LVOT: 1.7 cm                    LA volume/Index: 80.8 ml /41 ml/m2  Doppler Measurements   AV Peak Velocity: 344 cm/s     MV Peak E-Wave: 145 cm/s  AV Peak Gradient: 47.33 mmHg   MV Peak A-Wave: 156 cm/s  AV Mean Gradient: 30 mmHg      MV E/A Ratio: 0.93  LVOT Peak Velocity: 170 cm/s   MV P1/2t: 85 msec  AV Area (Continuity):1.12 cm2  MV Mean Gradient: 4 mmHg                                 MV Max P mmHg                                 MV Vmax:147 cm/s  Estimated RAP:8 mmHg           MV VTI:49.8 cm/s condition->Emergency Medical Condition (MA) Reason for Exam: syncope FINDINGS: Pulmonary Arteries: Pulmonary arteries are adequately opacified for evaluation. No evidence of intraluminal filling defect to suggest pulmonary embolism. Main pulmonary artery is normal in caliber. Mediastinum: No evidence of mediastinal lymphadenopathy. The heart and pericardium demonstrate no acute abnormality. There is no acute abnormality of the thoracic aorta. Lungs/pleura: There infiltrates in the lower lobes. There are left perihilar infiltrates. There is no effusion. There is no pneumothorax. The tracheobronchial tree is patent. Upper Abdomen: Hypodense foci are seen within the liver representing cysts versus hemangiomas. Soft Tissues/Bones: No acute bone or soft tissue abnormality. No acute or chronic pulmonary embolism. Lower lobe infiltrates bilaterally and left perihilar infiltrates concerning for pneumonia. Problem List  Patient Active Problem List   Diagnosis    Primary localized osteoarthrosis, lower leg    Obesity (BMI 30-39. 9)    Primary localized osteoarthrosis, pelvic region and thigh    History of total hip replacement    H/O total hip arthroplasty, left    Primary osteoarthritis of left hip    Arthritis of right knee    H/O total knee replacement, right 1/30/19    Right hip pain    2019 novel coronavirus disease (COVID-19)    Hyponatremia    UTI (urinary tract infection)    Syncope    CLL (chronic lymphocytic leukemia) (HCC)    Pneumonia due to COVID-19 virus    Pneumonia    Neutropenic fever (HCC)    Sepsis (HCC)       IMPRESSION/RECOMMENDATIONS:    Cll/sll on chemo -  Pt with cll/sll needed tx due to adenopathy that was causing sinus obstruction   She is on bendamustine rituxan -she has had about 4-5 cycles (ill check the chart as I may be off on that)   She has done well in terms of response and tolerance   She is not vaccinated against covid despite heavy counseling of this prior to starting chemo -I believe its due to her [employer] background   She is clinically stable    Neutropenia with slight fever   I added granix in dueto neutropenia  Agree with current abx  I would not send home until we see neutropenia improve etc    covid-tx per hospitalist/id   ID to see pt

## 2022-01-31 NOTE — PROGRESS NOTES
4 Eyes Skin Assessment     The patient is being assess for  Admission    I agree that 2 RN's have performed a thorough Head to Toe Skin Assessment on the patient. ALL assessment sites listed below have been assessed. Areas assessed by both nurses:   [x]   Head, Face, and Ears   [x]   Shoulders, Back, and Chest  [x]   Arms, Elbows, and Hands   [x]   Coccyx, Sacrum, and IschIum  [x]   Legs, Feet, and Heels        Does the Patient have Skin Breakdown?   No         Dash Prevention initiated:  NA   Wound Care Orders initiated:  NA      [facility] nurse consulted for Pressure Injury (Stage 3,4, Unstageable, DTI, NWPT, and Complex wounds), New and Established Ostomies:  NA      Nurse 1 eSignature: Electronically signed by Deya Kiser RN on 1/31/22 at 5:27 AM EST    **SHARE this note so that the co-signing nurse is able to place an eSignature**     Nurse 2 eSignature: Electronically signed by Andrew Parsons RN on 1/31/22 at 5:29 AM EST

## 2022-01-31 NOTE — ED NOTES
Bed: B-  Expected date: 1/30/22  Expected time: 8:03 PM  Means of arrival: Ambulance  Comments:  Mona Sebastian 24, [facility]  01/30/22 2029

## 2022-01-31 NOTE — PLAN OF CARE
Problem: Airway Clearance - Ineffective  Goal: Achieve or maintain patent airway  Outcome: Ongoing     Problem: Gas Exchange - Impaired  Goal: Promote optimal lung function  Outcome: Ongoing     Problem: Body Temperature -  Risk of, Imbalanced  Goal: Ability to maintain a body temperature within defined limits  Outcome: Ongoing     Problem:  Body Temperature -  Risk of, Imbalanced  Goal: Complications related to the disease process, condition or treatment will be avoided or minimized  Outcome: Ongoing     Problem: Fatigue  Goal: Verbalize increase energy and improved vitality  Outcome: Ongoing     Problem: Falls - Risk of:  Goal: Absence of physical injury  Description: Absence of physical injury  Outcome: Ongoing

## 2022-01-31 NOTE — CONSULTS
Infectious Diseases Inpatient Consult Note      Reason for Consult:  Neutropenic fevers and COVID 19 infection     Requesting Physician:  Zahira Ennis     Primary Care Physician:  FAY aKiser - REHA    History Obtained From:  [facility] and patient     CHIEF COMPLAINT:     Chief Complaint   Patient presents with    Loss of Consciousness     patient was sitting at the table when she passed out for about 30secs and then it happened again 5 mins later, pt is in treatment for CA       Neutropenic fever and COVID 19 +Ve    HISTORY OF PRESENT ILLNESS:  76 y.o. woman admitted from home after a syncopal event she is on chemo for CLL and last dose a week ago ? T max 100.5 on admit and she is unvaccinated against COVID 19 now tested +Ve on admit WBC at 1, hb at  10.9 and plt 176. Blood cx in process  She is on room air, CT chest lEFT lower lobe changes concerning for PNA , crp AT  143, UA with 4+ Bacteria and we are consulted for IV abx recommendations.          Past Medical History:    Past Medical History:   Diagnosis Date    Acid reflux     Arthritis     Cancer ([facility]     Chronic lymphocytic leukemia (CLL) genetic mutation variant ([facility] 2018    Diabetes mellitus ([facility]     BORDERLINE    High blood pressure     Hyperlipidemia     mild-no meds    Multiple drug resistant organism (MDRO) culture positive 01/25/2021    urine    PONV (postoperative nausea and vomiting)     after hysterectomy-got sick next morning    Thyroid disease     UTI (urinary tract infection) 01/2019    currently on cipro    Wears glasses        Past Surgical History:    Past Surgical History:   Procedure Laterality Date    CHOLECYSTECTOMY      CYST REMOVAL Right     on foot    EYE SURGERY      cataract removal with lens implants    HYSTERECTOMY      JOINT REPLACEMENT Left     hip    JOINT REPLACEMENT Right 01/30/2019      Right Total Knee Replacement    LARYNGOSCOPY N/A 9/10/2021    DIRECT LARYNGOSCOPY WITH BIOPSY performed by Mina Woods MD at [address] [place] [address] 9/10/2021    NASAL ENDOSCOPY WITH BIOPSY performed by Mina Woods MD at [address] Left     TOTAL KNEE ARTHROPLASTY Right 1/30/2019    RIGHT TOTAL KNEE REPLACEMENT performed by Mimi Pritchard MD at [place] [address]       Current Medications:    Outpatient Medications Marked as Taking for the 1/30/22 encounter [facility] Encounter)   Medication Sig Dispense Refill    metoprolol tartrate (LOPRESSOR) 25 MG tablet Take 50 mg by mouth 2 times daily       amLODIPine (NORVASC) 5 MG tablet Take 5 mg by mouth daily      Cranberry 1000 MG CAPS Take 1 capsule by mouth 2 times daily. 30 capsule 1    metFORMIN (GLUCOPHAGE) 500 MG tablet Take 500 mg by mouth 2 times daily (with meals).  Ferrous Sulfate (IRON) 325 (65 FE) MG TABS Take 1 tablet by mouth every evening.  Ascorbic Acid (VITAMIN C) 500 MG CAPS Take 1 tablet by mouth daily       Lactobacillus (PROBIOTIC ACIDOPHILUS PO) Take 1 capsule by mouth 2 times daily.  levothyroxine (SYNTHROID) 75 MCG tablet Take 75 mcg by mouth daily          Allergies:  Patient has no known allergies. Immunizations : There is no immunization history on file for this patient.       Social History:    Social History     Tobacco Use    Smoking status: Never Smoker    Smokeless tobacco: Never Used   Vaping Use    Vaping Use: Never used   Substance Use Topics    Alcohol use: No    Drug use: Never     Social History     Tobacco Use   Smoking Status Never Smoker   Smokeless Tobacco Never Used      Family History   Problem Relation Age of Onset    High Blood Pressure Mother     Heart Disease Mother     Cancer Father         acute leukemia         REVIEW OF SYSTEMS:     Constitutional:   Fevers +, chills, night sweats  Eyes:  negative for blurred vision, eye discharge, visual disturbance   HEENT:  negative for hearing loss, ear drainage,nasal congestion  Respiratory:   cough+ , shortness of breath  or hemoptysis   Cardiovascular:  negative for chest pain, palpitations, syncope  Gastrointestinal:  negative for nausea, vomiting, diarrhea, constipation, abdominal pain  Genitourinary:  negative for frequency, dysuria, urinary incontinence, hematuria  Hematologic/Lymphatic:  negative for easy bruising, bleeding and lymphadenopathy  Allergic/Immunologic:  negative for recurrent infections, angioedema, anaphylaxis   Endocrine:  negative for weight changes, polyuria, polydipsia and polyphagia  Musculoskeletal:  negative for joint  pain, swelling, decreased range of motion  Integumentary: No rashes, skin lesions  Neurological:  negative for headaches, slurred speech, unilateral weakness  Psychiatric: negative for hallucinations,confusion,agitation.      PHYSICAL EXAM:      Vitals:  T max 100.5   /80   Pulse 98   Temp 98.8 °F (37.1 °C) (Oral)   Resp 18   Ht 5' 6\" (1.676 m)   Wt 189 lb 2.5 oz (85.8 kg)   SpO2 97%   BMI 30.53 kg/m²     General Appearance: alert,in no acute distress, ++  pallor, no icterus   Skin: warm and dry, no rash or erythema  Head: normocephalic and atraumatic  Eyes: pupils equal, round, and reactive to light, conjunctivae normal  ENT: tympanic membrane, external ear and ear canal normal bilaterally, nose without deformity, nasal mucosa and turbinates normal without polyps  Neck: supple and non-tender without mass, no thyromegaly  no cervical lymphadenopathy  Pulmonary/Chest: clear to auscultation bilaterally- no wheezes, rales or rhonchi, normal air movement, no respiratory distress  Cardiovascular: normal rate, regular rhythm, normal S1 and S2, no murmurs, rubs, clicks, or gallops, no carotid bruits  Abdomen: soft, non-tender, non-distended, normal bowel sounds, no masses or organomegaly  Extremities: no cyanosis, clubbing or edema  Musculoskeletal: normal range of motion, no joint swelling, deformity or tenderness  Integumentary: No rashes, no abnormal skin lesions, no petechiae  Neurologic: reflexes normal and symmetric, no cranial nerve deficit  Psych:  Orientation, sensorium, mood normal   Lines: IV     DATA:    CBC:   Lab Results   Component Value Date    WBC 1.0 (LL) 01/31/2022    HGB 10.9 (L) 01/31/2022    HCT 30.9 (L) 01/31/2022    MCV 83.8 01/31/2022     01/31/2022     RENAL:   Lab Results   Component Value Date    CREATININE <0.5 (L) 01/31/2022    BUN 5 (L) 01/31/2022     (L) 01/31/2022    K 3.5 01/31/2022    CL 88 (L) 01/31/2022    CO2 22 01/31/2022     SED RATE:   Lab Results   Component Value Date    SEDRATE 20 03/31/2015     CK: No results found for: CKTOTAL  CRP:   Lab Results   Component Value Date    CRP 8.6 01/25/2021     Hepatic Function Panel:   Lab Results   Component Value Date    ALKPHOS 67 01/31/2022    ALT 14 01/31/2022    AST 16 01/31/2022    PROT 5.9 01/31/2022    BILITOT 0.3 01/31/2022    LABALBU 3.2 01/31/2022     UA:  Lab Results   Component Value Date    COLORU YELLOW 01/30/2022    CLARITYU CLOUDY 01/30/2022    GLUCOSEU Negative 01/30/2022    BILIRUBINUR Negative 01/30/2022    KETUA Negative 01/30/2022    SPECGRAV 1.015 01/30/2022    BLOODU TRACE 01/30/2022    PHUR 6.0 01/30/2022    PROTEINU 30 01/30/2022    UROBILINOGEN 0.2 01/30/2022    NITRU POSITIVE 01/30/2022    LEUKOCYTESUR Negative 01/30/2022    LABMICR YES 01/30/2022    URINETYPE Cleancatch 01/30/2022      Urine Microscopic:   Lab Results   Component Value Date    BACTERIA 4+ 01/30/2022    COMU see below 01/30/2022    HYALCAST 4 01/30/2022    WBCUA 6 01/30/2022    RBCUA 5 01/30/2022    EPIU 0-1 01/30/2022     Urine Reflex to Culture:   Lab Results   Component Value Date    URRFLXCULT Not Indicated 01/30/2022       BNP  1343     NA+ 121     WBC 1       MICRO: cultures reviewed and updated by me    COVID-19, Rapid0 Result Notes    Component 1/25/21 1108   Organism Enterobacter cloacae complex Abnormal     Urine Culture, Routine >100,000 CFU/ml   Multiple Resistant Drug Organism Resulting Agency [facility]          Susceptibility     Enterobacter cloacae complex     BACTERIAL SUSCEPTIBILITY PANEL BY VERO     amoxicillin-clavulanate >16/8 mcg/mL Resistant     ampicillin >16 mcg/mL Resistant     ceFAZolin >16 mcg/mL Resistant     cefepime >16 mcg/mL Resistant     cefTRIAXone >32 mcg/mL Resistant     cefuroxime >16 mcg/mL Resistant     ciprofloxacin <=1 mcg/mL Sensitive     ertapenem <=0.5 mcg/mL Sensitive     gentamicin <=4 mcg/mL Sensitive     meropenem <=1 mcg/mL Sensitive     nitrofurantoin <=32 mcg/mL Sensitive     piperacillin-tazobactam <=16 mcg/mL Sensitive     trimethoprim-sulfamethoxazole <=2/38 mcg/mL Sensitive                 Narrative  Performed by: [facility]  ORDER#: [id number]                          ORDERED BY: Leanne Hampton   SOURCE: Urine Clean Catch                  COLLECTED:  01/25/21 11:08   ANTIBIOTICS AT [facility]. :                      RECEIVED :  01/25/21 12:16   Performed at:   [address] [place] [address]   Phone [phone]              Result Notes     Ref Range & Units 1/25/21 1109   SARS-CoV-2, NAAT Not Detected DETECTED Abnormal     Comment: Rapid NAAT:   Negative results should be treated as presumptive and,   if inconsistent with clinical signs and symptoms or necessary for   patient management, should be tested with an alternative molecular   assay. Negative results do not preclude SARS-CoV-2 infection and   should not be used as the sole basis for patient management decisions. This test has been authorized by the FDA under an Emergency Use   Authorization (EUA) for use by authorized laboratories.      Fact sheet for Healthcare Providers:   Lenard              Status: Final result    Component Ref Range & Units 01/30/22 2210   SARS-CoV-2, NAAT Not Detected DETECTED Abnormal     Comment: Rapid NAAT:   Negative results should be treated as presumptive and,   if inconsistent with clinical signs and symptoms or necessary for   patient management, should be tested with an alternative molecular   assay. Negative results do not preclude SARS-CoV-2 infection and   should not be used as the sole basis for patient management decisions. This test has been authorized by the FDA under an Emergency Use           Procedure Component Value Units Date/Time   Culture, Urine [[id number]]    Order Status: Sent Specimen: Urine (20) from Urine, clean catch    COVID-19, Rapid [[id number]] (Abnormal) Collected: 01/30/22 2210   Order Status: Completed Specimen: Nasopharyngeal Swab Updated: 01/30/22 2228    SARS-CoV-2, NAAT DETECTED Abnormal     Comment: Rapid NAAT:   Negative results should be treated as presumptive and,   if inconsistent with clinical signs and symptoms or necessary for   patient management, should be tested with an alternative molecular   assay. Negative results do not preclude SARS-CoV-2 infection and   should not be used as the sole basis for patient management decisions. This test has been authorized by the FDA under an Emergency Use   Authorization (EUA) for use by authorized laboratories. Fact sheet for Healthcare Providers:   Luis.moy   Fact sheet for Patients: Luis.moy     METHODOLOGY: Isothermal Nucleic Acid Amplification       Narrative:     Performed at:   [address] [address]   Phone [phone]   Culture, Blood 2 [[id number]] Collected: 01/30/22 2208   Order Status: Sent Specimen: Blood Updated: 01/30/22 8390   Culture, Blood 1 [[id number]] Collected: 01/30/22 2123   Order Status: Sent Specimen: Blood Updated: 01/30/22 214         Blood Culture:   Lab Results   Component Value Date    BC No Growth after 4 days of incubation. 01/25/2021    BLOODCULT2 No Growth after 4 days of incubation.  01/25/2021 Viral Culture:    Lab Results   Component Value Date    COVID19 DETECTED 01/30/2022     Urine Culture: No results for input(s): Artemio Arteaga in the last 72 hours. Scheduled Meds:   amLODIPine  5 mg Oral Daily    levothyroxine  75 mcg Oral Daily    metoprolol tartrate  50 mg Oral BID    sodium chloride flush  5-40 mL IntraVENous 2 times per day    cefepime  2,000 mg IntraVENous Q8H    levofloxacin  750 mg IntraVENous Q24H    enoxaparin  30 mg SubCUTAneous BID    tbo-filgrastim  300 mcg SubCUTAneous Daily    albuterol sulfate HFA  2 puff Inhalation Q4H WA    And    ipratropium  2 puff Inhalation Q4H WA        FINAL DIAGNOSIS:     A.  Nasopharyngeal mass, biopsy:   - Small lymphocytic lymphoma/chronic lymphocytic leukemia. B.  Nasopharyngeal mass, biopsy:   - Small lymphocytic lymphoma/chronic lymphocytic leukemia. C.  Nasopharyngeal mass, biopsy:   - Small lymphocytic lymphoma/chronic lymphocytic leukemia. D.  Right tonsil, biopsy:   - Small lymphocytic lymphoma/chronic lymphocytic leukemia. Continuous Infusions:   sodium chloride         PRN Meds:  sodium chloride flush, sodium chloride, acetaminophen **OR** acetaminophen, ondansetron, guaiFENesin-dextromethorphan    Imaging:   CT CHEST PULMONARY EMBOLISM W CONTRAST   Final Result   No acute or chronic pulmonary embolism. Lower lobe infiltrates bilaterally and left perihilar infiltrates concerning   for pneumonia. XR CHEST PORTABLE   Final Result   Nonspecific subsegmental left basilar opacity. All pertinent images and reports for the current Hospitalization were reviewed by me. IMPRESSION:    Patient Active Problem List   Diagnosis    Primary localized osteoarthrosis, lower leg    Obesity (BMI 30-39. 9)    Primary localized osteoarthrosis, pelvic region and thigh    History of total hip replacement    H/O total hip arthroplasty, left    Primary osteoarthritis of left hip    Arthritis of right knee    H/O total knee replacement, right 1/30/19    Right hip pain    2019 novel coronavirus disease (COVID-19)    Hyponatremia    UTI (urinary tract infection)    Syncope    CLL (chronic lymphocytic leukemia) (HCC)    Pneumonia due to COVID-19 virus    Pneumonia    Neutropenic fever (HCC)    Sepsis (HCC)     Neutropenic fevers  CLL on chemotherapy   COVID 19 infection   CT chest with Left lung base changes  WBC at 1  Na+ 121 on admit  Syncope at home   Unvaccinated against COVID 19   CRP elevation     She is not hypoxic and mainly symptomatic from Neutropenia than from COVID 19 at this time but she is at risk for progression due to her Medical issues           Labs, Microbiology, Radiology and pertinent results from current hospitalization and care every where were reviewed by me as a part of the consultation. PLAN :  1. Cont IV Cefepime change to x 2 gm Q 12 hrs  2. Cont Levofloxacin for atypical coverage  3. Check Urine legionella and Pneumococcal antigen   4. Check MRSA probe  5. Covid 19 SYMPTOMATIC THERAPY   6. On GCSF for  Neutropenia      Discussed with patient/Family and Nursing   Risk of Complications/Morbidity: High      · Illness(es)/ Infection present that pose threat to bodily function. · There is potential for severe exacerbation of infection/side effects of treatment. · Therapy requires intensive monitoring for antimicrobial agent toxicity. Thanks for allowing me to participate in your patient's care please call me with any questions or concerns.     Dr. Paxton Brown MD  [facility]  Phone: [phone]   Fax : [phone]

## 2022-01-31 NOTE — PROGRESS NOTES
Pt arrived to floor via stretcher from ED and ambulated to bed. Telemetry activated. Patient oriented to room and use of call light. Call light and personal items within reach. Admission and assessment initiated. POC and education initiated and reviewed with patient. Telemetry box 120. Denied further needs or questions at this time. Will continue to monitor.

## 2022-01-31 NOTE — ED PROVIDER NOTES
[address] [place] [address]      Pt Name: Marleny Presley  MRN: [id number]  [place] 1945  Date of evaluation: 1/30/2022  Provider: Juventino Walker, [address] [place] [address]       Chief Complaint   Patient presents with    Loss of Consciousness     patient was sitting at the table when she passed out for about 30secs and then it happened again 5 mins later, pt is in treatment for CA         HISTORY OF PRESENT ILLNESS   (Location/Symptom, Timing/Onset, Context/Setting, Quality, Duration, Modifying Factors, Severity)  Note limiting factors. Marleny Presley is a 76 y.o. female who presents to the emergency department with a complaint of syncope. The patient states that she was sitting at the table playing again just prior to arrival-year-old lightheaded and very weak. Family states that she got very pale and slumped over and lost consciousness. She awakened but was restless. Her son who is an EMT came over from next-door and checked on her and she had another brief loss of consciousness. She states that she feels better now but feels weak. There is no report of any focal weakness, numbness, vision change, speech change, facial droop. She did not fall or hit her head. She denies any associated headache or neck pain. She denies any associated chest pain heaviness pressure tightness or palpitations. She does report a history of syncope several years ago secondary to low sodium. She does have a history of CLL diagnosed in September. 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 She has been getting chemotherapy since that time monthly with 2 agents. She sees Dr. Aiden Victoria. Her last chemotherapy was on January 13. She is scheduled for chemotherapy again on February 17. She states that she has been doing well. Most recent checkup with ENT on December 21 was good. Medical history is also significant for hypertension, diabetes.   She denies any history of COPD. She denies any history of coronary artery disease or thromboembolic disease. She denies any recent leg or calf pain. No recent travel. She does not take any anticoagulants. She denies any weight loss or recent change in medications. Appetite has been normal.  Urine output has been normal.  She denies any melena medic easier hematemesis. Nursing Notes were reviewed. HPI        REVIEW OF SYSTEMS    (2-9 systems for level 4, 10 or more for level 5)       Constitutional: Negative for fever or chills. HENT: Negative for rhinorrhea and sore throat. Eyes: Negative for redness or drainage. Respiratory: Negative for shortness of breath or dyspnea on exertion. Cardiovascular: Negative for chest pain. Gastrointestinal: Negative for abdominal pain. Negative for vomiting or diarrhea. Genitourinary: Negative for flank pain. Negative for dysuria. Negative for hematuria. Neurological: Negative for headache. All systems are reviewed and are negative except for those listed above in the history of present illness and ROS.         PAST MEDICAL HISTORY     Past Medical History:   Diagnosis Date    Acid reflux     Arthritis     [facility]     Chronic lymphocytic leukemia (CLL) genetic mutation variant ([facility] 2018    Diabetes mellitus ([facility]     BORDERLINE    High blood pressure     Hyperlipidemia     mild-no meds    Multiple drug resistant organism (MDRO) culture positive 01/25/2021    urine    PONV (postoperative nausea and vomiting)     after hysterectomy-got sick next morning    Thyroid disease     UTI (urinary tract infection) 01/2019    currently on cipro    Wears glasses          SURGICAL HISTORY       Past Surgical History:   Procedure Laterality Date    CHOLECYSTECTOMY      CYST REMOVAL Right     on foot    EYE SURGERY      cataract removal with lens implants    HYSTERECTOMY      JOINT REPLACEMENT Left     hip    JOINT REPLACEMENT Right 01/30/2019 Right Total Knee Replacement    LARYNGOSCOPY N/A 9/10/2021    [facility]SY [facility]    NASAL ENDOSCOPY WITH BIOPSY performed by Rafita Le MD at [facility]     TOTAL KNEE ARTHROPLASTY Right 1/30/2019    RIGHT TOTAL KNEE REPLACEMENT performed by Gonzales Gillis MD at [address]       Previous Medications    AMLODIPINE (NORVASC) 5 MG TABLET    Take 5 mg by mouth daily    ASCORBIC ACID (VITAMIN C) 500 MG CAPS    Take 1 tablet by mouth daily     CRANBERRY 1000 MG CAPS    Take 1 capsule by mouth 2 times daily. FERROUS SULFATE (IRON) 325 (65 FE) MG TABS    Take 1 tablet by mouth every evening. LACTOBACILLUS (PROBIOTIC ACIDOPHILUS PO)    Take 1 capsule by mouth 2 times daily. LEVOTHYROXINE (SYNTHROID) 75 MCG TABLET    Take 75 mcg by mouth daily     METFORMIN (GLUCOPHAGE) 500 MG TABLET    Take 500 mg by mouth 2 times daily (with meals). METOPROLOL TARTRATE (LOPRESSOR) 25 MG TABLET    Take 50 mg by mouth 2 times daily     POTASSIUM CHLORIDE SA (KLOR-CON M10) 10 MEQ TABLET    Take 10 mEq by mouth daily        ALLERGIES     Patient has no known allergies.     FAMILY HISTORY       Family History   Problem Relation Age of Onset    High Blood Pressure Mother     Heart Disease Mother     Cancer Father         acute leukemia          SOCIAL HISTORY       Social History     Socioeconomic History    Marital status:      Spouse name: None    Number of children: None    Years of education: None    Highest education level: None   Occupational History    Occupation: Light house work   Tobacco Use    Smoking status: Never Smoker    Smokeless tobacco: Never Used   Vaping Use    Vaping Use: Never used   Substance and Sexual Activity    Alcohol use: No    Drug use: Never    Sexual activity: Never   Other Topics Concern    None   Social History Narrative    None     Social Determinants of Health     Financial Resource Strain:     Difficulty of Paying Living Expenses: Not on file   Food Insecurity:     Worried About Running Out of Food in the Last Year: Not on file    Pooja of Food in the Last Year: Not on file   Transportation Needs:     Lack of Transportation (Medical): Not on file    Lack of Transportation (Non-Medical): Not on file   Physical Activity:     Days of Exercise per Week: Not on file    Minutes of Exercise per Session: Not on file   Stress:     Feeling of Stress : Not on file   Social Connections:     Frequency of Communication with Friends and Family: Not on file    Frequency of Social Gatherings with Friends and Family: Not on file    Attends Mosque Services: Not on file    Active Member of [address] [place] [address] [employer] or Organizations: Not on file    Attends Club or Organization Meetings: Not on file    Marital Status: Not on file   Intimate Partner Violence:     Fear of Current or Ex-Partner: Not on file    Emotionally Abused: Not on file    Physically Abused: Not on file    Sexually Abused: Not on file   Housing Stability:     Unable to Pay for Housing in the Last Year: Not on file    Number of [place] in the Last Year: Not on file    Unstable Housing in the Last Year: Not on file       SCREENINGS             PHYSICAL EXAM    (up to 7 for level 4, 8 or more for level 5)     ED Triage Vitals [01/30/22 2033]   BP Temp Temp Source Pulse Resp SpO2 Height Weight   (!) 150/56 100.5 °F (38.1 °C) Oral 114 20 92 % 5' 6\" (1.676 m) 188 lb (85.3 kg)         Physical Exam   Constitutional: Awake and alert. Very pleasant. Not ill-appearing. Head: No visible evidence of trauma. Normocephalic. Eyes: Pupils equal and reactive. No photophobia. Conjunctiva normal.    HENT: Oral mucosa moist.  Airway patent. Pharynx without erythema. Nares were clear. Her pharynx was normal in appearance. No blood. No mass. Uvula midline. No stridor or drooling.   Neck:  Soft and NAAT DETECTED (*)     All other components within normal limits    Narrative:     Performed at:  [address] [place] [address]   Phone [phone]   CBC WITH AUTO DIFFERENTIAL - Abnormal; Notable for the following components:    WBC 1.0 (*)     RBC 3.69 (*)     Hemoglobin 10.8 (*)     Hematocrit 30.7 (*)     Neutrophils Absolute 0.2 (*)     Lymphocytes Absolute 0.1 (*)     Bands Relative 12 (*)     Metamyelocytes Relative 4 (*)     Dohle Bodies Present (*)     All other components within normal limits    Narrative:     Fatimah Schwartzdakota tel. [phone],  Hematology results called to and read back by Hodges Holter, RN, 01/30/2022  22:15, by [facility]  Hematology results called to and read back by Chalino Wills, 01/30/2022  21:45, by [facility]  Performed at:  [address] [place] [address]   Phone [phone]   COMPREHENSIVE METABOLIC PANEL W/ REFLEX TO MG FOR LOW K - Abnormal; Notable for the following components:    Sodium 121 (*)     Chloride 84 (*)     Glucose 183 (*)     CREATININE <0.5 (*)     Calcium 8.2 (*)     Total Protein 6.0 (*)     All other components within normal limits    Narrative:     Performed at:  [address] [place] [address]   Phone [phone]   BRAIN NATRIURETIC PEPTIDE - Abnormal; Notable for the following components:    Pro-BNP 1,343 (*)     All other components within normal limits    Narrative:     Performed at:  [address] [place] [address]   Phone [phone]   URINE RT REFLEX TO CULTURE - Abnormal; Notable for the following components:    Clarity, UA CLOUDY (*)     Blood, Urine TRACE (*)     Protein, UA 30 (*)     Nitrite, Urine POSITIVE (*)     All other components within normal limits    Narrative:     Performed at:  [facility]  [address] [facility] [place] [address]   Phone [phone]   BLOOD GAS, VENOUS - Abnormal; Notable for the following components:    pH, Emiliano 7.487 (*)     pCO2, Emiliano 38.9 (*)     All other components within normal limits    Narrative:     Performed at:  [facility]  [address] [facility] [place] [address]   Phone [phone]   MAGNESIUM - Abnormal; Notable for the following components:    Magnesium 0.90 (*)     All other components within normal limits    Narrative:     Maricruz Hubbard tel. [phone],  Chemistry results called to and read back by Harvey Cranker RN, 01/30/2022  22:42, by [facility]  Performed at:  [facility]  [address] [facility] [place] [address]   Phone [phone]   MICROSCOPIC URINALYSIS - Abnormal; Notable for the following components:    Mucus, UA 1+ (*)     Bacteria, UA 4+ (*)     WBC, UA 6 (*)     RBC, UA 5 (*)     All other components within normal limits    Narrative:     Performed at:  [facility]  [address] [facility] [place] [address]   Phone [phone]   CULTURE, BLOOD 1   CULTURE, BLOOD 2   TROPONIN    Narrative:     Performed at:  [facility]  [address] [place] [address]   Phone [phone]   LIPASE    Narrative:     Performed at:  [facility]  [address] [facility] [place] [address]   Phone [phone]   LACTATE, SEPSIS    Narrative:     Performed at:  [address] [facility]   Phone [phone]   LACTATE, SEPSIS       All other labs were within normal range or not returned as of this dictation.     EMERGENCY DEPARTMENT COURSE and DIFFERENTIAL DIAGNOSIS/MDM:   Vitals:    Vitals:    01/30/22 2033 01/30/22 2132   BP: (!) 150/56 (!) 134/47   Pulse: 114 122   Resp: 20 28   Temp: 100.5 °F (38.1 °C) TempSrc: Oral    SpO2: 92% 95%   Weight: 188 lb (85.3 kg)    Height: 5' 6\" (1.676 m)          MDM      Patient presented to the emergency department with a complaint of an episode of syncope that occurred while sitting at the table playing again prior to arrival.  She had a brief loss of consciousness a few minutes later as well. Currently she is awake alert and oriented x3. GCS is 15. She is neurologically intact. EKG reveals sinus rhythm. No acute change. She is tachycardic with a heart rate of 120. Differential diagnosis would include dehydration, or sepsis. She is febrile with a temp of 100.5. Oxygen saturation is 92%. She initially denied a cough but was actively coughing during the exam and states that this has been going on for the last couple of days. Rapid Covid test was ordered. She denies any exposure to Covid. She is not vaccinated. CT chest PE protocol was obtained to rule out pulmonary embolus. REASSESSMENT          10:04 PM: White blood cell count is 1.0. Hemoglobin 10.8. Platelets 516. Patient was given cefepime 2 g IV and vancomycin 1 g IV. Chest x-ray reveals questionable left basilar infiltrate. pH is 7.48.    11:13 PM: CTA is currently pending. The patient will be admitted for further treatment and evaluation. A call was placed to the hospitalist on call for admission. CRITICAL CARE TIME   Total Critical Care time was 0 minutes, excluding separately reportable procedures. There was a high probability of clinically significant/life threatening deterioration in the patient's condition which required my urgent intervention. CONSULTS:  None    PROCEDURES:  Unless otherwise noted below, none     Procedures        FINAL IMPRESSION      1. COVID-19    2. Chemotherapy-induced neutropenia (HCC)    3. Pneumonia of left lower lobe due to infectious organism    4.  Hypomagnesemia          DISPOSITION/PLAN   DISPOSITION        PATIENT REFERRED TO:  No follow-up provider specified. DISCHARGE MEDICATIONS:  New Prescriptions    No medications on file     Controlled Substances Monitoring:     No flowsheet data found. (Please note that portions of this note were completed with a voice recognition program.  Efforts were made to edit the dictations but occasionally words are mis-transcribed. )    1859 Curt Mary DO (electronically signed)  Attending Emergency Physician          Malinda Hogn DO  01/30/22 1858

## 2022-02-01 LAB
A/G RATIO: 1.1 (ref 1.1–2.2)
ALBUMIN SERPL-MCNC: 3.1 G/DL (ref 3.4–5)
ALP BLD-CCNC: 69 U/L (ref 40–129)
ALT SERPL-CCNC: 14 U/L (ref 10–40)
ANION GAP SERPL CALCULATED.3IONS-SCNC: 10 MMOL/L (ref 3–16)
AST SERPL-CCNC: 16 U/L (ref 15–37)
BILIRUB SERPL-MCNC: 0.4 MG/DL (ref 0–1)
BUN BLDV-MCNC: 5 MG/DL (ref 7–20)
CALCIUM SERPL-MCNC: 8.1 MG/DL (ref 8.3–10.6)
CHLORIDE BLD-SCNC: 86 MMOL/L (ref 99–110)
CHLORIDE URINE RANDOM: 44 MMOL/L
CO2: 26 MMOL/L (ref 21–32)
CREAT SERPL-MCNC: 0.5 MG/DL (ref 0.6–1.2)
GFR AFRICAN AMERICAN: >60
GFR NON-AFRICAN AMERICAN: >60
GLUCOSE BLD-MCNC: 109 MG/DL (ref 70–99)
GLUCOSE BLD-MCNC: 115 MG/DL (ref 70–99)
GLUCOSE BLD-MCNC: 170 MG/DL (ref 70–99)
GLUCOSE BLD-MCNC: 241 MG/DL (ref 70–99)
MAGNESIUM: 1.4 MG/DL (ref 1.8–2.4)
MRSA SCREEN RT-PCR: NORMAL
OSMOLALITY URINE: 225 MOSM/KG (ref 390–1070)
PERFORMED ON: ABNORMAL
POTASSIUM SERPL-SCNC: 3.5 MMOL/L (ref 3.5–5.1)
POTASSIUM, UR: 15.1 MMOL/L
REPORT: NORMAL
SODIUM BLD-SCNC: 122 MMOL/L (ref 136–145)
SODIUM URINE: 38 MMOL/L
TOTAL PROTEIN: 5.8 G/DL (ref 6.4–8.2)
URINE CULTURE, ROUTINE: NORMAL

## 2022-02-01 PROCEDURE — 97530 THERAPEUTIC ACTIVITIES: CPT

## 2022-02-01 PROCEDURE — 6360000002 HC RX W HCPCS: Performed by: INTERNAL MEDICINE

## 2022-02-01 PROCEDURE — 99233 SBSQ HOSP IP/OBS HIGH 50: CPT | Performed by: INTERNAL MEDICINE

## 2022-02-01 PROCEDURE — 6370000000 HC RX 637 (ALT 250 FOR IP): Performed by: INTERNAL MEDICINE

## 2022-02-01 PROCEDURE — 84133 ASSAY OF URINE POTASSIUM: CPT

## 2022-02-01 PROCEDURE — 97165 OT EVAL LOW COMPLEX 30 MIN: CPT

## 2022-02-01 PROCEDURE — 2580000003 HC RX 258: Performed by: STUDENT IN AN ORGANIZED HEALTH CARE EDUCATION/TRAINING PROGRAM

## 2022-02-01 PROCEDURE — 80053 COMPREHEN METABOLIC PANEL: CPT

## 2022-02-01 PROCEDURE — 6360000002 HC RX W HCPCS: Performed by: STUDENT IN AN ORGANIZED HEALTH CARE EDUCATION/TRAINING PROGRAM

## 2022-02-01 PROCEDURE — 82436 ASSAY OF URINE CHLORIDE: CPT

## 2022-02-01 PROCEDURE — 94761 N-INVAS EAR/PLS OXIMETRY MLT: CPT

## 2022-02-01 PROCEDURE — 36415 COLL VENOUS BLD VENIPUNCTURE: CPT

## 2022-02-01 PROCEDURE — 2580000003 HC RX 258: Performed by: INTERNAL MEDICINE

## 2022-02-01 PROCEDURE — 1200000000 HC SEMI PRIVATE

## 2022-02-01 PROCEDURE — 83735 ASSAY OF MAGNESIUM: CPT

## 2022-02-01 PROCEDURE — 84300 ASSAY OF URINE SODIUM: CPT

## 2022-02-01 PROCEDURE — 94640 AIRWAY INHALATION TREATMENT: CPT

## 2022-02-01 PROCEDURE — 97162 PT EVAL MOD COMPLEX 30 MIN: CPT

## 2022-02-01 PROCEDURE — 6370000000 HC RX 637 (ALT 250 FOR IP): Performed by: STUDENT IN AN ORGANIZED HEALTH CARE EDUCATION/TRAINING PROGRAM

## 2022-02-01 PROCEDURE — 83935 ASSAY OF URINE OSMOLALITY: CPT

## 2022-02-01 PROCEDURE — 87641 MR-STAPH DNA AMP PROBE: CPT

## 2022-02-01 PROCEDURE — 85025 COMPLETE CBC W/AUTO DIFF WBC: CPT

## 2022-02-01 RX ORDER — MAGNESIUM SULFATE IN WATER 40 MG/ML
2000 INJECTION, SOLUTION INTRAVENOUS ONCE
Status: COMPLETED | OUTPATIENT
Start: 2022-02-01 | End: 2022-02-01

## 2022-02-01 RX ORDER — SODIUM CHLORIDE 9 MG/ML
INJECTION, SOLUTION INTRAVENOUS CONTINUOUS
Status: DISCONTINUED | OUTPATIENT
Start: 2022-02-01 | End: 2022-02-01

## 2022-02-01 RX ORDER — SODIUM CHLORIDE 9 MG/ML
INJECTION, SOLUTION INTRAVENOUS CONTINUOUS
Status: ACTIVE | OUTPATIENT
Start: 2022-02-01 | End: 2022-02-01

## 2022-02-01 RX ADMIN — AMLODIPINE BESYLATE 5 MG: 5 TABLET ORAL at 08:57

## 2022-02-01 RX ADMIN — CEFEPIME HYDROCHLORIDE 2000 MG: 2 INJECTION, POWDER, FOR SOLUTION INTRAVENOUS at 08:55

## 2022-02-01 RX ADMIN — MAGNESIUM SULFATE HEPTAHYDRATE 2000 MG: 2 INJECTION, SOLUTION INTRAVENOUS at 09:41

## 2022-02-01 RX ADMIN — IPRATROPIUM BROMIDE 2 PUFF: 17 AEROSOL, METERED RESPIRATORY (INHALATION) at 08:31

## 2022-02-01 RX ADMIN — LEVOTHYROXINE SODIUM 75 MCG: 0.07 TABLET ORAL at 05:03

## 2022-02-01 RX ADMIN — GUAIFENESIN 600 MG: 600 TABLET, EXTENDED RELEASE ORAL at 08:56

## 2022-02-01 RX ADMIN — BENZONATATE 100 MG: 100 CAPSULE ORAL at 18:25

## 2022-02-01 RX ADMIN — SODIUM CHLORIDE 250 ML: 9 INJECTION, SOLUTION INTRAVENOUS at 08:55

## 2022-02-01 RX ADMIN — ENOXAPARIN SODIUM 30 MG: 100 INJECTION SUBCUTANEOUS at 20:55

## 2022-02-01 RX ADMIN — GUAIFENESIN 600 MG: 600 TABLET, EXTENDED RELEASE ORAL at 20:55

## 2022-02-01 RX ADMIN — INSULIN LISPRO 1 UNITS: 100 INJECTION, SOLUTION INTRAVENOUS; SUBCUTANEOUS at 21:00

## 2022-02-01 RX ADMIN — METOPROLOL TARTRATE 50 MG: 50 TABLET, FILM COATED ORAL at 08:56

## 2022-02-01 RX ADMIN — CEFEPIME HYDROCHLORIDE 2000 MG: 2 INJECTION, POWDER, FOR SOLUTION INTRAVENOUS at 21:00

## 2022-02-01 RX ADMIN — LEVOFLOXACIN 750 MG: 5 INJECTION, SOLUTION INTRAVENOUS at 05:03

## 2022-02-01 RX ADMIN — ALBUTEROL SULFATE 2 PUFF: 90 AEROSOL, METERED RESPIRATORY (INHALATION) at 08:31

## 2022-02-01 RX ADMIN — TBO-FILGRASTIM 480 MCG: 480 INJECTION, SOLUTION SUBCUTANEOUS at 09:04

## 2022-02-01 RX ADMIN — SODIUM CHLORIDE: 9 INJECTION, SOLUTION INTRAVENOUS at 12:27

## 2022-02-01 RX ADMIN — METOPROLOL TARTRATE 50 MG: 50 TABLET, FILM COATED ORAL at 20:55

## 2022-02-01 RX ADMIN — SODIUM CHLORIDE, PRESERVATIVE FREE 10 ML: 5 INJECTION INTRAVENOUS at 08:57

## 2022-02-01 RX ADMIN — ENOXAPARIN SODIUM 30 MG: 100 INJECTION SUBCUTANEOUS at 08:57

## 2022-02-01 ASSESSMENT — PAIN SCALES - GENERAL: PAINLEVEL_OUTOF10: 0

## 2022-02-01 NOTE — ACP (ADVANCE CARE PLANNING)
Advance Care Planning     Advance Care Planning Activator (Inpatient)  Conversation Note      Date of ACP Conversation: 2/1/2022     Conversation Conducted with: Patient with Decision Making Capacity    ACP Activator: Josiane Cullen RN    Health Care Decision Maker:     Current Designated Health Care Decision Maker:     Primary Decision Maker: Kelton Low [phone]    Secondary Decision Maker: Michelle Liu Child - [phone]    Supplemental (Other) Decision Maker: Peterson Moran Child - [phone]    Supplemental (Other) Decision Maker: Charo Rodgers - Child - [phone]  Click here to complete Healthcare Decision Makers including section of the Healthcare Decision Maker Relationship (ie \"Primary\")  Today we discussed advanced directives, living will, HCPOA, and code status. Pt wishes to be a FULL CODE but does not want pro-longed life support. Care Preferences    Ventilation: \"If you were in your present state of health and suddenly became very ill and were unable to breathe on your own, what would your preference be about the use of a ventilator (breathing machine) if it were available to you? \"      Would the patient desire the use of ventilator (breathing machine)?: yes    \"If your health worsens and it becomes clear that your chance of recovery is unlikely, what would your preference be about the use of a ventilator (breathing machine) if it were available to you? \"     Would the patient desire the use of ventilator (breathing machine)?: No      Resuscitation  \"CPR works best to restart the heart when there is a sudden event, like a heart attack, in someone who is otherwise healthy. Unfortunately, CPR does not typically restart the heart for people who have serious health conditions or who are very sick. \"    \"In the event your heart stopped as a result of an underlying serious health condition, would you want attempts to be made to restart your heart (answer \"yes\" for attempt to resuscitate) or would you prefer a natural death (answer \"no\" for do not attempt to resuscitate)? \" yes       [x] Yes   [] No   Educated Patient / Conradhakan Robert regarding differences between Advance Directives and portable DNR orders.     Length of ACP Conversation in minutes:   5    Conversation Outcomes:  [x] ACP discussion completed  [] Existing advance directive reviewed with patient; no changes to patient's previously recorded wishes  [] New Advance Directive completed  [] Portable Do Not Rescitate prepared for Provider review and signature  [] POLST/POST/MOLST/MOST prepared for Provider review and signature      Follow-up plan:    [] Schedule follow-up conversation to continue planning  [] Referred individual to Provider for additional questions/concerns   [] Advised patient/agent/surrogate to review completed ACP document and update if needed with changes in condition, patient preferences or care setting    [x] This note routed to one or more involved healthcare providers    Electronically signed by Wolfgang Cowden, MSN RN-BC [facility]

## 2022-02-01 NOTE — PROGRESS NOTES
Hospitalist Progress Note      PCP: FAY Lucas - CNP    Date of Admission: 1/30/2022    Subjective: feeling about the same, still +cough    Medications:  Reviewed    Infusion Medications    sodium chloride       Scheduled Medications    amLODIPine  5 mg Oral Daily    levothyroxine  75 mcg Oral Daily    metoprolol tartrate  50 mg Oral BID    sodium chloride flush  5-40 mL IntraVENous 2 times per day    cefepime  2,000 mg IntraVENous Q8H    levofloxacin  750 mg IntraVENous Q24H    enoxaparin  30 mg SubCUTAneous BID    tbo-filgrastim  300 mcg SubCUTAneous Daily    albuterol sulfate HFA  2 puff Inhalation BID    And    ipratropium  2 puff Inhalation BID     PRN Meds: sodium chloride flush, sodium chloride, acetaminophen **OR** acetaminophen, ondansetron, guaiFENesin-dextromethorphan, albuterol sulfate HFA **AND** ipratropium      Intake/Output Summary (Last 24 hours) at 1/31/2022 2241  Last data filed at 1/31/2022 1839  Gross per 24 hour   Intake 1090 ml   Output 900 ml   Net 190 ml       Physical Exam Performed:    /71   Pulse 107   Temp 100 °F (37.8 °C) (Oral)   Resp 20   Ht 5' 6\" (1.676 m)   Wt 189 lb 2.5 oz (85.8 kg)   SpO2 93%   BMI 30.53 kg/m²        General appearance: Alert and oriented x4, no nasal cannula oxygen requirement, pleasant and conversational, still having some slight cough  HEENT Normal cephalic, atraumatic without obvious deformity. Pupils equal, round, and reactive to light. Extra ocular muscles intact. Conjunctivae/corneas clear.   Mildly dry mucous membranes  Neck: Supple, no JVD  Lungs: Diminished breath sounds, crackles mainly noted to the left greater than right lobes  Heart: Regular rate and rhythm with Normal S1/S2 without murmurs, rubs or gallops, point of maximum impulse non-displaced  Abdomen: Soft, nontender, nondistended, active bowel sounds  Extremities: No edema  Skin no rashes  Neurologic: Alert and oriented X 3, neurovascularly intact with sensory/motor intact upper extremities/lower extremities, bilaterally. Cranial nerves: II-XII intact, grossly non-focal.  Mental status: Alert, oriented, thought content appropriate. Capillary Refill: Acceptable  < 3 seconds  Peripheral Pulses: +3 Easily felt, not easily obliterated with pressure       Labs:   Recent Labs     01/30/22 2123 01/31/22  0530   WBC 1.0* 1.0*   HGB 10.8* 10.9*   HCT 30.7* 30.9*    176     Recent Labs     01/30/22 2123 01/31/22  0529   * 123*   K 3.5 3.5   CL 84* 88*   CO2 23 22   BUN 7 5*   CREATININE <0.5* <0.5*   CALCIUM 8.2* 8.3     Recent Labs     01/30/22 2123 01/31/22  0529   AST 16 16   ALT 13 14   BILITOT 0.4 0.3   ALKPHOS 71 67     No results for input(s): INR in the last 72 hours. Recent Labs     01/30/22 2123   TROPONINI <0.01       Urinalysis:      Lab Results   Component Value Date    NITRU POSITIVE 01/30/2022    WBCUA 6 01/30/2022    BACTERIA 4+ 01/30/2022    RBCUA 5 01/30/2022    BLOODU TRACE 01/30/2022    SPECGRAV 1.015 01/30/2022    GLUCOSEU Negative 01/30/2022       Radiology:  CT CHEST PULMONARY EMBOLISM W CONTRAST   Final Result   No acute or chronic pulmonary embolism. Lower lobe infiltrates bilaterally and left perihilar infiltrates concerning   for pneumonia. XR CHEST PORTABLE   Final Result   Nonspecific subsegmental left basilar opacity. Assessment/Plan:    Active Hospital Problems    Diagnosis     Pneumonia due to COVID-19 virus [U07.1, J12.82]     Pneumonia [J18.9]     Neutropenic fever ([address] [D70.9, R50.81]     Sepsis ([address] [A41.9]     CLL (chronic lymphocytic leukemia) ([address] [C91.10]     UTI (urinary tract infection) [N39.0]     Hyponatremia [E87.1]          · Sepsis  · Neutropenic fever  · Pneumonia  · Pneumonia due to COVID-19 virus  · Syncope  · UTI  · CLL  · Hyponatremia     Plan:  · Patient is Covid positive but is not hypoxic, patient may also have component of bacterial PNA.   Hyponatremia is likely a component of decreased fluid intake  · Started patient on broad-spectrum antibiotics with cefepime and Levaquin  · At this time patient is not hypoxic, will not start any steroid therapy for Covid at this time, check CRP  · Restart patient's home medications  · Infectious disease consult   · Oncology consulted, planning granix to improve counts per hem/onc  · Repeat labs in the morning       DVT Prophylaxis: lovenox  Diet: ADULT DIET;  Regular; 4 carb choices (60 gm/meal)  Code Status: Full Code    PT/OT Eval Status: ordered    Shaista Goldsmith MD

## 2022-02-01 NOTE — PROGRESS NOTES
Occupational Therapy   Occupational Therapy Initial Assessment and Tentative D/C    Date: 2022   Patient Name: Archana Galarza  MRN: [id number]     : 1945    Date of Service: 2022    Discharge Recommendations: Archana Galarza scored a 21/24 on the AM-PAC ADL Inpatient form. Current research shows that an AM-PAC score of 18 or greater is typically associated with a discharge to the patient's home setting. If patient discharges prior to next session this note will serve as a discharge summary. Please see below for the latest assessment towards goals. Continue to assess pending progress,Home with assist PRN  OT Equipment Recommendations  Equipment Needed: No    Assessment   Performance deficits / Impairments: Decreased functional mobility ; Decreased strength;Decreased endurance;Decreased ADL status; Decreased high-level IADLs;Decreased balance  Assessment: Archana Galarza is a 76 y.o. female who presents to the emergency department with a complaint of syncope. The patient states that she was sitting at the table playing again just prior to arrival-year-old lightheaded and very weak. Family states that she got very pale and slumped over and lost consciousness. She awakened but was restless. Her son who is an EMT came over from next-door and checked on her and she had another brief loss of consciousness. She states that she feels better now but feels weak. There is no report of any focal weakness, numbness, vision change, speech change, facial droop. She did not fall or hit her head. She denies any associated headache or neck pain. She denies any associated chest pain heaviness pressure tightness or palpitations. PTA pt from home with family where pt was Ind with mobility and ADLs. Pt currently requires Min A for bed mobility. Pt completes functional mobility and transfers with SBA and use of RW. Anticipate pt needing up to [facility] for ADLs. Pt will benefit from skilled OT services at this time. Anticipate pt safe to return home with PRN assist.  Prognosis: Good  Decision Making: Low Complexity  Exam: see above  Assistance / Modification: RW  OT Education: OT Role;Plan of Care;Transfer Training  REQUIRES OT FOLLOW UP: Yes  Activity Tolerance  Activity Tolerance: Patient Tolerated treatment well  Safety Devices  Safety Devices in place: Yes  Type of devices: Left in chair;Chair alarm in place;Call light within reach;Nurse notified           Patient Diagnosis(es): The primary encounter diagnosis was COVID-19. Diagnoses of Chemotherapy-induced neutropenia ([facility] Pneumonia of left lower lobe due to infectious organism, and Hypomagnesemia were also pertinent to this visit. has a past medical history of Acid reflux, Arthritis, Cancer ([facility] [address] [facility] Chronic lymphocytic leukemia (CLL) genetic mutation variant ([facility] [address] Diabetes mellitus ([facility] [address] High blood pressure, Hyperlipidemia, Multiple drug resistant organism (MDRO) culture positive, PONV (postoperative nausea and vomiting), Thyroid disease, UTI (urinary tract infection), and Wears glasses. has a past surgical history that includes Cholecystectomy; Hysterectomy; Thyroid surgery (Left); cyst removal (Right); eye surgery; joint replacement (Left); joint replacement (Right, 01/30/2019); Total knee arthroplasty (Right, 1/30/2019); Nasal sinus surgery (N/A, 9/10/2021); and laryngoscopy (N/A, 9/10/2021). Restrictions  Restrictions/Precautions  Restrictions/Precautions: Fall Risk,Isolation,Contact Precautions    Subjective   General  Chart Reviewed: Yes  Patient assessed for rehabilitation services?: Yes  Additional Pertinent Hx: per ED note, Pedro Pablo Arrington is a 76 y.o. female who presents to the emergency department with a complaint of syncope. The patient states that she was sitting at the table playing again just prior to arrival-year-old lightheaded and very weak. Family states that she got very pale and slumped over and lost consciousness.   She awakened but was restless. Her son who is an EMT came over from next-door and checked on her and she had another brief loss of consciousness. She states that she feels better now but feels weak. There is no report of any focal weakness, numbness, vision change, speech change, facial droop. She did not fall or hit her head. She denies any associated headache or neck pain. She denies any associated chest pain heaviness pressure tightness or palpitations. Family / Caregiver Present: No  Referring Practitioner: Joelle Dunaway MD  Subjective  Subjective: Pt agreeable to OT evaluation. Pt reports no pain. General Comment  Comments: okay for therapy per RN.     Social/Functional History  Social/Functional History  Lives With: Spouse,Family  Type of Home: House  Home Layout: One level  Home Access: Ramped entrance  Bathroom Shower/Tub: Walk-in shower  Bathroom Toilet: Standard  Bathroom Equipment: [place]: Cane,Rolling walker,Wheelchair-manual  ADL Assistance: [address]: Independent (Mod I with cane)  Transfer Assistance: Independent  Active : No  Additional Comments: No Hx of recent falls       Objective   Vision: Impaired  Vision Exceptions: Wears glasses at all times  Hearing: Within functional limits    Orientation  Overall Orientation Status: Within Functional Limits     Balance  Sitting Balance: Independent  Standing Balance: Stand by assistance (RW)  Functional Mobility  Functional - Mobility Device: Rolling Walker  Activity: Other (15ft x2)  Assist Level: Stand by assistance  Functional Mobility Comments: no overt LOB; pt also able to ambulate short distances with no device and SBA/CGA  Wheelchair Bed Transfers  Wheelchair/Bed - Technique: Ambulating (RW)  Equipment Used: Bed;Other (bed to chair)  Level of Asssistance: Stand by assistance  ADL  Additional Comments: Anticipate pt needing up to Min A for ADLs based on ROM, strength, and balance  Tone RUE  RUE Tone: Normotonic  Tone LUE  LUE Tone: Normotonic  Coordination  Movements Are Fluid And Coordinated: Yes     Bed mobility  Supine to Sit: Minimal assistance  Sit to Supine: Unable to assess  Scooting: Unable to assess  Transfers  Sit to stand: Stand by assistance  Stand to sit: Stand by assistance  Transfer Comments: to/from RW     Cognition  Overall Cognitive Status: WFL                 LUE AROM (degrees)  LUE AROM : WFL  RUE AROM (degrees)  RUE AROM : WFL  LUE Strength  Gross LUE Strength: WFL  RUE Strength  Gross RUE Strength: WFL                   Plan   Plan  Times per week: 3-5x  Current Treatment Recommendations: Functional Mobility Training,Strengthening,Gait Training,Balance Training,Endurance Training,Self-Care / ADL,Safety Education & Training,Patient/Caregiver Education & Training,Equipment Evaluation, Education, & procurement      AM-PAC Score        AM-PAC Inpatient Daily Activity Raw Score: 21 (02/01/22 1036)  AM-PAC Inpatient ADL T-Scale Score : 44.27 (02/01/22 1036)  ADL Inpatient CMS 0-100% Score: 32.79 (02/01/22 1036)  ADL Inpatient CMS G-Code Modifier : Idalmis Margi (02/01/22 1036)    Goals  Short term goals  Time Frame for Short term goals: prior to D/C  Short term goal 1: complete functional mobility and transfers Mod Ind  Short term goal 2: complete bathing and dressing Mod Ind  Short term goal 3: complete toileting Mod Ind  Short term goal 4: complete grooming in stance at sink [address] term goals  Time Frame for Long term goals : STG=LTG  Patient Goals   Patient goals : return home       Therapy Time   Individual Concurrent Group Co-treatment   Time In 1007         Time Out 1035         Minutes 28         Timed Code Treatment Minutes: 13 Minutes (15 minute eval)       RUSS Torres/TARIK

## 2022-02-01 NOTE — CARE COORDINATION
Case Management Assessment           Initial Evaluation                Date / Time of Evaluation: 2/1/2022 10:12 AM                 Assessment Completed by: Barb Sargent RN     Pt's chart reviewed. She is in isolation for COVID-19. A call was placed to the room to speak with her and complete initial assessment. Ms. Max Cervantes stated she lives at home in a double-wide mobile home with her spouse and 3 daughters. She is independent with all aspects of personal care. She gets around with a cane as needed. No services. She is not interested in any home care. She feels supported by her family. No case management needs anticipated. Family will transport home via private car. Patient Name: Marianna Landaverde     YOB: 1945  Diagnosis: Hypomagnesemia [E83.42]  Pneumonia [J18.9]  Chemotherapy-induced neutropenia ([facility] [address] [D70.1, T45.1X5A]  Pneumonia of left lower lobe due to infectious organism [J18.9]  COVID-19 [U07.1]     Date / Time: 1/30/2022  8:29 PM    Patient Admission Status: Inpatient    Current PCP: FAY Carolina CNP    Chart Reviewed: Yes  Patient/ Family Interviewed: Yes    Emergency Contacts:  Extended Emergency Contact Information  Primary Emergency Contact: Dirk Nissen [address] Phone: [phone]  Mobile Phone: [phone]  Relation: Child   needed? No  Secondary Emergency Contact: Doug Canchola  Address: DAUGHTER IN LAW   [facility] [address] [place] [address] Phone: [phone]  Relation: Child    Advance Directives:   Code Status: Full Code    Pharmacy    [id number] [address] [facility], [address] [place] [address] [phone] - F [phone]  [address] [place] [address]  Phone: [phone] Fax: [phone]    [place] [facility], [place] [address]  [address] [address]  Phone: [phone] Fax: [phone]      ADLS Independent with all aspects of personal care.  Family is supportive when needed. Support Systems:  family    HOUSING  Home Environment: mobile home with spouse, 3 daughters, and 1 dog  Steps: ramped entrance    Plans to RETURN to current housing: Yes    Kaley Sousa 76  Currently ACTIVE with [address]: No    Currently ACTIVE with [place] on Aging: No    Durable Medical Equipment  Equipment: cane    Home Oxygen and Respiratory Equipment  Has HOME OXYGEN prior to admission: No    DISCHARGE PLAN:  Disposition: Return home at [place] does not want home care    Transportation PLAN for discharge: family     The Patient and/or patient representative Yogesh Woodall and her family were provided with a choice of provider and agrees with the discharge plan Yes    Freedom of choice list was provided with basic dialogue that supports the patient's individualized plan of care/goals and shares the quality data associated with the providers.  Yes      Delia Franz RN  Case Management  [phone]

## 2022-02-01 NOTE — PROGRESS NOTES
Physical Therapy    Facility/Department: [facility] PROGRESSIVE CARE  Initial Assessment/Treatment Session    NAME: Michelle Kaminski  : 1945  MRN: [id number]    Date of Service: 2022    Discharge Recommendations:  Patient would benefit from continued therapy after discharge,Home with assist PRN   PT Equipment Recommendations  Equipment Needed: No    Assessment   Body structures, Functions, Activity limitations: Decreased functional mobility ; Decreased endurance  Assessment: Pt is a 76 y.o. F. admitted  for COVID. She is also being treated for CLL. She presents reporting minimal fatigue/discomfort, and was able to complete transfers and ambulation in room with/without devivce, CGA-SBA. She would benefit from continued therapy to maximize her strength and endurance. Anticipate safe return home with prn assist from family. Michelle Kaminski scored a 18/24 on the AM-PAC short mobility form. If patient discharges prior to next session this note will serve as a discharge summary. Please see below for the latest assessment towards goals. Specific instructions for Next Treatment: Improve strength, balance, endurance  Prognosis: Fair  Decision Making: Low Complexity  History: See below  Exam: Strength; ROM; Balance; Ambulation  Clinical Presentation: Stable  PT Education: Goals; General Safety;Gait Training;PT Role;Orientation;Plan of Care; Functional Mobility Training;Transfer Training  Barriers to Learning: None  REQUIRES PT FOLLOW UP: Yes  Activity Tolerance  Activity Tolerance: Patient Tolerated treatment well       Patient Diagnosis(es): The primary encounter diagnosis was COVID-19. Diagnoses of Chemotherapy-induced neutropenia ([facility] [address] Pneumonia of left lower lobe due to infectious organism, and Hypomagnesemia were also pertinent to this visit.      has a past medical history of Acid reflux, Arthritis, Cancer ([address] Chronic lymphocytic leukemia (CLL) genetic mutation variant ([facility] [address] Diabetes mellitus ([facility] [address] High blood pressure, Hyperlipidemia, Multiple drug resistant organism (MDRO) culture positive, PONV (postoperative nausea and vomiting), Thyroid disease, UTI (urinary tract infection), and Wears glasses. has a past surgical history that includes Cholecystectomy; Hysterectomy; Thyroid surgery (Left); cyst removal (Right); eye surgery; joint replacement (Left); joint replacement (Right, 01/30/2019); Total knee arthroplasty (Right, 1/30/2019); Nasal sinus surgery (N/A, 9/10/2021); and laryngoscopy (N/A, 9/10/2021). Restrictions  Restrictions/Precautions  Restrictions/Precautions: Fall Risk,Isolation,Contact Precautions     Vision/Hearing  Vision: Impaired  Vision Exceptions: Wears glasses at all times  Hearing: Within functional limits       Subjective  General  Chart Reviewed: Yes  Patient assessed for rehabilitation services?: Yes  Additional Pertinent Hx: The patient is a 76 y.o. female with past medical history as below but mainly CLL who is currently receiving chemotherapy via oncology with her last treatment being about a week or so ago who presents to [facility] being brought from home apparently because patient had a very brief syncopal episode while playing Bango with her  and family. Patient remembers that she was initially playing the game and that apparently she was out for at least a minute according to what she was told by family. She does not remember the entire event but does recall that she not been feeling entirely well the last few days. She did not think contact anyone sick but she is not vaccinated for Covid. She notes that for the last few days she had intermittent fevers with a temperature as high as at least 101 but as low as around  Fahrenheit. She did also develop a slight cough and has had decreased appetite the last few days.   Apart from the fevers and decreased appetite and cough, patient denies other symptoms of dizziness, dysuria, blood in urine/stool/sputum, abdominal pain/nausea/vomiting/diarrhea of any significance, rashes, leg swelling. She had never had any syncopal episodes before and denies any chest pain or shortness of breath prior to the episode occurring. She never had any seizures before either. Response To Previous Treatment: Not applicable  Referring Practitioner: Dr. Yonatan Askew  Referral Date : 02/01/22  Diagnosis: COVID; Sepsis; CLL  Follows Commands: Within Functional Limits  Subjective  Subjective: Pt denies pain.   Pain Screening  Patient Currently in Pain: Denies    Orientation  Orientation  Overall Orientation Status: Within Normal Limits     Social/Functional History  Social/Functional History  Lives With: Spouse,Family  Type of Home: House  Home Layout: One level  Home Access: Ramped entrance  Bathroom Shower/Tub: Walk-in shower  Bathroom Toilet: Standard  Bathroom Equipment: Shower chair,Toilet raiser  Home Equipment: Cane,Rolling walker,Wheelchair-manual  ADL Assistance: Independent  Ambulation Assistance: Independent (Mod I with cane)  Transfer Assistance: Independent  Active : No  Additional Comments: No Hx of recent falls    Objective    AROM RLE (degrees)  RLE AROM: WFL  RLE General AROM: Hip Flex, Knee Flex/Ext, and Ankle PF/DF WFL  AROM LLE (degrees)  LLE General AROM: Hip Flex moderately limited, knee Flex/Ext WFL    Strength RLE  Strength RLE: WFL  Comment: Hip Flex, Knee Flex/Ext WFL  Strength LLE  Strength LLE: WFL  Comment: Hip FLex 3-/5, Knee Flex/Ext WFL     Tone RLE  RLE Tone: Normotonic  Tone LLE  LLE Tone: Normotonic  Motor Control  Gross Motor?: WFL     Transfers  Sit to Stand: Contact guard assistance;Stand by assistance  Stand to sit: Contact guard assistance;Stand by assistance     Ambulation  Ambulation?: Yes  More Ambulation?: Yes  Ambulation 1  Surface: level tile  Device: Rolling Walker  Assistance: Stand by assistance  Quality of Gait: Fast pace, able to clear LLE without difficulty, no LOB.  Distance: 15'    Ambulation 2  Surface - 2: level tile  Device 2: No device  Assistance 2: Contact guard assistance;Stand by assistance  Quality of Gait 2: Fast pace, no difficulty clearing LLE, no LOB. Distance: 15'      Plan   Plan  Times per week: 2-3x  Specific instructions for Next Treatment: Improve strength, balance, endurance  Current Treatment Recommendations: Strengthening,Balance Training,Endurance Training,Functional Mobility Training,Transfer [employer] Training,Safety Education & Training,Patient/Caregiver Education & Training,Equipment Evaluation, Education, & procurement  Safety Devices  Type of devices: All fall risk precautions in place,Call light within reach,Left in chair,Gait belt,Chair alarm in place,Nurse notified  Restraints  Initially in place: No    AM-PAC Score  AM-PAC Inpatient Mobility Raw Score : 18 (02/01/22 1029)  AM-PAC Inpatient T-Scale Score : 43.63 (02/01/22 1029)  Mobility Inpatient CMS 0-100% Score: 46.58 (02/01/22 1029)  Mobility Inpatient CMS G-Code Modifier : CK (02/01/22 1029)    Goals  Short term goals  Time Frame for Short term goals: In 2-3 days pt will perform  Short term goal 1: Bed mobility (I)  Short term goal 2: Transfers (I)  Short term goal 3: Ambulation 48' with cane, Mod I  Patient Goals   Patient goals : To return home       Therapy Time   Individual Concurrent Group Co-treatment   Time In       1007   Time Out       1035   Minutes       28   Timed Code Treatment Minutes: 13 Minutes   Evaluation time: 15 min.      Rickie Thakkar PT    Electronically signed by Rickie Thakkar PT [id number] on 2/1/2022 at 10:37 AM

## 2022-02-01 NOTE — PROGRESS NOTES
Progress Note  Admit Date: 1/30/2022      PCP: FAY Swann CNP     CC: F/U for neutropenic fever    Days in hospital:  1    SUBJECTIVE / Interval History:  Pt feels ok, no complaints         Allergies  Patient has no known allergies. Medications    Scheduled Meds:   cefepime  2,000 mg IntraVENous Q12H    magnesium sulfate  2,000 mg IntraVENous Once    tbo-filgrastim  480 mcg SubCUTAneous Daily    amLODIPine  5 mg Oral Daily    levothyroxine  75 mcg Oral Daily    metoprolol tartrate  50 mg Oral BID    sodium chloride flush  5-40 mL IntraVENous 2 times per day    levofloxacin  750 mg IntraVENous Q24H    enoxaparin  30 mg SubCUTAneous BID    albuterol sulfate HFA  2 puff Inhalation BID    And    ipratropium  2 puff Inhalation BID    guaiFENesin  600 mg Oral BID     Continuous Infusions:   sodium chloride 250 mL (02/01/22 0855)       PRN Meds:  sodium chloride flush, sodium chloride, acetaminophen **OR** acetaminophen, ondansetron, guaiFENesin-dextromethorphan, albuterol sulfate HFA **AND** ipratropium, benzonatate    Vitals    BP (!) 163/53   Pulse 109   Temp 97.6 °F (36.4 °C) (Axillary)   Resp 16   Ht 5' 6\" (1.676 m)   Wt 189 lb 2.5 oz (85.8 kg)   SpO2 95%   BMI 30.53 kg/m²     Exam:    Gen: No distress. Eyes: PERRL. No sclera icterus. No conjunctival injection. ENT: No discharge. Pharynx clear. External appearance of ears and nose normal.  Neck: Trachea midline. No obvious mass. Resp: No accessory muscle use. No crackles. No wheezes. No rhonchi. No dullness on percussion. CV: Regular rate. Regular rhythm. No murmur or rub. No edema. GI: Non-tender. Non-distended. No hernia. Skin: Warm, dry, normal texture and turgor. No nodule on exposed extremities. Lymph: No cervical LAD. No supraclavicular LAD. M/S: No cyanosis. No clubbing. No joint deformity. Neuro: Moves all four extremities. CN 2-12 tested, no defect noted. Psych: Oriented x 3. No anxiety. Awake. Alert. Intact judgement and insight. Data    LABS  CBC:   Recent Labs     01/30/22 2123 01/31/22 0530 02/01/22 0519   WBC 1.0* 1.0* 1.2*   HGB 10.8* 10.9* 10.4*   HCT 30.7* 30.9* 29.8*   MCV 83.3 83.8 83.3    176 197     BMP:   Recent Labs     01/30/22 2123 01/31/22 0529 02/01/22 0519   * 123* 122*   K 3.5 3.5 3.5   CL 84* 88* 86*   CO2 23 22 26   BUN 7 5* 5*   CREATININE <0.5* <0.5* 0.5*   GLUCOSE 183* 131* 241*     POC GLUCOSE:  No results for input(s): POCGLU in the last 72 hours. LIVER PROFILE:   Recent Labs     01/30/22 2123 01/31/22 0529 02/01/22 0519   AST 16 16 16   ALT 13 14 14   LIPASE 17.0  --   --    LABALBU 3.5 3.2* 3.1*   BILITOT 0.4 0.3 0.4   ALKPHOS 71 67 69     PT/INR: No results for input(s): PROTIME, INR in the last 72 hours. APTT: No results for input(s): APTT in the last 72 hours. UA:  Recent Labs     01/30/22 2203   COLORU YELLOW   PHUR 6.0   WBCUA 6*   RBCUA 5*   MUCUS 1+*   BACTERIA 4+*   CLARITYU CLOUDY*   SPECGRAV 1.015   LEUKOCYTESUR Negative   UROBILINOGEN 0.2   BILIRUBINUR Negative   BLOODU TRACE*   GLUCOSEU Negative   KETUA Negative   AMORPHOUS Rare     Microbiology:  Wound Culture: No results for input(s): WNDABS, ORG in the last 72 hours. Invalid input(s):  LABGRAM  Nasal Culture: No results for input(s): ORG, MRSAPCR in the last 72 hours. Blood Culture:   Recent Labs     01/30/22 2123 01/30/22 2208   BC No Growth to date. Any change in status will be called. --    BLOODCULT2  --  No Growth to date. Any change in status will be called. Fungal Culture:   No results for input(s): FUNGSM in the last 72 hours. No results for input(s): FUNCXBLD in the last 72 hours. CSF Culture:  No results for input(s): COLORCSF, APPEARCSF, CFTUBE, CLOTCSF, WBCCSF, RBCCSF, NEUTCSF, NUMCELLSCSF, LYMPHSCSF, MONOCSF, GLUCCSF, VOLCSF in the last 72 hours. Respiratory Culture:  No results for input(s): Joenathan Divine in the last 72 hours.   AFB:No results for input(s): AFBSMEAR in the last 72 hours. Urine Culture  Recent Labs     01/31/22  1100   LABURIN No growth at 18 to 36 hours       RADIOLOGY:    CT CHEST PULMONARY EMBOLISM W CONTRAST   Final Result   No acute or chronic pulmonary embolism. Lower lobe infiltrates bilaterally and left perihilar infiltrates concerning   for pneumonia. XR CHEST PORTABLE   Final Result   Nonspecific subsegmental left basilar opacity. CONSULTS:    IP CONSULT TO INFECTIOUS DISEASES  IP CONSULT TO ONCOLOGY    ASSESSMENT AND PLAN:      Active Problems:    Hyponatremia    UTI (urinary tract infection)    CLL (chronic lymphocytic leukemia) ([address]    Pneumonia due to COVID-19 virus    Pneumonia    Neutropenic fever ([address]    Sepsis ([address]  Resolved Problems:    * No resolved hospital problems. *    Patient is a 53-year-old woman with a past medical history of CLL on chemotherapy who presented with syncope. She was found to have Covid along with neutropenic fever. Chest x-ray shows left lower lobe pneumonia. Neutropenic fever with pneumonia  -Continue antibiotics  -Urine Legionella and pneumococcal antigen pending  -Got a dose of Granix  -Blood culture no growth to date    Sepsis present on admission  -With fever and tachycardia  -Blood cultures no growth to date    CLL on chemotherapy  -bendamustine rituxan   -Dermatology consult appreciated      COVID-19  -Presented hypoxic. Has a high risk for progression  -CRP elevated    Hypertension  -Monitor blood pressure and continue home meds    Hypothyroidism  -Resume home meds    Diabetes mellitus  -Sliding scale insulin  -On Metformin at home    Hyponatremia-sodium still low( past work up + for possible SIADH )   -IV fluids started till work-up is back, poor p.o. intake at home ( stop fluid s dep on results )   -Work-up ordered  - Pt sees Dr Duron Skill    Syncope  - due to hyponatremia        DVT Prophylaxis: Lovenox  Diet: ADULT DIET;  Regular; 4 carb choices (60 gm/meal)  Code Status: Full Code    PT/OT Eval Status: Ordered    Discharge plan - 1-2 days    The patient and / or the family were informed of the results of any tests, a time was given to answer questions, a plan was proposed and they agreed with plan. Discussed with consulting physicians, nursing and social work     The note was completed using EMR. Every effort was made to ensure accuracy; however, inadvertent computerized transcription errors may be present.        Oxana Ortiz MD

## 2022-02-01 NOTE — PROGRESS NOTES
Infectious Disease Follow up Notes  Admit Date: 1/30/2022  Hospital Day: 3    Antibiotics :   IV Cefepime   IV Levofloxacin     CHIEF COMPLAINT:     Neutropenic fever  CLL  Fever  COVID 19+    Subjective interval History :  76 y.o. woman admitted from home after a syncopal event she is on chemo for CLL and last dose a week ago ? T max 100.5 on admit and she is unvaccinated against COVID 19 now tested +Ve on admit WBC at 1, hb at  10.9 and plt 176. Blood cx in process  She is on room air, CT chest lEFT lower lobe changes concerning for PNA , crp AT  143, UA with 4+ Bacteria and we are consulted for IV abx recommendations.      Interval History :  Fevers low grade no cough no sputum remains on room air+       Past Medical History:    Past Medical History:   Diagnosis Date    Acid reflux     Arthritis     [facility]     Chronic lymphocytic leukemia (CLL) genetic mutation variant ([facility] 2018    Diabetes mellitus ([facility]     BORDERLINE    High blood pressure     Hyperlipidemia     mild-no meds    Multiple drug resistant organism (MDRO) culture positive 01/25/2021    urine    PONV (postoperative nausea and vomiting)     after hysterectomy-got sick next morning    Thyroid disease     UTI (urinary tract infection) 01/2019    currently on cipro    Wears glasses        Past Surgical History:    Past Surgical History:   Procedure Laterality Date    CHOLECYSTECTOMY      CYST REMOVAL Right     on foot    EYE SURGERY      cataract removal with lens implants    HYSTERECTOMY      JOINT REPLACEMENT Left     hip    JOINT REPLACEMENT Right 01/30/2019      Right Total Knee Replacement    LARYNGOSCOPY N/A 9/10/2021    DIRECT LARYNGOSCOPY WITH BIOPSY performed by Maile Guerin MD at [facility] N/A 9/10/2021    NASAL ENDOSCOPY WITH BIOPSY performed by Maile Guerin MD at [place] [address] ARTHROPLASTY Right 1/30/2019    RIGHT TOTAL KNEE REPLACEMENT performed by Jazmyne Ortega MD at [facility]       Current Medications:    Outpatient Medications Marked as Taking for the [facility])   Medication Sig Dispense Refill    lisinopril (PRINIVIL;ZESTRIL) 20 MG tablet Take 20 mg by mouth daily      sodium chloride 1 g tablet Take 1 g by mouth 3 times daily      metoprolol tartrate (LOPRESSOR) 25 MG tablet Take 50 mg by mouth 2 times daily       amLODIPine (NORVASC) 5 MG tablet Take 5 mg by mouth daily      Cranberry 1000 MG CAPS Take 1 capsule by mouth 2 times daily. 30 capsule 1    metFORMIN (GLUCOPHAGE) 500 MG tablet Take 500 mg by mouth 2 times daily (with meals).  Ferrous Sulfate (IRON) 325 (65 FE) MG TABS Take 1 tablet by mouth every evening.  Ascorbic Acid (VITAMIN C) 500 MG CAPS Take 1 tablet by mouth daily       Lactobacillus (PROBIOTIC ACIDOPHILUS PO) Take 1 capsule by mouth 2 times daily.  levothyroxine (SYNTHROID) 75 MCG tablet Take 75 mcg by mouth daily          Allergies:  Patient has no known allergies. Immunizations : There is no immunization history on file for this patient.     Social History:     Social History     Tobacco Use    Smoking status: Never Smoker    Smokeless tobacco: Never Used   Vaping Use    Vaping Use: Never used   Substance Use Topics    Alcohol use: No    Drug use: Never     Social History     Tobacco Use   Smoking Status Never Smoker   Smokeless Tobacco Never Used      Family History   Problem Relation Age of Onset    High Blood Pressure Mother     Heart Disease Mother     Cancer Father         acute leukemia      REVIEW OF SYSTEMS:    Constitutional:    fevers+ , chills+ , night sweats  Eyes:  negative for blurred vision, eye discharge, visual disturbance   HEENT:  negative for hearing loss, ear drainage,nasal congestion  Respiratory:  negative for cough, shortness of breath or hemoptysis   Cardiovascular: negative for chest pain, palpitations, syncope  Gastrointestinal:  negative for nausea, vomiting, diarrhea, constipation, abdominal pain  Genitourinary:  negative for frequency, dysuria, urinary incontinence, hematuria  Hematologic/Lymphatic:  negative for easy bruising, bleeding and lymphadenopathy  Allergic/Immunologic:  negative for recurrent infections, angioedema, anaphylaxis   Endocrine:  negative for weight changes, polyuria, polydipsia and polyphagia  Musculoskeletal:  negative for joint  pain, swelling, decreased range of motion  Integumentary: No rashes, skin lesions  Neurological:  negative for headaches, slurred speech, unilateral weakness  Psychiatric: negative for hallucinations,confusion,agitation.                 PHYSICAL EXAM:      Vitals:    /63   Pulse 91   Temp 100.2 °F (37.9 °C) (Oral)   Resp 16   Ht 5' 6\" (1.676 m)   Wt 189 lb 2.5 oz (85.8 kg)   SpO2 95%   BMI 30.53 kg/m²     yGeneral Appearance: alert,in no acute distress, ++  pallor, no icterus   Skin: warm and dry, no rash or erythema  Head: normocephalic and atraumatic  Eyes: pupils equal, round, and reactive to light, conjunctivae normal  ENT: tympanic membrane, external ear and ear canal normal bilaterally, nose without deformity, nasal mucosa and turbinates normal without polyps  Neck: supple and non-tender without mass, no thyromegaly  no cervical lymphadenopathy  Pulmonary/Chest: clear to auscultation bilaterally- no wheezes, rales or rhonchi, normal air movement, no respiratory distress  Cardiovascular: normal rate, regular rhythm, normal S1 and S2, no murmurs, rubs, clicks, or gallops, no carotid bruits  Abdomen: soft, non-tender, non-distended, normal bowel sounds, no masses or organomegaly  Extremities: no cyanosis, clubbing or edema  Musculoskeletal: normal range of motion, no joint swelling, deformity or tenderness  Integumentary: No rashes, no abnormal skin lesions, no petechiae  Neurologic: reflexes normal and symmetric, no cranial nerve deficit  Psych:  Orientation, sensorium, mood normal            Lines: IV     Data Review:    CBC:   Lab Results   Component Value Date    WBC 1.2 (LL) 02/01/2022    HGB 10.4 (L) 02/01/2022    HCT 29.8 (L) 02/01/2022    MCV 83.3 02/01/2022     02/01/2022     RENAL:   Lab Results   Component Value Date    CREATININE 0.5 (L) 02/01/2022    BUN 5 (L) 02/01/2022     (L) 02/01/2022    K 3.5 02/01/2022    CL 86 (L) 02/01/2022    CO2 26 02/01/2022     SED RATE:   Lab Results   Component Value Date    SEDRATE 20 03/31/2015     CK: No results found for: CKTOTAL  CRP:   Lab Results   Component Value Date    .8 01/31/2022     Hepatic Function Panel:   Lab Results   Component Value Date    ALKPHOS 69 02/01/2022    ALT 14 02/01/2022    AST 16 02/01/2022    PROT 5.8 02/01/2022    BILITOT 0.4 02/01/2022    LABALBU 3.1 02/01/2022     UA:  Lab Results   Component Value Date    COLORU YELLOW 01/30/2022    CLARITYU CLOUDY 01/30/2022    GLUCOSEU Negative 01/30/2022    BILIRUBINUR Negative 01/30/2022    KETUA Negative 01/30/2022    SPECGRAV 1.015 01/30/2022    BLOODU TRACE 01/30/2022    PHUR 6.0 01/30/2022    PROTEINU 30 01/30/2022    UROBILINOGEN 0.2 01/30/2022    NITRU POSITIVE 01/30/2022    LEUKOCYTESUR Negative 01/30/2022    LABMICR YES 01/30/2022    URINETYPE Cleancatch 01/30/2022      Urine Microscopic:   Lab Results   Component Value Date    BACTERIA 4+ 01/30/2022    COMU see below 01/30/2022    HYALCAST 4 01/30/2022    WBCUA 6 01/30/2022    RBCUA 5 01/30/2022    EPIU 0-1 01/30/2022     Urine Reflex to Culture:   Lab Results   Component Value Date    URRFLXCULT Not Indicated 01/30/2022         MICRO: cultures reviewed and updated by me   Blood Culture: SARS-CoV-2, NAAT  COVID-19, Rapid  Collected: 01/30/22 2210   Result status: Final   Resulting lab: [facility]   Reference range: Not Detected   Value: DETECTED Abnormal     Comment: Rapid NAAT:   Negative results should be treated as presumptive and,   if inconsistent with clinical signs and symptoms or necessary for   patient management, should be tested with an alternative molecular   assay. Negative results do not preclude SARS-CoV-2 infection and   should not be used as the sole basis for patient management decisions. This test has been authorized by the FDA under an Emergency Use   Authorization (EUA) for use by authorized laboratories. Fact sheet for Healthcare Providers:      Time       MRSA DNA Probe, Nasal [[id number]] Collected: 02/01/22 1040   Order Status: Completed Specimen: Nares Updated: 02/01/22 1126    MRSA SCREEN RT-PCR Further report to follow   Narrative:     ORDER#: [id number]                          ORDERED BY: Arvilla Rubinstein   SOURCE: Nares                              COLLECTED:  02/01/22 10:40   ANTIBIOTICS AT [place]. :                      RECEIVED :  02/01/22 10:58   Performed at:   [address]   Phone [phone]   Culture, Blood, PCR ID Panel Results Report [[id number]] Collected: 01/30/22 2208   Order Status: Completed Updated: 02/01/22 1022    Report SEE IMAGE   Narrative:     CALL Lizeth Montoya [phone],   Microbiology results called to and read back by ANUJ Egan, 02/01/2022   10:21, by VINNY   Referred out by:   [address]   Phone [phone]   Culture, Blood 2 [[id number]] (Abnormal) Collected: 01/30/22 2208   Order Status: Completed Specimen: Blood Updated: 02/01/22 1021    Culture, Blood 2 -- Abnormal     Gram stain Aerobic bottle:   Gram positive cocci   Information to follow    Abnormal     Organism Staphylococcus coagulase negative DNA Detected Abnormal     Culture, Blood 2 See additional report for complete BCID panel.    Narrative:     ORDER#: [id number]                          ORDERED BY: Chucho Villasenor SOURCE: Blood                              COLLECTED:  01/30/22 22:08   ANTIBIOTICS AT [place]. :                      RECEIVED :  01/30/22 22:08   CALL  [facility]  VG[facility]D tel. [phone],   Microbiology results called to and read back by ANUJ Kumar, 02/01/2022   10:21, by VINNY   If child <=2 yrs old please draw pediatric bottle. ~Blood Culture #2   Performed at:   [facility]   [address] [address]   Phone [phone]   Culture, Urine [[id number]] Collected: 01/31/22 1100   Order Status: Completed Specimen: Urine (20) from Urine, clean catch Updated: 02/01/22 0850    Urine Culture, Routine No growth at 18 to 36 hours   Narrative:     ORDER#: [id number]                          ORDERED BY: Lan Anthony   SOURCE: Urine Clean Catch                  COLLECTED:  01/31/22 11:00   ANTIBIOTICS AT [place]. :                      RECEIVED :  01/31/22 11:07   Performed at:   [facility]   [address] [facility] [employer] [address]   Phone [phone]   Culture, Blood 1 [[id number]] Collected: 01/30/22 2123   Order Status: Completed Specimen: Blood Updated: 01/31/22 2215    Blood Culture, Routine No Growth to date.  Any change in status will be called. Narrative:     ORDER#: [id number]                          ORDERED BY: RACHELLE LUJAN   SOURCE: Blood                              COLLECTED:  01/30/22 21:23   ANTIBIOTICS AT [place]. :                      RECEIVED :  01/30/22 21:28   If child <=2 yrs old please draw pediatric bottle. ~Blood Culture 1   Performed at:   [facility]   [address] [facility] [address] [facility] [address]   Phone [phone]   Legionella Antigen, Urine [[id number]]    Order Status: Sent Specimen: Urine, clean catch    Strep Pneumoniae Antigen [[id number]]    Order Status: Sent Specimen: Urine, clean catch    COVID-19, Rapid [[id number]] (Abnormal) Collected: 01/30/22 2210   Order Status: Completed Specimen: Nasopharyngeal Swab Updated: 01/30/22 2228    SARS-CoV-2, NAAT DETECTED Abnormal     Comment: Rapid NAAT:   Negative results should be treated as presumptive and,   if inconsistent with clinical signs and symptoms or necessary for   patient management, should be tested with an alternative molecular   assay. Negative results do not preclude SARS-CoV-2 infection and   should not be used as the sole basis for patient management decisions. This test has been authorized by the FDA under an Emergency Use   Authorization (EUA) for use by authorized laboratories. Fact sheet for Healthcare Providers:   Lenard   Fact sheet for Patients: Lenard     METHODOLOGY: Isothermal Nucleic Acid Amplification       Narrative:     Performed at:   [place] [address] [address] [facility] [address] [facility] [address]   Phone [phone]         Lab Results   Component Value Date    [facility]  01/30/2022     No Growth to date. Any change in status will be called. BLOODCULT2  01/30/2022     Gram stain Aerobic bottle:  Gram positive cocci  Information to follow      Beauty Jorge See additional report for complete BCID panel. 01/30/2022       Respiratory Culture:  No results found for: Mary Bernstein  AFB:No results found for: [facility]  Viral Culture:  Lab Results   Component Value Date    COVID19 DETECTED 01/30/2022     Urine Culture:   Recent Labs     01/31/22  1100   LABURIN No growth at 18 to 36 hours         IMAGING:    CT CHEST PULMONARY EMBOLISM W CONTRAST   Final Result   No acute or chronic pulmonary embolism. Lower lobe infiltrates bilaterally and left perihilar infiltrates concerning   for pneumonia. XR CHEST PORTABLE   Final Result   Nonspecific subsegmental left basilar opacity.                All the pertinent images and reports for the current Hospitalization were reviewed by me     Scheduled Meds:   cefepime  2,000 mg IntraVENous Q12H    tbo-filgrastim  480 mcg SubCUTAneous Daily    insulin lispro  0-12 Units SubCUTAneous TID WC    insulin lispro  0-6 Units SubCUTAneous Nightly    amLODIPine  5 mg Oral Daily    levothyroxine  75 mcg Oral Daily    metoprolol tartrate  50 mg Oral BID    sodium chloride flush  5-40 mL IntraVENous 2 times per day    levofloxacin  750 mg IntraVENous Q24H    enoxaparin  30 mg SubCUTAneous BID    albuterol sulfate HFA  2 puff Inhalation BID    And    ipratropium  2 puff Inhalation BID    guaiFENesin  600 mg Oral BID       Continuous Infusions:   sodium chloride      sodium chloride 250 mL (02/01/22 0855)       PRN Meds:  sodium chloride flush, sodium chloride, acetaminophen **OR** acetaminophen, ondansetron, guaiFENesin-dextromethorphan, albuterol sulfate HFA **AND** ipratropium, benzonatate      Assessment:     Patient Active Problem List   Diagnosis    Primary localized osteoarthrosis, lower leg    Obesity (BMI 30-39. 9)    Primary localized osteoarthrosis, pelvic region and thigh    History of total hip replacement    H/O total hip arthroplasty, left    Primary osteoarthritis of left hip    Arthritis of right knee    H/O total knee replacement, right 1/30/19    Right hip pain    2019 novel coronavirus disease (COVID-19)    Hyponatremia    UTI (urinary tract infection)    Syncope    CLL (chronic lymphocytic leukemia) (HCC)    Pneumonia due to COVID-19 virus    Pneumonia    Neutropenic fever (HCC)    Sepsis (HCC)          Neutropenic fevers  CLL on chemotherapy   COVID 19 infection   CT chest with Left lung base changes  WBC at 1  Na+ 121 on admit  Syncope at home   Unvaccinated against COVID 19   CRP elevation      She is not hypoxic and mainly symptomatic from Neutropenia than from COVID 19 at this time but she is at risk for progression due to her Medical issues      Labs, Microbiology, Radiology and all the pertinent results from current hospitalization and  care every where were reviewed  by me as a part of the evaluation   Plan:   1. Cont IV Cefepime change to x 2 gm Q 12 hrs  2. D/c  Levofloxacin for atypical coverage  3. Check Urine legionella and Pneumococcal antigen pending     4. Check MRSA probe -ve  5. Covid 19 SYMPTOMATIC THERAPY   6. On GCSF for  Neutropenia - WBC slow improvement     Discussed with patient/Family and Nursing staff   Risk of Complications/Morbidity: High      · Illness(es)/ Infection present that pose threat to bodily function. · There is potential for severe exacerbation of infection/side effects of treatment. · Therapy requires intensive monitoring for antimicrobial agent toxicity. ·   Thanks for allowing me to participate in your patient's care and please call me with any questions or concerns.     Lake Hager MD  Infectious Disease  [facility] ([facility]) Physician  Phone: [phone]   Fax : [phone]

## 2022-02-02 LAB
A/G RATIO: 1.3 (ref 1.1–2.2)
ALBUMIN SERPL-MCNC: 3.1 G/DL (ref 3.4–5)
ALP BLD-CCNC: 64 U/L (ref 40–129)
ALT SERPL-CCNC: 15 U/L (ref 10–40)
ANION GAP SERPL CALCULATED.3IONS-SCNC: 13 MMOL/L (ref 3–16)
ANISOCYTOSIS: ABNORMAL
AST SERPL-CCNC: 15 U/L (ref 15–37)
BANDED NEUTROPHILS RELATIVE PERCENT: 23 % (ref 0–7)
BASOPHILS ABSOLUTE: 0.1 K/UL (ref 0–0.2)
BASOPHILS RELATIVE PERCENT: 2 %
BILIRUB SERPL-MCNC: 0.4 MG/DL (ref 0–1)
BUN BLDV-MCNC: 4 MG/DL (ref 7–20)
CALCIUM SERPL-MCNC: 8.5 MG/DL (ref 8.3–10.6)
CHLORIDE BLD-SCNC: 89 MMOL/L (ref 99–110)
CO2: 26 MMOL/L (ref 21–32)
CREAT SERPL-MCNC: <0.5 MG/DL (ref 0.6–1.2)
DOHLE BODIES: PRESENT
EOSINOPHILS ABSOLUTE: 0.1 K/UL (ref 0–0.6)
EOSINOPHILS RELATIVE PERCENT: 4 %
GFR AFRICAN AMERICAN: >60
GFR NON-AFRICAN AMERICAN: >60
GLUCOSE BLD-MCNC: 106 MG/DL (ref 70–99)
GLUCOSE BLD-MCNC: 128 MG/DL (ref 70–99)
GLUCOSE BLD-MCNC: 131 MG/DL (ref 70–99)
GLUCOSE BLD-MCNC: 159 MG/DL (ref 70–99)
GLUCOSE BLD-MCNC: 92 MG/DL (ref 70–99)
HCT VFR BLD CALC: 29.9 % (ref 36–48)
HEMOGLOBIN: 10.7 G/DL (ref 12–16)
HYPOCHROMIA: ABNORMAL
LYMPHOCYTES ABSOLUTE: 0.3 K/UL (ref 1–5.1)
LYMPHOCYTES RELATIVE PERCENT: 10 %
MAGNESIUM: 1.3 MG/DL (ref 1.8–2.4)
MCH RBC QN AUTO: 29.6 PG (ref 26–34)
MCHC RBC AUTO-ENTMCNC: 35.9 G/DL (ref 31–36)
MCV RBC AUTO: 82.6 FL (ref 80–100)
MICROCYTES: ABNORMAL
MONOCYTES ABSOLUTE: 1.1 K/UL (ref 0–1.3)
MONOCYTES RELATIVE PERCENT: 44 %
NEUTROPHILS ABSOLUTE: 1 K/UL (ref 1.7–7.7)
NEUTROPHILS RELATIVE PERCENT: 17 %
PDW BLD-RTO: 14.8 % (ref 12.4–15.4)
PERFORMED ON: ABNORMAL
PLATELET # BLD: 200 K/UL (ref 135–450)
PLATELET SLIDE REVIEW: ADEQUATE
PMV BLD AUTO: 6 FL (ref 5–10.5)
POLYCHROMASIA: ABNORMAL
POTASSIUM SERPL-SCNC: 3.3 MMOL/L (ref 3.5–5.1)
RBC # BLD: 3.61 M/UL (ref 4–5.2)
SLIDE REVIEW: ABNORMAL
SODIUM BLD-SCNC: 128 MMOL/L (ref 136–145)
TOTAL PROTEIN: 5.5 G/DL (ref 6.4–8.2)
TOXIC GRANULATION: PRESENT
WBC # BLD: 2.5 K/UL (ref 4–11)

## 2022-02-02 PROCEDURE — 6370000000 HC RX 637 (ALT 250 FOR IP): Performed by: STUDENT IN AN ORGANIZED HEALTH CARE EDUCATION/TRAINING PROGRAM

## 2022-02-02 PROCEDURE — 36415 COLL VENOUS BLD VENIPUNCTURE: CPT

## 2022-02-02 PROCEDURE — 2580000003 HC RX 258: Performed by: STUDENT IN AN ORGANIZED HEALTH CARE EDUCATION/TRAINING PROGRAM

## 2022-02-02 PROCEDURE — 6370000000 HC RX 637 (ALT 250 FOR IP): Performed by: INTERNAL MEDICINE

## 2022-02-02 PROCEDURE — 1200000000 HC SEMI PRIVATE

## 2022-02-02 PROCEDURE — 97530 THERAPEUTIC ACTIVITIES: CPT

## 2022-02-02 PROCEDURE — 6360000002 HC RX W HCPCS: Performed by: INTERNAL MEDICINE

## 2022-02-02 PROCEDURE — 80053 COMPREHEN METABOLIC PANEL: CPT

## 2022-02-02 PROCEDURE — 83735 ASSAY OF MAGNESIUM: CPT

## 2022-02-02 PROCEDURE — 6360000002 HC RX W HCPCS: Performed by: STUDENT IN AN ORGANIZED HEALTH CARE EDUCATION/TRAINING PROGRAM

## 2022-02-02 PROCEDURE — 94640 AIRWAY INHALATION TREATMENT: CPT

## 2022-02-02 PROCEDURE — 99233 SBSQ HOSP IP/OBS HIGH 50: CPT | Performed by: INTERNAL MEDICINE

## 2022-02-02 PROCEDURE — 97110 THERAPEUTIC EXERCISES: CPT

## 2022-02-02 PROCEDURE — 2580000003 HC RX 258: Performed by: INTERNAL MEDICINE

## 2022-02-02 PROCEDURE — 85025 COMPLETE CBC W/AUTO DIFF WBC: CPT

## 2022-02-02 RX ORDER — MAGNESIUM SULFATE IN WATER 40 MG/ML
4000 INJECTION, SOLUTION INTRAVENOUS ONCE
Status: COMPLETED | OUTPATIENT
Start: 2022-02-02 | End: 2022-02-02

## 2022-02-02 RX ADMIN — ALBUTEROL SULFATE 2 PUFF: 90 AEROSOL, METERED RESPIRATORY (INHALATION) at 20:20

## 2022-02-02 RX ADMIN — INSULIN LISPRO 1 UNITS: 100 INJECTION, SOLUTION INTRAVENOUS; SUBCUTANEOUS at 20:46

## 2022-02-02 RX ADMIN — AMLODIPINE BESYLATE 5 MG: 5 TABLET ORAL at 09:32

## 2022-02-02 RX ADMIN — METOPROLOL TARTRATE 50 MG: 50 TABLET, FILM COATED ORAL at 09:32

## 2022-02-02 RX ADMIN — GUAIFENESIN 600 MG: 600 TABLET, EXTENDED RELEASE ORAL at 20:37

## 2022-02-02 RX ADMIN — CEFEPIME HYDROCHLORIDE 2000 MG: 2 INJECTION, POWDER, FOR SOLUTION INTRAVENOUS at 20:46

## 2022-02-02 RX ADMIN — ENOXAPARIN SODIUM 30 MG: 100 INJECTION SUBCUTANEOUS at 20:37

## 2022-02-02 RX ADMIN — LEVOTHYROXINE SODIUM 75 MCG: 0.07 TABLET ORAL at 06:57

## 2022-02-02 RX ADMIN — IPRATROPIUM BROMIDE 2 PUFF: 17 AEROSOL, METERED RESPIRATORY (INHALATION) at 20:20

## 2022-02-02 RX ADMIN — SODIUM CHLORIDE, PRESERVATIVE FREE 10 ML: 5 INJECTION INTRAVENOUS at 21:00

## 2022-02-02 RX ADMIN — METOPROLOL TARTRATE 50 MG: 50 TABLET, FILM COATED ORAL at 20:37

## 2022-02-02 RX ADMIN — GUAIFENESIN 600 MG: 600 TABLET, EXTENDED RELEASE ORAL at 09:32

## 2022-02-02 RX ADMIN — TBO-FILGRASTIM 480 MCG: 480 INJECTION, SOLUTION SUBCUTANEOUS at 10:09

## 2022-02-02 RX ADMIN — ENOXAPARIN SODIUM 30 MG: 100 INJECTION SUBCUTANEOUS at 09:35

## 2022-02-02 RX ADMIN — CEFEPIME HYDROCHLORIDE 2000 MG: 2 INJECTION, POWDER, FOR SOLUTION INTRAVENOUS at 10:09

## 2022-02-02 RX ADMIN — MAGNESIUM SULFATE HEPTAHYDRATE 4000 MG: 40 INJECTION, SOLUTION INTRAVENOUS at 11:48

## 2022-02-02 ASSESSMENT — PAIN SCALES - GENERAL: PAINLEVEL_OUTOF10: 0

## 2022-02-02 NOTE — PROGRESS NOTES
98.4 °F (36.9 °C); Max - Temp  Av °F (37.2 °C)  Min: 98.4 °F (36.9 °C)  Max: 100.2 °F (37.9 °C)  PULSE OXIMETRY RANGE: SpO2  Av.2 %  Min: 93 %  Max: 99 %  24HR INTAKE/OUTPUT:      Intake/Output Summary (Last 24 hours) at 2022 0751  Last data filed at 2022 1858  Gross per 24 hour   Intake 640 ml   Output 900 ml   Net -260 ml       CONSTITUTIONAL: a o x 3   Pt in restroom deferred exam       Data      Recent Labs     22  0530 22  0519   WBC 1.0* 1.0* 1.2*   HGB 10.8* 10.9* 10.4*   HCT 30.7* 30.9* 29.8*    176 197   MCV 83.3 83.8 83.3        Recent Labs     22  0529 22  0519   * 123* 122*   K 3.5 3.5 3.5   CL 84* 88* 86*   CO2 23 22 26   BUN 7 5* 5*   CREATININE <0.5* <0.5* 0.5*     Recent Labs     22  0529 22  0519   AST 16 16 16   ALT 13 14 14   BILITOT 0.4 0.3 0.4   ALKPHOS 71 67 69       Magnesium:    Lab Results   Component Value Date    MG 1.40 2022    MG 1.90 2022    MG 2.10 2022       Imaging ECHO Complete 2D W Doppler W Color    Result Date: 2022  Transthoracic Echocardiography Report (TTE)  Demographics   Patient Name       Renrey Ana   Date of Study      2022         Gender              Female   Patient Number     [id number]         Date of Birth       1945   Visit Number       [id number]          Age                 76 year(s)   Accession Number   [id number]         Room Number   Corporate ID       [id number]           Sonographer         Devang Duenas RDCS   Ordering Physician Chaparro Garay MD          Physician           Kellee Melendez MD  Procedure Type of Study   TTE procedure:ECHOCARDIOGRAM COMPLETE 2D W DOPPLER W COLOR.   Procedure Date Date: 2022 Start: 08:26 AM Study Location: [facility] SURGICAL [facility] - Echo Lab Technical Quality: Adequate visualization Indications:Heart murmur. Additional Indications:Leukemia DM HTN HLD . Patient Status: Routine Height: 66 inches Weight: 196 pounds BSA: 1.98 m2 BMI: 31.64 kg/m2 Rhythm: Within normal limits HR: 76 bpm BP: 147/61 mmHg  Conclusions   Summary  Ejection fraction is visually estimated to be 60-65%. No regional wall motion abnormalities are noted. Grade II diastoli  The left atrium is mildly dilated. Moderate aortic stenosis with a peak velocity of 3.4m/s and a mean pressure  gradient of 30mmHg. No evidence of aortic valve regurgitation. Normal right ventricular size. Right ventricular systolic function is normal.   Signature   ------------------------------------------------------------------  Electronically signed by Alexandra Roberts MD (Interpreting  physician) on 01/19/2022 at 11:37 AM  ------------------------------------------------------------------   Findings   Left Ventricle  Left ventricular cavity size is normal.  Normal left ventricular wall thickness. Ejection fraction is visually estimated to be 60-65%. No regional wall motion abnormalities are noted. Grade II diastolic dysfunction with elevated LV filling pressures. GLS= -15.7%   Mitral Valve  Mitral annular calcification is present. Calcification of the leaflets of the mitral valve. Trivial mitral regurgitation. No evidence of mitral stenosis. Left Atrium  The left atrium is mildly dilated. Aortic Valve  Moderate aortic stenosis with a peak velocity of 3.4m/s and a mean pressure  gradient of 30mmHg. No evidence of aortic valve regurgitation. Aorta  The aortic root is normal in size. The ascending aorta is normal in size. Right Ventricle  Normal right ventricular size. Right ventricular systolic function is normal.  TAPSE= 2.4cm   Tricuspid Valve  Trivial tricuspid regurgitation. No evidence of tricuspid stenosis. Right Atrium  The right atrium is normal in size.    Pulmonic Valve  Mild pulmonic regurgitation present. No evidence of pulmonic valve stenosis. Pericardial Effusion  No pericardial effusion noted. Pleural Effusion  No pleural effusion. Miscellaneous  IVC size is dilated (>2.1 cm) but collapses > 50% with respiration  consistent with elevated RA pressure (8 mmHg). Unable to estimate pulmonary artery pressure secondary to incomplete TR jet  envelope. M-Mode/2D Measurements (cm)   LV Diastolic Dimension: 7.90 cm LV Systolic Dimension: 9.04 cm  LV Septum Diastolic: 4.65 cm  LV PW Diastolic: 3.47 cm        AO Root Dimension: 3 cm  RV Diastolic Dimension: 7.44 cm                                  LA Area: 26.2 cm2  LVOT: 1.7 cm                    LA volume/Index: 80.8 ml /41 ml/m2  Doppler Measurements   AV Peak Velocity: 344 cm/s     MV Peak E-Wave: 145 cm/s  AV Peak Gradient: 47.33 mmHg   MV Peak A-Wave: 156 cm/s  AV Mean Gradient: 30 mmHg      MV E/A Ratio: 0.93  LVOT Peak Velocity: 170 cm/s   MV P1/2t: 85 msec  AV Area (Continuity):1.12 cm2  MV Mean Gradient: 4 mmHg                                 MV Max P mmHg                                 MV Vmax:147 cm/s  Estimated RAP:8 mmHg           MV VTI:49.8 cm/s  E' Septal Velocity: 4.93 cm/s  E' Lateral Velocity: 5.25 cm/s MV Area (continuity): 1.96 cm2  PV Peak Velocity: 116 cm/s     MV Deceleration Time: 274 msec  PV Peak Gradient: 5.38 mmHg    MV Area (PHT): 2.59 cm2   Aortic Valve   Peak Velocity: 344 cm/s     Mean Velocity: 264 cm/s  Peak Gradient: 47.33 mmHg   Mean Gradient: 30 mmHg  Area (continuity): 1.12 cm2  AV VTI: 87.3 cm  Aorta   Aortic Root: 3 cm  Ascending Aorta: 3.4 cm  LVOT Diameter: 1.7 cm      XR CHEST PORTABLE    Result Date: 2022  EXAMINATION: ONE XRAY VIEW OF THE CHEST 2022 8:42 pm COMPARISON: Chest x-ray 2021.  HISTORY: ORDERING SYSTEM PROVIDED HISTORY: syncope TECHNOLOGIST PROVIDED HISTORY: Reason for exam:->syncope Reason for Exam: Loss of Consciousness (patient was sitting at the table when she passed out for about 30secs and then it happened again 5 mins later, pt is in treatment for CA) FINDINGS: Cardiac silhouette is within normal limits in size. Mediastinal contours are otherwise suboptimally evaluated due to rotated projection. Subsegmental left basilar opacity. No pleural effusion on this projection. No pneumothorax appreciated on this projection. Nonspecific subsegmental left basilar opacity. CT CHEST PULMONARY EMBOLISM W CONTRAST    Result Date: 1/30/2022  EXAMINATION: CTA OF THE CHEST 1/30/2022 10:53 pm TECHNIQUE: CTA of the chest was performed after the administration of intravenous contrast.  Multiplanar reformatted images are provided for review. MIP images are provided for review. Dose modulation, iterative reconstruction, and/or weight based adjustment of the mA/kV was utilized to reduce the radiation dose to as low as reasonably achievable. COMPARISON: CT of the chest, abdomen, and pelvis performed 09/25/2021. HISTORY: ORDERING SYSTEM PROVIDED HISTORY: syncope TECHNOLOGIST PROVIDED HISTORY: Reason for exam:->syncope Decision Support Exception - unselect if not a suspected or confirmed emergency medical condition->Emergency Medical Condition (MA) Reason for Exam: syncope FINDINGS: Pulmonary Arteries: Pulmonary arteries are adequately opacified for evaluation. No evidence of intraluminal filling defect to suggest pulmonary embolism. Main pulmonary artery is normal in caliber. Mediastinum: No evidence of mediastinal lymphadenopathy. The heart and pericardium demonstrate no acute abnormality. There is no acute abnormality of the thoracic aorta. Lungs/pleura: There infiltrates in the lower lobes. There are left perihilar infiltrates. There is no effusion. There is no pneumothorax. The tracheobronchial tree is patent. Upper Abdomen: Hypodense foci are seen within the liver representing cysts versus hemangiomas. Soft Tissues/Bones: No acute bone or soft tissue abnormality.      No acute or chronic pulmonary embolism. Lower lobe infiltrates bilaterally and left perihilar infiltrates concerning for pneumonia. Problem List  Patient Active Problem List   Diagnosis    Primary localized osteoarthrosis, lower leg    Obesity (BMI 30-39. 9)    Primary localized osteoarthrosis, pelvic region and thigh    History of total hip replacement    H/O total hip arthroplasty, left    Primary osteoarthritis of left hip    Arthritis of right knee    H/O total knee replacement, right 1/30/19    Right hip pain    2019 novel coronavirus disease (COVID-19)    Hyponatremia    UTI (urinary tract infection)    Syncope    CLL [facility]chronic [facility]ocytic leukemia) ([facility] [address]    Pneumonia due to COVID-19 virus    Pneumonia    Neutropenic fever (HCC)    Sepsis (HCC)       ASSESSMENT AND PLAN    CLL  On bendamustine rituxan  Responding to tx   Pt is neutropenic await cbc today  ONe culture is pos for staph-?  Contaminate?_  I suspect covid playing a role in neutropenia  On granix cbc pending     Murmur-has mod aortic stenosis-has not seen cardiology for this -new dx     Maricruz Mendez MD, MD

## 2022-02-02 NOTE — PROGRESS NOTES
[facility]  Oncology Hematology [facility]  Progress Note    Late entry -forgot to write note   SUBJECTIVE:   No events over night  She is still neutropenic   ROS: No fever chills sweats, no nausea, vomiting, diarrhea  shortness of breath chest pain or other pain  OBJECTIVE      Medications    Current Facility-Administered Medications: cefepime (MAXIPIME) 2000 mg IVPB minibag, 2,000 mg, IntraVENous, Q12H  Tbo-Filgrastim (GRANIX) injection 480 mcg, 480 mcg, SubCUTAneous, Daily  insulin lispro (HUMALOG) injection vial 0-12 Units, 0-12 Units, SubCUTAneous, TID WC  insulin lispro (HUMALOG) injection vial 0-6 Units, 0-6 Units, SubCUTAneous, Nightly  amLODIPine (NORVASC) tablet 5 mg, 5 mg, Oral, Daily  levothyroxine (SYNTHROID) tablet 75 mcg, 75 mcg, Oral, Daily  metoprolol tartrate (LOPRESSOR) tablet 50 mg, 50 mg, Oral, BID  sodium chloride flush 0.9 % injection 5-40 mL, 5-40 mL, IntraVENous, 2 times per day  sodium chloride flush 0.9 % injection 5-40 mL, 5-40 mL, IntraVENous, PRN  0.9 % sodium chloride infusion, 25 mL, IntraVENous, PRN  acetaminophen (TYLENOL) tablet 650 mg, 650 mg, Oral, Q6H PRN **OR** acetaminophen (TYLENOL) suppository 650 mg, 650 mg, Rectal, Q6H PRN  ondansetron (ZOFRAN) injection 4 mg, 4 mg, IntraVENous, Q6H PRN  enoxaparin (LOVENOX) injection 30 mg, 30 mg, SubCUTAneous, BID  guaiFENesin-dextromethorphan (ROBITUSSIN DM) 100-10 MG/5ML syrup 5 mL, 5 mL, Oral, Q4H PRN  albuterol sulfate  (90 Base) MCG/ACT inhaler 2 puff, 2 puff, Inhalation, BID **AND** ipratropium (ATROVENT HFA) 17 MCG/ACT inhaler 2 puff, 2 puff, Inhalation, BID  albuterol sulfate  (90 Base) MCG/ACT inhaler 2 puff, 2 puff, Inhalation, Q4H PRN **AND** ipratropium (ATROVENT HFA) 17 MCG/ACT inhaler 2 puff, 2 puff, Inhalation, Q4H PRN  guaiFENesin (MUCINEX) extended release tablet 600 mg, 600 mg, Oral, BID  benzonatate (TESSALON) capsule 100 mg, 100 mg, Oral, TID PRN  Physical    VITALS:  /68   Pulse 72   Temp 98.4 °F (36.9 °C) (Oral)   Resp 18   Ht 5' 6\" (1.676 m)   Wt 188 lb 15 oz (85.7 kg)   SpO2 99%   BMI 30.49 kg/m²   TEMPERATURE:  Current - Temp: 98.4 °F (36.9 °C); Max - Temp  Av °F (37.2 °C)  Min: 98.4 °F (36.9 °C)  Max: 100.2 °F (37.9 °C)  PULSE OXIMETRY RANGE: SpO2  Av.2 %  Min: 93 %  Max: 99 %  24HR INTAKE/OUTPUT:    Intake/Output Summary (Last 24 hours) at 2022 0746  Last data filed at 2022 1858  Gross per 24 hour   Intake 640 ml   Output 900 ml   Net -260 ml       CONSTITUTIONAL:  awake, alert, cooperative, no apparent distress, HEENT oral pharynx , no scleral icterus  HEMATOLOGIC/LYMPHATICS:  no cervical lymphadenopathy, no supraclavicular lymphadenopathy, no axillary lymphadenopathy and no inguinal lymphadenopathy  LUNGS:  No increased work of breathing, good air exchange, clear to auscultation bilaterally, no crackles or wheezing  CARDIOVASCULAR: + murmur   ABDOMEN:  No scars, normal bowel sounds, soft, non-distended, non-tender, no masses palpated, no hepatosplenomegally  MUSCULOSKELETAL:  There is no redness, warmth, or swelling of the joints. EXTREMETIES: No clubbing cynosis or edema  NEUROLOGIC:  Awake, alert, oriented to name, place and time. Cranial nerves II-XII are grossly intact. Motor is 5 out of 5 bilaterally.    SKIN:  no bruising or bleeding      Data      Recent Labs     22   WBC 1.0* 1.0* 1.2*   HGB 10.8* 10.9* 10.4*   HCT 30.7* 30.9* 29.8*    176 197   MCV 83.3 83.8 83.3        Recent Labs     2229 22  05   * 123* 122*   K 3.5 3.5 3.5   CL 84* 88* 86*   CO2 23 22 26   BUN 7 5* 5*   CREATININE <0.5* <0.5* 0.5*     Recent Labs     22  0529 22  0519   AST 16 16 16   ALT 13 14 14   BILITOT 0.4 0.3 0.4   ALKPHOS 71 67 69       Magnesium:    Lab Results   Component Value Date    MG 1.40 2022    MG 1.90 2022    MG 2.10 2022       Imaging ECHO Complete 2D W Doppler W Color    Result Date: 1/19/2022  Transthoracic Echocardiography Report (TTE)  Demographics   Patient Name       Lina Carbajal   Date of Study      01/19/2022         Gender              Female   Patient Number     [id number]         Date of Birth       1945   Visit Number       [id number]          Age                 76 year(s)   Accession Number   [id number]         Room Number   Corporate ID       [id number]           Al Arora                                                            [facility]   Ordering Physician Jessica Dozier MD          Physician           Elvia García MD  Procedure Type of Study   TTE procedure:ECHOCARDIOGRAM COMPLETE 2D W DOPPLER W COLOR. Procedure Date Date: 01/19/2022 Start: 08:26 AM Study Location: [facility] - Echo Lab Technical Quality: Adequate visualization Indications:Heart murmur. Additional Indications:Leukemia DM HTN HLD . Patient Status: Routine Height: 66 inches Weight: 196 pounds BSA: 1.98 m2 BMI: 31.64 kg/m2 Rhythm: Within normal limits HR: 76 bpm BP: 147/61 mmHg  Conclusions   Summary  Ejection fraction is visually estimated to be 60-65%. No regional wall motion abnormalities are noted. Grade II diastoli  The left atrium is mildly dilated. Moderate aortic stenosis with a peak velocity of 3.4m/s and a mean pressure  gradient of 30mmHg. No evidence of aortic valve regurgitation. Normal right ventricular size. Right ventricular systolic function is normal.   Signature   ------------------------------------------------------------------  Electronically signed by Elvia García MD (Interpreting  physician) on 01/19/2022 at 11:37 AM  ------------------------------------------------------------------   Findings   Left Ventricle  Left ventricular cavity size is normal.  Normal left ventricular wall thickness. Ejection fraction is visually estimated to be 60-65%.   No regional wall motion abnormalities are noted. Grade II diastolic dysfunction with elevated LV filling pressures. GLS= -15.7%   Mitral Valve  Mitral annular calcification is present. Calcification of the leaflets of the mitral valve. Trivial mitral regurgitation. No evidence of mitral stenosis. Left Atrium  The left atrium is mildly dilated. Aortic Valve  Moderate aortic stenosis with a peak velocity of 3.4m/s and a mean pressure  gradient of 30mmHg. No evidence of aortic valve regurgitation. Aorta  The aortic root is normal in size. The ascending aorta is normal in size. Right Ventricle  Normal right ventricular size. Right ventricular systolic function is normal.  TAPSE= 2.4cm   Tricuspid Valve  Trivial tricuspid regurgitation. No evidence of tricuspid stenosis. Right Atrium  The right atrium is normal in size. Pulmonic Valve  Mild pulmonic regurgitation present. No evidence of pulmonic valve stenosis. Pericardial Effusion  No pericardial effusion noted. Pleural Effusion  No pleural effusion. Miscellaneous  IVC size is dilated (>2.1 cm) but collapses > 50% with respiration  consistent with elevated RA pressure (8 mmHg). Unable to estimate pulmonary artery pressure secondary to incomplete TR jet  envelope.   M-Mode/2D Measurements (cm)   LV Diastolic Dimension: 6.57 cm LV Systolic Dimension: 0.10 cm  LV Septum Diastolic: 6.53 cm  LV PW Diastolic: 3.45 cm        AO Root Dimension: 3 cm  RV Diastolic Dimension: 9.79 cm                                  LA Area: 26.2 cm2  LVOT: 1.7 cm                    LA volume/Index: 80.8 ml /41 ml/m2  Doppler Measurements   AV Peak Velocity: 344 cm/s     MV Peak E-Wave: 145 cm/s  AV Peak Gradient: 47.33 mmHg   MV Peak A-Wave: 156 cm/s  AV Mean Gradient: 30 mmHg      MV E/A Ratio: 0.93  LVOT Peak Velocity: 170 cm/s   MV P1/2t: 85 msec  AV Area (Continuity):1.12 cm2  MV Mean Gradient: 4 mmHg                                 MV Max P mmHg MV Vmax:147 cm/s  Estimated RAP:8 mmHg           MV VTI:49.8 cm/s  E' Septal Velocity: 4.93 cm/s  E' Lateral Velocity: 5.25 cm/s MV Area (continuity): 1.96 cm2  PV Peak Velocity: 116 cm/s     MV Deceleration Time: 274 msec  PV Peak Gradient: 5.38 mmHg    MV Area (PHT): 2.59 cm2   Aortic Valve   Peak Velocity: 344 cm/s     Mean Velocity: 264 cm/s  Peak Gradient: 47.33 mmHg   Mean Gradient: 30 mmHg  Area (continuity): 1.12 cm2  AV VTI: 87.3 cm  Aorta   Aortic Root: 3 cm  Ascending Aorta: 3.4 cm  LVOT Diameter: 1.7 cm      XR CHEST PORTABLE    Result Date: 1/30/2022  EXAMINATION: ONE XRAY VIEW OF THE CHEST 1/30/2022 8:42 pm COMPARISON: Chest x-ray 01/25/2021. HISTORY: ORDERING SYSTEM PROVIDED HISTORY: syncope TECHNOLOGIST PROVIDED HISTORY: Reason for exam:->syncope Reason for Exam: Loss of Consciousness (patient was sitting at the table when she passed out for about 30secs and then it happened again 5 mins later, pt is in treatment for CA) FINDINGS: Cardiac silhouette is within normal limits in size. Mediastinal contours are otherwise suboptimally evaluated due to rotated projection. Subsegmental left basilar opacity. No pleural effusion on this projection. No pneumothorax appreciated on this projection. Nonspecific subsegmental left basilar opacity. CT CHEST PULMONARY EMBOLISM W CONTRAST    Result Date: 1/30/2022  EXAMINATION: CTA OF THE CHEST 1/30/2022 10:53 pm TECHNIQUE: CTA of the chest was performed after the administration of intravenous contrast.  Multiplanar reformatted images are provided for review. MIP images are provided for review. Dose modulation, iterative reconstruction, and/or weight based adjustment of the mA/kV was utilized to reduce the radiation dose to as low as reasonably achievable. COMPARISON: CT of the chest, abdomen, and pelvis performed 09/25/2021.  HISTORY: ORDERING SYSTEM PROVIDED HISTORY: syncope TECHNOLOGIST PROVIDED HISTORY: Reason for exam:->syncope Decision Support Exception - unselect if not a suspected or confirmed emergency medical condition->Emergency Medical Condition (MA) Reason for Exam: syncope FINDINGS: Pulmonary Arteries: Pulmonary arteries are adequately opacified for evaluation. No evidence of intraluminal filling defect to suggest pulmonary embolism. Main pulmonary artery is normal in caliber. Mediastinum: No evidence of mediastinal lymphadenopathy. The heart and pericardium demonstrate no acute abnormality. There is no acute abnormality of the thoracic aorta. Lungs/pleura: There infiltrates in the lower lobes. There are left perihilar infiltrates. There is no effusion. There is no pneumothorax. The tracheobronchial tree is patent. Upper Abdomen: Hypodense foci are seen within the liver representing cysts versus hemangiomas. Soft Tissues/Bones: No acute bone or soft tissue abnormality. No acute or chronic pulmonary embolism. Lower lobe infiltrates bilaterally and left perihilar infiltrates concerning for pneumonia. Problem List  Patient Active Problem List   Diagnosis    Primary localized osteoarthrosis, lower leg    Obesity (BMI 30-39. 9)    Primary localized osteoarthrosis, pelvic region and thigh    History of total hip replacement    H/O total hip arthroplasty, left    Primary osteoarthritis of left hip    Arthritis of right knee    H/O total knee replacement, right 1/30/19    Right hip pain    2019 novel coronavirus disease (COVID-19)    Hyponatremia    UTI (urinary tract infection)    Syncope    CLL (chronic lymphocytic leukemia) ([address]    Pneumonia due to COVID-19 virus    Pneumonia    Neutropenic fever (HCC)    Sepsis ([address]       ASSESSMENT AND PLAN    CLL  On bendamustine rituxan  Responding to tx   Pt is neutropenic  I suspect covid playing a role in neutropenia  Increased granix to 480 to try to speed recovery on this   Cultures pending  Id following   She appears non toxic    Murmur-I forgot to mention on initial consult  I ordered an echo outpt -it does show moderate aortic stenosis     Alejandra Pink MD, MD

## 2022-02-02 NOTE — PROGRESS NOTES
Progress Note  Admit Date: 1/30/2022      PCP: FAY Maradiaga CNP     CC: F/U for neutropenic fever    Days in hospital:  2    SUBJECTIVE / Interval History:  Pt feels ok, no complaints     Spiked a temp yesterday      Allergies  Patient has no known allergies. Medications    Scheduled Meds:   magnesium sulfate  4,000 mg IntraVENous Once    cefepime  2,000 mg IntraVENous Q12H    tbo-filgrastim  480 mcg SubCUTAneous Daily    insulin lispro  0-12 Units SubCUTAneous TID WC    insulin lispro  0-6 Units SubCUTAneous Nightly    amLODIPine  5 mg Oral Daily    levothyroxine  75 mcg Oral Daily    metoprolol tartrate  50 mg Oral BID    sodium chloride flush  5-40 mL IntraVENous 2 times per day    enoxaparin  30 mg SubCUTAneous BID    albuterol sulfate HFA  2 puff Inhalation BID    And    ipratropium  2 puff Inhalation BID    guaiFENesin  600 mg Oral BID     Continuous Infusions:   sodium chloride 250 mL (02/01/22 0855)       PRN Meds:  sodium chloride flush, sodium chloride, acetaminophen **OR** acetaminophen, ondansetron, guaiFENesin-dextromethorphan, albuterol sulfate HFA **AND** ipratropium, benzonatate    Vitals    /68   Pulse 72   Temp 98.4 °F (36.9 °C) (Oral)   Resp 18   Ht 5' 6\" (1.676 m)   Wt 188 lb 15 oz (85.7 kg)   SpO2 99%   BMI 30.49 kg/m²     Exam:    Gen: No distress. Eyes: PERRL. No sclera icterus. No conjunctival injection. ENT: No discharge. Pharynx clear. External appearance of ears and nose normal.  Neck: Trachea midline. No obvious mass. Resp: No accessory muscle use. No crackles. No wheezes. No rhonchi. No dullness on percussion. CV: Regular rate. Regular rhythm. No murmur or rub. No edema. GI: Non-tender. Non-distended. No hernia. Skin: Warm, dry, normal texture and turgor. No nodule on exposed extremities. Lymph: No cervical LAD. No supraclavicular LAD. M/S: No cyanosis. No clubbing. No joint deformity. Neuro: Moves all four extremities.  CN 2-12 tested, no defect noted. Psych: Oriented x 3. No anxiety. Awake. Alert. Intact judgement and insight. Data    LABS  CBC:   Recent Labs     01/30/22 2123 01/31/22 0530 02/01/22  0519   WBC 1.0* 1.0* 1.2*   HGB 10.8* 10.9* 10.4*   HCT 30.7* 30.9* 29.8*   MCV 83.3 83.8 83.3    176 197     BMP:   Recent Labs     01/31/22 0529 02/01/22  0519 02/02/22  0620   * 122* 128*   K 3.5 3.5 3.3*   CL 88* 86* 89*   CO2 22 26 26   BUN 5* 5* 4*   CREATININE <0.5* 0.5* <0.5*   GLUCOSE 131* 241* 92     POC GLUCOSE:    Recent Labs     02/01/22  1128 02/01/22  1710 02/01/22  1946 02/02/22  0743   POCGLU 115* 109* 170* 106*     LIVER PROFILE:   Recent Labs     01/30/22 2123 01/30/22 2123 01/31/22 0529 02/01/22  0519 02/02/22  0620   AST 16   < > 16 16 15   ALT 13   < > 14 14 15   LIPASE 17.0  --   --   --   --    LABALBU 3.5   < > 3.2* 3.1* 3.1*   BILITOT 0.4   < > 0.3 0.4 0.4   ALKPHOS 71   < > 67 69 64    < > = values in this interval not displayed. PT/INR: No results for input(s): PROTIME, INR in the last 72 hours. APTT: No results for input(s): APTT in the last 72 hours. UA:  Recent Labs     01/30/22 2203   COLORU YELLOW   PHUR 6.0   WBCUA 6*   RBCUA 5*   MUCUS 1+*   BACTERIA 4+*   CLARITYU CLOUDY*   SPECGRAV 1.015   LEUKOCYTESUR Negative   UROBILINOGEN 0.2   BILIRUBINUR Negative   BLOODU TRACE*   GLUCOSEU Negative   KETUA Negative   AMORPHOUS Rare     Microbiology:  Wound Culture:   Recent Labs     01/30/22 2208   ORG Staphylococcus coagulase negative DNA Detected*     Nasal Culture:   Recent Labs     01/30/22 2208 02/01/22  1040   ORG Staphylococcus coagulase negative DNA Detected*  --    MRSAPCR  --  Negative  MRSA DNA not detected. Normal Range: Not detected       Blood Culture:   Recent Labs     01/30/22 2123 01/30/22 2208   BC No Growth to date. Any change in status will be called.   --    BLOODCULT2  --  Gram stain Aerobic bottle:  Gram positive cocci  Information to follow  *  See additional report for complete BCID panel. Fungal Culture:   No results for input(s): FUNGSM in the last 72 hours. No results for input(s): FUNCXBLD in the last 72 hours. CSF Culture:  No results for input(s): COLORCSF, APPEARCSF, CFTUBE, CLOTCSF, WBCCSF, RBCCSF, NEUTCSF, NUMCELLSCSF, LYMPHSCSF, MONOCSF, GLUCCSF, VOLCSF in the last 72 hours. Respiratory Culture:  No results for input(s): Saul Cancer in the last 72 hours. AFB:No results for input(s): AFBSMEAR in the last 72 hours. Urine Culture  Recent Labs     01/31/22  1100   LABURIN No growth at 18 to 36 hours       RADIOLOGY:    CT CHEST PULMONARY EMBOLISM W CONTRAST   Final Result   No acute or chronic pulmonary embolism. Lower lobe infiltrates bilaterally and left perihilar infiltrates concerning   for pneumonia. XR CHEST PORTABLE   Final Result   Nonspecific subsegmental left basilar opacity. CONSULTS:    IP CONSULT TO INFECTIOUS DISEASES  IP CONSULT TO ONCOLOGY    ASSESSMENT AND PLAN:      Active Problems:    Hyponatremia    UTI (urinary tract infection)    CLL (chronic lymphocytic leukemia) ([address]    Pneumonia due to COVID-19 virus    Pneumonia    Neutropenic fever ([address]    Sepsis ([address]  Resolved Problems:    * No resolved hospital problems. *    Patient is a 68-year-old woman with a past medical history of CLL on chemotherapy who presented with syncope. She was found to have Covid along with neutropenic fever. Chest x-ray shows left lower lobe pneumonia. Neutropenic fever with pneumonia  -Continue antibiotics  -Urine Legionella and pneumococcal antigen pending  -Got a dose of Granix  -Blood culture no growth to date    Sepsis present on admission  -With fever and tachycardia  -Blood cultures no growth to date    CLL on chemotherapy  -bendamustine rituxan   -Dermatology consult appreciated      COVID-19  -Presented hypoxic.   Has a high risk for progression  -CRP elevated    Hypertension  -Monitor blood pressure and continue home meds    Hypothyroidism  -Resume home meds    Diabetes mellitus  -Sliding scale insulin  -On Metformin at home    Hyponatremia-sodium still low( past work up + for possible SIADH ) -sodium improving  -Patient's initial work-up showed SIADH-like picture. Work-up now shows a component of polydipsia. -Start fluid restrictions    Syncope  - due to hyponatremia    Hypomagnesemia  -Replace and monitor    Coag negative staph bacteremia    Moderate aortic stenosis seen on echo? DVT Prophylaxis: Lovenox  Diet: ADULT DIET; Regular; 4 carb choices (60 gm/meal)  Code Status: Full Code    PT/OT Eval Status: Ordered    Discharge plan -spiked a temp    The patient and / or the family were informed of the results of any tests, a time was given to answer questions, a plan was proposed and they agreed with plan. Discussed with consulting physicians, nursing and social work     The note was completed using EMR. Every effort was made to ensure accuracy; however, inadvertent computerized transcription errors may be present.        Trevon Suazo MD

## 2022-02-02 NOTE — PROGRESS NOTES
Physical Therapy  Facility/Department: [address] PROGRESSIVE CARE  Daily Treatment Note  NAME: Kimi Sheriff  : 1945  MRN: [id number]    Date of Service: 2022    Discharge Recommendations:  Patient would benefit from continued therapy after discharge,Home with assist PRN   PT Equipment Recommendations  Equipment Needed: No    Assessment   Body structures, Functions, Activity limitations: Decreased functional mobility ; Decreased endurance  Assessment: Pt able to ambulate Mod I in room with SPC, and while managing IV pole. She denied fatigue, and is on room air. She is functioning close to her baseline for mobility, and PT continues to anticipate safe return home with prn assist from family. No home PT needed. Kimi Sheriff scored a 23/24 on the AM-PAC short mobility form. If patient discharges prior to next session this note will serve as a discharge summary. Please see below for the latest assessment towards goals. Specific instructions for Next Treatment: Improve strength, balance, endurance  PT Education: Goals; General Safety;Gait Training;PT Role;Orientation;Plan of Care; Functional Mobility Training;Transfer Training  REQUIRES PT FOLLOW UP: Yes  Activity Tolerance  Activity Tolerance: Patient Tolerated treatment well     Patient Diagnosis(es): The primary encounter diagnosis was COVID-19. Diagnoses of Chemotherapy-induced neutropenia ([facility] Pneumonia of left lower lobe due to infectious organism, and Hypomagnesemia were also pertinent to this visit. has a past medical history of Acid reflux, Arthritis, Cancer ([facility] Chronic lymphocytic leukemia (CLL) genetic mutation variant ([facility] Diabetes mellitus ([facility] High blood pressure, Hyperlipidemia, Multiple drug resistant organism (MDRO) culture positive, PONV (postoperative nausea and vomiting), Thyroid disease, UTI (urinary tract infection), and Wears glasses. has a past surgical history that includes Cholecystectomy; Hysterectomy;  Thyroid surgery (Left); cyst removal (Right); eye surgery; joint replacement (Left); joint replacement (Right, 01/30/2019); Total knee arthroplasty (Right, 1/30/2019); Nasal sinus surgery (N/A, 9/10/2021); and laryngoscopy (N/A, 9/10/2021). Restrictions  Restrictions/Precautions  Restrictions/Precautions: Fall Risk,Isolation,Contact Precautions     Subjective   General  Chart Reviewed: Yes  Additional Pertinent Hx: The patient is a 76 y.o. female with past medical history as below but mainly CLL who is currently receiving chemotherapy via oncology with her last treatment being about a week or so ago who presents to [facility] being brought from home apparently because patient had a very brief syncopal episode while playing [employer] with her  and family. Patient remembers that she was initially playing the game and that apparently she was out for at least a minute according to what she was told by family. She does not remember the entire event but does recall that she not been feeling entirely well the last few days. She did not think contact anyone sick but she is not vaccinated for Covid. She notes that for the last few days she had intermittent fevers with a temperature as high as at least 101 but as low as around  Fahrenheit. She did also develop a slight cough and has had decreased appetite the last few days. Apart from the fevers and decreased appetite and cough, patient denies other symptoms of dizziness, dysuria, blood in urine/stool/sputum, abdominal pain/nausea/vomiting/diarrhea of any significance, rashes, leg swelling. She had never had any syncopal episodes before and denies any chest pain or shortness of breath prior to the episode occurring. She never had any seizures before either. Response To Previous Treatment: Patient with no complaints from previous session. Subjective  Subjective: Pt denies pain. Agreeable to therapy. Now on room air. Orientation  Orientation  Overall Orientation Status: Within Normal Limits    Objective      Bed mobility  Supine to Sit: Independent  Sit to Supine: Independent     Transfers  Sit to Stand: Modified independent  Stand to sit: Modified independent     Ambulation  Ambulation?: Yes  Ambulation 1  Surface: level tile  Device: No Device (IV)  Assistance: Modified Independent  Quality of Gait: Fast pace, mildly flexed posture, no LOB, able to manage IV pole without difficulty. Distance: 25' x 2, 100'    Ambulation 2  Surface - 2: level tile  Device 2: Single point cane  Assistance 2: Modified Independent  Quality of Gait 2: Fast pace, no difficulty clearing LLE, no LOB. Distance: 27'     Other Activities: Other (see comment)  Comment: Pt able to don/doff brief, complete toilet transfer, urinate, perform raul-care, and wash hands at sink without assist.  At end of session, pt positioned for comfort in bedside chair, call light in reach, alarm in place. AM-PAC Score  AM-PAC Inpatient Mobility Raw Score : 23 (02/02/22 1350)  AM-PAC Inpatient T-Scale Score : 56.93 (02/02/22 1350)  Mobility Inpatient CMS 0-100% Score: 11.2 (02/02/22 1350)  Mobility Inpatient CMS G-Code Modifier : CI (02/02/22 1350)    Goals  Short term goals  Time Frame for Short term goals: In 2-3 days pt will perform  Short term goal 1: Bed mobility (I)  Short term goal 2: Transfers (I)  Short term goal 3: Ambulation 48' with cane, Mod I  Patient Goals   Patient goals : To return home    Plan    Plan  Times per week: 2-3x  Specific instructions for Next Treatment: Improve strength, balance, endurance  Current Treatment Recommendations: Strengthening,Balance Training,Endurance Training,Functional Mobility Training,Transfer [employer] Training,Safety Education & Training,Patient/Caregiver Education & Training,Equipment Evaluation, Education, & procurement  Safety Devices  Type of devices:  All fall risk precautions in place,Call light within reach,Left in [facility] belt,Nurse notified  Restraints  Initially in place: No     Therapy Time   Individual Concurrent Group Co-treatment   Time In 1315         Time Out 1340         Minutes 25         Timed Code Treatment Minutes: 25 Minutes       Nadeen Lomeli, PT    Electronically signed by Nadeen Lomeli, PT [id number] on 2/2/2022 at 1:52 PM

## 2022-02-02 NOTE — PROGRESS NOTES
Infectious Disease Follow up Notes  Admit Date: 1/30/2022  Hospital Day: 4    Antibiotics :   IV Cefepime       CHIEF COMPLAINT:     Neutropenic fever  CLL  Fever  COVID 19+    Subjective interval History :  76 y.o. woman admitted from home after a syncopal event she is on chemo for CLL and last dose a week ago ? T max 100.5 on admit and she is unvaccinated against COVID 19 now tested +Ve on admit WBC at 1, hb at  10.9 and plt 176. Blood cx in process  She is on room air, CT chest lEFT lower lobe changes concerning for PNA , crp AT  143, UA with 4+ Bacteria and we are consulted for IV abx recommendations.      Interval History :  Fevers low grade  WBC now slowly recovering and Blood cx contaminant noted and remains on room air     Past Medical History:    Past Medical History:   Diagnosis Date    Acid reflux     Arthritis     [facility]     Chronic lymphocytic leukemia (CLL) genetic mutation variant ([facility] 2018    Diabetes mellitus ([facility] [address] [facility]     BORDERLINE    High blood pressure     Hyperlipidemia     mild-no meds    Multiple drug resistant organism (MDRO) culture positive 01/25/2021    urine    PONV (postoperative nausea and vomiting)     after hysterectomy-got sick next morning    Thyroid disease     UTI (urinary tract infection) 01/2019    currently on cipro    Wears glasses        Past Surgical History:    Past Surgical History:   Procedure Laterality Date    CHOLECYSTECTOMY      CYST REMOVAL Right     on foot    EYE SURGERY      cataract removal with lens implants    HYSTERECTOMY      JOINT REPLACEMENT Left     hip    JOINT REPLACEMENT Right 01/30/2019      Right Total Knee Replacement    LARYNGOSCOPY N/A 9/10/2021    DIRECT LARYNGOSCOPY WITH BIOPSY performed by Eva Guillen MD at [facility] N/A 9/10/2021    NASAL ENDOSCOPY WITH BIOPSY performed by Eva Guillen MD at [facility] Left  TOTAL KNEE ARTHROPLASTY Right 1/30/2019    RIGHT TOTAL KNEE REPLACEMENT performed by Awais Knight MD at [facility]       Current Medications:    Outpatient Medications Marked as Taking for the 1/30/22 encounter [facility] Encounter)   Medication Sig Dispense Refill    lisinopril (PRINIVIL;ZESTRIL) 20 MG tablet Take 20 mg by mouth daily      sodium chloride 1 g tablet Take 1 g by mouth 3 times daily      metoprolol tartrate (LOPRESSOR) 25 MG tablet Take 50 mg by mouth 2 times daily       amLODIPine (NORVASC) 5 MG tablet Take 5 mg by mouth daily      Cranberry 1000 MG CAPS Take 1 capsule by mouth 2 times daily. 30 capsule 1    metFORMIN (GLUCOPHAGE) 500 MG tablet Take 500 mg by mouth 2 times daily (with meals).  Ferrous Sulfate (IRON) 325 (65 FE) MG TABS Take 1 tablet by mouth every evening.  Ascorbic Acid (VITAMIN C) 500 MG CAPS Take 1 tablet by mouth daily       Lactobacillus (PROBIOTIC ACIDOPHILUS PO) Take 1 capsule by mouth 2 times daily.  levothyroxine (SYNTHROID) 75 MCG tablet Take 75 mcg by mouth daily          Allergies:  Patient has no known allergies. Immunizations : There is no immunization history on file for this patient.     Social History:     Social History     Tobacco Use    Smoking status: Never Smoker    Smokeless tobacco: Never Used   Vaping Use    Vaping Use: Never used   Substance Use Topics    Alcohol use: No    Drug use: Never     Social History     Tobacco Use   Smoking Status Never Smoker   Smokeless Tobacco Never Used      Family History   Problem Relation Age of Onset    High Blood Pressure Mother     Heart Disease Mother     Cancer Father         acute leukemia      REVIEW OF SYSTEMS:    Constitutional:    fevers+ , chills+ , night sweats  Eyes:  negative for blurred vision, eye discharge, visual disturbance   HEENT:  negative for hearing loss, ear drainage,nasal congestion  Respiratory:  negative for cough, shortness of breath or hemoptysis Cardiovascular:  negative for chest pain, palpitations, syncope  Gastrointestinal:  negative for nausea, vomiting, diarrhea, constipation, abdominal pain  Genitourinary:  negative for frequency, dysuria, urinary incontinence, hematuria  Hematologic/Lymphatic:  negative for easy bruising, bleeding and lymphadenopathy  Allergic/Immunologic:  negative for recurrent infections, angioedema, anaphylaxis   Endocrine:  negative for weight changes, polyuria, polydipsia and polyphagia  Musculoskeletal:  negative for joint  pain, swelling, decreased range of motion  Integumentary: No rashes, skin lesions  Neurological:  negative for headaches, slurred speech, unilateral weakness  Psychiatric: negative for hallucinations,confusion,agitation.                 PHYSICAL EXAM:      Vitals:    /69   Pulse 89   Temp 98.4 °F (36.9 °C) (Oral)   Resp 18   Ht 5' 6\" (1.676 m)   Wt 188 lb 15 oz (85.7 kg)   SpO2 95%   BMI 30.49 kg/m²     yGeneral Appearance: alert,in no acute distress, ++  pallor, no icterus   Skin: warm and dry, no rash or erythema  Head: normocephalic and atraumatic  Eyes: pupils equal, round, and reactive to light, conjunctivae normal  ENT: tympanic membrane, external ear and ear canal normal bilaterally, nose without deformity, nasal mucosa and turbinates normal without polyps  Neck: supple and non-tender without mass, no thyromegaly  no cervical lymphadenopathy  Pulmonary/Chest: clear to auscultation bilaterally- no wheezes, rales or rhonchi, normal air movement, no respiratory distress  Cardiovascular: normal rate, regular rhythm, normal S1 and S2, esm+ murmurs, rubs, clicks, or gallops, no carotid bruits  Abdomen: soft, non-tender, non-distended, normal bowel sounds, no masses or organomegaly  Extremities: no cyanosis, clubbing or edema  Musculoskeletal: normal range of motion, no joint swelling, deformity or tenderness  Integumentary: No rashes, no abnormal skin lesions, no petechiae  Neurologic: reflexes normal and symmetric, no cranial nerve deficit  Psych:  Orientation, sensorium, mood normal            Lines: IV     Data Review:    CBC:   Lab Results   Component Value Date    WBC 2.5 (L) 02/02/2022    HGB 10.7 (L) 02/02/2022    HCT 29.9 (L) 02/02/2022    MCV 82.6 02/02/2022     02/02/2022     RENAL:   Lab Results   Component Value Date    CREATININE <0.5 (L) 02/02/2022    BUN 4 (L) 02/02/2022     (L) 02/02/2022    K 3.3 (L) 02/02/2022    CL 89 (L) 02/02/2022    CO2 26 02/02/2022     SED RATE:   Lab Results   Component Value Date    SEDRATE 20 03/31/2015     CK: No results found for: CKTOTAL  CRP:   Lab Results   Component Value Date    .8 01/31/2022     Hepatic Function Panel:   Lab Results   Component Value Date    ALKPHOS 64 02/02/2022    ALT 15 02/02/2022    AST 15 02/02/2022    PROT 5.5 02/02/2022    BILITOT 0.4 02/02/2022    LABALBU 3.1 02/02/2022     UA:  Lab Results   Component Value Date    COLORU YELLOW 01/30/2022    CLARITYU CLOUDY 01/30/2022    GLUCOSEU Negative 01/30/2022    BILIRUBINUR Negative 01/30/2022    KETUA Negative 01/30/2022    SPECGRAV 1.015 01/30/2022    BLOODU TRACE 01/30/2022    PHUR 6.0 01/30/2022    PROTEINU 30 01/30/2022    UROBILINOGEN 0.2 01/30/2022    NITRU POSITIVE 01/30/2022    LEUKOCYTESUR Negative 01/30/2022    LABMICR YES 01/30/2022    URINETYPE Cleancatch 01/30/2022      Urine Microscopic:   Lab Results   Component Value Date    BACTERIA 4+ 01/30/2022    COMU see below 01/30/2022    HYALCAST 4 01/30/2022    WBCUA 6 01/30/2022    RBCUA 5 01/30/2022    EPIU 0-1 01/30/2022     Urine Reflex to Culture:   Lab Results   Component Value Date    URRFLXCULT Not Indicated 01/30/2022         MICRO: cultures reviewed and updated by me   Blood Culture: SARS-CoV-2, NAAT  COVID-19, Rapid  Collected: 01/30/22 2210   Result status: Final   Resulting lab: [facility]   Reference range: Not Detected   Value: DETECTED Abnormal     Comment: Rapid NAAT:   Negative results should be treated as presumptive and,   if inconsistent with clinical signs and symptoms or necessary for   patient management, should be tested with an alternative molecular   assay. Negative results do not preclude SARS-CoV-2 infection and   should not be used as the sole basis for patient management decisions. This test has been authorized by the FDA under an Emergency Use   Authorization (EUA) for use by authorized laboratories. Fact sheet for Healthcare Providers:      Time       MRSA DNA Probe, Nasal [[id number]] Collected: 02/01/22 1040   Order Status: Completed Specimen: Nares Updated: 02/01/22 1126    MRSA SCREEN RT-PCR Further report to follow   Narrative:     ORDER#: [id number]                          ORDERED BY: Holden Wells   SOURCE: Nares                              COLLECTED:  02/01/22 10:40   ANTIBIOTICS AT [place]. :                      RECEIVED :  02/01/22 10:58   Performed at:   [facility]   [address] [employer] [address]   Phone [phone]   Culture, Blood, PCR ID Panel Results Report [[id number]] Collected: 01/30/22 2208   Order Status: Completed Updated: 02/01/22 1022    Report SEE IMAGE   Narrative:     CALL Ellie Lovett [phone],   Microbiology results called to and read back by ANUJ Knowles, 02/01/2022   10:21, by VINNY   Referred out by:   [address] [place], [place] [address]   Phone [phone]   Culture, Blood 2 [[id number]] (Abnormal) Collected: 01/30/22 2208   Order Status: Completed Specimen: Blood Updated: 02/01/22 1021    Culture, Blood 2 -- Abnormal     Gram stain Aerobic bottle:   Gram positive cocci   Information to follow    Abnormal     Organism Staphylococcus coagulase negative DNA Detected Abnormal     Culture, Blood 2 See additional report for complete BCID panel.    Narrative:     ORDER#: [id number]                         2210   Order Status: Completed Specimen: Nasopharyngeal Swab Updated: 01/30/22 2228    SARS-CoV-2, NAAT DETECTED Abnormal     Comment: Rapid NAAT:   Negative results should be treated as presumptive and,   if inconsistent with clinical signs and symptoms or necessary for   patient management, should be tested with an alternative molecular   assay. Negative results do not preclude SARS-CoV-2 infection and   should not be used as the sole basis for patient management decisions. This test has been authorized by the FDA under an Emergency Use   Authorization (EUA) for use by authorized laboratories. Fact sheet for Healthcare Providers:   Luis.moy   Fact sheet for Patients: Luis.moy     METHODOLOGY: Isothermal Nucleic Acid Amplification       Narrative:     Performed at:   [address] [facility] [place] [address]   Phone [phone]         Lab Results   Component Value Date    [facility]  01/30/2022     No Growth to date. Any change in status will be called. BLOODCULT2  01/30/2022     Gram stain Aerobic bottle:  Gram positive cocci  Information to follow      Fiorella Bal See additional report for complete BCID panel. 01/30/2022    BLOODCULT2  01/30/2022     POSITIVE for  This organism was isolated in one set. Susceptibility testing is not routinely done as this  organism frequently represents skin contamination. Additional testing can be ordered by calling the  Microbiology Department. Respiratory Culture:  No results found for: Floyce Pat  AFB:No results found for: AFBSMEAR  Viral Culture:  Lab Results   Component Value Date    COVID19 DETECTED 01/30/2022     Urine Culture:   Recent Labs     01/31/22  1100   LABURIN No growth at 18 to 36 hours         IMAGING:    CT CHEST PULMONARY EMBOLISM W CONTRAST   Final Result   No acute or chronic pulmonary embolism.       Lower lobe infiltrates bilaterally and left perihilar infiltrates concerning   for pneumonia. XR CHEST PORTABLE   Final Result   Nonspecific subsegmental left basilar opacity. All the pertinent images and reports for the current Hospitalization were reviewed by me     Scheduled Meds:   magnesium sulfate  4,000 mg IntraVENous Once    cefepime  2,000 mg IntraVENous Q12H    tbo-filgrastim  480 mcg SubCUTAneous Daily    insulin lispro  0-12 Units SubCUTAneous TID WC    insulin lispro  0-6 Units SubCUTAneous Nightly    amLODIPine  5 mg Oral Daily    levothyroxine  75 mcg Oral Daily    metoprolol tartrate  50 mg Oral BID    sodium chloride flush  5-40 mL IntraVENous 2 times per day    enoxaparin  30 mg SubCUTAneous BID    albuterol sulfate HFA  2 puff Inhalation BID    And    ipratropium  2 puff Inhalation BID    guaiFENesin  600 mg Oral BID       Continuous Infusions:   sodium chloride 250 mL (02/01/22 0855)       PRN Meds:  sodium chloride flush, sodium chloride, acetaminophen **OR** acetaminophen, ondansetron, guaiFENesin-dextromethorphan, albuterol sulfate HFA **AND** ipratropium, benzonatate      Assessment:     Patient Active Problem List   Diagnosis    Primary localized osteoarthrosis, lower leg    Obesity (BMI 30-39. 9)    Primary localized osteoarthrosis, pelvic region and thigh    History of total hip replacement    H/O total hip arthroplasty, left    Primary osteoarthritis of left hip    Arthritis of right knee    H/O total knee replacement, right 1/30/19    Right hip pain    2019 novel coronavirus disease (COVID-19)    Hyponatremia    UTI (urinary tract infection)    Syncope    CLL (chronic lymphocytic leukemia) (HCC)    Pneumonia due to COVID-19 virus    Pneumonia    Neutropenic fever (HCC)    Sepsis (HCC)          Neutropenic fevers  CLL on chemotherapy   COVID 19 infection   CT chest with Left lung base changes  WBC at 1  Na+ 121 on admit  Syncope at home   Unvaccinated against COVID 19   CRP elevation   TTE Moderate Aortic stenosis noted      She is not hypoxic and mainly symptomatic from Neutropenia than from COVID 19 at this time but she is at risk for progression due to her Medical issues     Blood cx is a contaminant and Urine cx negative    Will be able to dc IV abx once WBC improved        Labs, Microbiology, Radiology and all the pertinent results from current hospitalization and  care every where were reviewed  by me as a part of the evaluation   Plan:   1. Cont IV Cefepime  x 2 gm Q 12 hrs  2. Blood cx is a contaminant no treatment necessary   3. WBC improving    4. Check MRSA probe -ve  5. Covid 19 SYMPTOMATIC THERAPY   6. On GCSF for  Neutropenia - WBC slow improvement   7. Will be able to d/c IV abx once WBC improved  8. May not need any additional abx at d/c is she does well     Discussed with patient/Family and Nursing staff   Risk of Complications/Morbidity: High      · Illness(es)/ Infection present that pose threat to bodily function. · There is potential for severe exacerbation of infection/side effects of treatment. · Therapy requires intensive monitoring for antimicrobial agent toxicity. ·   Thanks for allowing me to participate in your patient's care and please call me with any questions or concerns.     Rufino Barnes MD  Infectious Disease  [facility]) Physician  Phone: [phone]   Fax : [phone]

## 2022-02-02 NOTE — PROGRESS NOTES
Physician Progress Note      PATIENT:               Haskell Gottron  CSN #:                  [id number]  :                       1945  ADMIT DATE:       2022 8:29 PM  [address] DATE:  RESPONDING  PROVIDER #:        Lizeth Agrawal MD          QUERY TEXT:    Patient admitted with COVID, PNA and Sepsis. Noted documentation of UTI in H&P   and progress note. urine culture negative. . In order to support the diagnosis   of UTI, please include additional clinical indicators in your documentation. Or please document if the diagnosis of UTI has been ruled out after further   study. The medical record reflects the following:  Risk Factors: 76 yof, hx of UTI  Clinical Indicators: U/A positive for nitrites, WBC and bacteria - urine   culture negative  Treatment: U/A - C&S, IV ABX  Options provided:  -- UTI was ruled out  -- UTI present as evidenced by, Please document evidence. -- Other - I will add my own diagnosis  -- Disagree - Not applicable / Not valid  -- Disagree - Clinically unable to determine / Unknown  -- Refer to Clinical Documentation Reviewer    PROVIDER RESPONSE TEXT:    UTI was ruled out after study.     Query created by: Peri Castle on 2022 6:40 AM      Electronically signed by:  Lizeth Agrawal MD 2022 9:04 AM

## 2022-02-02 NOTE — PLAN OF CARE
Problem: Airway Clearance - Ineffective  Goal: Achieve or maintain patent airway  2/1/2022 2029 by Trina Woo RN  Outcome: Ongoing  2/1/2022 1122 by Helen Higuera RN  Outcome: Ongoing     Problem: Gas Exchange - Impaired  Goal: Absence of hypoxia  2/1/2022 2029 by Trina Woo RN  Outcome: Ongoing  2/1/2022 1122 by Helen Higuera RN  Outcome: Ongoing  Goal: Promote optimal lung function  2/1/2022 2029 by Trina Woo RN  Outcome: Ongoing  2/1/2022 1122 by Helen Higuera RN  Outcome: Ongoing     Problem: Breathing Pattern - Ineffective  Goal: Ability to achieve and maintain a regular respiratory rate  2/1/2022 1122 by Helen Higuera RN  Outcome: Ongoing     Problem:  Body Temperature -  Risk of, Imbalanced  Goal: Ability to maintain a body temperature within defined limits  2/1/2022 1122 by Helen Higuera RN  Outcome: Ongoing  Goal: Will regain or maintain usual level of consciousness  2/1/2022 1122 by Helen Higuera RN  Outcome: Ongoing  Goal: Complications related to the disease process, condition or treatment will be avoided or minimized  2/1/2022 1122 by Helen Higuera RN  Outcome: Ongoing     Problem: Isolation Precautions - Risk of Spread of Infection  Goal: Prevent transmission of infection  2/1/2022 2029 by Trina Woo RN  Outcome: Ongoing  2/1/2022 1122 by Helen Higuera RN  Outcome: Ongoing     Problem: Nutrition Deficits  Goal: Optimize nutritional status  2/1/2022 2029 by Trina Woo RN  Outcome: Ongoing  2/1/2022 1122 by Helen Higuera RN  Outcome: Ongoing     Problem: Risk for Fluid Volume Deficit  Goal: Maintain normal heart rhythm  2/1/2022 2029 by Trina Woo RN  Outcome: Ongoing  2/1/2022 1122 by Helen Higuera RN  Outcome: Ongoing  Goal: Maintain absence of muscle cramping  2/1/2022 2029 by Trina Woo RN  Outcome: Ongoing  2/1/2022 1122 by Helen Higuera RN  Outcome: Ongoing  Goal: Maintain normal serum potassium, sodium, calcium, phosphorus, and pH  2/1/2022 2029 by Francine Doherty Larissa Felder RN  Outcome: Ongoing  2/1/2022 1122 by Neymar Garcia RN  Outcome: Ongoing     Problem: Loneliness or Risk for Loneliness  Goal: Demonstrate positive use of time alone when socialization is not possible  2/1/2022 2029 by Yessi Gray RN  Outcome: Ongoing  2/1/2022 1122 by Neymar Garcia RN  Outcome: Ongoing     Problem: Fatigue  Goal: Verbalize increase energy and improved vitality  2/1/2022 2029 by Yessi Gray RN  Outcome: Ongoing  2/1/2022 1122 by Neymar Garcia RN  Outcome: Ongoing     Problem: Patient Education: Go to Patient Education Activity  Goal: Patient/Family Education  2/1/2022 1122 by Neymar Garcia RN  Outcome: Ongoing     Problem: Falls - Risk of:  Goal: Will remain free from falls  Description: Will remain free from falls  2/1/2022 1122 by Neymar Garcia RN  Outcome: Ongoing  Goal: Absence of physical injury  Description: Absence of physical injury  2/1/2022 1122 by Neymar Garcia RN  Outcome: Ongoing

## 2022-02-03 LAB
A/G RATIO: 0.9 (ref 1.1–2.2)
ALBUMIN SERPL-MCNC: 2.9 G/DL (ref 3.4–5)
ALBUMIN SERPL-MCNC: 3.1 G/DL (ref 3.4–5)
ALP BLD-CCNC: 87 U/L (ref 40–129)
ALT SERPL-CCNC: 19 U/L (ref 10–40)
ANION GAP SERPL CALCULATED.3IONS-SCNC: 12 MMOL/L (ref 3–16)
ANION GAP SERPL CALCULATED.3IONS-SCNC: 13 MMOL/L (ref 3–16)
ANISOCYTOSIS: ABNORMAL
AST SERPL-CCNC: 24 U/L (ref 15–37)
BANDED NEUTROPHILS RELATIVE PERCENT: 13 % (ref 0–7)
BANDED NEUTROPHILS RELATIVE PERCENT: 8 % (ref 0–7)
BASOPHILS ABSOLUTE: 0 K/UL (ref 0–0.2)
BASOPHILS ABSOLUTE: 0 K/UL (ref 0–0.2)
BASOPHILS RELATIVE PERCENT: 0 %
BASOPHILS RELATIVE PERCENT: 0 %
BILIRUB SERPL-MCNC: 0.4 MG/DL (ref 0–1)
BLOOD CULTURE, ROUTINE: NORMAL
BUN BLDV-MCNC: 6 MG/DL (ref 7–20)
BUN BLDV-MCNC: 7 MG/DL (ref 7–20)
CALCIUM SERPL-MCNC: 8.6 MG/DL (ref 8.3–10.6)
CALCIUM SERPL-MCNC: 8.8 MG/DL (ref 8.3–10.6)
CHLORIDE BLD-SCNC: 89 MMOL/L (ref 99–110)
CHLORIDE BLD-SCNC: 89 MMOL/L (ref 99–110)
CO2: 23 MMOL/L (ref 21–32)
CO2: 28 MMOL/L (ref 21–32)
CREAT SERPL-MCNC: 0.6 MG/DL (ref 0.6–1.2)
CREAT SERPL-MCNC: <0.5 MG/DL (ref 0.6–1.2)
CULTURE, BLOOD 2: ABNORMAL
CULTURE, BLOOD 2: ABNORMAL
DOHLE BODIES: PRESENT
EOSINOPHILS ABSOLUTE: 0.1 K/UL (ref 0–0.6)
EOSINOPHILS ABSOLUTE: 0.5 K/UL (ref 0–0.6)
EOSINOPHILS RELATIVE PERCENT: 4 %
EOSINOPHILS RELATIVE PERCENT: 8 %
GFR AFRICAN AMERICAN: >60
GFR AFRICAN AMERICAN: >60
GFR NON-AFRICAN AMERICAN: >60
GFR NON-AFRICAN AMERICAN: >60
GLUCOSE BLD-MCNC: 100 MG/DL (ref 70–99)
GLUCOSE BLD-MCNC: 105 MG/DL (ref 70–99)
GLUCOSE BLD-MCNC: 116 MG/DL (ref 70–99)
GLUCOSE BLD-MCNC: 125 MG/DL (ref 70–99)
GLUCOSE BLD-MCNC: 165 MG/DL (ref 70–99)
GLUCOSE BLD-MCNC: 166 MG/DL (ref 70–99)
HCT VFR BLD CALC: 29.8 % (ref 36–48)
HCT VFR BLD CALC: 37.2 % (ref 36–48)
HEMATOLOGY PATH CONSULT: NO
HEMATOLOGY PATH CONSULT: NO
HEMOGLOBIN: 10.4 G/DL (ref 12–16)
HEMOGLOBIN: 12.6 G/DL (ref 12–16)
LYMPHOCYTES ABSOLUTE: 0.1 K/UL (ref 1–5.1)
LYMPHOCYTES ABSOLUTE: 0.8 K/UL (ref 1–5.1)
LYMPHOCYTES RELATIVE PERCENT: 7 %
LYMPHOCYTES RELATIVE PERCENT: 9 %
MAGNESIUM: 1.9 MG/DL (ref 1.8–2.4)
MCH RBC QN AUTO: 28.7 PG (ref 26–34)
MCH RBC QN AUTO: 29.2 PG (ref 26–34)
MCHC RBC AUTO-ENTMCNC: 33.8 G/DL (ref 31–36)
MCHC RBC AUTO-ENTMCNC: 35 G/DL (ref 31–36)
MCV RBC AUTO: 83.3 FL (ref 80–100)
MCV RBC AUTO: 85 FL (ref 80–100)
MONOCYTES ABSOLUTE: 0.8 K/UL (ref 0–1.3)
MONOCYTES ABSOLUTE: 1.7 K/UL (ref 0–1.3)
MONOCYTES RELATIVE PERCENT: 15 %
MONOCYTES RELATIVE PERCENT: 65 %
NEUTROPHILS ABSOLUTE: 0.2 K/UL (ref 1.7–7.7)
NEUTROPHILS ABSOLUTE: 8.4 K/UL (ref 1.7–7.7)
NEUTROPHILS RELATIVE PERCENT: 10 %
NEUTROPHILS RELATIVE PERCENT: 61 %
ORGANISM: ABNORMAL
ORGANISM: ABNORMAL
OSMOLALITY URINE: 243 MOSM/KG (ref 390–1070)
PDW BLD-RTO: 15.1 % (ref 12.4–15.4)
PDW BLD-RTO: 15.1 % (ref 12.4–15.4)
PERFORMED ON: ABNORMAL
PHOSPHORUS: 2.5 MG/DL (ref 2.5–4.9)
PLATELET # BLD: 197 K/UL (ref 135–450)
PLATELET # BLD: 275 K/UL (ref 135–450)
PLATELET SLIDE REVIEW: ADEQUATE
PLATELET SLIDE REVIEW: ADEQUATE
PMV BLD AUTO: 5.6 FL (ref 5–10.5)
PMV BLD AUTO: 6.2 FL (ref 5–10.5)
POTASSIUM SERPL-SCNC: 3.5 MMOL/L (ref 3.5–5.1)
POTASSIUM SERPL-SCNC: 3.6 MMOL/L (ref 3.5–5.1)
RBC # BLD: 3.58 M/UL (ref 4–5.2)
RBC # BLD: 4.38 M/UL (ref 4–5.2)
SLIDE REVIEW: ABNORMAL
SLIDE REVIEW: ABNORMAL
SODIUM BLD-SCNC: 125 MMOL/L (ref 136–145)
SODIUM BLD-SCNC: 129 MMOL/L (ref 136–145)
SODIUM URINE: 37 MMOL/L
TOTAL PROTEIN: 6 G/DL (ref 6.4–8.2)
TOXIC GRANULATION: PRESENT
WBC # BLD: 1.2 K/UL (ref 4–11)
WBC # BLD: 11.4 K/UL (ref 4–11)

## 2022-02-03 PROCEDURE — 94760 N-INVAS EAR/PLS OXIMETRY 1: CPT

## 2022-02-03 PROCEDURE — 94640 AIRWAY INHALATION TREATMENT: CPT

## 2022-02-03 PROCEDURE — 85025 COMPLETE CBC W/AUTO DIFF WBC: CPT

## 2022-02-03 PROCEDURE — 2580000003 HC RX 258: Performed by: INTERNAL MEDICINE

## 2022-02-03 PROCEDURE — 80053 COMPREHEN METABOLIC PANEL: CPT

## 2022-02-03 PROCEDURE — 6360000002 HC RX W HCPCS: Performed by: INTERNAL MEDICINE

## 2022-02-03 PROCEDURE — 97535 SELF CARE MNGMENT TRAINING: CPT

## 2022-02-03 PROCEDURE — 6370000000 HC RX 637 (ALT 250 FOR IP): Performed by: INTERNAL MEDICINE

## 2022-02-03 PROCEDURE — 84300 ASSAY OF URINE SODIUM: CPT

## 2022-02-03 PROCEDURE — 83935 ASSAY OF URINE OSMOLALITY: CPT

## 2022-02-03 PROCEDURE — 36415 COLL VENOUS BLD VENIPUNCTURE: CPT

## 2022-02-03 PROCEDURE — 6360000002 HC RX W HCPCS: Performed by: STUDENT IN AN ORGANIZED HEALTH CARE EDUCATION/TRAINING PROGRAM

## 2022-02-03 PROCEDURE — 6370000000 HC RX 637 (ALT 250 FOR IP): Performed by: STUDENT IN AN ORGANIZED HEALTH CARE EDUCATION/TRAINING PROGRAM

## 2022-02-03 PROCEDURE — 1200000000 HC SEMI PRIVATE

## 2022-02-03 PROCEDURE — 2580000003 HC RX 258: Performed by: STUDENT IN AN ORGANIZED HEALTH CARE EDUCATION/TRAINING PROGRAM

## 2022-02-03 PROCEDURE — 83735 ASSAY OF MAGNESIUM: CPT

## 2022-02-03 RX ORDER — SODIUM CHLORIDE 1000 MG
1 TABLET, SOLUBLE MISCELLANEOUS
Status: DISCONTINUED | OUTPATIENT
Start: 2022-02-03 | End: 2022-02-04 | Stop reason: HOSPADM

## 2022-02-03 RX ADMIN — AMLODIPINE BESYLATE 5 MG: 5 TABLET ORAL at 10:08

## 2022-02-03 RX ADMIN — GUAIFENESIN 600 MG: 600 TABLET, EXTENDED RELEASE ORAL at 10:08

## 2022-02-03 RX ADMIN — METOPROLOL TARTRATE 50 MG: 50 TABLET, FILM COATED ORAL at 10:08

## 2022-02-03 RX ADMIN — INSULIN LISPRO 2 UNITS: 100 INJECTION, SOLUTION INTRAVENOUS; SUBCUTANEOUS at 12:53

## 2022-02-03 RX ADMIN — SODIUM CHLORIDE 25 ML: 9 INJECTION, SOLUTION INTRAVENOUS at 21:08

## 2022-02-03 RX ADMIN — GUAIFENESIN 600 MG: 600 TABLET, EXTENDED RELEASE ORAL at 21:03

## 2022-02-03 RX ADMIN — Medication 1 G: at 12:52

## 2022-02-03 RX ADMIN — ENOXAPARIN SODIUM 30 MG: 100 INJECTION SUBCUTANEOUS at 10:09

## 2022-02-03 RX ADMIN — METOPROLOL TARTRATE 50 MG: 50 TABLET, FILM COATED ORAL at 21:03

## 2022-02-03 RX ADMIN — IPRATROPIUM BROMIDE 2 PUFF: 17 AEROSOL, METERED RESPIRATORY (INHALATION) at 20:12

## 2022-02-03 RX ADMIN — INSULIN LISPRO 1 UNITS: 100 INJECTION, SOLUTION INTRAVENOUS; SUBCUTANEOUS at 21:02

## 2022-02-03 RX ADMIN — SODIUM CHLORIDE, PRESERVATIVE FREE 10 ML: 5 INJECTION INTRAVENOUS at 21:03

## 2022-02-03 RX ADMIN — CEFEPIME HYDROCHLORIDE 2000 MG: 2 INJECTION, POWDER, FOR SOLUTION INTRAVENOUS at 10:16

## 2022-02-03 RX ADMIN — IPRATROPIUM BROMIDE 2 PUFF: 17 AEROSOL, METERED RESPIRATORY (INHALATION) at 08:08

## 2022-02-03 RX ADMIN — LEVOTHYROXINE SODIUM 75 MCG: 0.07 TABLET ORAL at 06:31

## 2022-02-03 RX ADMIN — ALBUTEROL SULFATE 2 PUFF: 90 AEROSOL, METERED RESPIRATORY (INHALATION) at 08:08

## 2022-02-03 RX ADMIN — SODIUM CHLORIDE, PRESERVATIVE FREE 10 ML: 5 INJECTION INTRAVENOUS at 10:09

## 2022-02-03 RX ADMIN — ENOXAPARIN SODIUM 30 MG: 100 INJECTION SUBCUTANEOUS at 21:02

## 2022-02-03 RX ADMIN — ALBUTEROL SULFATE 2 PUFF: 90 AEROSOL, METERED RESPIRATORY (INHALATION) at 20:12

## 2022-02-03 RX ADMIN — CEFEPIME HYDROCHLORIDE 2000 MG: 2 INJECTION, POWDER, FOR SOLUTION INTRAVENOUS at 21:09

## 2022-02-03 RX ADMIN — SODIUM CHLORIDE 25 ML: 9 INJECTION, SOLUTION INTRAVENOUS at 23:22

## 2022-02-03 RX ADMIN — Medication 1 G: at 17:38

## 2022-02-03 ASSESSMENT — PAIN SCALES - GENERAL
PAINLEVEL_OUTOF10: 0

## 2022-02-03 NOTE — PLAN OF CARE
Problem: Airway Clearance - Ineffective  Goal: Achieve or maintain patent airway  2/2/2022 2304 by Sukh Salvage  Outcome: Ongoing  2/2/2022 1907 by Kelton Fu RN  Outcome: Ongoing     Problem: Gas Exchange - Impaired  Goal: Absence of hypoxia  2/2/2022 2304 by Sukh Salvage  Outcome: Ongoing  2/2/2022 1907 by Kelton Fu RN  Outcome: Ongoing

## 2022-02-03 NOTE — PROGRESS NOTES
Left     TOTAL KNEE ARTHROPLASTY Right 1/30/2019    RIGHT TOTAL KNEE REPLACEMENT performed by Hannah Sotomayor MD at [facility]       Current Medications:    Outpatient Medications Marked as Taking for the 1/30/22 encounter [facility] Encounter)   Medication Sig Dispense Refill    magnesium 200 MG TABS tablet Take 1 tablet by mouth daily 30 tablet 1    sodium chloride 1 g tablet Take 1 g by mouth 3 times daily      metoprolol tartrate (LOPRESSOR) 25 MG tablet Take 50 mg by mouth 2 times daily       amLODIPine (NORVASC) 5 MG tablet Take 5 mg by mouth daily      Cranberry 1000 MG CAPS Take 1 capsule by mouth 2 times daily. 30 capsule 1    metFORMIN (GLUCOPHAGE) 500 MG tablet Take 500 mg by mouth 2 times daily (with meals).  Ferrous Sulfate (IRON) 325 (65 FE) MG TABS Take 1 tablet by mouth every evening.  Ascorbic Acid (VITAMIN C) 500 MG CAPS Take 1 tablet by mouth daily       Lactobacillus (PROBIOTIC ACIDOPHILUS PO) Take 1 capsule by mouth 2 times daily.  levothyroxine (SYNTHROID) 75 MCG tablet Take 75 mcg by mouth daily          Allergies:  Patient has no known allergies. Immunizations : There is no immunization history on file for this patient.     Social History:     Social History     Tobacco Use    Smoking status: Never Smoker    Smokeless tobacco: Never Used   Vaping Use    Vaping Use: Never used   Substance Use Topics    Alcohol use: No    Drug use: Never     Social History     Tobacco Use   Smoking Status Never Smoker   Smokeless Tobacco Never Used      Family History   Problem Relation Age of Onset    High Blood Pressure Mother     Heart Disease Mother     Cancer Father         acute leukemia      REVIEW OF SYSTEMS:    Constitutional:    fevers+ , chills+ , night sweats  Eyes:  negative for blurred vision, eye discharge, visual disturbance   HEENT:  negative for hearing loss, ear drainage,nasal congestion  Respiratory:  negative for cough, shortness of breath or hemoptysis Cardiovascular:  negative for chest pain, palpitations, syncope  Gastrointestinal:  negative for nausea, vomiting, diarrhea, constipation, abdominal pain  Genitourinary:  negative for frequency, dysuria, urinary incontinence, hematuria  Hematologic/Lymphatic:  negative for easy bruising, bleeding and lymphadenopathy  Allergic/Immunologic:  negative for recurrent infections, angioedema, anaphylaxis   Endocrine:  negative for weight changes, polyuria, polydipsia and polyphagia  Musculoskeletal:  negative for joint  pain, swelling, decreased range of motion  Integumentary: No rashes, skin lesions  Neurological:  negative for headaches, slurred speech, unilateral weakness  Psychiatric: negative for hallucinations,confusion,agitation.                 PHYSICAL EXAM:      Vitals:    BP (!) 175/78   Pulse 99   Temp 98.4 °F (36.9 °C) (Oral)   Resp 16   Ht 5' 6\" (1.676 m)   Wt 180 lb 12.4 oz (82 kg)   SpO2 95%   BMI 29.18 kg/m²     yGeneral Appearance: alert,in no acute distress, ++  pallor, no icterus   Skin: warm and dry, no rash or erythema  Head: normocephalic and atraumatic  Eyes: pupils equal, round, and reactive to light, conjunctivae normal  ENT: tympanic membrane, external ear and ear canal normal bilaterally, nose without deformity, nasal mucosa and turbinates normal without polyps  Neck: supple and non-tender without mass, no thyromegaly  no cervical lymphadenopathy  Pulmonary/Chest: clear to auscultation bilaterally- no wheezes, rales or rhonchi, normal air movement, no respiratory distress  Cardiovascular: normal rate, regular rhythm, normal S1 and S2, esm+ murmurs, rubs, clicks, or gallops, no carotid bruits  Abdomen: soft, non-tender, non-distended, normal bowel sounds, no masses or organomegaly  Extremities: no cyanosis, clubbing or edema  Musculoskeletal: normal range of motion, no joint swelling, deformity or tenderness  Integumentary: No rashes, no abnormal skin lesions, no petechiae  Neurologic: reflexes normal and symmetric, no cranial nerve deficit  Psych:  Orientation, sensorium, mood normal            Lines: IV     Data Review:    CBC:   Lab Results   Component Value Date    WBC 12.8 (H) 02/04/2022    HGB 11.8 (L) 02/04/2022    HCT 34.3 (L) 02/04/2022    MCV 84.2 02/04/2022     02/04/2022     RENAL:   Lab Results   Component Value Date    CREATININE <0.5 (L) 02/04/2022    BUN 7 02/04/2022     (L) 02/04/2022    K 3.7 02/04/2022    CL 91 (L) 02/04/2022    CO2 25 02/04/2022     SED RATE:   Lab Results   Component Value Date    SEDRATE 20 03/31/2015     CK: No results found for: CKTOTAL  CRP:   Lab Results   Component Value Date    .8 01/31/2022     Hepatic Function Panel:   Lab Results   Component Value Date    ALKPHOS 87 02/03/2022    ALT 19 02/03/2022    AST 24 02/03/2022    PROT 6.0 02/03/2022    BILITOT 0.4 02/03/2022    LABALBU 3.0 02/04/2022     UA:  Lab Results   Component Value Date    COLORU YELLOW 01/30/2022    CLARITYU CLOUDY 01/30/2022    GLUCOSEU Negative 01/30/2022    BILIRUBINUR Negative 01/30/2022    KETUA Negative 01/30/2022    SPECGRAV 1.015 01/30/2022    BLOODU TRACE 01/30/2022    PHUR 6.0 01/30/2022    PROTEINU 30 01/30/2022    UROBILINOGEN 0.2 01/30/2022    NITRU POSITIVE 01/30/2022    LEUKOCYTESUR Negative 01/30/2022    LABMICR YES 01/30/2022    URINETYPE Cleancatch 01/30/2022      Urine Microscopic:   Lab Results   Component Value Date    BACTERIA 4+ 01/30/2022    COMU see below 01/30/2022    HYALCAST 4 01/30/2022    WBCUA 6 01/30/2022    RBCUA 5 01/30/2022    EPIU 0-1 01/30/2022     Urine Reflex to Culture:   Lab Results   Component Value Date    URRFLXCULT Not Indicated 01/30/2022         MICRO: cultures reviewed and updated by me   Blood Culture: SARS-CoV-2, NAAT  COVID-19, Rapid  Collected: 01/30/22 2210   Result status: Final   Resulting lab: [address] [place] [address] LAB   Reference range: Not Detected   Value: DETECTED Abnormal     Comment: Rapid NAAT:   Negative results should be treated as presumptive and,   if inconsistent with clinical signs and symptoms or necessary for   patient management, should be tested with an alternative molecular   assay. Negative results do not preclude SARS-CoV-2 infection and   should not be used as the sole basis for patient management decisions. This test has been authorized by the FDA under an Emergency Use   Authorization (EUA) for use by authorized laboratories. Fact sheet for Healthcare Providers:      Time       MRSA DNA Probe, Nasal [[id number]] Collected: 02/01/22 1040   Order Status: Completed Specimen: Nares Updated: 02/01/22 1126    MRSA SCREEN RT-PCR Further report to follow   Narrative:     ORDER#: [id number]                          ORDERED BY: Bob Love   SOURCE: Nares                              COLLECTED:  02/01/22 10:40   ANTIBIOTICS AT [facility]. :                      RECEIVED :  02/01/22 10:58   Performed at:   [place] [address] Isael Mcardle [place], [place] [address]   Phone [phone]   Culture, Blood, PCR ID Panel Results Report [[id number]] Collected: 01/30/22 2208   Order Status: Completed Updated: 02/01/22 1022    Report SEE IMAGE   Narrative:     CALL Sushil Cortez [phone],   Microbiology results called to and read back by ANUJ Dowell, 02/01/2022   10:21, by VINNY   Referred out by:   [address] [facility] [address] [address]   Phone [phone]   Culture, Blood 2 [[id number]] (Abnormal) Collected: 01/30/22 2208   Order Status: Completed Specimen: Blood Updated: 02/01/22 1021    Culture, Blood 2 -- Abnormal     Gram stain Aerobic bottle:   Gram positive cocci   Information to follow    Abnormal     Organism Staphylococcus coagulase negative DNA Detected Abnormal     Culture, Blood 2 See additional report for complete BCID panel.    Narrative:     ORDER#: [id number]                          ORDERED BY: RACHELLE LUJAN   SOURCE: Blood                              COLLECTED:  01/30/22 22:08   ANTIBIOTICS AT [facility]. :                      RECEIVED :  01/30/22 22:08   CALL  [facility]  Q8 tel. [phone],   Microbiology results called to and read back by ANUJ Kumar, 02/01/2022   10:21, by VINNY   If child <=2 yrs old please draw pediatric bottle. ~Blood Culture #2   Performed at:   [facility]   [address] [address]   Phone [phone]   Culture, Urine [[id number]] Collected: 01/31/22 1100   Order Status: Completed Specimen: Urine (20) from Urine, clean catch Updated: 02/01/22 0850    Urine Culture, Routine No growth at 18 to 36 hours   Narrative:     ORDER#: [id number]                          ORDERED BY: Lan Anthony   SOURCE: Urine Clean Catch                  COLLECTED:  01/31/22 11:00   ANTIBIOTICS AT [facility]. :                      RECEIVED :  01/31/22 11:07   Performed at:   [facility]   [address] [facility] [place] [address]   Phone [phone]   Culture, Blood 1 [[id number]] Collected: 01/30/22 2123   Order Status: Completed Specimen: Blood Updated: 01/31/22 2215    Blood Culture, Routine No Growth to date.  Any change in status will be called. Narrative:     ORDER#: [id number]                          ORDERED BY: RACHELLE LUJAN   SOURCE: Blood                              COLLECTED:  01/30/22 21:23   ANTIBIOTICS AT [facility]. :                      RECEIVED :  01/30/22 21:28   If child <=2 yrs old please draw pediatric bottle. ~Blood Culture 1   Performed at:   [facility]   [address], [place] [address]   Phone [phone]   Legionella Antigen, Urine [[id number]]    Order Status: Sent Specimen: Urine, clean catch    Strep Pneumoniae Antigen [[id number]]    Order Status: Sent Specimen: Urine, clean catch    COVID-19, Rapid [[id number]] (Abnormal) Collected: 01/30/22 2210   Order Status: Completed Specimen: Nasopharyngeal Swab Updated: 01/30/22 2228    SARS-CoV-2, NAAT DETECTED Abnormal     Comment: Rapid NAAT:   Negative results should be treated as presumptive and,   if inconsistent with clinical signs and symptoms or necessary for   patient management, should be tested with an alternative molecular   assay. Negative results do not preclude SARS-CoV-2 infection and   should not be used as the sole basis for patient management decisions. This test has been authorized by the FDA under an Emergency Use   Authorization (EUA) for use by authorized laboratories. Fact sheet for Healthcare Providers:   Luis.[email or web address]   Fact sheet for Patients: Luis.moy     METHODOLOGY: Isothermal Nucleic Acid Amplification       Narrative:     Performed at:   [address] [address]   Phone [phone]         Lab Results   Component Value Date    BC No Growth after 4 days of incubation. 01/30/2022    BLOODCULT2 See additional report for complete BCID panel. 01/30/2022    BLOODCULT2  01/30/2022     POSITIVE for  This organism was isolated in one set. Susceptibility testing is not routinely done as this  organism frequently represents skin contamination. Additional testing can be ordered by calling the  Microbiology Department. Respiratory Culture:  No results found for: Katha Mode  AFB:No results found for: AFBSMEAR  Viral Culture:  Lab Results   Component Value Date    COVID19 DETECTED 01/30/2022     Urine Culture:   No results for input(s): Stephanie Constant in the last 72 hours. IMAGING:    CT CHEST PULMONARY EMBOLISM W CONTRAST   Final Result   No acute or chronic pulmonary embolism. Lower lobe infiltrates bilaterally and left perihilar infiltrates concerning   for pneumonia.          XR CHEST PORTABLE   Final Result   Nonspecific subsegmental left basilar opacity. All the pertinent images and reports for the current Hospitalization were reviewed by me     Scheduled Meds:   magnesium sulfate  4,000 mg IntraVENous Once    sodium chloride  1 g Oral TID WC    cefepime  2,000 mg IntraVENous Q12H    insulin lispro  0-12 Units SubCUTAneous TID WC    insulin lispro  0-6 Units SubCUTAneous Nightly    amLODIPine  5 mg Oral Daily    levothyroxine  75 mcg Oral Daily    metoprolol tartrate  50 mg Oral BID    sodium chloride flush  5-40 mL IntraVENous 2 times per day    enoxaparin  30 mg SubCUTAneous BID    albuterol sulfate HFA  2 puff Inhalation BID    And    ipratropium  2 puff Inhalation BID    guaiFENesin  600 mg Oral BID       Continuous Infusions:   sodium chloride 25 mL (02/04/22 0942)       PRN Meds:  sodium chloride flush, sodium chloride, acetaminophen **OR** acetaminophen, ondansetron, guaiFENesin-dextromethorphan, albuterol sulfate HFA **AND** ipratropium, benzonatate      Assessment:     Patient Active Problem List   Diagnosis    Primary localized osteoarthrosis, lower leg    Obesity (BMI 30-39. 9)    Primary localized osteoarthrosis, pelvic region and thigh    History of total hip replacement    H/O total hip arthroplasty, left    Primary osteoarthritis of left hip    Arthritis of right knee    H/O total knee replacement, right 1/30/19    Right hip pain    2019 novel coronavirus disease (COVID-19)    Hyponatremia    UTI (urinary tract infection)    Syncope    CLL (chronic lymphocytic leukemia) (HCC)    Pneumonia due to COVID-19 virus    Pneumonia    Neutropenic fever (HCC)    Sepsis (HCC)          Neutropenic fevers  CLL on chemotherapy   COVID 19 infection   CT chest with Left lung base changes  WBC at 1  Na+ 121 on admit  Syncope at home   Unvaccinated against COVID 19   CRP elevation   TTE Moderate Aortic stenosis noted      She is not hypoxic and mainly symptomatic from Neutropenia than from COVID 19 at this time but

## 2022-02-03 NOTE — PROGRESS NOTES
Patient is resting in bed. Alert and oriented X 4. On RA. Denies pain this time. Able to independently ambulate. IV capped and flushed. Assessment complete. All patient needs are met at this time. Call light is in reach. Will continue to monitor.

## 2022-02-03 NOTE — PLAN OF CARE
Problem: Airway Clearance - Ineffective  Goal: Achieve or maintain patent airway  Outcome: Ongoing     Problem: Gas Exchange - Impaired  Goal: Absence of hypoxia  Outcome: Ongoing  Goal: Promote optimal lung function  Outcome: Ongoing     Problem: Breathing Pattern - Ineffective  Goal: Ability to achieve and maintain a regular respiratory rate  Outcome: Ongoing     Problem:  Body Temperature -  Risk of, Imbalanced  Goal: Ability to maintain a body temperature within defined limits  Outcome: Ongoing  Goal: Will regain or maintain usual level of consciousness  Outcome: Ongoing  Goal: Complications related to the disease process, condition or treatment will be avoided or minimized  Outcome: Ongoing     Problem: Isolation Precautions - Risk of Spread of Infection  Goal: Prevent transmission of infection  Outcome: Ongoing     Problem: Nutrition Deficits  Goal: Optimize nutritional status  Outcome: Ongoing     Problem: Risk for Fluid Volume Deficit  Goal: Maintain normal heart rhythm  Outcome: Ongoing  Goal: Maintain absence of muscle cramping  Outcome: Ongoing  Goal: Maintain normal serum potassium, sodium, calcium, phosphorus, and pH  Outcome: Ongoing     Problem: Fatigue  Goal: Verbalize increase energy and improved vitality  Outcome: Ongoing     Problem: Falls - Risk of:  Goal: Will remain free from falls  Description: Will remain free from falls  Outcome: Ongoing  Goal: Absence of physical injury  Description: Absence of physical injury  Outcome: Ongoing

## 2022-02-03 NOTE — PROGRESS NOTES
Hematology Oncology Daily Progress Note    Admit Date: 1/30/2022  Hospital day several    Subjective:     Patient has complaints of improved cough--denies sob/cp  Medication side effects: none    Scheduled Meds:   cefepime  2,000 mg IntraVENous Q12H    tbo-filgrastim  480 mcg SubCUTAneous Daily    insulin lispro  0-12 Units SubCUTAneous TID WC    insulin lispro  0-6 Units SubCUTAneous Nightly    amLODIPine  5 mg Oral Daily    levothyroxine  75 mcg Oral Daily    metoprolol tartrate  50 mg Oral BID    sodium chloride flush  5-40 mL IntraVENous 2 times per day    enoxaparin  30 mg SubCUTAneous BID    albuterol sulfate HFA  2 puff Inhalation BID    And    ipratropium  2 puff Inhalation BID    guaiFENesin  600 mg Oral BID     Continuous Infusions:   sodium chloride 250 mL (02/01/22 0855)     PRN Meds:sodium chloride flush, sodium chloride, acetaminophen **OR** acetaminophen, ondansetron, guaiFENesin-dextromethorphan, albuterol sulfate HFA **AND** ipratropium, benzonatate    Review of Systems  Pertinent items are noted in HPI. REVIEW OF SYSTEMS:         · Constitutional: Denies fever, sweats, weight loss     · Eyes: No visual changes or diplopia. No scleral icterus. · ENT: No Headaches, hearing loss or vertigo. No mouth sores or sore throat. · Cardiovascular: No chest pain, dyspnea on exertion, palpitations or loss of consciousness. · Respiratory: No cough or wheezing, no sputum production. No hemoptysis. .    · Gastrointestinal: No abdominal pain, appetite loss, blood in stools. No change in bowel habits. · Genitourinary: No dysuria, trouble voiding, or hematuria. · Musculoskeletal:  Generalized weakness. No joint complaints. · Integumentary: No rash or pruritis. · Neurological: No headache, diplopia. No change in gait, balance, or coordination. No paresthesias. · Endocrine: No temperature intolerance. No excessive thirst, fluid intake, or urination.    · Hematologic/Lymphatic: No abnormal bruising or ecchymoses, blood clots or swollen lymph nodes. · Allergic/Immunologic: No nasal congestion or hives. ·     Objective:     Patient Vitals for the past 8 hrs:   BP Temp Temp src Pulse Resp SpO2   02/03/22 0530 (!) 152/73 98.8 °F (37.1 °C) Oral 93 18 96 %   02/02/22 2345 (!) 146/66 99.1 °F (37.3 °C) Oral 96 20 --     I/O last 3 completed shifts: In: 1200 [P.O.:1200]  Out: -   No intake/output data recorded. BP (!) 152/73   Pulse 93   Temp 98.8 °F (37.1 °C) (Oral)   Resp 18   Ht 5' 6\" (1.676 m)   Wt 188 lb 15 oz (85.7 kg)   SpO2 96%   BMI 30.49 kg/m²     General Appearance:    Alert, cooperative, no distress, appears stated age   Head:    Normocephalic, without obvious abnormality, atraumatic   Eyes:    PERRL, conjunctiva/corneas clear, EOM's intact, fundi     benign, both eyes        Ears:    Normal TM's and external ear canals, both ears   Nose:   Nares normal, septum midline, mucosa normal, no drainage    or sinus tenderness   Throat:   Lips, mucosa, and tongue normal; teeth and gums normal   Neck:   Supple, symmetrical, trachea midline, no adenopathy;        thyroid:  No enlargement/tenderness/nodules; no carotid    bruit or JVD   Back:     Symmetric, no curvature, ROM normal, no CVA tenderness   Lungs:     Clear to auscultation bilaterally, respirations unlabored   Chest wall:    No tenderness or deformity   Heart:    Regular rate and rhythm, S1 and S2 normal, no murmur, rub   or gallop   Abdomen:     Soft, non-tender, bowel sounds active all four quadrants,     no masses, no organomegaly           Extremities:   Extremities normal, atraumatic, no cyanosis or edema   Pulses:   2+ and symmetric all extremities   Skin:   Skin color, texture, turgor normal, no rashes or lesions   Lymph nodes:   Cervical, supraclavicular, and axillary nodes normal   Neurologic:   CNII-XII intact.  Normal strength, sensation and reflexes       throughout         Data Review  CBC:   Lab Results   Component Value Date WBC 2.5 02/02/2022    RBC 3.61 02/02/2022       Assessment:     Active Problems:    Hyponatremia    UTI (urinary tract infection)    CLL (chronic lymphocytic leukemia) (HCC)    Pneumonia due to COVID-19 virus    Pneumonia    Neutropenic fever (HCC)    Sepsis ([facility] [address]  Resolved Problems:    * No resolved hospital problems. *      Plan:     1. CLL--on bendamustine/Rituxan    2. Neutropenic fever--WBC count improving. Could potentially go home today but would lean toward one more day. 3  ID--AF. Blood culture is contaminant.         Electronically signed by Merari Payne MD on 2/3/2022 at 7:12 AM

## 2022-02-03 NOTE — PROGRESS NOTES
Progress Note  Admit Date: 1/30/2022      PCP: FAY Carolina CNP     CC: F/U for neutropenic fever    Days in hospital:  3    SUBJECTIVE / Interval History:  Pt feels ok, no complaints   No fever       Allergies  Patient has no known allergies. Medications    Scheduled Meds:   cefepime  2,000 mg IntraVENous Q12H    tbo-filgrastim  480 mcg SubCUTAneous Daily    insulin lispro  0-12 Units SubCUTAneous TID WC    insulin lispro  0-6 Units SubCUTAneous Nightly    amLODIPine  5 mg Oral Daily    levothyroxine  75 mcg Oral Daily    metoprolol tartrate  50 mg Oral BID    sodium chloride flush  5-40 mL IntraVENous 2 times per day    enoxaparin  30 mg SubCUTAneous BID    albuterol sulfate HFA  2 puff Inhalation BID    And    ipratropium  2 puff Inhalation BID    guaiFENesin  600 mg Oral BID     Continuous Infusions:   sodium chloride 250 mL (02/01/22 0855)       PRN Meds:  sodium chloride flush, sodium chloride, acetaminophen **OR** acetaminophen, ondansetron, guaiFENesin-dextromethorphan, albuterol sulfate HFA **AND** ipratropium, benzonatate    Vitals    BP (!) 167/76   Pulse 94   Temp 97.7 °F (36.5 °C) (Axillary)   Resp 18   Ht 5' 6\" (1.676 m)   Wt 188 lb 7.9 oz (85.5 kg)   SpO2 95%   BMI 30.42 kg/m²     Exam:    Gen: No distress. Eyes: PERRL. No sclera icterus. No conjunctival injection. ENT: No discharge. Pharynx clear. External appearance of ears and nose normal.  Neck: Trachea midline. No obvious mass. Resp: No accessory muscle use. No crackles. No wheezes. No rhonchi. No dullness on percussion. CV: Regular rate. Regular rhythm. No murmur or rub. No edema. GI: Non-tender. Non-distended. No hernia. Skin: Warm, dry, normal texture and turgor. No nodule on exposed extremities. Lymph: No cervical LAD. No supraclavicular LAD. M/S: No cyanosis. No clubbing. No joint deformity. Neuro: Moves all four extremities. CN 2-12 tested, no defect noted. Psych: Oriented x 3.  No anxiety. Awake. Alert. Intact judgement and insight. Data    LABS  CBC:   Recent Labs     02/01/22  0519 02/02/22  0620 02/03/22  0554   WBC 1.2* 2.5* 11.4*   HGB 10.4* 10.7* 12.6   HCT 29.8* 29.9* 37.2   MCV 83.3 82.6 85.0    200 275     BMP:   Recent Labs     02/01/22  0519 02/02/22  0620 02/03/22  0554   * 128* 125*   K 3.5 3.3* 3.5   CL 86* 89* 89*   CO2 26 26 23   BUN 5* 4* 6*   CREATININE 0.5* <0.5* <0.5*   GLUCOSE 241* 92 100*     POC GLUCOSE:    Recent Labs     02/02/22  0743 02/02/22  1153 02/02/22  1638 02/02/22  2028 02/03/22  0736   POCGLU 106* 131* 128* 159* 105*     LIVER PROFILE:   Recent Labs     02/01/22  0519 02/02/22  0620 02/03/22  0554   AST 16 15 24   ALT 14 15 19   LABALBU 3.1* 3.1* 2.9*   BILITOT 0.4 0.4 0.4   ALKPHOS 69 64 87     PT/INR: No results for input(s): PROTIME, INR in the last 72 hours. APTT: No results for input(s): APTT in the last 72 hours. UA:  No results for input(s): NITRITE, COLORU, PHUR, LABCAST, WBCUA, RBCUA, MUCUS, TRICHOMONAS, YEAST, BACTERIA, CLARITYU, SPECGRAV, LEUKOCYTESUR, UROBILINOGEN, BILIRUBINUR, BLOODU, GLUCOSEU, KETUA, AMORPHOUS in the last 72 hours. Microbiology:  Wound Culture:   No results for input(s): WNDABS, ORG in the last 72 hours. Invalid input(s):  LABGRAM  Nasal Culture:   Recent Labs     02/01/22  1040   MRSAPCR Negative  MRSA DNA not detected. Normal Range: Not detected       Blood Culture:   No results for input(s): BC, BLOODCULT2 in the last 72 hours. Fungal Culture:   No results for input(s): FUNGSM in the last 72 hours. No results for input(s): FUNCXBLD in the last 72 hours. CSF Culture:  No results for input(s): COLORCSF, APPEARCSF, CFTUBE, CLOTCSF, WBCCSF, RBCCSF, NEUTCSF, NUMCELLSCSF, LYMPHSCSF, MONOCSF, GLUCCSF, VOLCSF in the last 72 hours. Respiratory Culture:  No results for input(s): Argelia Jason in the last 72 hours. AFB:No results for input(s): AFBSMEAR in the last 72 hours.   Urine Culture  Recent Labs 01/31/22  1100   LABURIN No growth at 18 to 36 hours       RADIOLOGY:    CT CHEST PULMONARY EMBOLISM W CONTRAST   Final Result   No acute or chronic pulmonary embolism. Lower lobe infiltrates bilaterally and left perihilar infiltrates concerning   for pneumonia. XR CHEST PORTABLE   Final Result   Nonspecific subsegmental left basilar opacity. CONSULTS:    IP CONSULT TO INFECTIOUS DISEASES  IP CONSULT TO ONCOLOGY  IP CONSULT TO NEPHROLOGY    ASSESSMENT AND PLAN:      Active Problems:    Hyponatremia    UTI (urinary tract infection)    CLL (chronic lymphocytic leukemia) ([address]    Pneumonia due to COVID-19 virus    Pneumonia    Neutropenic fever ([address]    Sepsis ([address]  Resolved Problems:    * No resolved hospital problems. *    Patient is a 24-year-old woman with a past medical history of CLL on chemotherapy who presented with syncope. She was found to have Covid along with neutropenic fever. Chest x-ray shows left lower lobe pneumonia. Patient was treated for neutropenic fever with broad-spectrum antibiotics. She got Granix with improvement of count. Patient did have Covid but was not hypoxic. In the hospital was found to have hyponatremia. Patient does have chronic hyponatremia due to SIADH, poor p.o. intake and polydipsia.     Neutropenic fever with pneumonia- neutropenia improved   -Continue antibiotics  -Urine Legionella and pneumococcal antigen pending  -Got a dose of Granix  -Blood culture no growth to date    Sepsis present on admission- improving   -With fever and tachycardia  -Blood cultures no growth to date    CLL on chemotherapy  -bendamustine rituxan   -oncology consult appreciated      COVID-19  -not hypoxic  -CRP elevated    Hypertension  -Monitor blood pressure and continue home meds    Hypothyroidism  -Resume home meds    Diabetes mellitus  -Sliding scale insulin  -On Metformin at home    Hyponatremia-sodium still low( past work up + for possible SIADH ) -sodium fluctuation, consult nephrology   -Patient's initial work-up showed SIADH-like picture. Work-up now shows a component of polydipsia. -Start fluid restrictions    Syncope  - due to hyponatremia    Hypomagnesemia  -Replace and monitor    Coag negative staph bacteremia    Moderate aortic stenosis seen on echo? -out pt FU    DVT Prophylaxis: Lovenox  Diet: ADULT DIET; Regular; 4 carb choices (60 gm/meal); 1500 ml  Code Status: Full Code    PT/OT Eval Status: Ordered    Discharge plan -per oncology discharge tomorrow    The patient and / or the family were informed of the results of any tests, a time was given to answer questions, a plan was proposed and they agreed with plan. Discussed with consulting physicians, nursing and social work     The note was completed using EMR. Every effort was made to ensure accuracy; however, inadvertent computerized transcription errors may be present.        Lalit Mishra MD

## 2022-02-03 NOTE — PROGRESS NOTES
Occupational Therapy   Occupational Therapy Progress Note and Discharge  Date: 2/3/2022   Patient Name: Fadia Espinoza  MRN: [id number]     : 1945    Date of Service: 2/3/2022    Discharge Recommendations:  Home with assist PRN       Fadia Espinoza scored a 23/24 on the AM-[facility] ADL Inpatient form. At this time, no further OT is recommended upon discharge. Recommend patient returns to prior setting with prior services. Assessment   Performance deficits / Impairments: Decreased functional mobility ; Decreased strength;Decreased endurance;Decreased ADL status; Decreased high-level IADLs;Decreased balance  Assessment: Fadia Espinoza is a 76 y.o. female who presents to the emergency department with a complaint of syncope x2. COVID +. PTA pt from home with family where pt was Ind with mobility and ADLs. Pt with significant improvement with fxl mobility and ADLs. Pt feels she is now functioning at her baseline. Pt completes tx, fxl mobility with and without AD, toileting, grooming, and bed mobility with MOD I. Pt with no fatigue. Anticipate pt safe to return home with PRN assist and no further acute OT  Prognosis: Good  Exam: see above  Assistance / Modification: RW  OT Education: OT Role;Plan of Care;Transfer Training  REQUIRES OT FOLLOW UP: Yes  Activity Tolerance  Activity Tolerance: Patient Tolerated treatment well  Safety Devices  Safety Devices in place: Yes  Type of devices: Call light within reach;Nurse notified; Left in bed         Patient Diagnosis(es): The primary encounter diagnosis was COVID-19. Diagnoses of Chemotherapy-induced neutropenia ([facility] [address] Pneumonia of left lower lobe due to infectious organism, and Hypomagnesemia were also pertinent to this visit.      has a past medical history of Acid reflux, Arthritis, Cancer ([facility] [address] Chronic lymphocytic leukemia (CLL) genetic mutation variant ([facility] [address] Diabetes mellitus ([facility] [address] High blood pressure, Hyperlipidemia, Multiple drug resistant organism (MDRO) culture positive, PONV (postoperative nausea and vomiting), Thyroid disease, UTI (urinary tract infection), and Wears glasses. has a past surgical history that includes Cholecystectomy; Hysterectomy; Thyroid surgery (Left); cyst removal (Right); eye surgery; joint replacement (Left); joint replacement (Right, 01/30/2019); Total knee arthroplasty (Right, 1/30/2019); Nasal sinus surgery (N/A, 9/10/2021); and laryngoscopy (N/A, 9/10/2021). Restrictions  Restrictions/Precautions  Restrictions/Precautions: Fall Risk,Isolation,Contact Precautions (Droplet)    Subjective   General  Chart Reviewed: Yes  Patient assessed for rehabilitation services?: Yes  Additional Pertinent Hx: per ED note, Jessee Garrido is a 76 y.o. female who presents to the emergency department with a complaint of syncope. The patient states that she was sitting at the table playing again just prior to arrival-year-old lightheaded and very weak. Family states that she got very pale and slumped over and lost consciousness. She awakened but was restless. Her son who is an EMT came over from next-door and checked on her and she had another brief loss of consciousness. She states that she feels better now but feels weak. There is no report of any focal weakness, numbness, vision change, speech change, facial droop. She did not fall or hit her head. She denies any associated headache or neck pain. She denies any associated chest pain heaviness pressure tightness or palpitations. Family / Caregiver Present: No  Referring Practitioner: Jason Cox MD  Subjective  Subjective: Pt agreeable to OT tx. Pt reports no pain. General Comment  Comments: okay for therapy per RN.     Social/Functional History  Social/Functional History  Lives With: Spouse,Family  Type of Home: House  Home Layout: One level  Home Access: Ramped entrance  Bathroom Shower/Tub: Walk-in shower  Bathroom Toilet: Standard  Bathroom Equipment: [facility] Equipment: Cane,Rolling walker,Wheelchair-manual  ADL Assistance: Independent  Ambulation Assistance: Independent (Mod I with cane)  Transfer Assistance: Independent  Active : No  Additional Comments: No Hx of recent falls       Objective        Orientation  Overall Orientation Status: Within Functional Limits     Balance  Sitting Balance: Independent  Standing Balance: Modified independent  (sink side no UE on sink + pant management x2)  Functional Mobility  Functional - Mobility Device:  (no AD and quad cane)  Activity: Other (15ft x2)  Assist Level: Modified independent   Functional Mobility Comments: no unsteadiness or LOB recliner>bathroom>within room moving phone/table >EOB.  Pt initially uses quad cane then places to the side, does reach for furniture without cane, however, stable  Toilet Transfers  Toilet - Technique: Ambulating (no AD)  Equipment Used: Grab bars  Toilet Transfer: Modified independent  ADL  Grooming: Modified independent  (sink side grooming and face washing)  Toileting: Modified independent         Bed mobility  Supine to Sit: Independent  Sit to Supine: Independent  Transfers  Sit to stand: Modified independent  Stand to sit: Modified independent  Transfer Comments: with and without quad cane     Cognition  Overall Cognitive Status: [facility]                                     Plan   Plan  Times per week: 0 (d/c)  Current Treatment Recommendations: Functional Mobility Training,Strengthening,Gait Training,Balance Training,Endurance Training,Self-Care / ADL,Safety Education & Training,Patient/Caregiver Education & Training,Equipment Evaluation, Education, & procurement    AM-PAC Score        [facility] Inpatient Daily Activity Raw Score: 23 (02/03/22 1430)  AM-PAC Inpatient ADL T-Scale Score : 51.12 (02/03/22 1430)  ADL Inpatient CMS 0-100% Score: 15.86 (02/03/22 1430)  ADL Inpatient CMS G-Code Modifier : CI (02/03/22 1430)    Goals  Short term goals  Time Frame for Short term goals: prior to D/C  Short term goal 1: complete functional mobility and transfers Mod Ind -MET  Short term goal 2: complete bathing and dressing Mod Ind- MET  Short term goal 3: complete toileting Mod Ind- MET  Short term goal 4: complete grooming in stance at sink Mod Ind- MET  Long term goals  Time Frame for Long term goals : STG=LTG  Patient Goals   Patient goals : return home       Therapy Time   Individual Concurrent Group Co-treatment   Time In 7242         Time Out 1408         Minutes 40         Timed Code Treatment Minutes: 40 Minutes (40 ADL)       GEOVANY Vega OTR/L

## 2022-02-03 NOTE — CONSULTS
Nephrology Consult Note   [facility]      Chief Complaint: Fever/COVID    History of Present Illness: Ms Elizabeth Benoit is a 76 female with a past medical history significant for CLL who had received chemotherapy about 2 weeks that was admitted with fever, non productive cough and decreased appetite on 22. Patient tested positive for COVID. I have seen the patient in the past in my office for Hyponatremia. Work up consistent with Polydipsia and reduced oral solute intake. Hyponatremia has corrected in the past with sodium chloride tablets and FR. Subjective:    Resting in recliner. Not totally convinced she has COVID    ROS: + Fever, cough, loss of appetite. All other ROS negative    Scheduled Meds:   cefepime  2,000 mg IntraVENous Q12H    insulin lispro  0-12 Units SubCUTAneous TID WC    insulin lispro  0-6 Units SubCUTAneous Nightly    amLODIPine  5 mg Oral Daily    levothyroxine  75 mcg Oral Daily    metoprolol tartrate  50 mg Oral BID    sodium chloride flush  5-40 mL IntraVENous 2 times per day    enoxaparin  30 mg SubCUTAneous BID    albuterol sulfate HFA  2 puff Inhalation BID    And    ipratropium  2 puff Inhalation BID    guaiFENesin  600 mg Oral BID        sodium chloride 10 mL/hr at 22 1013       PRN Meds:.sodium chloride flush, sodium chloride, acetaminophen **OR** acetaminophen, ondansetron, guaiFENesin-dextromethorphan, albuterol sulfate HFA **AND** ipratropium, benzonatate    Physical Exam:    TEMPERATURE:  Current - Temp: 97.7 °F (36.5 °C);  Max - Temp  Av.5 °F (36.9 °C)  Min: 97.7 °F (36.5 °C)  Max: 99.1 °F (37.3 °C)  RESPIRATIONS RANGE: Resp  Av.7  Min: 18  Max: 20  PULSE RANGE: Pulse  Av  Min: 89  Max: 110  BLOOD PRESSURE RANGE:  Systolic (39GVI), AEK:410 , Min:137 , ZCF:437   ; Diastolic (49HZD), UBX:00, Min:66, Max:76    24HR INTAKE/OUTPUT:      Intake/Output Summary (Last 24 hours) at 2/3/2022 1113  Last data filed at 2022 1840  Gross per 24 hour   Intake 360 ml   Output --   Net 360 ml       Patient Vitals for the past 96 hrs (Last 3 readings):   Weight   02/03/22 0815 188 lb 7.9 oz (85.5 kg)   02/02/22 0354 188 lb 15 oz (85.7 kg)   02/01/22 0443 189 lb 2.5 oz (85.8 kg)       General: Alert, Awake, NAD, Obese  HEENT: Normocephalic, atraumatic, Nose and ears appear externally without deformity, MMM  Neck: No Thyromegaly, Trachea is midline, No Carotid bruit  Eyes: EOMI, LEANDRO  Chest: clear to auscultation, no intercostal retractions  CVS: RRR, no murmur, no rub  Abdomen: soft, non tender, no organomegaly, no bruit appreciated  Extremities: no edema, no cyanosis.   Skin: normal texture, normal skin turgor, no rash  Musculoskeletal: normal ROM, no joint swelling, no visible deformity  Neurological: CN intact, no focal motor neurological deficit  Psych: normal affect; AAO x 3    Past Medical History:   Diagnosis Date    Acid reflux     Arthritis     Cancer ([facility] [address]     Chronic lymphocytic leukemia (CLL) genetic mutation variant ([facility] [address] 2018    Diabetes mellitus ([facility] [address]     BORDERLINE    High blood pressure     Hyperlipidemia     mild-no meds    Multiple drug resistant organism (MDRO) culture positive 01/25/2021    urine    PONV (postoperative nausea and vomiting)     after hysterectomy-got sick next morning    Thyroid disease     UTI (urinary tract infection) 01/2019    currently on cipro    Wears glasses      Past Surgical History:   Procedure Laterality Date    CHOLECYSTECTOMY      CYST REMOVAL Right     on foot    EYE SURGERY      cataract removal with lens implants    HYSTERECTOMY      JOINT REPLACEMENT Left     hip    JOINT REPLACEMENT Right 01/30/2019      Right Total Knee Replacement    LARYNGOSCOPY N/A 9/10/2021    DIRECT LARYNGOSCOPY WITH BIOPSY performed by Arielle Palacio MD at [place] [id number] N/A 9/10/2021    NASAL ENDOSCOPY WITH BIOPSY performed by Arielle Palacio MD at [employer] Left     TOTAL KNEE ARTHROPLASTY Right 1/30/2019    RIGHT TOTAL KNEE REPLACEMENT performed by Jennifer Mcgrath MD at [facility] History   Problem Relation Age of Onset    High Blood Pressure Mother     Heart Disease Mother     Cancer Father         acute leukemia     Social History     Socioeconomic History    Marital status:      Spouse name: Not on file    Number of children: Not on file    Years of education: Not on file    Highest education level: Not on file   Occupational History    Occupation: Light house work   Tobacco Use    Smoking status: Never Smoker    Smokeless tobacco: Never Used   Vaping Use    Vaping Use: Never used   Substance and Sexual Activity    Alcohol use: No    Drug use: Never    Sexual activity: Never   Other Topics Concern    Not on file   Social History Narrative    Not on file     Social Determinants of Health     Financial Resource Strain:     Difficulty of Paying Living Expenses: Not on file   Food Insecurity:     Worried About [address] in the Last Year: Not on file    [address] in the Last Year: Not on file   Transportation Needs:     Lack of Transportation (Medical): Not on file    Lack of Transportation (Non-Medical):  Not on file   Physical Activity:     Days of Exercise per Week: Not on file    Minutes of Exercise per Session: Not on file   Stress:     Feeling of Stress : Not on file   Social Connections:     Frequency of Communication with Friends and Family: Not on file    Frequency of Social Gatherings with Friends and Family: Not on file    Attends Holiness Services: Not on file    Active Member of Clubs or Organizations: Not on file    Attends Club or Organization Meetings: Not on file    Marital Status: Not on file   Intimate Partner Violence:     Fear of Current or Ex-Partner: Not on file    Emotionally Abused: Not on file    Physically Abused: Not on file    Sexually Abused: Not on file   Housing Stability:     Unable to Pay for Housing in the Last Year: Not on file    Number of Places Lived in the Last Year: Not on file    Unstable Housing in the Last Year: Not on file         Allergies:  No Known Allergies       LAB DATA:    CBC:   Lab Results   Component Value Date    WBC 11.4 02/03/2022    RBC 4.38 02/03/2022    HGB 12.6 02/03/2022    HCT 37.2 02/03/2022    MCV 85.0 02/03/2022    MCH 28.7 02/03/2022    MCHC 33.8 02/03/2022    RDW 15.1 02/03/2022     02/03/2022    MPV 6.2 02/03/2022     BMP:    Lab Results   Component Value Date     02/03/2022    K 3.5 02/03/2022    K 3.5 01/30/2022    CL 89 02/03/2022    CO2 23 02/03/2022    BUN 6 02/03/2022    CREATININE <0.5 02/03/2022    CALCIUM 8.8 02/03/2022    GFRAA >60 02/03/2022    LABGLOM >60 02/03/2022    GLUCOSE 100 02/03/2022     Ionized Calcium:  No results found for: IONCA  Magnesium:    Lab Results   Component Value Date    MG 1.90 02/03/2022     Phosphorus:    Lab Results   Component Value Date    PHOS 5.7 10/01/2021     U/A:    Lab Results   Component Value Date    COLORU YELLOW 01/30/2022    PHUR 6.0 01/30/2022    WBCUA 6 01/30/2022    RBCUA 5 01/30/2022    MUCUS 1+ 01/30/2022    BACTERIA 4+ 01/30/2022    CLARITYU CLOUDY 01/30/2022    SPECGRAV 1.015 01/30/2022    LEUKOCYTESUR Negative 01/30/2022    UROBILINOGEN 0.2 01/30/2022    BILIRUBINUR Negative 01/30/2022    BLOODU TRACE 01/30/2022    GLUCOSEU Negative 01/30/2022    AMORPHOUS Rare 01/30/2022         IMPRESSION/RECOMMENDATIONS:      Active Problems:    Hyponatremia    UTI (urinary tract infection)    CLL (chronic lymphocytic leukemia) ([facility] [address]    Pneumonia due to COVID-19 virus    Pneumonia    Neutropenic fever (HCC)    Sepsis ([facility] [address]  Resolved Problems:    * No resolved hospital problems. *    1. Hyponatremia - recurring   - Likely once again due to reduced protein intake and Polydipsia   - Repeat urine sodium and urine osmolality  - start sodium chloride tablets  - FR to 1.2 L / day  - monitor serial serum sodium levels    2.  COVID + - Plan per Medicine    3.  CLL per Hem/Onc

## 2022-02-03 NOTE — PROGRESS NOTES
[facility]  Oncology Hematology Care  Progress Note      SUBJECTIVE:     Admitted 1/31/22. CLL on Bend/Rituxan. Unvaccinated, Covid+ as well as neutropenic fever. She is on room air, CT chest lEFT lower lobe changes concerning for PNA , crp AT  143, UA with 4+ Bacteria    Remains on Cefipime. Vanc stopped as BC thought to be contaminant. No SOB, CP.  Feels well  ROS: no new ros     OBJECTIVE      Medications    Current Facility-Administered Medications: cefepime (MAXIPIME) 2000 mg IVPB minibag, 2,000 mg, IntraVENous, Q12H  Tbo-Filgrastim (GRANIX) injection 480 mcg, 480 mcg, SubCUTAneous, Daily  insulin lispro (HUMALOG) injection vial 0-12 Units, 0-12 Units, SubCUTAneous, TID WC  insulin lispro (HUMALOG) injection vial 0-6 Units, 0-6 Units, SubCUTAneous, Nightly  amLODIPine (NORVASC) tablet 5 mg, 5 mg, Oral, Daily  levothyroxine (SYNTHROID) tablet 75 mcg, 75 mcg, Oral, Daily  metoprolol tartrate (LOPRESSOR) tablet 50 mg, 50 mg, Oral, BID  sodium chloride flush 0.9 % injection 5-40 mL, 5-40 mL, IntraVENous, 2 times per day  sodium chloride flush 0.9 % injection 5-40 mL, 5-40 mL, IntraVENous, PRN  0.9 % sodium chloride infusion, 25 mL, IntraVENous, PRN  acetaminophen (TYLENOL) tablet 650 mg, 650 mg, Oral, Q6H PRN **OR** acetaminophen (TYLENOL) suppository 650 mg, 650 mg, Rectal, Q6H PRN  ondansetron (ZOFRAN) injection 4 mg, 4 mg, IntraVENous, Q6H PRN  enoxaparin (LOVENOX) injection 30 mg, 30 mg, SubCUTAneous, BID  guaiFENesin-dextromethorphan (ROBITUSSIN DM) 100-10 MG/5ML syrup 5 mL, 5 mL, Oral, Q4H PRN  albuterol sulfate  (90 Base) MCG/ACT inhaler 2 puff, 2 puff, Inhalation, BID **AND** ipratropium (ATROVENT HFA) 17 MCG/ACT inhaler 2 puff, 2 puff, Inhalation, BID  albuterol sulfate  (90 Base) MCG/ACT inhaler 2 puff, 2 puff, Inhalation, Q4H PRN **AND** ipratropium (ATROVENT HFA) 17 MCG/ACT inhaler 2 puff, 2 puff, Inhalation, Q4H PRN  guaiFENesin (MUCINEX) extended release tablet 600 mg, 600 mg, Oral, BID  benzonatate (TESSALON) capsule 100 mg, 100 mg, Oral, TID PRN     Physical    VITALS:  BP (!) 152/73   Pulse 93   Temp 98.8 °F (37.1 °C) (Oral)   Resp 18   Ht 5' 6\" (1.676 m)   Wt 188 lb 15 oz (85.7 kg)   SpO2 96%   BMI 30.49 kg/m²   TEMPERATURE:  Current - Temp: 98.8 °F (37.1 °C); Max - Temp  Av.7 °F (37.1 °C)  Min: 98.4 °F (36.9 °C)  Max: 99.1 °F (37.3 °C)  PULSE OXIMETRY RANGE: SpO2  Av %  Min: 94 %  Max: 96 %  24HR INTAKE/OUTPUT:      Intake/Output Summary (Last 24 hours) at 2/3/2022 1686  Last data filed at 2022 1840  Gross per 24 hour   Intake 1200 ml   Output --   Net 1200 ml       CONSTITUTIONAL: a o x 3   Chest clear  Card S1S2 II/IV LUCIA  Abd - soft NT  Skin, mildly pale. No lesions      Data      Recent Labs     22  0519 22  0620   WBC 1.2* 2.5*   HGB 10.4* 10.7*   HCT 29.8* 29.9*    200   MCV 83.3 82.6        Recent Labs     22  0519 22  0620 22  0554   * 128* 125*   K 3.5 3.3* 3.5   CL 86* 89* 89*   CO2 26 26 23   BUN 5* 4* 6*   CREATININE 0.5* <0.5* <0.5*     Recent Labs     22  0519 22  0620 22  0554   AST 16 15 24   ALT 14 15 19   BILITOT 0.4 0.4 0.4   ALKPHOS 69 64 87       Magnesium:    Lab Results   Component Value Date    MG 1.90 2022    MG 1.30 2022    MG 1.40 2022       BC   2022       No Growth to date. Any change in status will be called.     BLOODCULT2   2022       Gram stain Aerobic bottle:  Gram positive cocci  Information to follow        BLOODCULT2 See additional report for complete BCID panel. 2022     BLOODCULT2   2022       POSITIVE for  This organism was isolated in one set. Susceptibility testing is not routinely done as this  organism frequently represents skin contamination.   Additional testing can be ordered by calling the  Microbiology Department.          Imaging ECHO Complete 2D W Doppler W Color    Result Date: 2022  Transthoracic Echocardiography Report (TTE)  Demographics   Patient Name       Isabel Beasley   Date of Study      01/19/2022         Gender              Female   Patient Number     [id number]         Date of Birth       1945   Visit Number       [id number]          Age                 76 year(s)   Accession Number   [id number]         Room Number   Corporate ID       [id number]           Jennyfer Mace                                                            [facility]   Ordering Physician Qasim Dodd MD          Physician           Man Tong MD  Procedure Type of Study   TTE procedure:ECHOCARDIOGRAM COMPLETE 2D W DOPPLER W COLOR. Procedure Date Date: 01/19/2022 Start: 08:26 AM [facility] Technical Quality: Adequate visualization Indications:Heart murmur. Additional Indications:Leukemia DM HTN HLD . Patient Status: Routine Height: 66 inches Weight: 196 pounds BSA: 1.98 m2 BMI: 31.64 kg/m2 Rhythm: Within normal limits HR: 76 bpm BP: 147/61 mmHg  Conclusions   Summary  Ejection fraction is visually estimated to be 60-65%. No regional wall motion abnormalities are noted. Grade II diastoli  The left atrium is mildly dilated. Moderate aortic stenosis with a peak velocity of 3.4m/s and a mean pressure  gradient of 30mmHg. No evidence of aortic valve regurgitation. Normal right ventricular size. Right ventricular systolic function is normal.   Signature   ------------------------------------------------------------------  Electronically signed by Man Tong MD (Interpreting  physician) on 01/19/2022 at 11:37 AM  ------------------------------------------------------------------   Findings   Left Ventricle  Left ventricular cavity size is normal.  Normal left ventricular wall thickness. Ejection fraction is visually estimated to be 60-65%. No regional wall motion abnormalities are noted.   Grade II diastolic dysfunction with elevated LV filling pressures. GLS= -15.7%   Mitral Valve  Mitral annular calcification is present. Calcification of the leaflets of the mitral valve. Trivial mitral regurgitation. No evidence of mitral stenosis. Left Atrium  The left atrium is mildly dilated. Aortic Valve  Moderate aortic stenosis with a peak velocity of 3.4m/s and a mean pressure  gradient of 30mmHg. No evidence of aortic valve regurgitation. Aorta  The aortic root is normal in size. The ascending aorta is normal in size. Right Ventricle  Normal right ventricular size. Right ventricular systolic function is normal.  TAPSE= 2.4cm   Tricuspid Valve  Trivial tricuspid regurgitation. No evidence of tricuspid stenosis. Right Atrium  The right atrium is normal in size. Pulmonic Valve  Mild pulmonic regurgitation present. No evidence of pulmonic valve stenosis. Pericardial Effusion  No pericardial effusion noted. Pleural Effusion  No pleural effusion. Miscellaneous  IVC size is dilated (>2.1 cm) but collapses > 50% with respiration  consistent with elevated RA pressure (8 mmHg). Unable to estimate pulmonary artery pressure secondary to incomplete TR jet  envelope.   M-Mode/2D Measurements (cm)   LV Diastolic Dimension: 0.68 cm LV Systolic Dimension: 0.66 cm  LV Septum Diastolic: 1.94 cm  LV PW Diastolic: 7.51 cm        AO Root Dimension: 3 cm  RV Diastolic Dimension: 8.10 cm                                  LA Area: 26.2 cm2  LVOT: 1.7 cm                    LA volume/Index: 80.8 ml /41 ml/m2  Doppler Measurements   AV Peak Velocity: 344 cm/s     MV Peak E-Wave: 145 cm/s  AV Peak Gradient: 47.33 mmHg   MV Peak A-Wave: 156 cm/s  AV Mean Gradient: 30 mmHg      MV E/A Ratio: 0.93  LVOT Peak Velocity: 170 cm/s   MV P1/2t: 85 msec  AV Area (Continuity):1.12 cm2  MV Mean Gradient: 4 mmHg                                 MV Max P mmHg                                 MV Vmax:147 cm/s  Estimated RAP:8 mmHg MV VTI:49.8 cm/s  E' Septal Velocity: 4.93 cm/s  E' Lateral Velocity: 5.25 cm/s MV Area (continuity): 1.96 cm2  PV Peak Velocity: 116 cm/s     MV Deceleration Time: 274 msec  PV Peak Gradient: 5.38 mmHg    MV Area (PHT): 2.59 cm2   Aortic Valve   Peak Velocity: 344 cm/s     Mean Velocity: 264 cm/s  Peak Gradient: 47.33 mmHg   Mean Gradient: 30 mmHg  Area (continuity): 1.12 cm2  AV VTI: 87.3 cm  Aorta   Aortic Root: 3 cm  Ascending Aorta: 3.4 cm  LVOT Diameter: 1.7 cm      XR CHEST PORTABLE    Result Date: 1/30/2022  EXAMINATION: ONE XRAY VIEW OF THE CHEST 1/30/2022 8:42 pm COMPARISON: Chest x-ray 01/25/2021. HISTORY: ORDERING SYSTEM PROVIDED HISTORY: syncope TECHNOLOGIST PROVIDED HISTORY: Reason for exam:->syncope Reason for Exam: Loss of Consciousness (patient was sitting at the table when she passed out for about 30secs and then it happened again 5 mins later, pt is in treatment for CA) FINDINGS: Cardiac silhouette is within normal limits in size. Mediastinal contours are otherwise suboptimally evaluated due to rotated projection. Subsegmental left basilar opacity. No pleural effusion on this projection. No pneumothorax appreciated on this projection. Nonspecific subsegmental left basilar opacity. CT CHEST PULMONARY EMBOLISM W CONTRAST    Result Date: 1/30/2022  EXAMINATION: CTA OF THE CHEST 1/30/2022 10:53 pm TECHNIQUE: CTA of the chest was performed after the administration of intravenous contrast.  Multiplanar reformatted images are provided for review. MIP images are provided for review. Dose modulation, iterative reconstruction, and/or weight based adjustment of the mA/kV was utilized to reduce the radiation dose to as low as reasonably achievable. COMPARISON: CT of the chest, abdomen, and pelvis performed 09/25/2021.  HISTORY: ORDERING SYSTEM PROVIDED HISTORY: syncope TECHNOLOGIST PROVIDED HISTORY: Reason for exam:->syncope Decision Support Exception - unselect if not a suspected or confirmed emergency medical condition->Emergency Medical Condition (MA) Reason for Exam: syncope FINDINGS: Pulmonary Arteries: Pulmonary arteries are adequately opacified for evaluation. No evidence of intraluminal filling defect to suggest pulmonary embolism. Main pulmonary artery is normal in caliber. Mediastinum: No evidence of mediastinal lymphadenopathy. The heart and pericardium demonstrate no acute abnormality. There is no acute abnormality of the thoracic aorta. Lungs/pleura: There infiltrates in the lower lobes. There are left perihilar infiltrates. There is no effusion. There is no pneumothorax. The tracheobronchial tree is patent. Upper Abdomen: Hypodense foci are seen within the liver representing cysts versus hemangiomas. Soft Tissues/Bones: No acute bone or soft tissue abnormality. No acute or chronic pulmonary embolism. Lower lobe infiltrates bilaterally and left perihilar infiltrates concerning for pneumonia. Problem List  Patient Active Problem List   Diagnosis    Primary localized osteoarthrosis, lower leg    Obesity (BMI 30-39. 9)    Primary localized osteoarthrosis, pelvic region and thigh    History of total hip replacement    H/O total hip arthroplasty, left    Primary osteoarthritis of left hip    Arthritis of right knee    H/O total knee replacement, right 1/30/19    Right hip pain    2019 novel coronavirus disease (COVID-19)    Hyponatremia    UTI (urinary tract infection)    Syncope    CLL (chronic lymphocytic leukemia) (HCC)    Pneumonia due to COVID-19 virus    Pneumonia    Neutropenic fever (HCC)    Sepsis (HCC)       ASSESSMENT AND PLAN    CLL  On bendamustine rituxan  Responding to Granix  Pt is neutropenic await cbc today  One culture is pos for staph- Probable contaminate  I   suspect covid playing a role in neutropenia    Murmur-has mod aortic stenosis-has not seen cardiology for this -new dx  Noted on PAVEL      Melecio Nathan MD, MPH

## 2022-02-04 VITALS
HEART RATE: 99 BPM | HEIGHT: 66 IN | WEIGHT: 180.78 LBS | RESPIRATION RATE: 16 BRPM | BODY MASS INDEX: 29.05 KG/M2 | SYSTOLIC BLOOD PRESSURE: 175 MMHG | TEMPERATURE: 98.4 F | DIASTOLIC BLOOD PRESSURE: 78 MMHG | OXYGEN SATURATION: 95 %

## 2022-02-04 LAB
ALBUMIN SERPL-MCNC: 3 G/DL (ref 3.4–5)
ALBUMIN SERPL-MCNC: 3.3 G/DL (ref 3.4–5)
ANION GAP SERPL CALCULATED.3IONS-SCNC: 10 MMOL/L (ref 3–16)
ANION GAP SERPL CALCULATED.3IONS-SCNC: 14 MMOL/L (ref 3–16)
BANDED NEUTROPHILS RELATIVE PERCENT: 14 % (ref 0–7)
BASOPHILS ABSOLUTE: 0 K/UL (ref 0–0.2)
BASOPHILS RELATIVE PERCENT: 0 %
BUN BLDV-MCNC: 7 MG/DL (ref 7–20)
BUN BLDV-MCNC: 9 MG/DL (ref 7–20)
CALCIUM SERPL-MCNC: 8.7 MG/DL (ref 8.3–10.6)
CALCIUM SERPL-MCNC: 8.8 MG/DL (ref 8.3–10.6)
CHLORIDE BLD-SCNC: 89 MMOL/L (ref 99–110)
CHLORIDE BLD-SCNC: 91 MMOL/L (ref 99–110)
CO2: 25 MMOL/L (ref 21–32)
CO2: 30 MMOL/L (ref 21–32)
CREAT SERPL-MCNC: <0.5 MG/DL (ref 0.6–1.2)
CREAT SERPL-MCNC: <0.5 MG/DL (ref 0.6–1.2)
DOHLE BODIES: PRESENT
EOSINOPHILS ABSOLUTE: 0.1 K/UL (ref 0–0.6)
EOSINOPHILS RELATIVE PERCENT: 1 %
GFR AFRICAN AMERICAN: >60
GFR AFRICAN AMERICAN: >60
GFR NON-AFRICAN AMERICAN: >60
GFR NON-AFRICAN AMERICAN: >60
GLUCOSE BLD-MCNC: 102 MG/DL (ref 70–99)
GLUCOSE BLD-MCNC: 111 MG/DL (ref 70–99)
GLUCOSE BLD-MCNC: 119 MG/DL (ref 70–99)
GLUCOSE BLD-MCNC: 129 MG/DL (ref 70–99)
HCT VFR BLD CALC: 34.3 % (ref 36–48)
HEMATOLOGY PATH CONSULT: NORMAL
HEMATOLOGY PATH CONSULT: YES
HEMOGLOBIN: 11.8 G/DL (ref 12–16)
LYMPHOCYTES ABSOLUTE: 0.4 K/UL (ref 1–5.1)
LYMPHOCYTES RELATIVE PERCENT: 3 %
MAGNESIUM: 1.4 MG/DL (ref 1.8–2.4)
MCH RBC QN AUTO: 29.1 PG (ref 26–34)
MCHC RBC AUTO-ENTMCNC: 34.5 G/DL (ref 31–36)
MCV RBC AUTO: 84.2 FL (ref 80–100)
METAMYELOCYTES RELATIVE PERCENT: 1 %
MONOCYTES ABSOLUTE: 2.2 K/UL (ref 0–1.3)
MONOCYTES RELATIVE PERCENT: 17 %
MYELOCYTE PERCENT: 4 %
NEUTROPHILS ABSOLUTE: 10.1 K/UL (ref 1.7–7.7)
NEUTROPHILS RELATIVE PERCENT: 60 %
PDW BLD-RTO: 15 % (ref 12.4–15.4)
PERFORMED ON: ABNORMAL
PERFORMED ON: ABNORMAL
PHOSPHORUS: 2.8 MG/DL (ref 2.5–4.9)
PHOSPHORUS: 3 MG/DL (ref 2.5–4.9)
PLATELET # BLD: 268 K/UL (ref 135–450)
PLATELET SLIDE REVIEW: ADEQUATE
PMV BLD AUTO: 5.7 FL (ref 5–10.5)
POTASSIUM SERPL-SCNC: 3.2 MMOL/L (ref 3.5–5.1)
POTASSIUM SERPL-SCNC: 3.7 MMOL/L (ref 3.5–5.1)
RBC # BLD: 4.07 M/UL (ref 4–5.2)
RBC # BLD: NORMAL 10*6/UL
SLIDE REVIEW: ABNORMAL
SODIUM BLD-SCNC: 129 MMOL/L (ref 136–145)
SODIUM BLD-SCNC: 130 MMOL/L (ref 136–145)
TOXIC GRANULATION: PRESENT
URIC ACID, SERUM: 4.3 MG/DL (ref 2.6–6)
WBC # BLD: 12.8 K/UL (ref 4–11)

## 2022-02-04 PROCEDURE — 2580000003 HC RX 258: Performed by: INTERNAL MEDICINE

## 2022-02-04 PROCEDURE — 80069 RENAL FUNCTION PANEL: CPT

## 2022-02-04 PROCEDURE — 83735 ASSAY OF MAGNESIUM: CPT

## 2022-02-04 PROCEDURE — 97530 THERAPEUTIC ACTIVITIES: CPT

## 2022-02-04 PROCEDURE — 6360000002 HC RX W HCPCS: Performed by: INTERNAL MEDICINE

## 2022-02-04 PROCEDURE — 6370000000 HC RX 637 (ALT 250 FOR IP): Performed by: STUDENT IN AN ORGANIZED HEALTH CARE EDUCATION/TRAINING PROGRAM

## 2022-02-04 PROCEDURE — 2580000003 HC RX 258: Performed by: STUDENT IN AN ORGANIZED HEALTH CARE EDUCATION/TRAINING PROGRAM

## 2022-02-04 PROCEDURE — 94640 AIRWAY INHALATION TREATMENT: CPT

## 2022-02-04 PROCEDURE — 6370000000 HC RX 637 (ALT 250 FOR IP): Performed by: INTERNAL MEDICINE

## 2022-02-04 PROCEDURE — 94760 N-INVAS EAR/PLS OXIMETRY 1: CPT

## 2022-02-04 PROCEDURE — 6360000002 HC RX W HCPCS: Performed by: STUDENT IN AN ORGANIZED HEALTH CARE EDUCATION/TRAINING PROGRAM

## 2022-02-04 PROCEDURE — 85025 COMPLETE CBC W/AUTO DIFF WBC: CPT

## 2022-02-04 PROCEDURE — 99232 SBSQ HOSP IP/OBS MODERATE 35: CPT | Performed by: INTERNAL MEDICINE

## 2022-02-04 PROCEDURE — 84550 ASSAY OF BLOOD/URIC ACID: CPT

## 2022-02-04 PROCEDURE — 6370000000 HC RX 637 (ALT 250 FOR IP): Performed by: NURSE PRACTITIONER

## 2022-02-04 RX ORDER — MAGNESIUM SULFATE IN WATER 40 MG/ML
4000 INJECTION, SOLUTION INTRAVENOUS ONCE
Status: COMPLETED | OUTPATIENT
Start: 2022-02-04 | End: 2022-02-04

## 2022-02-04 RX ORDER — MAGNESIUM 200 MG
200 TABLET ORAL DAILY
Qty: 30 TABLET | Refills: 1 | Status: SHIPPED | OUTPATIENT
Start: 2022-02-04 | End: 2022-12-31

## 2022-02-04 RX ORDER — POTASSIUM CHLORIDE 20 MEQ/1
40 TABLET, EXTENDED RELEASE ORAL ONCE
Status: COMPLETED | OUTPATIENT
Start: 2022-02-04 | End: 2022-02-04

## 2022-02-04 RX ADMIN — METOPROLOL TARTRATE 50 MG: 50 TABLET, FILM COATED ORAL at 08:46

## 2022-02-04 RX ADMIN — BENZONATATE 100 MG: 100 CAPSULE ORAL at 08:47

## 2022-02-04 RX ADMIN — MAGNESIUM SULFATE HEPTAHYDRATE 4000 MG: 40 INJECTION, SOLUTION INTRAVENOUS at 10:18

## 2022-02-04 RX ADMIN — Medication 1 G: at 08:46

## 2022-02-04 RX ADMIN — GUAIFENESIN 600 MG: 600 TABLET, EXTENDED RELEASE ORAL at 08:46

## 2022-02-04 RX ADMIN — LEVOTHYROXINE SODIUM 75 MCG: 0.07 TABLET ORAL at 06:15

## 2022-02-04 RX ADMIN — CEFEPIME HYDROCHLORIDE 2000 MG: 2 INJECTION, POWDER, FOR SOLUTION INTRAVENOUS at 09:42

## 2022-02-04 RX ADMIN — POTASSIUM CHLORIDE 40 MEQ: 20 TABLET, EXTENDED RELEASE ORAL at 00:58

## 2022-02-04 RX ADMIN — AMLODIPINE BESYLATE 5 MG: 5 TABLET ORAL at 08:46

## 2022-02-04 RX ADMIN — ALBUTEROL SULFATE 2 PUFF: 90 AEROSOL, METERED RESPIRATORY (INHALATION) at 09:10

## 2022-02-04 RX ADMIN — ENOXAPARIN SODIUM 30 MG: 100 INJECTION SUBCUTANEOUS at 08:46

## 2022-02-04 RX ADMIN — IPRATROPIUM BROMIDE 2 PUFF: 17 AEROSOL, METERED RESPIRATORY (INHALATION) at 09:11

## 2022-02-04 RX ADMIN — SODIUM CHLORIDE 25 ML: 9 INJECTION, SOLUTION INTRAVENOUS at 09:42

## 2022-02-04 ASSESSMENT — PAIN SCALES - GENERAL: PAINLEVEL_OUTOF10: 0

## 2022-02-04 NOTE — PROGRESS NOTES
Nephrology Progress Note   [facility]      Chief Complaint: Fever/COVID    History of Present Illness: Ms Rachael Barba is a 76 female with a past medical history significant for CLL who had received chemotherapy about 2 weeks that was admitted with fever, non productive cough and decreased appetite on 22. Patient tested positive for COVID. I have seen the patient in the past in my office for Hyponatremia. Work up consistent with Polydipsia and reduced oral solute intake. Hyponatremia has corrected in the past with sodium chloride tablets and FR. Subjective:    Resting in bed. NAD    ROS: + Fever, + productive cough, loss of appetite. All other ROS negative    Scheduled Meds:   magnesium sulfate  4,000 mg IntraVENous Once    sodium chloride  1 g Oral TID WC    insulin lispro  0-12 Units SubCUTAneous TID WC    insulin lispro  0-6 Units SubCUTAneous Nightly    amLODIPine  5 mg Oral Daily    levothyroxine  75 mcg Oral Daily    metoprolol tartrate  50 mg Oral BID    sodium chloride flush  5-40 mL IntraVENous 2 times per day    enoxaparin  30 mg SubCUTAneous BID    albuterol sulfate HFA  2 puff Inhalation BID    And    ipratropium  2 puff Inhalation BID    guaiFENesin  600 mg Oral BID        sodium chloride 25 mL (22 0942)       PRN Meds:.sodium chloride flush, sodium chloride, acetaminophen **OR** acetaminophen, ondansetron, guaiFENesin-dextromethorphan, albuterol sulfate HFA **AND** ipratropium, benzonatate    Physical Exam:    TEMPERATURE:  Current - Temp: 98.4 °F (36.9 °C);  Max - Temp  Av.4 °F (36.9 °C)  Min: 97.9 °F (36.6 °C)  Max: 99 °F (37.2 °C)  RESPIRATIONS RANGE: Resp  Av.3  Min: 16  Max: 18  PULSE RANGE: Pulse  Av.2  Min: 80  Max: 108  BLOOD PRESSURE RANGE:  Systolic (97KUY), KM , Min:146 , WFV:502   ; Diastolic (53LFI), BKH:13, Min:67, Max:78    24HR INTAKE/OUTPUT:    No intake or output data in the 24 hours ending 22 1052    Patient Vitals for the past 96 hrs (Last 3 readings):   Weight   02/04/22 0337 180 lb 12.4 oz (82 kg)   02/03/22 0815 188 lb 7.9 oz (85.5 kg)   02/02/22 0354 188 lb 15 oz (85.7 kg)       General: Alert, Awake, NAD, Obese  HEENT: Normocephalic, atraumatic, Nose and ears appear externally without deformity, MMM  Eyes: EOMI, LEANDRO  Chest: + rhonchi and bilateral wheezing  CVS: RRR, no murmur, no rub  Abdomen: soft, non tender, no organomegaly, no bruit appreciated  Extremities: no edema, no cyanosis.   Skin: normal texture, normal skin turgor, no rash  Musculoskeletal: normal ROM, no joint swelling, no visible deformity  Neurological: CN intact, no focal motor neurological deficit  Psych: normal affect; AAO x 3    Past Medical History:   Diagnosis Date    Acid reflux     Arthritis     Cancer ([facility]     Chronic lymphocytic leukemia (CLL) genetic mutation variant ([facility] [facility] 2018    Diabetes mellitus ([facility] [address]     BORDERLINE    High blood pressure     Hyperlipidemia     mild-no meds    Multiple drug resistant organism (MDRO) culture positive 01/25/2021    urine    PONV (postoperative nausea and vomiting)     after hysterectomy-got sick next morning    Thyroid disease     UTI (urinary tract infection) 01/2019    currently on cipro    Wears glasses      Past Surgical History:   Procedure Laterality Date    CHOLECYSTECTOMY      CYST REMOVAL Right     on foot    EYE SURGERY      cataract removal with lens implants    HYSTERECTOMY      JOINT REPLACEMENT Left     hip    JOINT REPLACEMENT Right 01/30/2019      Right Total Knee Replacement    LARYNGOSCOPY N/A 9/10/2021    DIRECT LARYNGOSCOPY WITH BIOPSY performed by J[facility] Reno MD at [facility] N/A 9/10/2021    NASAL ENDOSCOPY WITH BIOPSY performed by Jonna Reno MD at [facility] Left     TOTAL KNEE ARTHROPLASTY Right 1/30/2019    RIGHT TOTAL KNEE REPLACEMENT performed by María Elena Arnold MD at [facility]     Family History   Problem Relation Age of Onset    High Blood Pressure Mother     Heart Disease Mother     Cancer Father         acute leukemia     Social History     Socioeconomic History    Marital status:      Spouse name: Not on file    Number of children: Not on file    Years of education: Not on file    Highest education level: Not on file   Occupational History    Occupation: Light house work   Tobacco Use    Smoking status: Never Smoker    Smokeless tobacco: Never Used   Vaping Use    Vaping Use: Never used   Substance and Sexual Activity    Alcohol use: No    Drug use: Never    Sexual activity: Never   Other Topics Concern    Not on file   Social History Narrative    Not on file     Social Determinants of Health     Financial Resource Strain:     Difficulty of Paying Living Expenses: Not on file   Food Insecurity:     Worried About [email or web address] in the Last Year: Not on file    [address] [employer] in the Last Year: Not on file   Transportation Needs:     Lack of Transportation (Medical): Not on file    Lack of Transportation (Non-Medical):  Not on file   Physical Activity:     Days of Exercise per Week: Not on file    Minutes of Exercise per Session: Not on file   Stress:     Feeling of Stress : Not on file   Social Connections:     Frequency of Communication with Friends and Family: Not on file    Frequency of Social Gatherings with Friends and Family: Not on file    Attends Evangelical Services: Not on file    Active Member of [address] [place] [address] or Organizations: Not on file    Attends Club or Organization Meetings: Not on file    Marital Status: Not on file   Intimate Partner Violence:     Fear of Current or Ex-Partner: Not on file    Emotionally Abused: Not on file    Physically Abused: Not on file    Sexually Abused: Not on file   Housing Stability:     Unable to Pay for Housing in the Last Year: Not on file    Number of [place] in the Last Year: Not on file    Unstable Housing in the Last Year: Not on file         Allergies:  No Known Allergies       LAB DATA:    CBC:   Lab Results   Component Value Date    WBC 12.8 02/04/2022    RBC 4.07 02/04/2022    HGB 11.8 02/04/2022    HCT 34.3 02/04/2022    MCV 84.2 02/04/2022    MCH 29.1 02/04/2022    MCHC 34.5 02/04/2022    RDW 15.0 02/04/2022     02/04/2022    MPV 5.7 02/04/2022     BMP:    Lab Results   Component Value Date     02/04/2022    K 3.7 02/04/2022    K 3.5 01/30/2022    CL 91 02/04/2022    CO2 25 02/04/2022    BUN 7 02/04/2022    CREATININE <0.5 02/04/2022    CALCIUM 8.7 02/04/2022    GFRAA >60 02/04/2022    LABGLOM >60 02/04/2022    GLUCOSE 102 02/04/2022     Ionized Calcium:  No results found for: IONCA  Magnesium:    Lab Results   Component Value Date    MG 1.40 02/04/2022     Phosphorus:    Lab Results   Component Value Date    PHOS 3.0 02/04/2022     U/A:    Lab Results   Component Value Date    COLORU YELLOW 01/30/2022    PHUR 6.0 01/30/2022    WBCUA 6 01/30/2022    RBCUA 5 01/30/2022    MUCUS 1+ 01/30/2022    BACTERIA 4+ 01/30/2022    CLARITYU CLOUDY 01/30/2022    SPECGRAV 1.015 01/30/2022    LEUKOCYTESUR Negative 01/30/2022    UROBILINOGEN 0.2 01/30/2022    BILIRUBINUR Negative 01/30/2022    BLOODU TRACE 01/30/2022    GLUCOSEU Negative 01/30/2022    AMORPHOUS Rare 01/30/2022         IMPRESSION/RECOMMENDATIONS:      Active Problems:    Hyponatremia    UTI (urinary tract infection)    CLL (chronic lymphocytic leukemia) ([facility] [address] [facility]    Pneumonia due to COVID-19 virus    Pneumonia    Neutropenic fever (HCC)    Sepsis ([facility] [address]  Resolved Problems:    * No resolved hospital problems. *    1. Hyponatremia - recurring - Improving after starting sodium chloride tablets and FR  - Likely once again due to reduced protein intake and Polydipsia - Repeat urine sodium and urine osmolality consistent with that    2. COVID +   - Plan per Medicine    3.  CLL per Hem/Onc    Ok to d/c from renal standpoint on sodium chloride tablets and FR - no follow up as an outpatient needed

## 2022-02-04 NOTE — DISCHARGE SUMMARY
Hospital Medicine Discharge Summary      Patient ID: Ignacio [id number]      Patient's PCP: Payton Degroot, APRN - CNP    Admit Date: 1/30/2022     Discharge Date: 2/4/2022  The patient was seen and examined on day of discharge and this discharge summary is in conjunction with any daily progress note from day of discharge. Admitting Physician: Napoleon Pemberton DO    Discharge Physician: Roberto Smith MD     Admitted for   Chief Complaint   Patient presents with    Loss of Consciousness     patient was sitting at the table when she passed out for about 30secs and then it happened again 5 mins later, pt is in treatment for CA       Admitting Diagnosis Hypomagnesemia [E83.42]  Pneumonia [J18.9]  Chemotherapy-induced neutropenia ([address] [D70.1, T45.1X5A]  Pneumonia of left lower lobe due to infectious organism [J18.9]  COVID-19 [U07.1]    Discharge Diagnoses: Active Hospital Problems    Diagnosis Date Noted    Pneumonia due to COVID-19 virus [U07.1, J12.82] 01/31/2022    Pneumonia [J18.9] 01/31/2022    Neutropenic fever ([address] [D70.9, R50.81] 01/31/2022    Sepsis ([address] [A41.9] 01/31/2022    CLL (chronic lymphocytic leukemia) ([address] [C91.10] 01/25/2021    UTI (urinary tract infection) [N39.0] 01/25/2021    Hyponatremia [E87.1] 01/25/2021       Follow Up: Primary Care Physician in one week    PCP to Follow up on   Post dc fu    monitor NA          Hospital Course:   Patient is a 27-year-old woman with a past medical history of CLL on chemotherapy who presented with syncope. She was found to have Covid along with neutropenic fever. Chest x-ray shows left lower lobe pneumonia. Patient was treated for neutropenic fever with broad-spectrum antibiotics. She got Granix with improvement of count. Patient did have Covid but was not hypoxic. In the hospital was found to have hyponatremia. Patient does have chronic hyponatremia due to SIADH, poor p.o. intake and polydipsia.   Patient has been counseled at the time of discharge to limit her fluids.     Neutropenic fever with pneumonia- neutropenia resolved  -Continue antibiotics  -Got a dose of Granix  -Blood culture no growth to date     Sepsis present on admission- improving   -With fever and tachycardia  -Blood cultures no growth to date     CLL on chemotherapy  -bendamustine rituxan   -oncology consult appreciated        COVID-19  -not hypoxic  -CRP elevated     Hypertension  -Monitor blood pressure and continue home meds     Hypothyroidism  -Resume home meds     Diabetes mellitus  -Sliding scale insulin  -On Metformin at home     Hyponatremia-sodium  Improving ( past work up + for possible SIADH ) -sodium fluctuation, consult nephrology   -Patient's initial work-up showed SIADH-like picture. Work-up now shows a component of polydipsia. -Start fluid restrictions     Syncope  - due to hyponatremia     Hypomagnesemia  -Replace and monitor     Coag negative staph bacteremia     Moderate aortic stenosis seen on echo? -out pt FU      Consults:     IP CONSULT TO INFECTIOUS DISEASES  IP CONSULT TO ONCOLOGY  IP CONSULT TO NEPHROLOGY        Disposition: home    Discharged Condition: Stable    Code Status: Full Code    Activity: activity as tolerated    Diet: regular diet, fluid restriction of 1.2 L               Labs:  For convenience and continuity at follow-up the following most recent labs are provided:    CBC:   Lab Results   Component Value Date    WBC 12.8 02/04/2022    HGB 11.8 02/04/2022    HCT 34.3 02/04/2022     02/04/2022       RENAL:   Lab Results   Component Value Date     02/04/2022    K 3.7 02/04/2022    K 3.5 01/30/2022    CL 91 02/04/2022    CO2 25 02/04/2022    BUN 7 02/04/2022    CREATININE <0.5 02/04/2022           Discharge Medications:   Current Discharge Medication List           Details   magnesium 200 MG TABS tablet Take 1 tablet by mouth daily  Qty: 30 tablet, Refills: 1              Details   sodium chloride 1 g tablet Take 1 g by mouth 3 times daily      metoprolol tartrate (LOPRESSOR) 25 MG tablet Take 50 mg by mouth 2 times daily       amLODIPine (NORVASC) 5 MG tablet Take 5 mg by mouth daily      Cranberry 1000 MG CAPS Take 1 capsule by mouth 2 times daily. Qty: 30 capsule, Refills: 1      metFORMIN (GLUCOPHAGE) 500 MG tablet Take 500 mg by mouth 2 times daily (with meals). Ferrous Sulfate (IRON) 325 (65 FE) MG TABS Take 1 tablet by mouth every evening. Ascorbic Acid (VITAMIN C) 500 MG CAPS Take 1 tablet by mouth daily       Lactobacillus (PROBIOTIC ACIDOPHILUS PO) Take 1 capsule by mouth 2 times daily. levothyroxine (SYNTHROID) 75 MCG tablet Take 75 mcg by mouth daily              Future Appointments   Date Time Provider Quentin Romo   6/13/2022 10:00 AM [facility]       Time Spent on discharge is more than 30 minutes in the examination, evaluation, counseling and review of medications and discharge plan. Signed:  Lalit Mishra MD   2/4/2022    The note was completed using EMR. Every effort was made to ensure accuracy; however, inadvertent computerized transcription errors may be present. Thank you FAY Randolph CNP for the opportunity to be involved in this patient's care. If you have any questions or concerns please feel free to contact me at [phone].

## 2022-02-04 NOTE — CARE COORDINATION
CASE MANAGEMENT DISCHARGE SUMMARY:  Spoke to patient via telephone due to isolation status. Per chart review, patient on room air. Patient reports son and spouse to transport home. Patient reports no discharge needs. DISCHARGE DATE: 2/4/2022    DISCHARGED TO: Home with spouse     TRANSPORTATION: son and spouse              TIME: Patient to coordinate with RN after medication completed.      MARSHALL Corona, JEANNE, Social Work/Case Management   [phone]  Electronically signed by MARSHALL Corona, JEANNE on 2/4/2022 at 11:39 AM

## 2022-02-04 NOTE — PROGRESS NOTES
Progress Note  Admit Date: 1/30/2022      PCP: FAY Rodrigez CNP     CC: F/U for neutropenic fever    Days in hospital:  4    SUBJECTIVE / Interval History:  Pt feels ok, no complaints   No fever       Allergies  Patient has no known allergies. Medications    Scheduled Meds:   sodium chloride  1 g Oral TID WC    cefepime  2,000 mg IntraVENous Q12H    insulin lispro  0-12 Units SubCUTAneous TID WC    insulin lispro  0-6 Units SubCUTAneous Nightly    amLODIPine  5 mg Oral Daily    levothyroxine  75 mcg Oral Daily    metoprolol tartrate  50 mg Oral BID    sodium chloride flush  5-40 mL IntraVENous 2 times per day    enoxaparin  30 mg SubCUTAneous BID    albuterol sulfate HFA  2 puff Inhalation BID    And    ipratropium  2 puff Inhalation BID    guaiFENesin  600 mg Oral BID     Continuous Infusions:   sodium chloride 25 mL (02/03/22 2322)       PRN Meds:  sodium chloride flush, sodium chloride, acetaminophen **OR** acetaminophen, ondansetron, guaiFENesin-dextromethorphan, albuterol sulfate HFA **AND** ipratropium, benzonatate    Vitals    BP (!) 175/78   Pulse 99   Temp 98.4 °F (36.9 °C) (Oral)   Resp 16   Ht 5' 6\" (1.676 m)   Wt 180 lb 12.4 oz (82 kg)   SpO2 94%   BMI 29.18 kg/m²     Exam:    Gen: No distress. Eyes: PERRL. No sclera icterus. No conjunctival injection. ENT: No discharge. Pharynx clear. External appearance of ears and nose normal.  Neck: Trachea midline. No obvious mass. Resp: No accessory muscle use. No crackles. No wheezes. No rhonchi. No dullness on percussion. CV: Regular rate. Regular rhythm. No murmur or rub. No edema. GI: Non-tender. Non-distended. No hernia. Skin: Warm, dry, normal texture and turgor. No nodule on exposed extremities. Lymph: No cervical LAD. No supraclavicular LAD. M/S: No cyanosis. No clubbing. No joint deformity. Neuro: Moves all four extremities. CN 2-12 tested, no defect noted. Psych: Oriented x 3. No anxiety. Awake. Alert. Intact judgement and insight. Data    LABS  CBC:   Recent Labs     02/02/22  0620 02/03/22  0554 02/04/22  0614   WBC 2.5* 11.4* 12.8*   HGB 10.7* 12.6 11.8*   HCT 29.9* 37.2 34.3*   MCV 82.6 85.0 84.2    275 268     BMP:   Recent Labs     02/03/22  1533 02/03/22  2334 02/04/22  0614   * 129* 130*   K 3.6 3.2* 3.7   CL 89* 89* 91*   CO2 28 30 25   PHOS 2.5 2.8 3.0   BUN 7 9 7   CREATININE 0.6 <0.5* <0.5*   GLUCOSE 116* 119* 102*     POC GLUCOSE:    Recent Labs     02/03/22  0736 02/03/22  1123 02/03/22  1724 02/03/22  2039 02/04/22  0804   POCGLU 105* 165* 125* 166* 111*     LIVER PROFILE:   Recent Labs     02/02/22  0620 02/02/22  0620 02/03/22  0554 02/03/22  0554 02/03/22  1533 02/03/22  2334 02/04/22  0614   AST 15  --  24  --   --   --   --    ALT 15  --  19  --   --   --   --    LABALBU 3.1*   < > 2.9*   < > 3.1* 3.3* 3.0*   BILITOT 0.4  --  0.4  --   --   --   --    ALKPHOS 64  --  87  --   --   --   --     < > = values in this interval not displayed. PT/INR: No results for input(s): PROTIME, INR in the last 72 hours. APTT: No results for input(s): APTT in the last 72 hours. UA:  No results for input(s): NITRITE, COLORU, PHUR, LABCAST, WBCUA, RBCUA, MUCUS, TRICHOMONAS, YEAST, BACTERIA, CLARITYU, SPECGRAV, LEUKOCYTESUR, UROBILINOGEN, BILIRUBINUR, BLOODU, GLUCOSEU, KETUA, AMORPHOUS in the last 72 hours. Microbiology:  Wound Culture:   No results for input(s): WNDABS, ORG in the last 72 hours. Invalid input(s):  LABGRAM  Nasal Culture:   Recent Labs     02/01/22  1040   MRSAPCR Negative  MRSA DNA not detected. Normal Range: Not detected       Blood Culture:   No results for input(s): BC, BLOODCULT2 in the last 72 hours. Fungal Culture:   No results for input(s): FUNGSM in the last 72 hours. No results for input(s): FUNCXBLD in the last 72 hours.   CSF Culture:  No results for input(s): COLORCSF, APPEARCSF, CFTUBE, CLOTCSF, WBCCSF, RBCCSF, NEUTCSF, NUMCELLSCSF, LYMPHSCSF, MONOCSF, GLUCCSF, VOLCSF in the last 72 hours. Respiratory Culture:  No results for input(s): Monna Look in the last 72 hours. AFB:No results for input(s): AFBSMEAR in the last 72 hours. Urine Culture  No results for input(s): LABURIN in the last 72 hours. RADIOLOGY:    CT CHEST PULMONARY EMBOLISM W CONTRAST   Final Result   No acute or chronic pulmonary embolism. Lower lobe infiltrates bilaterally and left perihilar infiltrates concerning   for pneumonia. XR CHEST PORTABLE   Final Result   Nonspecific subsegmental left basilar opacity. CONSULTS:    IP CONSULT TO INFECTIOUS DISEASES  IP CONSULT TO ONCOLOGY  IP CONSULT TO NEPHROLOGY    ASSESSMENT AND PLAN:      Active Problems:    Hyponatremia    UTI (urinary tract infection)    CLL (chronic lymphocytic leukemia) ([address]    Pneumonia due to COVID-19 virus    Pneumonia    Neutropenic fever ([address]    Sepsis ([address]  Resolved Problems:    * No resolved hospital problems. *    Patient is a 49-year-old woman with a past medical history of CLL on chemotherapy who presented with syncope. She was found to have Covid along with neutropenic fever. Chest x-ray shows left lower lobe pneumonia. Patient was treated for neutropenic fever with broad-spectrum antibiotics. She got Granix with improvement of count. Patient did have Covid but was not hypoxic. In the hospital was found to have hyponatremia. Patient does have chronic hyponatremia due to SIADH, poor p.o. intake and polydipsia. Patient has been counseled at the time of discharge to limit her fluids.     Neutropenic fever with pneumonia- neutropenia resolved  -Continue antibiotics  -Got a dose of Granix  -Blood culture no growth to date    Sepsis present on admission- improving   -With fever and tachycardia  -Blood cultures no growth to date    CLL on chemotherapy  -bendamustine rituxan   -oncology consult appreciated      COVID-19  -not hypoxic  -CRP elevated    Hypertension  -Monitor blood pressure and continue home meds    Hypothyroidism  -Resume home meds    Diabetes mellitus  -Sliding scale insulin  -On Metformin at home    Hyponatremia-sodium  Improving ( past work up + for possible SIADH ) -sodium fluctuation, consult nephrology   -Patient's initial work-up showed SIADH-like picture. Work-up now shows a component of polydipsia. -Start fluid restrictions    Syncope  - due to hyponatremia    Hypomagnesemia  -Replace and monitor    Coag negative staph bacteremia    Moderate aortic stenosis seen on echo? -out pt FU    DVT Prophylaxis: Lovenox  Diet: ADULT DIET; Regular; 4 carb choices (60 gm/meal); 1200 ml  Code Status: Full Code    PT/OT Eval Status: Ordered    Discharge plan -today    The patient and / or the family were informed of the results of any tests, a time was given to answer questions, a plan was proposed and they agreed with plan. Discussed with consulting physicians, nursing and social work     The note was completed using EMR. Every effort was made to ensure accuracy; however, inadvertent computerized transcription errors may be present.        Carline Gallegos MD

## 2022-02-04 NOTE — PROGRESS NOTES
Physical Therapy  Facility/Department: [address] PROGRESSIVE CARE  Daily Treatment Note  NAME: Kimi Sheriff  : 1945  MRN: [id number]    Date of Service: 2022    Discharge Recommendations:  Patient would benefit from continued therapy after discharge,Home with assist PRN        Assessment   Body structures, Functions, Activity limitations: Decreased functional mobility ; Decreased endurance  Assessment: Pt able to ambulate Mod I in room while managing IV pole, no LOB. HR stable. She was minimally SOB. PT continues to anticipate safe return home with prn assist from family. No home PT needed. She is now discharged from acute PT.  Kimi Sheriff scored a 23/24 on the AM-PAC short mobility form. If patient discharges prior to next session this note will serve as a discharge summary. Please see below for the latest assessment towards goals. Specific instructions for Next Treatment: Improve strength, balance, endurance        Patient Diagnosis(es): The primary encounter diagnosis was COVID-19. Diagnoses of Chemotherapy-induced neutropenia ([facility] Pneumonia of left lower lobe due to infectious organism, and Hypomagnesemia were also pertinent to this visit. has a past medical history of Acid reflux, Arthritis, Cancer ([address] Chronic lymphocytic leukemia (CLL) genetic mutation variant ([facility] [facility] Diabetes mellitus ([address] High blood pressure, Hyperlipidemia, Multiple drug resistant organism (MDRO) culture positive, PONV (postoperative nausea and vomiting), Thyroid disease, UTI (urinary tract infection), and Wears glasses. has a past surgical history that includes Cholecystectomy; Hysterectomy; Thyroid surgery (Left); cyst removal (Right); eye surgery; joint replacement (Left); joint replacement (Right, 2019); Total knee arthroplasty (Right, 2019); Nasal sinus surgery (N/A, 9/10/2021); and laryngoscopy (N/A, 9/10/2021).     Restrictions  Restrictions/Precautions  Restrictions/Precautions: Fall Risk,Isolation,Contact Precautions (Droplet)     Subjective : Pt agreeable to session. Does not feel she needs any more therapy. Objective        Transfers  Sit to stand: (I)  Stand to sit: (I)    Ambulation  Ambulation?: Yes  Ambulation 1  Surface: level tile  Device: No Device (IV)  Assistance: Modified Independent  Quality of Gait: Fast pace, mildly flexed posture, no LOB, able to manage IV pole without difficulty. Distance: 100'    Other: Pt able to position herself for comfort in bedside chair at end of session. AM-PAC Score  AM-PAC Inpatient Mobility Raw Score : 23 (02/02/22 1350)  AM-PAC Inpatient T-Scale Score : 56.93 (02/02/22 1350)  Mobility Inpatient CMS 0-100% Score: 11.2 (02/02/22 1350)  Mobility Inpatient CMS G-Code Modifier : CI (02/02/22 1350)    Goals  Short term goals  Time Frame for Short term goals: In 2-3 days pt will perform  Short term goal 1: Bed mobility (I)  Short term goal 2: Transfers (I)  Short term goal 3: Ambulation 48' with cane, Mod I  Patient Goals   Patient goals : To return home    Plan    Plan  Times per week: 2-3x  Specific instructions for Next Treatment: Improve strength, balance, endurance  Current Treatment Recommendations: Strengthening,Balance Training,Endurance Training,Functional Mobility Training,Transfer [employer] Training,Safety Education & Training,Patient/Caregiver Education & Training,Equipment Evaluation, Education, & procurement  Safety Devices  Type of devices:  All fall risk precautions in place,Call light within reach,Left in chair,Gait belt,Nurse notified  Restraints  Initially in place: No     Therapy Time   Individual Concurrent Group Co-treatment   Time In  9901         Time Out  1115         Minutes  Natasha 198, PT    Electronically signed by Tenisha James, PT [id number] on 2/4/2022 at 11:15 AM

## 2022-02-04 NOTE — PLAN OF CARE
Problem: Airway Clearance - Ineffective  Goal: Achieve or maintain patent airway  Outcome: Ongoing     Problem: Gas Exchange - Impaired  Goal: Absence of hypoxia  Outcome: Ongoing  Goal: Promote optimal lung function  Outcome: Ongoing     Problem: Breathing Pattern - Ineffective  Goal: Ability to achieve and maintain a regular respiratory rate  Outcome: Ongoing     Problem: Body Temperature -  Risk of, Imbalanced  Goal: Ability to maintain a body temperature within defined limits  Outcome: Ongoing  Goal: Will regain or maintain usual level of consciousness  Outcome: Ongoing  Goal: Complications related to the disease process, condition or treatment will be avoided or minimized  Outcome: Ongoing     Problem: Isolation Precautions - Risk of Spread of Infection  Goal: Prevent transmission of infection  Outcome: Ongoing     Problem: Nutrition Deficits  Goal: Optimize nutritional status  Outcome: Ongoing     Problem: Risk for Fluid Volume Deficit  Goal: Maintain normal heart rhythm  Outcome: Ongoing  Goal: Maintain absence of muscle cramping  Outcome: Ongoing  Goal: Maintain normal serum potassium, sodium, calcium, phosphorus, and pH  Outcome: Ongoing     Problem: Loneliness or Risk for Loneliness  Goal: Demonstrate positive use of time alone when socialization is not possible  Outcome: Ongoing     Problem: Fatigue  Goal: Verbalize increase energy and improved vitality  Outcome: Ongoing     Problem: Patient Education: Go to Patient Education Activity  Goal: Patient/Family Education  Outcome: Ongoing     Problem: Falls - Risk of:  Goal: Will remain free from falls  Description: Will remain free from falls  Outcome: Ongoing  Patient assessed for fall risk; fall precautions initiated. Patient and family instructed about safety devices. Environment kept free of clutter and adequate lighting provided. Bed locked and in lowest position. Call light within reach. Will continue to monitor.     Goal: Absence of physical injury  Description: Absence of physical injury  Outcome: Ongoing

## 2022-02-04 NOTE — PROGRESS NOTES
Hematology Oncology Daily Progress Note    Admit Date: 1/30/2022  Hospital day several    Subjective:     Patient has complaints of improved weakness--denies sob/cp. .   Medication side effects: none    Scheduled Meds:   magnesium sulfate  4,000 mg IntraVENous Once    sodium chloride  1 g Oral TID WC    insulin lispro  0-12 Units SubCUTAneous TID WC    insulin lispro  0-6 Units SubCUTAneous Nightly    amLODIPine  5 mg Oral Daily    levothyroxine  75 mcg Oral Daily    metoprolol tartrate  50 mg Oral BID    sodium chloride flush  5-40 mL IntraVENous 2 times per day    enoxaparin  30 mg SubCUTAneous BID    albuterol sulfate HFA  2 puff Inhalation BID    And    ipratropium  2 puff Inhalation BID    guaiFENesin  600 mg Oral BID     Continuous Infusions:   sodium chloride 25 mL (02/04/22 0942)     PRN Meds:sodium chloride flush, sodium chloride, acetaminophen **OR** acetaminophen, ondansetron, guaiFENesin-dextromethorphan, albuterol sulfate HFA **AND** ipratropium, benzonatate    Review of Systems  Pertinent items are noted in HPI. REVIEW OF SYSTEMS:         · Constitutional: Denies fever, sweats, weight loss     · Eyes: No visual changes or diplopia. No scleral icterus. · ENT: No Headaches, hearing loss or vertigo. No mouth sores or sore throat. · Cardiovascular: No chest pain, dyspnea on exertion, palpitations or loss of consciousness. · Respiratory: No cough or wheezing, no sputum production. No hemoptysis. .    · Gastrointestinal: No abdominal pain, appetite loss, blood in stools. No change in bowel habits. · Genitourinary: No dysuria, trouble voiding, or hematuria. · Musculoskeletal:  Generalized weakness. No joint complaints. · Integumentary: No rash or pruritis. · Neurological: No headache, diplopia. No change in gait, balance, or coordination. No paresthesias. · Endocrine: No temperature intolerance. No excessive thirst, fluid intake, or urination.    · Hematologic/Lymphatic: No abnormal bruising or ecchymoses, blood clots or swollen lymph nodes. · Allergic/Immunologic: No nasal congestion or hives. ·     Objective:     Patient Vitals for the past 8 hrs:   BP Temp Temp src Pulse Resp SpO2   02/04/22 0911 -- -- -- -- -- 95 %   02/04/22 0717 (!) 175/78 98.4 °F (36.9 °C) Oral 99 16 94 %     No intake/output data recorded. No intake/output data recorded. BP (!) 175/78   Pulse 99   Temp 98.4 °F (36.9 °C) (Oral)   Resp 16   Ht 5' 6\" (1.676 m)   Wt 180 lb 12.4 oz (82 kg)   SpO2 95%   BMI 29.18 kg/m²     General Appearance:    Alert, cooperative, no distress, appears stated age   Head:    Normocephalic, without obvious abnormality, atraumatic   Eyes:    PERRL, conjunctiva/corneas clear, EOM's intact, fundi     benign, both eyes        Ears:    Normal TM's and external ear canals, both ears   Nose:   Nares normal, septum midline, mucosa normal, no drainage    or sinus tenderness   Throat:   Lips, mucosa, and tongue normal; teeth and gums normal   Neck:   Supple, symmetrical, trachea midline, no adenopathy;        thyroid:  No enlargement/tenderness/nodules; no carotid    bruit or JVD   Back:     Symmetric, no curvature, ROM normal, no CVA tenderness   Lungs:     Clear to auscultation bilaterally, respirations unlabored   Chest wall:    No tenderness or deformity   Heart:    Regular rate and rhythm, S1 and S2 normal, no murmur, rub   or gallop   Abdomen:     Soft, non-tender, bowel sounds active all four quadrants,     no masses, no organomegaly           Extremities:   Extremities normal, atraumatic, no cyanosis or edema   Pulses:   2+ and symmetric all extremities   Skin:   Skin color, texture, turgor normal, no rashes or lesions   Lymph nodes:   Cervical, supraclavicular, and axillary nodes normal   Neurologic:   CNII-XII intact.  Normal strength, sensation and reflexes       throughout         Data Review  CBC:   Lab Results   Component Value Date    WBC 12.8 02/04/2022    RBC 4.07 02/04/2022 Assessment:     Active Problems:    Hyponatremia    UTI (urinary tract infection)    CLL (chronic lymphocytic leukemia) (HCC)    Pneumonia due to COVID-19 virus    Pneumonia    Neutropenic fever (HCC)    Sepsis ([facility] [address]  Resolved Problems:    * No resolved hospital problems. *      Plan:     1. CLL--s/p bendamustine/rituxan. Follows with Dr. Edwin Singh. 2. ID--AF    3.  Neutropenic fever--resolved    OK to [employer]        Electronically signed by Sal Gooden MD on 2/4/2022 at 12:29 PM

## 2022-02-07 ENCOUNTER — CARE COORDINATION (OUTPATIENT)
Dept: CASE MANAGEMENT | Age: 77
End: 2022-02-07

## 2022-02-07 NOTE — CARE COORDINATION
Transitions of Care Call  Call within 2 business days of discharge: Yes    Patient: Rosemary Goldsmith Patient : 1945   MRN: [id number]  Reason for Admission: Cinda  Discharge Date: 22 RARS: Readmission Risk Score: 10.9 ( )      Last Discharge [facility]       Complaint Diagnosis Description Type Department Provider    22 Loss of Consciousness COVID-19 . .. ED to Hosp-Admission (Discharged) (ADMITTED) CORTES BishopN Sandip Melton MD; Jennifer Bruner All. .. Was this an external facility discharge? No Discharge Facility: N/A    Challenges to be reviewed by the provider   Additional needs identified to be addressed with provider: No                   Encounter was not routed to provider for escalation. Method of communication with provider: none. Discussed COVID-19 related testing which was: available at this time. Test results were: positive. Patient informed of results, if available? Yes. Cain Gomez states she did not receive the COVID vaccine. Current Symptoms: no new symptoms and no worsening symptoms    Reviewed New or Changed Meds: yes    Do you have what you need at home?  Durable Medical Equipment ordered at discharge: None   Home Health/Outpatient orders at discharge: none   Was patient discharged with a pulse oximeter? No Discussed and confirmed pulse oximeter discharge instructions and when to notify provider or seek emergency care. Patient education provided: Reviewed appropriate site of care based on symptoms and resources available to patient including: PCP and When to call 911. Follow up appointment scheduled within 7 days of discharge: no. If no appointment scheduled, scheduling offered: Cain Gomez states she will contact her PCP and schedule an appointment for later this week.   Future Appointments   Date Time Provider Quentin Romo   2022 10:00 AM FOREIGN Mijares       Interventions: Obtained and reviewed discharge summary and/or continuity of care documents  Reviewed discharge instructions, medical action plan and red flags with patient who verbalized understanding. Provided contact information for future needs. Chio Ramirez states she is \"fine\". She verbalizes understanding of her instruction to restrict/monitor her fluid intake daily. Chio Ramirez denies any fever or LOC since she returned home. She denies any needs at this time. No additional CTN outreach - Non [facility] PCP.     Aneudy Roland RN BSN  Care Transition Nurse  [phone]

## 2022-03-02 PROBLEM — N39.0 UTI (URINARY TRACT INFECTION): Status: RESOLVED | Noted: 2021-01-25 | Resolved: 2022-03-02

## 2022-03-28 ENCOUNTER — HOSPITAL ENCOUNTER (OUTPATIENT)
Dept: CT IMAGING | Age: 77
Discharge: HOME | End: 2022-03-28

## 2022-03-28 DIAGNOSIS — C91.10 CLL (CHRONIC LYMPHOCYTIC LEUKEMIA) (HCC): ICD-10-CM

## 2022-03-28 LAB
GFR AFRICAN AMERICAN: >60
GFR NON-AFRICAN AMERICAN: >60
PERFORMED ON: NORMAL
POC CREATININE: 0.9 MG/DL (ref 0.6–1.2)
POC SAMPLE TYPE: NORMAL

## 2022-03-28 PROCEDURE — 6360000004 HC RX CONTRAST MEDICATION: Performed by: INTERNAL MEDICINE

## 2022-03-28 PROCEDURE — 74177 CT ABD & PELVIS W/CONTRAST: CPT

## 2022-03-28 PROCEDURE — 70491 CT SOFT TISSUE NECK W/DYE: CPT

## 2022-03-28 PROCEDURE — 82565 ASSAY OF CREATININE: CPT

## 2022-03-28 PROCEDURE — 70487 CT MAXILLOFACIAL W/DYE: CPT

## 2022-03-28 RX ADMIN — IOPAMIDOL 75 ML: 755 INJECTION, SOLUTION INTRAVENOUS at 07:39

## 2022-03-28 RX ADMIN — IOHEXOL 50 ML: 240 INJECTION, SOLUTION INTRATHECAL; INTRAVASCULAR; INTRAVENOUS; ORAL at 07:39

## 2022-06-28 ENCOUNTER — OFFICE VISIT (OUTPATIENT)
Dept: ORTHOPEDIC SURGERY | Age: 77
End: 2022-06-28

## 2022-06-28 VITALS — HEIGHT: 66 IN | WEIGHT: 180 LBS | RESPIRATION RATE: 18 BRPM | BODY MASS INDEX: 28.93 KG/M2

## 2022-06-28 DIAGNOSIS — M25.551 RIGHT HIP PAIN: ICD-10-CM

## 2022-06-28 DIAGNOSIS — Z96.651 H/O TOTAL KNEE REPLACEMENT, RIGHT: ICD-10-CM

## 2022-06-28 DIAGNOSIS — M16.11 ARTHRITIS OF RIGHT HIP: ICD-10-CM

## 2022-06-28 DIAGNOSIS — Z96.642 H/O TOTAL HIP ARTHROPLASTY, LEFT: Primary | ICD-10-CM

## 2022-06-28 PROCEDURE — 1124F ACP DISCUSS-NO DSCNMKR DOCD: CPT | Performed by: PHYSICIAN ASSISTANT

## 2022-06-28 PROCEDURE — 99213 OFFICE O/P EST LOW 20 MIN: CPT | Performed by: PHYSICIAN ASSISTANT

## 2022-06-29 PROBLEM — M16.11 ARTHRITIS OF RIGHT HIP: Status: ACTIVE | Noted: 2022-06-29

## 2022-06-29 NOTE — PROGRESS NOTES
Subjective:      Patient ID: Joel Rinaldi is a 76 y.o. female who is here for follow up. She has a history of right knee arthroplasty 1/30/2019, left hip arthroplasty 4/7/2015. She has right groin and right buttock pain which is progressively worsening. She also has discomfort that radiates down her right thigh to her knee. No recent history of injury or fall. Review Of Systems:   Negative for fever or chills. Negative for numbness or tingling. I have reviewed the clinically relevant past medical history, medications, allergies, family history, social history, and 13 point Review of Systems from the patient's recent history form & documented any details relevant to today's presenting complaints in the history above. The patient's self-reported past medical history, medications, allergies, family history, social history, and Review of Systems form from today's date have been scanned into the chart under the \"Media\" tab.          Past Medical History:   Diagnosis Date    Acid reflux     Arthritis     [facility]     Chronic lymphocytic leukemia (CLL) genetic mutation variant ([facility] 2018    Diabetes mellitus ([facility]     BORDERLINE    High blood pressure     Hyperlipidemia     mild-no meds    Multiple drug resistant organism (MDRO) culture positive 01/25/2021    urine    PONV (postoperative nausea and vomiting)     after hysterectomy-got sick next morning    Thyroid disease     UTI (urinary tract infection) 01/2019    currently on cipro    Wears glasses        Family History   Problem Relation Age of Onset    High Blood Pressure Mother     Heart Disease Mother     Cancer Father         acute leukemia       Past Surgical History:   Procedure Laterality Date    CHOLECYSTECTOMY      CYST REMOVAL Right     on foot    EYE SURGERY      cataract removal with lens implants    HYSTERECTOMY (CERVIX STATUS UNKNOWN)      JOINT REPLACEMENT Left     hip    JOINT REPLACEMENT Right 01/30/2019      Right Total Knee Replacement    LARYNGOSCOPY N/A 9/10/2021    DIRECT LARYNGOSCOPY WITH BIOPSY performed by Franklyn Mcgrath MD at [facility] N/A 9/10/2021    NASAL ENDOSCOPY WITH BIOPSY performed by Franklyn Mcgrath MD at [address] Left     TOTAL KNEE ARTHROPLASTY Right 1/30/2019    RIGHT TOTAL KNEE REPLACEMENT performed by Jessica Calzada MD at [address] History     Occupational History    Occupation: Light house work   Tobacco Use    Smoking status: Never Smoker    Smokeless tobacco: Never Used   Vaping Use    Vaping Use: Never used   Substance and Sexual Activity    Alcohol use: No    Drug use: Never    Sexual activity: Never       Current Outpatient Medications   Medication Sig Dispense Refill    magnesium 200 MG TABS tablet Take 1 tablet by mouth daily 30 tablet 1    sodium chloride 1 g tablet Take 1 g by mouth 3 times daily      metoprolol tartrate (LOPRESSOR) 25 MG tablet Take 50 mg by mouth 2 times daily       amLODIPine (NORVASC) 5 MG tablet Take 5 mg by mouth daily      Cranberry 1000 MG CAPS Take 1 capsule by mouth 2 times daily. 30 capsule 1    metFORMIN (GLUCOPHAGE) 500 MG tablet Take 500 mg by mouth 2 times daily (with meals).  Ferrous Sulfate (IRON) 325 (65 FE) MG TABS Take 1 tablet by mouth every evening.  Ascorbic Acid (VITAMIN C) 500 MG CAPS Take 1 tablet by mouth daily       Lactobacillus (PROBIOTIC ACIDOPHILUS PO) Take 1 capsule by mouth 2 times daily.  levothyroxine (SYNTHROID) 75 MCG tablet Take 75 mcg by mouth daily        No current facility-administered medications for this visit. Objective:     She is alert, oriented x 3, pleasant, well nourished, developed and in no   acute distress. Resp 18   Ht 5' 6\" (1.676 m)   Wt 180 lb (81.6 kg)   BMI 29.05 kg/m²      Examination of the right knee: Inspection of the skin demonstrates a well-healed midline incision.   Inspection of the soft tissues without significant swelling or erythema. The overall alignment of the knee is neutral.  There is minimal intra-articular effusion. AROM     Extension 0     Flexion  120   There no pain associated with ROM testing. Pes anserine bursa non-tender to palpation. Patellar tendon non-tender to palpation. Quadriceps tendon non-tender to palpation. Collateral ligaments non-tender to palpation. Popliteal fossa non-tender to palpation. Retro patellar crepitus is not present. There is no instability with varus/valgus stress testing in full extension or 90 degrees of flexion. There is no instability with anterior drawer testing. Extensor Mechanism is intact. Examination of the left hip:  No pain with active ROM. No pain with passive ROM. No instability, crepitus with ROM. No pain with weight bearing. Gait is mildly Trendelenburg. Examination of the right hip shows:  No discoloration, wounds or gross deformity. Greater trochanter/bursa palpation is mildly tender. Anterior superior iliac spine is nontender. Ischial tuberosity is nontender. Sacroiliac joint is nontender. ROM:  -Flexion 120                      (Nml 120-135 degrees)  -Extension 10                  (Nml 20-30 degrees)  -Abduction 30                  (Nml 40-50 degrees)  -Internal rotation 20         (Nml 30 degrees)  -External rotation 30        (Nml 50 degrees)    [facility] resisted hip flexion test positive. Gait ( Nml, Antalgic, Trendelenberg)-antalgic. Lower extremities:  She has 5/5 motor strength of bilateral lower extremities. She has a negative straight leg raise, bilaterally. Deep tendon reflexes at knees and achilles are 2+. Sensation is intact to light touch L3 to S1 bilaterally. She has no clonus. Examination of the lower extremities shows intact perfusion to both lower extremities. She has no cyanosis and digigts are warm to touch, capillary refill is less than 2 seconds. She has no edema noted.      She has intact skin without lacerations or abrasions, no significant erythema, rashes or skin lesions. X Rays: performed in the office today:   AP Standing, Lateral and Sunrise Right Knee: There is a right cemented total knee arthroplasty present. The alignment is satisfactory. There are no signs of failure or loosening. AP Pelvis, AP and Frog Leg Lateral of the right and  left hip:   There is a left total hip arthroplasty present. The alignment is satisfactory. There are no signs of failure, dislocation or loosening. Moderate central joint space narrowing right hip with early sclerosis. Minimal periarticular osteophytes. Diagnosis:       ICD-10-CM    1. H/O total hip arthroplasty, left  Z96.642 XR HIP 3-4 VW W PELVIS BILATERAL   2. H/O total knee replacement, right  Z96.651 XR KNEE RIGHT (3 VIEWS)   3. Right hip pain  M25.551 XR HIP 3-4 VW W PELVIS BILATERAL   4. Arthritis of right hip  M16.11         Assessment and Plan:       Assessment:  Clinically and radiographically stable right knee arthroplasty, left hip arthroplasty. Right hip arthritis with intermittent mild to moderate and progressively worsening right hip pain. She has at times stumbled or lost her balance due to right hip pain. She is utilizing a cane to assist with ambulation to prevent falls. I had an extensive discussion with Ms. Jonah Pena regarding the natural history, etiology, and long term consequences of her condition. I have presented reasonable alternatives to the patient's proposed care, treatment, and services. Risks and benefits of the treatment options also reviewed in detail. I have outlined a treatment plan with them. She has had full opportunity to ask her questions. I have answered them all to her satisfaction. I feel that Ms. Jonah Pena understands our discussion today.      Plan:     Discussed at length conservative treatment of hip symptoms/arthritis, according to AAOS Clinical Practice Guidelines:  1) Recommend oral or topical NSAIDs to reduce pain and swelling. 2)  Recommend acetaminophen to reduce pain. 3)  Oral narcotics, including tramadol, result in a significant increase of adverse events and are not effective at improving pain or function for treatment of osteoarthritis of the knee. 4)  Recommend maintaining weight in a healthy range to reduce stresses on the knee, particularly the patellofemoral compartment which sees up to 6x body weight. 5)  Home exercise program, focusing on strengthening, low impact aerobic exercises, and neuromuscular education. 6)  Intra-articular corticosteroid injections are an option. Informed patient corticosteroid injections can be performed every 3-4 months as needed. 7)  Cane use can improve pain and function. Although not specifically listed in the CPGs, ice therapy and topical patches such as Salonpas are good options as well. Surgical option, hip arthroplasty discussed. At this time she is ready to proceed with right hip arthroplasty late October. She will be scheduled with Lizzy Mariee MD for right hip arthroplasty. She will follow-up with Dr. Umesh Dejesus in a preop appointment. Follow up:  Call or return to clinic if these symptoms worsen or fail to improve as anticipated. The total time spent on today's visit including reviewing test results, history, performance of physical exam, counseling/ education, ordering of medications, tests or procedures was 28 minutes. This time does not include completion of the medical record. This time excludes any time spent performing procedures or tests in the office. Melina [facility] DA[facility]   Senior Physician Assistant   [facility]                                         Disclaimer: This note was generated with use of a verbal recognition program (DRAGON) and an attempt was made to check for errors.   It is possible that there are still dictated errors within this office note. If so, please bring any significant errors to my attention for an addendum. All efforts were made to ensure that this office note is accurate.

## 2022-06-30 ENCOUNTER — PREP FOR PROCEDURE (OUTPATIENT)
Dept: ORTHOPEDIC SURGERY | Age: 77
End: 2022-06-30

## 2022-06-30 DIAGNOSIS — M16.11 ARTHRITIS OF RIGHT HIP: Primary | ICD-10-CM

## 2022-07-28 ENCOUNTER — OFFICE VISIT (OUTPATIENT)
Dept: CARDIOLOGY CLINIC | Age: 77
End: 2022-07-28

## 2022-07-28 VITALS
HEIGHT: 66 IN | OXYGEN SATURATION: 99 % | HEART RATE: 68 BPM | SYSTOLIC BLOOD PRESSURE: 138 MMHG | DIASTOLIC BLOOD PRESSURE: 62 MMHG | WEIGHT: 193.2 LBS | BODY MASS INDEX: 31.05 KG/M2

## 2022-07-28 DIAGNOSIS — I10 ESSENTIAL HYPERTENSION: ICD-10-CM

## 2022-07-28 DIAGNOSIS — I35.0 NON-RHEUMATIC AORTIC STENOSIS: Primary | ICD-10-CM

## 2022-07-28 PROCEDURE — 93000 ELECTROCARDIOGRAM COMPLETE: CPT | Performed by: INTERNAL MEDICINE

## 2022-07-28 PROCEDURE — 99214 OFFICE O/P EST MOD 30 MIN: CPT | Performed by: INTERNAL MEDICINE

## 2022-07-28 PROCEDURE — 1124F ACP DISCUSS-NO DSCNMKR DOCD: CPT | Performed by: INTERNAL MEDICINE

## 2022-07-28 RX ORDER — VITAMIN B COMPLEX
1 CAPSULE ORAL 2 TIMES DAILY
COMMUNITY

## 2022-07-28 RX ORDER — MULTIVIT-MIN/IRON/FOLIC ACID/K 18-600-40
CAPSULE ORAL DAILY
COMMUNITY

## 2022-07-28 NOTE — PROGRESS NOTES
[address]  Cardiology Consult    Josafat Mcqueen  1945 July 28, 2022      Reason for Referral: Aortic Stenosis    Referring physician: Renetta Vazquez     CC: \"I am here to discuss my heart valve. \"      Subjective:     History of Present Illness:    Josafat Mcqueen is a 76 y.o. patient with a PMH significant for diabetes, hypertension, hyperlipidemia, and aortic stenosis. Today, she is here to discuss aortic stenosis. She follows with Dr Cheyanne Hernandez for CLL. She states that she does notice that she cannot do as much as she could. She does have to move a little slower. She is planning to have right hip replacement on 10/25/2022. Patient denies exertional chest pain/pressure, dyspnea at rest, PND, orthopnea, palpitations, lightheadedness, weight changes, changes in LE edema, and syncope. Patient reports compliance to her medications. Past Medical History:   has a past medical history of Acid reflux, Arthritis, Cancer ([facility] Chronic lymphocytic leukemia (CLL) genetic mutation variant ([facility] Diabetes mellitus ([facility] High blood pressure, Hyperlipidemia, Multiple drug resistant organism (MDRO) culture positive, PONV (postoperative nausea and vomiting), Thyroid disease, UTI (urinary tract infection), and Wears glasses. Surgical History:   has a past surgical history that includes Cholecystectomy; Hysterectomy; Thyroid surgery (Left); cyst removal (Right); eye surgery; joint replacement (Left); joint replacement (Right, 01/30/2019); Total knee arthroplasty (Right, 1/30/2019); Nasal sinus surgery (N/A, 9/10/2021); and laryngoscopy (N/A, 9/10/2021). Social History:   reports that she has never smoked. She has never used smokeless tobacco. She reports that she does not drink alcohol and does not use drugs. Family History:  family history includes Cancer in her father; Heart Disease in her mother; High Blood Pressure in her mother.     Home Medications:  Were reviewed and are listed in nursing record and/or below  Prior to Admission medications    Medication Sig Start Date End Date Taking? Authorizing Provider   Cholecalciferol (VITAMIN D) 50 MCG (2000 UT) CAPS capsule Take by mouth daily   Yes Historical Provider, MD   b complex vitamins capsule Take 1 capsule by mouth in the morning and at bedtime   Yes Historical Provider, MD   magnesium 200 MG TABS tablet Take 1 tablet by mouth daily 2/4/22 7/28/22 Yes Daphne Hussein MD   sodium chloride 1 g tablet Take 1 g by mouth 3 times daily   Yes Historical Provider, MD   metoprolol tartrate (LOPRESSOR) 25 MG tablet Take 50 mg by mouth 2 times daily    Yes Historical Provider, MD   amLODIPine (NORVASC) 5 MG tablet Take 5 mg by mouth daily   Yes Historical Provider, MD   Cranberry 1000 MG CAPS Take 1 capsule by mouth 2 times daily. 4/8/15  Yes FAY Sandoval CNP   metFORMIN (GLUCOPHAGE) 500 MG tablet Take 500 mg by mouth 2 times daily (with meals). Yes Historical Provider, MD   Ferrous Sulfate (IRON) 325 (65 FE) MG TABS Take 1 tablet by mouth every evening. Yes Historical Provider, MD   Ascorbic Acid (VITAMIN C) 500 MG CAPS Take 1 tablet by mouth daily    Yes Historical Provider, MD   levothyroxine (SYNTHROID) 75 MCG tablet Take 75 mcg by mouth daily  12/30/14  Yes Historical Provider, MD        CURRENT Medications:  No current facility-administered medications for this visit. Allergies:  Patient has no known allergies. Review of Systems: SEE HPI   Constitutional: no unanticipated weight loss. There's been no change in energy level, sleep pattern, or activity level. No fevers, chills. Eyes: No visual changes or diplopia. No scleral icterus. ENT: No Headaches, hearing loss or vertigo. No mouth sores or sore throat. Cardiovascular: No Chest pain, tightness or discomfort. No Shortness of breath. No Dyspnea on exertion, Orthopnea, Paroxysmal nocturnal dyspnea or breathlessness at rest.  No Palpitations.   No Syncope ('blackouts', 'faints', 'collapse') or dizziness. Respiratory: No cough or wheezing, no sputum production. No hematemesis. Gastrointestinal: No abdominal pain, appetite loss, blood in stools. No change in bowel or bladder habits. Genitourinary: No dysuria, trouble voiding, or hematuria. Musculoskeletal:  No gait disturbance, no joint complaints. Integumentary: No rash or pruritis. Neurological: No headache, diplopia, change in muscle strength, numbness or tingling. Psychiatric: No anxiety or depression. Endocrine: No temperature intolerance. No excessive thirst, fluid intake, or urination. No tremor. Hematologic/Lymphatic: No abnormal bruising or bleeding, blood clots or swollen lymph nodes. Allergic/Immunologic: No nasal congestion or hives. Objective:     PHYSICAL EXAM:      Vitals:    07/28/22 1523   BP: 138/62   Pulse: 68   SpO2: 99%    Weight: 193 lb 3.2 oz (87.6 kg)       General Appearance:  Alert, cooperative, no distress, appears stated age. Head:  Normocephalic, without obvious abnormality, atraumatic. Eyes:  Pupils equal and round. No scleral icterus. Mouth: Moist mucosa, no pharyngeal erythema. Nose: Nares normal. No drainage or sinus tenderness. Neck: Supple, symmetrical, trachea midline. No adenopathy. No tenderness/mass/nodules. No carotid bruit or elevated JVD. Lungs:   Respiratory Effort: Normal   Auscultation: Clear to auscultation bilaterally, respirations unlabored. No wheeze, rales   Chest Wall:  No tenderness or deformity. Cardiovascular:    Pulses  Palpation: normal   Ascultation: Regular rate, S1/ S2 normal. No murmur, rub, or gallop. 2+ radial and pedal pulses, symmetric  Carotid  Femoral   Abdomen and Gastrointestinal:   Soft, non-tender, bowel sounds active. Liver and Spleen  Masses   Musculoskeletal: No muscle wasting  Back  Gait   Extremities: Extremities normal, atraumatic. No cyanosis or edema.  No cyanosis clubbing       Skin: Inspection and palpation performed, no rashes or lesions. Pysch: Normal mood and affect. Alert and oriented to time place person   Neurologic: Normal gross motor and sensory exam.       Labs     All labs have been reviewed    Lab Results   Component Value Date/Time    WBC 12.8 2022 06:14 AM    RBC 4.07 2022 06:14 AM    HGB 11.8 2022 06:14 AM    HCT 34.3 2022 06:14 AM    MCV 84.2 2022 06:14 AM    RDW 15.0 2022 06:14 AM     2022 06:14 AM     Lab Results   Component Value Date/Time     2022 06:14 AM    K 3.7 2022 06:14 AM    K 3.5 2022 09:23 PM    CL 91 2022 06:14 AM    CO2 25 2022 06:14 AM    BUN 7 2022 06:14 AM    CREATININE 0.9 2022 07:37 AM    CREATININE <0.5 2022 06:14 AM    GFRAA >60 2022 07:37 AM    AGRATIO 0.9 2022 05:54 AM    LABGLOM >60 2022 07:37 AM    GLUCOSE 102 2022 06:14 AM    PROT 6.0 2022 05:54 AM    CALCIUM 8.7 2022 06:14 AM    BILITOT 0.4 2022 05:54 AM    ALKPHOS 87 2022 05:54 AM    AST 24 2022 05:54 AM    ALT 19 2022 05:54 AM     No results found for: PTINR  Lab Results   Component Value Date    LABA1C 6.2 2021     Lab Results   Component Value Date    TROPONINI <0.01 2022       Cardiac, Vascular and Imaging Data all Personally Reviewed in Detail by Myself      EK2022 Sinus rhythm     Echocardiogram:   ECHO 2022  Ejection fraction is visually estimated to be 60-65%. No regional wall motion abnormalities are noted. Grade II diastoli  The left atrium is mildly dilated. Moderate aortic stenosis with a peak velocity of 3.4m/s and a mean pressure  gradient of 30mmHg. No evidence of aortic valve regurgitation. Normal right ventricular size. Right ventricular systolic function is normal.    Stress Test:     Cath:     Other imaging:     Assessment and Plan     Preoperative risk assessment  She is planning to have right hip replacement on 10/25/2022. Will repeat echocardiogram prior to making decision if she can proceed with the hip replacement. Aortic stenosis MOD-SEVERE   Moderate. Increased dyspnea with exertion. Murmur 3/6 SYSTOLIC on clinical exam. Repeat echocardiogram.     Hypertension  Controlled. Continue current medical management. Follow up as directed by TAVR coordinator. Thank you for allowing us to participate in the care of Carina Hollingsworth. Please do not hesitate to contact me if you have any questions. Afsaneh Herrera MD, MPH    [facility], [address] [place], [place] [address]ard Victor Manuel Jones [address]  Ph: [phone]  Fax: [phone]    This note was scribed in the presence of Dr Mayr Canas, by Leslee Alexander RN  Physician Attestation:  The scribes documentation has been prepared under my direction and personally reviewed by me in its entirety. I confirm that the note above accurately reflects all work, treatment, procedures, and medical decision making performed by me.

## 2022-08-26 ENCOUNTER — HOSPITAL ENCOUNTER (OUTPATIENT)
Dept: CARDIOLOGY | Age: 77
Discharge: HOME | End: 2022-08-26

## 2022-08-26 DIAGNOSIS — I35.0 NON-RHEUMATIC AORTIC STENOSIS: ICD-10-CM

## 2022-08-26 LAB
LV EF: 63 %
LVEF MODALITY: NORMAL

## 2022-08-26 PROCEDURE — 93306 TTE W/DOPPLER COMPLETE: CPT

## 2022-08-29 ENCOUNTER — TELEPHONE (OUTPATIENT)
Dept: CARDIOLOGY CLINIC | Age: 77
End: 2022-08-29

## 2022-08-29 NOTE — LETTER
Rachael Drop   1945    [facility] scheduled 12/7/2022 at 10:00  Arrive at 8:30      The morning of your procedure you will park in the hospital parking lot and report directly to the cath lab to check in.     Pre-Procedure Instructions   1. You will need to fast for at least 8 hours prior to procedure. No caffeine the morning of.   2. Hold all diabetic medications including, Metfomin. If you take Lantus/Levemir only take ½ your normal dose the evening before. All other medications can be taken in the morning with sips of water. 3. You will need to take 325 mg aspirin the morning of. If you are currently taking 81 mg please take 4 tablets that morning. 4. Do not use any lotions, creams or perfume the morning of procedure. 5. Pre-procedure lab work will need to be completed 5-7 days prior to procedure. 6. Please have a responsible adult to drive you home after procedure. We advise you have someone to stay with you for 24 hours following procedure for precautionary measures. Depending on procedure you may require an overnight stay. 7. Cath lab will provide you with all post procedure instructions. If you have any questions regarding the procedure itself or medications, please call [phone] and ask to speak with a nurse.

## 2022-08-29 NOTE — TELEPHONE ENCOUNTER
Per result note patient needs schedule for [facility] with valve study with dual access.

## 2022-09-01 NOTE — TELEPHONE ENCOUNTER
Patient called and I let her know that Dr Merlin Bartlett would like to do a LHC. She wants to know if she can get her hip surgery done. I let her know that I will talk to Dr Merlin Bartlett this afternoon and call her back. [phone].

## 2022-09-02 NOTE — TELEPHONE ENCOUNTER
Called and spoke to patient and let her know that Dr Davdi Urias states that she can proceed with the hip surgery if the surgeon is comfortable doing it prior to TAVR.

## 2022-09-07 ENCOUNTER — OFFICE VISIT (OUTPATIENT)
Dept: ENT CLINIC | Age: 77
End: 2022-09-07

## 2022-09-07 VITALS
SYSTOLIC BLOOD PRESSURE: 133 MMHG | HEART RATE: 64 BPM | DIASTOLIC BLOOD PRESSURE: 75 MMHG | BODY MASS INDEX: 30.99 KG/M2 | WEIGHT: 192 LBS

## 2022-09-07 DIAGNOSIS — R59.0 CERVICAL ADENOPATHY: ICD-10-CM

## 2022-09-07 DIAGNOSIS — J34.2 DEVIATED NASAL SEPTUM: ICD-10-CM

## 2022-09-07 DIAGNOSIS — J31.0 CHRONIC RHINITIS: ICD-10-CM

## 2022-09-07 DIAGNOSIS — C91.10 CLL (CHRONIC LYMPHOCYTIC LEUKEMIA) (HCC): Primary | ICD-10-CM

## 2022-09-07 PROCEDURE — 1124F ACP DISCUSS-NO DSCNMKR DOCD: CPT | Performed by: STUDENT IN AN ORGANIZED HEALTH CARE EDUCATION/TRAINING PROGRAM

## 2022-09-07 PROCEDURE — 31231 NASAL ENDOSCOPY DX: CPT | Performed by: STUDENT IN AN ORGANIZED HEALTH CARE EDUCATION/TRAINING PROGRAM

## 2022-09-07 PROCEDURE — 99213 OFFICE O/P EST LOW 20 MIN: CPT | Performed by: STUDENT IN AN ORGANIZED HEALTH CARE EDUCATION/TRAINING PROGRAM

## 2022-09-07 NOTE — PROGRESS NOTES
[id number] [facility] Shahnaz (:  1945) is a 76 y.o. female, here for evaluation of the following chief complaint(s):  Follow-up (biopsy)      ASSESSMENT/PLAN:  1. CLL (chronic lymphocytic leukemia) (HCC)  2. Cervical adenopathy  3. Chronic rhinitis  4. Deviated nasal septum      This is a very pleasant 76 y.o. female here today for evaluation of the the above-noted complaints. She was taken to the operating room on 9/10/2021 for direct laryngoscopy, bilateral nasal endoscopy with debridement and bilateral inferior turbinate outfracture. Biopsies were taken of bilateral nasopharynx and a debridement was performed of her nasopharynx of the obstructing tissue. Additionally, a biopsy was taken of her right tonsil. All the biopsies were consistent with chronic lymphocytic leukemia. No evidence of recurrent disease on exam.    Ears look clear. If she continues to have issues related to ear fullness I recommend we get an audiogram.    Follow-up in 1 year for recheck    She will continue to follow with Dr. Harvey Polanco for management of her CLL. She knows to contact me should she experience a return of her symptoms. SUBJECTIVE/OBJECTIVE:  MIRI Raymundo is here today for evaluation of issues related not being able to breathe through her nose. This is been going on since at least memorial day but maybe before that. She gets constant nasal drainage which is clear. She has been treated with antibiotics with no significant improvement. She is now having some difficulty swallowing and this is affecting her speech. She does not have any pain. She is having difficulty hearing out of her left ear. Update 2021:    Patient was taken to the operating room for procedure to biopsy her nose and tonsils.   Her operative findings are noted below:    1) The exposure was good  2) The oral cavity, oropharynx, supraglottis, glottis HPI: PHYSICAL EXAM    GENERAL: No acute distress, alert and oriented, no hoarseness, normal voice  EYES: EOMI, Anti-icteric  HENT:   Head: Normocephalic and atraumatic. Face:  Symmetric, facial nerve intact, no sinus tenderness  Right Ear: Normal external ear, normal external auditory canal, intact tympanic membrane with normal mobility and aerated middle ear  Left Ear: Normal external ear, normal external auditory canal, intact tympanic membrane with normal mobility and aerated middle ear    Oral cavity was examined. No evidence of masses of the tonsil    PROCEDURE  Nasal Endoscopy (CPT code 42875)     Verbal consent was received. After topical anesthesia and decongestion had been obtained using aerosolized 1% lidocaine and oxymetazoline, a 45 degree rigid endoscope was placed into both nares with the patient in a sitting position.  The following was observed:    Right Nasal Cavity and Paranasal Sinuses:  Polyp score = 0 (0 = no polyps, 1 = small polyps in middle meatus not reaching below the inferior border of the middle kathi, 2 = polyps reaching below the middle border of the middle turbinate, 3= large polyps reaching the lower border of the inferior turbinate or polyps medial to the middle kathi, 4= large polyps causing almost complete congestion/obstruction of the interior meatus)  Edema score = 1 (0 = absent, 1 = mild, 2 = severe)  Discharge score = 1 (0 = no discharge, 1 = clear thin discharge, 2 = thick purulent discharge)    Left Nasal Cavity and Paranasal Sinuses:    Polyp score = 0 (0 = no polyps, 1 = small polyps in middle meatus not reaching below the inferior border of the middle kathi, 2 = polyps reaching below the middle border of the middle turbinate, 3= large polyps reaching the lower border of the inferior turbinate or polyps medial to the middle kathi, 4= large polyps causing almost complete congestion/obstruction of the interior meatus)  Edema score = 1 (0 = absent, 1 = mild, 2 = severe)  Discharge score = 1 (0 = no discharge, 1 = clear thin discharge, 2 = thick purulent discharge)    Septum: intact and deviated   Other:   -The inferior and middle turbinates were examined. The middle meatus, and sphenoethmoid recess was examined bilaterally.    -The previously visualized submucosal lesion of the nasopharynx has resolved. The eustachian tubes appear normal except for some mucus in the left eustachian tube. There is no obstruction.  -There were no complications. Tolerated well without complication. I attest that I was present for and did the entire procedure myself. This note was generated completely or in part utilizing Dragon dictation speech recognition software. Occasionally, words are mistranscribed and despite editing, the text may contain inaccuracies due to incorrect word recognition. If further clarification is needed please contact the office at [phone]. An electronic signature was used to authenticate this note.     --Pearletha Primrose, MD

## 2022-09-22 NOTE — TELEPHONE ENCOUNTER
[facility] scheduled 12/7/2022 at 10:00  Arrive at 8:30      The morning of your procedure you will park in the hospital parking lot and report directly to the cath lab to check in.     Pre-Procedure Instructions   You will need to fast for at least 8 hours prior to procedure. No caffeine the morning of. Hold all diabetic medications including, Metfomin. If you take Lantus/Levemir only take ½ your normal dose the evening before. All other medications can be taken in the morning with sips of water. You will need to take 325 mg aspirin the morning of. If you are currently taking 81 mg please take 4 tablets that morning. Do not use any lotions, creams or perfume the morning of procedure. Pre-procedure lab work will need to be completed 5-7 days prior to procedure. Please have a responsible adult to drive you home after procedure. We advise you have someone to stay with you for 24 hours following procedure for precautionary measures. Depending on procedure you may require an overnight stay. Cath lab will provide you with all post procedure instructions. If you have any questions regarding the procedure itself or medications, please call [phone] and ask to speak with a nurse. Called patient and she is agreeable to date and time. She asked that I mail her the instructions. Published on [employer] and e-mail to Nain Gu.

## 2022-10-05 ENCOUNTER — TELEPHONE (OUTPATIENT)
Dept: ORTHOPEDIC SURGERY | Age: 77
End: 2022-10-05

## 2022-10-11 NOTE — PROGRESS NOTES
Notification sent to Dr Janeth Agee and medical assistant Manoj GARCIA regarding cardiology clearance and recommendations.   Electronically signed by Renard Veloz RN on 10/11/2022 at 12:32 PM

## 2022-10-13 ENCOUNTER — PREP FOR PROCEDURE (OUTPATIENT)
Dept: ORTHOPEDICS UNIT | Age: 77
End: 2022-10-13

## 2022-10-14 RX ORDER — TRANEXAMIC ACID 650 1/1
1950 TABLET ORAL ONCE
Status: CANCELLED | OUTPATIENT
Start: 2022-10-14 | End: 2022-10-14

## 2022-10-14 RX ORDER — MELOXICAM 7.5 MG/1
7.5 TABLET ORAL ONCE
Status: CANCELLED | OUTPATIENT
Start: 2022-10-14 | End: 2022-10-14

## 2022-10-14 RX ORDER — OXYCODONE HYDROCHLORIDE 10 MG/1
10 TABLET ORAL ONCE
Status: CANCELLED | OUTPATIENT
Start: 2022-10-14 | End: 2022-10-14

## 2022-10-14 NOTE — PROGRESS NOTES
PRE-OP INSTRUCTIONS     Do not eat or drink anything after 12:00 midnight prior to surgery or per Dr Sheldon Crisostomo  This includes water, chewing gum, mints and ice chips. You may brush your teeth and gargle the morning of surgery but DO  NOT SWALLOW THE WATER. Take the following medications with a small sip of water on the morning of procedure. Kim Stanley Follow your MD/Surgeons pre-procedure instructions regarding your medications     You may be asked to stop blood thinners such as:  Coumadin, Plavix, Fragmin and lovenox. Please check with your doctor before stopping these or any other medications. Aspirin, ibuprofen, advil and naproxen, any anti-inflammatory products should be stopped for a week prior to your surgery. Do not smoke and do not drink any alcoholic beverages 24 hours prior to your surgery. Please do not wear any jewelry or body piercings on the day of surgery. Please wear something simple, loose fitting clothing to the hospital.  Do not wear any make-up(including eye make-up) or nail polish on your fingers and toes. As part of our patient safety program to minimize surgical infections, we ask you to do the following:    Please notify your surgeon if you develop any illness between now and the day of your surgery. This includes a cough, cold, fever, sore  throat, nausea, vomiting, diarrhea, etc.   Please notify your surgeon if you experience dizziness, shortness of  breath or blurred vision between now and the time of your surgery. Please notify your surgeon of any open or redden areas that may look infected       DO NOT shave your operative site 96 hours(four days) prior to surgery. Shower the week before surgery with an antibacterial soap such as:dial,safeguard, etc.       Three(3) days prior to your surgery, cleanse the operative site with Hibiclens(anti-microbial soap).  This soap may dry your skin, please do not apply any oils or lotions     Please bring your insurance card and picture ID day of surgery    If you have a living will or durable power of attorny. Please bring in a copy of you advanced directives. If you have dentures, they will be removed before going to the OR, we will provide you with a container. If you wear contact lenses/glasses, they will be removes, please bring a case    Have you seen your family doctor for a pre-op history and physical.      Surgery scheduler will call you 48 hours prior to your surgery to notify you of the time of your surgery and the time you will need to be at hospital...patients are asked to arrive 2 1/2 hours prior to surgery. Please call Pre-Admission testing if you have any further questions. [id number] [facility] testing phone number:  [phone]      Thank You for choosing [facility]!!      C-Difficile admission screening and protocol:       * Admitted with diarrhea? [] YES    [x]  NO     *Prior history of C-Diff. In last 3 months? [] YES    [x]  NO     *Antibiotic use in the past 6-8 weeks? [x]  NO    []  YES                 If yes, which ANTIBIOTIC AND REASON______     *Prior hospitalization or nursing home in the last month? []  YES    [x]  NO        SAFETY FIRST. .call before you fall

## 2022-10-14 NOTE — PROGRESS NOTES
DOS 10/25    According to Dr Garrick Patterson note from 7/28  , he is requesting a repeat ECHO before clearing patient for surgery. Reshma notified via TEAM`s and patient is unaware of this. JET scheduled for 10/17 please review labs and results . UPDATE: 10/24/22  See Telephone note in encounters from Dr. Tremayne Brothers. Ok to continue with hip procedure.

## 2022-10-17 ENCOUNTER — HOSPITAL ENCOUNTER (OUTPATIENT)
Dept: PREADMISSION TESTING | Age: 77
Discharge: HOME | End: 2022-10-17

## 2022-10-17 DIAGNOSIS — M16.11 ARTHRITIS OF RIGHT HIP: ICD-10-CM

## 2022-10-17 LAB
ABO/RH: NORMAL
ALBUMIN SERPL-MCNC: 4.7 G/DL (ref 3.4–5)
ANION GAP SERPL CALCULATED.3IONS-SCNC: 12 MMOL/L (ref 3–16)
ANTIBODY SCREEN: NORMAL
APTT: 26 SEC (ref 23–34.3)
BACTERIA: ABNORMAL /HPF
BASOPHILS ABSOLUTE: 0 K/UL (ref 0–0.2)
BASOPHILS RELATIVE PERCENT: 0.7 %
BILIRUBIN URINE: NEGATIVE
BLOOD, URINE: NEGATIVE
BUN BLDV-MCNC: 12 MG/DL (ref 7–20)
CALCIUM SERPL-MCNC: 9.7 MG/DL (ref 8.3–10.6)
CHLORIDE BLD-SCNC: 98 MMOL/L (ref 99–110)
CLARITY: CLEAR
CO2: 25 MMOL/L (ref 21–32)
COLOR: YELLOW
CREAT SERPL-MCNC: 0.7 MG/DL (ref 0.6–1.2)
EOSINOPHILS ABSOLUTE: 0.1 K/UL (ref 0–0.6)
EOSINOPHILS RELATIVE PERCENT: 1.8 %
EPITHELIAL CELLS, UA: 1 /HPF (ref 0–5)
ESTIMATED AVERAGE GLUCOSE: 102.5 MG/DL
GFR AFRICAN AMERICAN: >60
GFR NON-AFRICAN AMERICAN: >60
GLUCOSE BLD-MCNC: 85 MG/DL (ref 70–99)
GLUCOSE URINE: NEGATIVE MG/DL
HBA1C MFR BLD: 5.2 %
HCT VFR BLD CALC: 37.2 % (ref 36–48)
HEMOGLOBIN: 12.8 G/DL (ref 12–16)
HYALINE CASTS: 4 /LPF (ref 0–8)
INR BLD: 1 (ref 0.87–1.14)
KETONES, URINE: NEGATIVE MG/DL
LEUKOCYTE ESTERASE, URINE: ABNORMAL
LYMPHOCYTES ABSOLUTE: 0.7 K/UL (ref 1–5.1)
LYMPHOCYTES RELATIVE PERCENT: 14 %
MCH RBC QN AUTO: 31.5 PG (ref 26–34)
MCHC RBC AUTO-ENTMCNC: 34.5 G/DL (ref 31–36)
MCV RBC AUTO: 91.2 FL (ref 80–100)
MICROSCOPIC EXAMINATION: YES
MONOCYTES ABSOLUTE: 0.7 K/UL (ref 0–1.3)
MONOCYTES RELATIVE PERCENT: 14.2 %
MRSA SCREEN RT-PCR: NORMAL
NEUTROPHILS ABSOLUTE: 3.6 K/UL (ref 1.7–7.7)
NEUTROPHILS RELATIVE PERCENT: 69.3 %
NITRITE, URINE: NEGATIVE
PDW BLD-RTO: 13 % (ref 12.4–15.4)
PH UA: 5 (ref 5–8)
PLATELET # BLD: 239 K/UL (ref 135–450)
PMV BLD AUTO: 6.8 FL (ref 5–10.5)
POTASSIUM SERPL-SCNC: 4.4 MMOL/L (ref 3.5–5.1)
PREALBUMIN: 26.6 MG/DL (ref 20–40)
PROTEIN UA: NEGATIVE MG/DL
PROTHROMBIN TIME: 13.1 SEC (ref 11.7–14.5)
RBC # BLD: 4.08 M/UL (ref 4–5.2)
RBC UA: 0 /HPF (ref 0–4)
SODIUM BLD-SCNC: 135 MMOL/L (ref 136–145)
SPECIFIC GRAVITY UA: 1 (ref 1–1.03)
URINE REFLEX TO CULTURE: YES
URINE TYPE: ABNORMAL
UROBILINOGEN, URINE: 0.2 E.U./DL
VITAMIN D 25-HYDROXY: 68.9 NG/ML
WBC # BLD: 5.2 K/UL (ref 4–11)
WBC UA: 67 /HPF (ref 0–5)

## 2022-10-17 PROCEDURE — 85610 PROTHROMBIN TIME: CPT

## 2022-10-17 PROCEDURE — 82040 ASSAY OF SERUM ALBUMIN: CPT

## 2022-10-17 PROCEDURE — 83036 HEMOGLOBIN GLYCOSYLATED A1C: CPT

## 2022-10-17 PROCEDURE — 85730 THROMBOPLASTIN TIME PARTIAL: CPT

## 2022-10-17 PROCEDURE — 86850 RBC ANTIBODY SCREEN: CPT

## 2022-10-17 PROCEDURE — 87086 URINE CULTURE/COLONY COUNT: CPT

## 2022-10-17 PROCEDURE — 87088 URINE BACTERIA CULTURE: CPT

## 2022-10-17 PROCEDURE — 80048 BASIC METABOLIC PNL TOTAL CA: CPT

## 2022-10-17 PROCEDURE — 82306 VITAMIN D 25 HYDROXY: CPT

## 2022-10-17 PROCEDURE — 87186 SC STD MICRODIL/AGAR DIL: CPT

## 2022-10-17 PROCEDURE — 85025 COMPLETE CBC W/AUTO DIFF WBC: CPT

## 2022-10-17 PROCEDURE — 86900 BLOOD TYPING SEROLOGIC ABO: CPT

## 2022-10-17 PROCEDURE — 84134 ASSAY OF PREALBUMIN: CPT

## 2022-10-17 PROCEDURE — 87641 MR-STAPH DNA AMP PROBE: CPT

## 2022-10-17 PROCEDURE — 81001 URINALYSIS AUTO W/SCOPE: CPT

## 2022-10-17 PROCEDURE — 86901 BLOOD TYPING SEROLOGIC RH(D): CPT

## 2022-10-17 NOTE — PROGRESS NOTES
Patient and  ysabel and daughter Brannon Gipson attended JET class on 10/17/2022 . Patient verified surgery for Total hip replacement. Patient and family received patient information and educational JET folder including the following handouts: jet powerpoint, covid-19 restrictions, ERAS, incentive spirometry including purpose and how to perform, case management contact information, hand hygiene, preventing constipation, home health care agency list, skilled nursing facility list, pre-operative showering techniques and the use of anti-septic 3 days before surgery. Interviews completed by PT, OT, and Nurse Navigator. Labs and Tests completed as ordered/necessary. Anti-septic bottle given to patient to take home. Patient and family states no further questions or concerns. Patient provided orthopedic office, case management, and nurse navigator contact information. Date Of Surgery: 10/25/2022  Surgeon: Dr Sharyle Sink  Per Patient Will see/Saw PCP on 10/12/2022. Will see/Saw Specialist Dr John Cuevas on 9/1/22. Received [facility] clearance on 9/28/2022. HISTORY OF JOINT REPLACEMENT(S): Left RODRIGO and Right TKA    DC Plan: Home    HOME ASSISTANCE - WHO WILL BE STAYING WITH YOU AT HOME FOR FIRST SEVERAL DAYS?  and daughters    DC TRANSPORTATION: friend    STEPS INTO HOME: 0, has ramp    STEPS TO BATHROOM/BEDROOM: 0    DME NEEDS:  denies, already has a rolling walker, toilet safety frame, shower chair, and wheelchair. LENGTH OF STAY HAS BEEN DISCUSSED WITH THE PATIENT, APPROPRIATE TO HIS/ HER PROCEDURE. PATIENT HAS BEEN INFORMED THAT THEY WILL BE DISCHARGED WHEN THE PHYSICIAN DEEMS THEM MEDICALLY READY. MOST PATIENTS CAN EXPECT TO BE IN THE HOSPITAL ONE NIGHT AS AN OBSERVATION ONLY, OR 1-2 DAYS AS AN ADMISSION FOR THOSE WITH MEDICAL HEALTH ISSUES/COMPLICATIONS. CHOICES FOR HHC, DME VENDORS AND SKILLED/ REHAB FACILITIES PROVIDED TO PATIENT DURING THIS INTERVIEW.     HOME CARE CHOICE(S): open to any agency, patient has concerns with cost,  - last surgery was out of pocket expense. c list was provided to patient. Discussed with [facility] if patient would qualify for [facility] visits. SNF/REHAB CHOICES (S):       n/a    MEDS TO BEDS FROM [employer]: Patient is agreeable to have medications filled via meds to beds program.    The Patient and/or patient representative  was provided with a choice of provider and agrees   with the discharge plan. [x] Yes [] No    Freedom of choice list was provided with basic dialogue that supports the patient's individualized plan of care/goals, treatment preferences and shares the quality data associated with the providers.  [x] Yes [] No

## 2022-10-18 NOTE — PROGRESS NOTES
Notification sent to Dr Cale Andrade and medical assistant Denisse GARCIA regarding abnormal preoperative labs.   Electronically signed by Alicia Wyatt RN on 10/18/2022 at 3:05 PM

## 2022-10-19 LAB
ORGANISM: ABNORMAL
URINE CULTURE, ROUTINE: ABNORMAL

## 2022-10-19 RX ORDER — NITROFURANTOIN 25; 75 MG/1; MG/1
100 CAPSULE ORAL 2 TIMES DAILY
Qty: 10 CAPSULE | Refills: 0 | Status: SHIPPED | OUTPATIENT
Start: 2022-10-19 | End: 2022-10-24

## 2022-10-20 ENCOUNTER — OFFICE VISIT (OUTPATIENT)
Dept: ORTHOPEDIC SURGERY | Age: 77
End: 2022-10-20

## 2022-10-20 VITALS — WEIGHT: 192 LBS | HEIGHT: 66 IN | RESPIRATION RATE: 18 BRPM | BODY MASS INDEX: 30.86 KG/M2

## 2022-10-20 DIAGNOSIS — M16.11 ARTHRITIS OF RIGHT HIP: Primary | ICD-10-CM

## 2022-10-20 PROCEDURE — 1123F ACP DISCUSS/DSCN MKR DOCD: CPT | Performed by: ORTHOPAEDIC SURGERY

## 2022-10-20 PROCEDURE — 99214 OFFICE O/P EST MOD 30 MIN: CPT | Performed by: ORTHOPAEDIC SURGERY

## 2022-10-20 NOTE — PROGRESS NOTES
[facility]  Office Visit    Chief Complaint: Right hip pain    HPI:  Raymond Garland is a 76 y.o. who is here in follow-up of right hip pain. She is scheduled for right total hip arthroplasty next week. She does have a history of right total knee arthroplasty and left total hip arthroplasty with Dr. Renita Mendez. She reports worsening right hip pain over the past few years. There is no history of injury or surgery to the right hip. She walks with a cane. The patient has difficulty putting on socks and shoes, difficulty getting out of a car, difficulty with ambulation and doing activities of daily living including pain at night. Pain at rest is 7 and with activities 9-10/10. She is here with her  today. She denies diabetes, history of blood clots, blood thinners, tobacco use, sleep apnea. She does have help at home. Patient Active Problem List   Diagnosis    Primary localized osteoarthrosis, lower leg    Obesity (BMI 30-39. 9)    Primary localized osteoarthrosis, pelvic region and thigh    History of total hip replacement    H/O total hip arthroplasty, left    Primary osteoarthritis of left hip    Arthritis of right knee    H/O total knee replacement, right 1/30/19    Right hip pain    2019 novel coronavirus disease (COVID-19)    Hyponatremia    Syncope    CLL (chronic lymphocytic leukemia) ([facility]    Pneumonia due to COVID-19 virus    Pneumonia    Neutropenic fever ([facility] [address]    Sepsis ([facility] [address]    Arthritis of right hip       ROS:  Constitutional: denies fever, chills, weight loss  MSK: denies pain in other joints, muscle aches  Neurological: denies numbness, tingling, weakness    Exam:  Resp. rate 18, height 5' 6\" (1.676 m), weight 192 lb (87.1 kg).     Appearance: sitting in exam room chair, appears to be in no acute distress, awake and alert  Resp: unlabored breathing on room air  Skin: warm, dry and intact with out erythema or significant increased temperature  Neuro: grossly intact both lower extremities. Intact sensation to light touch. Motor exam 4+ to 5/5 in all major motor groups. RLE: Examination demonstrates pain with logroll and Stinchfield. There is brisk capillary refill. Strength is 5/5 in hamstrings, quads, hip flexors. Imaging:  Prior right hip radiographs were reviewed today. Significant for joint space narrowing with subchondral sclerosis of the right hip joint. She has a left total hip arthroplasty prosthesis in place. Left leg is shortened through the hip when compared to the right hip. Assessment:  Right hip osteoarthritis    Plan: We further discussed right total hip arthroplasty. The operative procedure, alternatives, and risks were discussed in detail with the patient. The risks include but are not limited to: Infection, vessel injury, nerve injury, DVT, pulmonary embolism, implant loosening, need for revision surgery, leg length discrepancy, dislocation, lateral femoral cutaneous nerve palsy, intraoperative fracture. Despite these risks the patient would like to proceed. All questions have been answered and no guarantees were made. I discussed with the patient the diagnosis in detail and answered all questions. The patient verbalized understanding of the plan as it has been described above and is in agreement. Plan for anterior right total hip arthroplasty. Total time spent on today's encounter was at least 32 minutes. This time included reviewing prior notes, radiographs, and lab results when available, reviewing history obtained by medical assistant, performing history and physical exam, reviewing tests/radiographs with the patient, counseling the patient, ordering medications or tests, documentation in the electronic health record, and coordination of care. This dictation was done with Dragon dictation and may contain mechanical errors related to translation.

## 2022-10-24 ENCOUNTER — ANESTHESIA EVENT (OUTPATIENT)
Dept: OPERATING ROOM | Age: 77
DRG: 470 | End: 2022-10-24

## 2022-10-24 NOTE — DISCHARGE INSTR - COC
[facility] ([facility]) Continuity of Care Form    Patient Name:  Joann Rice  : 1945    MRN:  [id number]    Admit date:  (Not on file)  Discharge date:  10/26/2022    Code Status Order: Prior  Advance Directives: No    Admitting Physician: Sveta Holley MD  PCP: FAY Booker - CNP    Discharging Nurse: [facility] Unit/Room#: No information available for this encounter. Discharging Unit Phone Number: [phone]    Emergency Contact:        Past Surgical History:  Past Surgical History:   Procedure Laterality Date    CHOLECYSTECTOMY      CYST REMOVAL Right     on foot    EYE SURGERY      cataract removal with lens implants    HYSTERECTOMY (CERVIX STATUS UNKNOWN)      JOINT REPLACEMENT Left     hip    JOINT REPLACEMENT Right 2019      Right Total Knee Replacement    LARYNGOSCOPY N/A 9/10/2021    DIRECT LARYNGOSCOPY WITH BIOPSY performed by Ronny Trinh MD at [place] [address] 9/10/2021    NASAL ENDOSCOPY WITH BIOPSY performed by Ronny Trinh MD at [facility] 320 Left     TOTAL KNEE ARTHROPLASTY Right 2019    RIGHT TOTAL KNEE REPLACEMENT performed by Mary Ann Martinez MD at [facility] 91       Immunization History: There is no immunization history on file for this patient. Active Problems:  Active Problems:    * No active hospital problems. *  Resolved Problems:    * No resolved hospital problems.  *      Isolation/Infection:       Nurse Assessment:  Last Vital Signs:Ht 5' 6\" (1.676 m)   Wt 192 lb (87.1 kg)   BMI 30.99 kg/m²   Last documented pain score (0-10 scale):    Last Weight:   Wt Readings from Last 1 Encounters:   10/20/22 192 lb (87.1 kg)     Mental Status:  oriented and alert     IV Access:  - None    Nursing Mobility/ADLs:  Walking   Assisted  Transfer  Assisted  Bathing  Assisted  Dressing  Assisted  Toileting  Assisted  Feeding  Independent  Med Admin  Independent  Med Delivery   whole    Wound Care Documentation and Therapy:  Keep glued Prineo dressing intact. DO NOT remove. This is waterproof for showering. Doctor will evaluate wound at office visit 2 weeks after surgery to discuss removal.     Incision 04/07/15 Hip Left (Active)   Number of days: 2757        Elimination:  Urinary Catheter: None   Colostomy/Ileostomy: No  Continence: Bowel: Yes  Bladder: Yes  Date of Last BM: 10/24/2022    Intake/Output Summary (Last 24 hours)   No intake or output data in the 24 hours ending 10/24/22 1422  Safety Concerns: At Risk for Falls    Impairments/Disabilities:      Vision    Nutrition Therapy:  Current Nutrition Therapy: general  Routes of Feeding: Oral  Liquids: No Restrictions  Daily Fluid Restriction: no  Last Modified Barium Swallow with Video (Video Swallowing Test): not done    Treatments at the Time of Hospital Discharge:   Respiratory Treatments:   Oxygen Therapy:  is not on home oxygen therapy.   Ventilator:    - No ventilator support    Lab orders for discharge:        Rehab Therapies: Physical Therapy, Occupational Therapy and nursing care  Weight Bearing Status/Restrictions: No weight bearing restrictions, anterior hip precautions, NO straight leg raises  Other Medical Equipment (for information only, NOT a DME order):  Rolling walker  Other Treatments: ASA 81mg twice at day for 14 days for DVT prophylaxis , bilateral knee high TEMO hose for 2 weeks after surgery  HOME HEALTH CARE: LEVEL 3 [address] to establish plan of care for patient over 60 day period   Nursing  Initial home SN evaluation visit to occur within 24-48 hours for:  1)  medication management  2)  VS and clinical assessment  3)  S&S chronic disease exacerbation education + when to contact MD/NP  4)  care coordination  Medication Reconciliation during 1st SN visit  PT/OT/Speech   Evaluations in home within 24-48 hours of discharge to include DME and home safety   Frontload therapy 5 days, then 3x a week   OT to evaluate if patient has [address] needs for personal care    evaluation within 24-48 hours to evaluate resources & insurance for potential AL, [place], LTC, and Medicaid options   Palliative Care referral within 5 days of hospital discharge   PCP Visit scheduled within 3 - 7 days of hospital discharge    [address] (If patient is agreeable and meets guidelines)      Patient's personal belongings (please select all that are sent with patient):  Glasses, Dentures upper and lower    RN SIGNATURE:  Electronically signed by Renae Garcia RN on 10/25/22 at 3:45 PM EDT    PHYSICIAN SECTION    Prognosis: Good    Condition at Discharge: Stable    Rehab Potential (if transferring to Rehab): Good    Physician Certification: I certify the above orders, information, and transfer of Myranda Mccartney is necessary for the continuing treatment of the diagnosis listed and that he requires [address] for less 30 days. Update Admission H&P: No change in H&P    PHYSICIAN SIGNATURE:  Electronically signed by FAY Heaton CNP on 10/24/22 at 2:24 PM EDT/ Dr Lesvia Gomez in office 2 weeks after surgery   [facility], [address] [place] [address],[address] ,   [phone]    CASE MANAGEMENT/SOCIAL WORK SECTION    Inpatient Status Date: 10/25/22    [facility]:    Discharging to Facility/ Agency   [facility] Abdi Christie  [facility]  [facility][phone]  Fax:    / signature: Electronically signed by Nate Cornejo on 10/26/22 at 10:32 AM EDT  Activity:  Elevate your leg if swelling occurs in your ankle. Use elastic wraps/hose until swelling decreases. Continue the exercise program as prescribed by physical therapists. Take frequent walks. Use walker, crutches, or cane with weight bearing instructions as indicated by the physical therapists. Take rest periods often. Elevate leg during rest period.   Avoid sitting in low chairs or toilets without raised seats. Keep knees apart. Sleep with a pillow between your legs. Do not cross your legs, especially when putting on shoes and socks. Wound Care:  Cover the wound with a sterile gauze dressing and change daily as long as there is drainage. Do not scrub wound. Pat it dry with a soft towel. Dont apply any lotions or creams to your wound. Check the incision every day for redness, swelling, or increase in drainage. Diet:  You can resume your normal diet. There are no limits on your diet due to your surgery. Pain pills and activity changes may lead to constipation. To prevent this, use prune juice or bran cereals liberally. You may need to use a laxative such as Dulcolax, Senokot, or Milk of Magnesia. Drink plenty of fluids. Medications: Take pain pills if needed to maintain comfort. Never drive while taking pain medicine. Avoid over the counter medications until checking with your doctor. Resume previous medications as instructed by your doctor. You will be on Aspirin or Eliquis  for 30 days only. Stay off other anti-inflammatory medications (except Celebrex) while on blood thinners  Call Your Doctor If:  You have increased pain not controlled by medications. Excessive swelling in your ankle. You develop numbness, tingling, or decreased movement. You have a fever greater than 100 degrees for a day or over 101 degrees at any one time. Your wound becomes more reddened, starts draining, or opens. If you fall. You have any questions about your recovery. Inform your family doctor/dentist or any other doctor who cares for you in the future that you have a joint replacement. You may need antibiotics for dental or surgical procedures if there is any evidence of infection present. If you have required the use of insulin to control your blood sugar after surgery, follow up with your family doctor.   Call your surgeons office to schedule your appointment to be seen after surgery. Make your appointment to continue physical therapy per doctors orders. Smoking cessation assistance can be obtained from your family doctor or by [address] @ [phone]    _______________________________   _____   _______________________  ____                Patient Signature              Date      Witness                               Date          Anterior Approach  The main positions and movements to avoid after an anterior approach include bending the hip back, turning your hip and leg out, or spreading your leg outward. Don't stretch your hip back. Walk with short steps. Taking a longer step when leading with your nonoperated hip stretches the surgical hip back. Don't kneel only on one knee. Kneeling only on the surgical hip stretches the hip back. Use both knees when you must kneel down. Don't turn your foot out. Place a pillow next to your hip and leg to keep your leg from turning or rolling out while lying on your back in bed. Don't twist your body away from your operated hip. This means don't stand with your toes pointed out. Keep the toes of your affected leg pointed forward when you stand, sit, or walk. If you turn your body away from your surgical hip without pivoting your foot, your hip will be placed in an unsafe position. Remember to lift and turn your foot as you turn. Don't swing your leg outward away from your body. This means scooting to the side in bed by supporting your surgical leg. Don't put your leg in a straddling position, as though you are mounting a horse. This means preventing your leg from bending up and out when getting in or out of the bathtub.  Instead, hold your leg, and lift it straight up and over the edge of the tub

## 2022-10-25 ENCOUNTER — APPOINTMENT (OUTPATIENT)
Dept: GENERAL RADIOLOGY | Age: 77
DRG: 470 | End: 2022-10-25
Attending: ORTHOPAEDIC SURGERY

## 2022-10-25 ENCOUNTER — ANESTHESIA (OUTPATIENT)
Dept: OPERATING ROOM | Age: 77
DRG: 470 | End: 2022-10-25

## 2022-10-25 ENCOUNTER — HOSPITAL ENCOUNTER (INPATIENT)
Age: 77
LOS: 1 days | Discharge: HOME HEALTH | DRG: 470 | End: 2022-10-26
Attending: ORTHOPAEDIC SURGERY | Admitting: ORTHOPAEDIC SURGERY

## 2022-10-25 DIAGNOSIS — M16.11 PRIMARY OSTEOARTHRITIS OF RIGHT HIP: Primary | ICD-10-CM

## 2022-10-25 LAB
ABO/RH: NORMAL
ANTIBODY SCREEN: NORMAL
GLUCOSE BLD-MCNC: 110 MG/DL (ref 70–99)
GLUCOSE BLD-MCNC: 92 MG/DL (ref 70–99)
GLUCOSE BLD-MCNC: 96 MG/DL (ref 70–99)
PERFORMED ON: ABNORMAL
PERFORMED ON: NORMAL
PERFORMED ON: NORMAL

## 2022-10-25 PROCEDURE — 7100000001 HC PACU RECOVERY - ADDTL 15 MIN: Performed by: ORTHOPAEDIC SURGERY

## 2022-10-25 PROCEDURE — 3600000015 HC SURGERY LEVEL 5 ADDTL 15MIN: Performed by: ORTHOPAEDIC SURGERY

## 2022-10-25 PROCEDURE — 2500000003 HC RX 250 WO HCPCS: Performed by: NURSE ANESTHETIST, CERTIFIED REGISTERED

## 2022-10-25 PROCEDURE — 2500000003 HC RX 250 WO HCPCS: Performed by: ANESTHESIOLOGY

## 2022-10-25 PROCEDURE — 1200000000 HC SEMI PRIVATE

## 2022-10-25 PROCEDURE — 27130 TOTAL HIP ARTHROPLASTY: CPT | Performed by: ORTHOPAEDIC SURGERY

## 2022-10-25 PROCEDURE — 73501 X-RAY EXAM HIP UNI 1 VIEW: CPT

## 2022-10-25 PROCEDURE — 97161 PT EVAL LOW COMPLEX 20 MIN: CPT

## 2022-10-25 PROCEDURE — 97116 GAIT TRAINING THERAPY: CPT

## 2022-10-25 PROCEDURE — 6360000002 HC RX W HCPCS: Performed by: ORTHOPAEDIC SURGERY

## 2022-10-25 PROCEDURE — 3700000000 HC ANESTHESIA ATTENDED CARE: Performed by: ORTHOPAEDIC SURGERY

## 2022-10-25 PROCEDURE — 6370000000 HC RX 637 (ALT 250 FOR IP): Performed by: ORTHOPAEDIC SURGERY

## 2022-10-25 PROCEDURE — 3600000005 HC SURGERY LEVEL 5 BASE: Performed by: ORTHOPAEDIC SURGERY

## 2022-10-25 PROCEDURE — C9290 INJ, BUPIVACAINE LIPOSOME: HCPCS | Performed by: ORTHOPAEDIC SURGERY

## 2022-10-25 PROCEDURE — 2580000003 HC RX 258: Performed by: ORTHOPAEDIC SURGERY

## 2022-10-25 PROCEDURE — 7100000000 HC PACU RECOVERY - FIRST 15 MIN: Performed by: ORTHOPAEDIC SURGERY

## 2022-10-25 PROCEDURE — 6360000002 HC RX W HCPCS: Performed by: NURSE ANESTHETIST, CERTIFIED REGISTERED

## 2022-10-25 PROCEDURE — 0SR904A REPLACEMENT OF RIGHT HIP JOINT WITH CERAMIC ON POLYETHYLENE SYNTHETIC SUBSTITUTE, UNCEMENTED, OPEN APPROACH: ICD-10-PCS | Performed by: ORTHOPAEDIC SURGERY

## 2022-10-25 PROCEDURE — 86900 BLOOD TYPING SEROLOGIC ABO: CPT

## 2022-10-25 PROCEDURE — 2709999900 HC NON-CHARGEABLE SUPPLY: Performed by: ORTHOPAEDIC SURGERY

## 2022-10-25 PROCEDURE — 94760 N-INVAS EAR/PLS OXIMETRY 1: CPT

## 2022-10-25 PROCEDURE — 2580000003 HC RX 258: Performed by: ANESTHESIOLOGY

## 2022-10-25 PROCEDURE — 6370000000 HC RX 637 (ALT 250 FOR IP): Performed by: ANESTHESIOLOGY

## 2022-10-25 PROCEDURE — 2720000010 HC SURG SUPPLY STERILE: Performed by: ORTHOPAEDIC SURGERY

## 2022-10-25 PROCEDURE — C1776 JOINT DEVICE (IMPLANTABLE): HCPCS | Performed by: ORTHOPAEDIC SURGERY

## 2022-10-25 PROCEDURE — 27130 TOTAL HIP ARTHROPLASTY: CPT | Performed by: PHYSICIAN ASSISTANT

## 2022-10-25 PROCEDURE — 6360000002 HC RX W HCPCS: Performed by: ANESTHESIOLOGY

## 2022-10-25 PROCEDURE — A4217 STERILE WATER/SALINE, 500 ML: HCPCS | Performed by: ORTHOPAEDIC SURGERY

## 2022-10-25 PROCEDURE — 97530 THERAPEUTIC ACTIVITIES: CPT

## 2022-10-25 PROCEDURE — 3209999900 FLUORO FOR SURGICAL PROCEDURES

## 2022-10-25 PROCEDURE — 86901 BLOOD TYPING SEROLOGIC RH(D): CPT

## 2022-10-25 PROCEDURE — 3700000001 HC ADD 15 MINUTES (ANESTHESIA): Performed by: ORTHOPAEDIC SURGERY

## 2022-10-25 PROCEDURE — 86850 RBC ANTIBODY SCREEN: CPT

## 2022-10-25 DEVICE — ACTIS DUOFIX HIP PROSTHESIS (FEMORAL STEM 12/14 TAPER CEMENTLESS SIZE 7 STD COLLAR)  CE
Type: IMPLANTABLE DEVICE | Site: HIP | Status: FUNCTIONAL
Brand: ACTIS

## 2022-10-25 DEVICE — PINNACLE GRIPTION ACETABULAR SHELL SECTOR 52MM OD
Type: IMPLANTABLE DEVICE | Site: HIP | Status: FUNCTIONAL
Brand: PINNACLE GRIPTION

## 2022-10-25 DEVICE — PINNACLE HIP SOLUTIONS ALTRX POLYETHYLENE ACETABULAR LINER NEUTRAL 36MM ID 52MM OD
Type: IMPLANTABLE DEVICE | Site: HIP | Status: FUNCTIONAL
Brand: PINNACLE ALTRX

## 2022-10-25 DEVICE — BIOLOX DELTA CERAMIC FEMORAL HEAD +1.5 36MM DIA 12/14 TAPER
Type: IMPLANTABLE DEVICE | Site: HIP | Status: FUNCTIONAL
Brand: BIOLOX DELTA

## 2022-10-25 RX ORDER — OXYCODONE HYDROCHLORIDE 10 MG/1
10 TABLET ORAL EVERY 4 HOURS PRN
Status: DISCONTINUED | OUTPATIENT
Start: 2022-10-25 | End: 2022-10-26 | Stop reason: HOSPADM

## 2022-10-25 RX ORDER — ACETAMINOPHEN 325 MG/1
650 TABLET ORAL EVERY 6 HOURS
Status: DISCONTINUED | OUTPATIENT
Start: 2022-10-25 | End: 2022-10-26 | Stop reason: HOSPADM

## 2022-10-25 RX ORDER — ONDANSETRON 4 MG/1
4 TABLET, ORALLY DISINTEGRATING ORAL EVERY 8 HOURS PRN
Status: DISCONTINUED | OUTPATIENT
Start: 2022-10-25 | End: 2022-10-26 | Stop reason: HOSPADM

## 2022-10-25 RX ORDER — ONDANSETRON 2 MG/ML
4 INJECTION INTRAMUSCULAR; INTRAVENOUS EVERY 6 HOURS PRN
Status: DISCONTINUED | OUTPATIENT
Start: 2022-10-25 | End: 2022-10-26 | Stop reason: HOSPADM

## 2022-10-25 RX ORDER — MIDAZOLAM HYDROCHLORIDE 1 MG/ML
INJECTION INTRAMUSCULAR; INTRAVENOUS PRN
Status: DISCONTINUED | OUTPATIENT
Start: 2022-10-25 | End: 2022-10-25 | Stop reason: SDUPTHER

## 2022-10-25 RX ORDER — FENTANYL CITRATE 50 UG/ML
INJECTION, SOLUTION INTRAMUSCULAR; INTRAVENOUS PRN
Status: DISCONTINUED | OUTPATIENT
Start: 2022-10-25 | End: 2022-10-25 | Stop reason: SDUPTHER

## 2022-10-25 RX ORDER — SODIUM CHLORIDE 9 MG/ML
INJECTION, SOLUTION INTRAVENOUS PRN
Status: DISCONTINUED | OUTPATIENT
Start: 2022-10-25 | End: 2022-10-25 | Stop reason: HOSPADM

## 2022-10-25 RX ORDER — SODIUM CHLORIDE 0.9 % (FLUSH) 0.9 %
5-40 SYRINGE (ML) INJECTION EVERY 12 HOURS SCHEDULED
Status: DISCONTINUED | OUTPATIENT
Start: 2022-10-25 | End: 2022-10-25 | Stop reason: HOSPADM

## 2022-10-25 RX ORDER — ONDANSETRON 2 MG/ML
4 INJECTION INTRAMUSCULAR; INTRAVENOUS
Status: DISCONTINUED | OUTPATIENT
Start: 2022-10-25 | End: 2022-10-25 | Stop reason: HOSPADM

## 2022-10-25 RX ORDER — CEFUROXIME AXETIL 250 MG/1
250 TABLET ORAL 2 TIMES DAILY
Qty: 14 TABLET | Refills: 0 | Status: SHIPPED | OUTPATIENT
Start: 2022-10-25 | End: 2022-11-01

## 2022-10-25 RX ORDER — MEPERIDINE HYDROCHLORIDE 25 MG/ML
12.5 INJECTION INTRAMUSCULAR; INTRAVENOUS; SUBCUTANEOUS EVERY 5 MIN PRN
Status: DISCONTINUED | OUTPATIENT
Start: 2022-10-25 | End: 2022-10-25 | Stop reason: HOSPADM

## 2022-10-25 RX ORDER — MELOXICAM 7.5 MG/1
7.5 TABLET ORAL ONCE
Status: COMPLETED | OUTPATIENT
Start: 2022-10-25 | End: 2022-10-25

## 2022-10-25 RX ORDER — SODIUM CHLORIDE 9 MG/ML
INJECTION, SOLUTION INTRAVENOUS CONTINUOUS
Status: DISCONTINUED | OUTPATIENT
Start: 2022-10-25 | End: 2022-10-25 | Stop reason: HOSPADM

## 2022-10-25 RX ORDER — PROPOFOL 10 MG/ML
INJECTION, EMULSION INTRAVENOUS CONTINUOUS PRN
Status: DISCONTINUED | OUTPATIENT
Start: 2022-10-25 | End: 2022-10-25 | Stop reason: SDUPTHER

## 2022-10-25 RX ORDER — SODIUM CHLORIDE 9 MG/ML
INJECTION, SOLUTION INTRAVENOUS PRN
Status: DISCONTINUED | OUTPATIENT
Start: 2022-10-25 | End: 2022-10-26 | Stop reason: HOSPADM

## 2022-10-25 RX ORDER — PROPOFOL 10 MG/ML
INJECTION, EMULSION INTRAVENOUS PRN
Status: DISCONTINUED | OUTPATIENT
Start: 2022-10-25 | End: 2022-10-25 | Stop reason: SDUPTHER

## 2022-10-25 RX ORDER — TRANEXAMIC ACID 650 MG/1
1950 TABLET ORAL ONCE
Status: COMPLETED | OUTPATIENT
Start: 2022-10-25 | End: 2022-10-25

## 2022-10-25 RX ORDER — SODIUM CHLORIDE 450 MG/100ML
INJECTION, SOLUTION INTRAVENOUS CONTINUOUS
Status: DISCONTINUED | OUTPATIENT
Start: 2022-10-25 | End: 2022-10-26 | Stop reason: HOSPADM

## 2022-10-25 RX ORDER — INSULIN GLARGINE 100 [IU]/ML
0.25 INJECTION, SOLUTION SUBCUTANEOUS NIGHTLY
Status: DISCONTINUED | OUTPATIENT
Start: 2022-10-25 | End: 2022-10-25

## 2022-10-25 RX ORDER — METOPROLOL TARTRATE 50 MG/1
50 TABLET, FILM COATED ORAL 2 TIMES DAILY
Status: DISCONTINUED | OUTPATIENT
Start: 2022-10-25 | End: 2022-10-26 | Stop reason: HOSPADM

## 2022-10-25 RX ORDER — ACETAMINOPHEN 500 MG
1000 TABLET ORAL ONCE
Status: COMPLETED | OUTPATIENT
Start: 2022-10-25 | End: 2022-10-25

## 2022-10-25 RX ORDER — SODIUM CHLORIDE 0.9 % (FLUSH) 0.9 %
5-40 SYRINGE (ML) INJECTION PRN
Status: DISCONTINUED | OUTPATIENT
Start: 2022-10-25 | End: 2022-10-26 | Stop reason: HOSPADM

## 2022-10-25 RX ORDER — SODIUM CHLORIDE 0.9 % (FLUSH) 0.9 %
5-40 SYRINGE (ML) INJECTION EVERY 12 HOURS SCHEDULED
Status: DISCONTINUED | OUTPATIENT
Start: 2022-10-25 | End: 2022-10-26 | Stop reason: HOSPADM

## 2022-10-25 RX ORDER — DIPHENHYDRAMINE HYDROCHLORIDE 50 MG/ML
12.5 INJECTION INTRAMUSCULAR; INTRAVENOUS
Status: DISCONTINUED | OUTPATIENT
Start: 2022-10-25 | End: 2022-10-25 | Stop reason: HOSPADM

## 2022-10-25 RX ORDER — DEXTROSE MONOHYDRATE 100 MG/ML
INJECTION, SOLUTION INTRAVENOUS CONTINUOUS PRN
Status: DISCONTINUED | OUTPATIENT
Start: 2022-10-25 | End: 2022-10-26 | Stop reason: HOSPADM

## 2022-10-25 RX ORDER — OXYCODONE HYDROCHLORIDE 5 MG/1
5 TABLET ORAL EVERY 6 HOURS PRN
Qty: 40 TABLET | Refills: 0 | Status: SHIPPED | OUTPATIENT
Start: 2022-10-25 | End: 2022-10-30

## 2022-10-25 RX ORDER — KETOROLAC TROMETHAMINE 15 MG/ML
15 INJECTION, SOLUTION INTRAMUSCULAR; INTRAVENOUS
Status: ACTIVE | OUTPATIENT
Start: 2022-10-25 | End: 2022-10-26

## 2022-10-25 RX ORDER — APREPITANT 40 MG/1
40 CAPSULE ORAL ONCE
Status: COMPLETED | OUTPATIENT
Start: 2022-10-25 | End: 2022-10-25

## 2022-10-25 RX ORDER — ONDANSETRON 2 MG/ML
INJECTION INTRAMUSCULAR; INTRAVENOUS PRN
Status: DISCONTINUED | OUTPATIENT
Start: 2022-10-25 | End: 2022-10-25 | Stop reason: SDUPTHER

## 2022-10-25 RX ORDER — OXYCODONE HYDROCHLORIDE 10 MG/1
10 TABLET ORAL ONCE
Status: COMPLETED | OUTPATIENT
Start: 2022-10-25 | End: 2022-10-25

## 2022-10-25 RX ORDER — AMLODIPINE BESYLATE 5 MG/1
5 TABLET ORAL DAILY
Status: DISCONTINUED | OUTPATIENT
Start: 2022-10-26 | End: 2022-10-26 | Stop reason: HOSPADM

## 2022-10-25 RX ORDER — DEXAMETHASONE SODIUM PHOSPHATE 4 MG/ML
INJECTION, SOLUTION INTRA-ARTICULAR; INTRALESIONAL; INTRAMUSCULAR; INTRAVENOUS; SOFT TISSUE PRN
Status: DISCONTINUED | OUTPATIENT
Start: 2022-10-25 | End: 2022-10-25 | Stop reason: SDUPTHER

## 2022-10-25 RX ORDER — CALCIUM CARBONATE 200(500)MG
500 TABLET,CHEWABLE ORAL 3 TIMES DAILY PRN
Status: DISCONTINUED | OUTPATIENT
Start: 2022-10-25 | End: 2022-10-26 | Stop reason: HOSPADM

## 2022-10-25 RX ORDER — MELOXICAM 7.5 MG/1
7.5 TABLET ORAL DAILY
Qty: 5 TABLET | Refills: 0 | Status: SHIPPED | OUTPATIENT
Start: 2022-10-26 | End: 2022-10-31

## 2022-10-25 RX ORDER — ASPIRIN 81 MG/1
81 TABLET ORAL 2 TIMES DAILY
Qty: 60 TABLET | Refills: 0 | Status: SHIPPED | OUTPATIENT
Start: 2022-10-25 | End: 2022-11-24

## 2022-10-25 RX ORDER — PREGABALIN 75 MG/1
75 CAPSULE ORAL 2 TIMES DAILY
Status: DISCONTINUED | OUTPATIENT
Start: 2022-10-25 | End: 2022-10-26 | Stop reason: HOSPADM

## 2022-10-25 RX ORDER — PHENYLEPHRINE HCL IN 0.9% NACL 1 MG/10 ML
SYRINGE (ML) INTRAVENOUS PRN
Status: DISCONTINUED | OUTPATIENT
Start: 2022-10-25 | End: 2022-10-25 | Stop reason: SDUPTHER

## 2022-10-25 RX ORDER — LEVOTHYROXINE SODIUM 0.07 MG/1
75 TABLET ORAL DAILY
Status: DISCONTINUED | OUTPATIENT
Start: 2022-10-26 | End: 2022-10-26 | Stop reason: HOSPADM

## 2022-10-25 RX ORDER — BUPIVACAINE HYDROCHLORIDE 7.5 MG/ML
INJECTION, SOLUTION INTRASPINAL
Status: COMPLETED | OUTPATIENT
Start: 2022-10-25 | End: 2022-10-25

## 2022-10-25 RX ORDER — MELOXICAM 7.5 MG/1
7.5 TABLET ORAL DAILY
Status: DISCONTINUED | OUTPATIENT
Start: 2022-10-26 | End: 2022-10-26 | Stop reason: HOSPADM

## 2022-10-25 RX ORDER — MORPHINE SULFATE 4 MG/ML
4 INJECTION, SOLUTION INTRAMUSCULAR; INTRAVENOUS
Status: DISCONTINUED | OUTPATIENT
Start: 2022-10-25 | End: 2022-10-26 | Stop reason: HOSPADM

## 2022-10-25 RX ORDER — INSULIN LISPRO 100 [IU]/ML
0-4 INJECTION, SOLUTION INTRAVENOUS; SUBCUTANEOUS NIGHTLY
Status: DISCONTINUED | OUTPATIENT
Start: 2022-10-25 | End: 2022-10-26

## 2022-10-25 RX ORDER — OXYCODONE HYDROCHLORIDE 5 MG/1
5 TABLET ORAL PRN
Status: DISCONTINUED | OUTPATIENT
Start: 2022-10-25 | End: 2022-10-25 | Stop reason: HOSPADM

## 2022-10-25 RX ORDER — SODIUM CHLORIDE 0.9 % (FLUSH) 0.9 %
5-40 SYRINGE (ML) INJECTION PRN
Status: DISCONTINUED | OUTPATIENT
Start: 2022-10-25 | End: 2022-10-25 | Stop reason: HOSPADM

## 2022-10-25 RX ORDER — GLYCOPYRROLATE 0.2 MG/ML
INJECTION INTRAMUSCULAR; INTRAVENOUS PRN
Status: DISCONTINUED | OUTPATIENT
Start: 2022-10-25 | End: 2022-10-25 | Stop reason: SDUPTHER

## 2022-10-25 RX ORDER — OXYCODONE HYDROCHLORIDE 10 MG/1
10 TABLET ORAL PRN
Status: DISCONTINUED | OUTPATIENT
Start: 2022-10-25 | End: 2022-10-25 | Stop reason: HOSPADM

## 2022-10-25 RX ORDER — LISINOPRIL 40 MG/1
40 TABLET ORAL DAILY
COMMUNITY

## 2022-10-25 RX ORDER — FENTANYL CITRATE 50 UG/ML
25 INJECTION, SOLUTION INTRAMUSCULAR; INTRAVENOUS EVERY 5 MIN PRN
Status: DISCONTINUED | OUTPATIENT
Start: 2022-10-25 | End: 2022-10-25 | Stop reason: HOSPADM

## 2022-10-25 RX ORDER — PREGABALIN 75 MG/1
75 CAPSULE ORAL 2 TIMES DAILY
Qty: 28 CAPSULE | Refills: 0 | Status: SHIPPED | OUTPATIENT
Start: 2022-10-25 | End: 2022-11-08

## 2022-10-25 RX ORDER — MORPHINE SULFATE 2 MG/ML
2 INJECTION, SOLUTION INTRAMUSCULAR; INTRAVENOUS
Status: DISCONTINUED | OUTPATIENT
Start: 2022-10-25 | End: 2022-10-26 | Stop reason: HOSPADM

## 2022-10-25 RX ORDER — ASPIRIN 81 MG/1
81 TABLET ORAL 2 TIMES DAILY
Status: DISCONTINUED | OUTPATIENT
Start: 2022-10-25 | End: 2022-10-26 | Stop reason: HOSPADM

## 2022-10-25 RX ORDER — INSULIN LISPRO 100 [IU]/ML
0.08 INJECTION, SOLUTION INTRAVENOUS; SUBCUTANEOUS
Status: DISCONTINUED | OUTPATIENT
Start: 2022-10-25 | End: 2022-10-25

## 2022-10-25 RX ORDER — INSULIN LISPRO 100 [IU]/ML
0-4 INJECTION, SOLUTION INTRAVENOUS; SUBCUTANEOUS
Status: DISCONTINUED | OUTPATIENT
Start: 2022-10-25 | End: 2022-10-26

## 2022-10-25 RX ORDER — EPHEDRINE SULFATE/0.9% NACL/PF 50 MG/5 ML
SYRINGE (ML) INTRAVENOUS PRN
Status: DISCONTINUED | OUTPATIENT
Start: 2022-10-25 | End: 2022-10-25 | Stop reason: SDUPTHER

## 2022-10-25 RX ORDER — LABETALOL HYDROCHLORIDE 5 MG/ML
5 INJECTION, SOLUTION INTRAVENOUS
Status: DISCONTINUED | OUTPATIENT
Start: 2022-10-25 | End: 2022-10-25 | Stop reason: HOSPADM

## 2022-10-25 RX ORDER — OXYCODONE HYDROCHLORIDE 5 MG/1
5 TABLET ORAL EVERY 4 HOURS PRN
Status: DISCONTINUED | OUTPATIENT
Start: 2022-10-25 | End: 2022-10-26 | Stop reason: HOSPADM

## 2022-10-25 RX ORDER — LIDOCAINE HYDROCHLORIDE 20 MG/ML
INJECTION, SOLUTION EPIDURAL; INFILTRATION; INTRACAUDAL; PERINEURAL PRN
Status: DISCONTINUED | OUTPATIENT
Start: 2022-10-25 | End: 2022-10-25 | Stop reason: SDUPTHER

## 2022-10-25 RX ADMIN — PROPOFOL 30 MG: 10 INJECTION, EMULSION INTRAVENOUS at 08:59

## 2022-10-25 RX ADMIN — DEXAMETHASONE SODIUM PHOSPHATE 8 MG: 4 INJECTION, SOLUTION INTRAMUSCULAR; INTRAVENOUS at 09:29

## 2022-10-25 RX ADMIN — OXYCODONE HYDROCHLORIDE 10 MG: 10 TABLET ORAL at 14:06

## 2022-10-25 RX ADMIN — PROPOFOL 30 MG: 10 INJECTION, EMULSION INTRAVENOUS at 08:56

## 2022-10-25 RX ADMIN — Medication 5 MG: at 09:12

## 2022-10-25 RX ADMIN — Medication 10 MG: at 09:16

## 2022-10-25 RX ADMIN — FENTANYL CITRATE 25 MCG: 50 INJECTION INTRAMUSCULAR; INTRAVENOUS at 09:28

## 2022-10-25 RX ADMIN — APREPITANT 40 MG: 40 CAPSULE ORAL at 07:52

## 2022-10-25 RX ADMIN — MIDAZOLAM 1 MG: 1 INJECTION INTRAMUSCULAR; INTRAVENOUS at 08:44

## 2022-10-25 RX ADMIN — Medication 50 MCG: at 09:24

## 2022-10-25 RX ADMIN — PROPOFOL 140 MCG/KG/MIN: 10 INJECTION, EMULSION INTRAVENOUS at 08:58

## 2022-10-25 RX ADMIN — ACETAMINOPHEN 650 MG: 325 TABLET ORAL at 18:36

## 2022-10-25 RX ADMIN — ASPIRIN 81 MG: 81 TABLET, COATED ORAL at 21:38

## 2022-10-25 RX ADMIN — Medication 5 MG: at 09:39

## 2022-10-25 RX ADMIN — Medication 10 ML: at 14:11

## 2022-10-25 RX ADMIN — ONDANSETRON 4 MG: 2 INJECTION INTRAMUSCULAR; INTRAVENOUS at 09:30

## 2022-10-25 RX ADMIN — ACETAMINOPHEN 650 MG: 325 TABLET ORAL at 14:06

## 2022-10-25 RX ADMIN — TRANEXAMIC ACID 1950 MG: 650 TABLET ORAL at 07:24

## 2022-10-25 RX ADMIN — GLYCOPYRROLATE 0.2 MG: 0.2 INJECTION, SOLUTION INTRAMUSCULAR; INTRAVENOUS at 09:10

## 2022-10-25 RX ADMIN — MELOXICAM 7.5 MG: 7.5 TABLET ORAL at 07:24

## 2022-10-25 RX ADMIN — OXYCODONE HYDROCHLORIDE 10 MG: 10 TABLET ORAL at 07:24

## 2022-10-25 RX ADMIN — ACETAMINOPHEN 1000 MG: 500 TABLET ORAL at 07:52

## 2022-10-25 RX ADMIN — SODIUM CHLORIDE: 4.5 INJECTION, SOLUTION INTRAVENOUS at 14:09

## 2022-10-25 RX ADMIN — SODIUM CHLORIDE: 9 INJECTION, SOLUTION INTRAVENOUS at 09:45

## 2022-10-25 RX ADMIN — Medication 10 MG: at 09:24

## 2022-10-25 RX ADMIN — BUPIVACAINE HYDROCHLORIDE IN DEXTROSE 12 MG: 7.5 INJECTION, SOLUTION SUBARACHNOID at 08:55

## 2022-10-25 RX ADMIN — Medication 50 MCG: at 09:39

## 2022-10-25 RX ADMIN — CEFAZOLIN 2000 MG: 2 INJECTION, POWDER, FOR SOLUTION INTRAMUSCULAR; INTRAVENOUS at 08:50

## 2022-10-25 RX ADMIN — PREGABALIN 75 MG: 75 CAPSULE ORAL at 21:38

## 2022-10-25 RX ADMIN — LIDOCAINE HYDROCHLORIDE 60 MG: 20 INJECTION, SOLUTION EPIDURAL; INFILTRATION; INTRACAUDAL; PERINEURAL at 08:57

## 2022-10-25 RX ADMIN — SODIUM CHLORIDE: 9 INJECTION, SOLUTION INTRAVENOUS at 07:17

## 2022-10-25 RX ADMIN — CEFAZOLIN 2000 MG: 2 INJECTION, POWDER, FOR SOLUTION INTRAMUSCULAR; INTRAVENOUS at 18:40

## 2022-10-25 ASSESSMENT — PAIN - FUNCTIONAL ASSESSMENT
PAIN_FUNCTIONAL_ASSESSMENT: PREVENTS OR INTERFERES SOME ACTIVE ACTIVITIES AND ADLS
PAIN_FUNCTIONAL_ASSESSMENT: 0-10

## 2022-10-25 ASSESSMENT — PAIN SCALES - GENERAL
PAINLEVEL_OUTOF10: 3
PAINLEVEL_OUTOF10: 0
PAINLEVEL_OUTOF10: 5
PAINLEVEL_OUTOF10: 3

## 2022-10-25 ASSESSMENT — PAIN DESCRIPTION - LOCATION
LOCATION: HIP
LOCATION: HIP

## 2022-10-25 ASSESSMENT — PAIN DESCRIPTION - PAIN TYPE: TYPE: SURGICAL PAIN

## 2022-10-25 ASSESSMENT — PAIN DESCRIPTION - ORIENTATION
ORIENTATION: RIGHT
ORIENTATION: RIGHT

## 2022-10-25 ASSESSMENT — PAIN DESCRIPTION - DESCRIPTORS
DESCRIPTORS: ACHING
DESCRIPTORS: SHOOTING

## 2022-10-25 ASSESSMENT — LIFESTYLE VARIABLES: SMOKING_STATUS: 0

## 2022-10-25 ASSESSMENT — ENCOUNTER SYMPTOMS: SHORTNESS OF BREATH: 0

## 2022-10-25 ASSESSMENT — PAIN DESCRIPTION - ONSET: ONSET: ON-GOING

## 2022-10-25 ASSESSMENT — PAIN DESCRIPTION - FREQUENCY: FREQUENCY: CONTINUOUS

## 2022-10-25 NOTE — CARE COORDINATION
10/25/22 spoke with patient and confirmed JET Class plan to return home with her  and family. She is unsure if she will need home care . She states she has been through this type of surgery in the past and is familiar w/ the exercise program.-States her daughters and  can assist her as needed  made referral to [facility] [place]/ [facility] - she will follow and discuss w/ patient .     Electronically signed by Symone Castillo on 10/25/2022 at 4:00 PM  #[phone]

## 2022-10-25 NOTE — PROGRESS NOTES
Patient arrived to unit from PACU and was placed into room 3103. Patient A&O x 4. VSS. Patient oriented to room, unit routine, call light/telephone use,and meal ordering process. Patient verbalizes understanding of fall precautions and agreeable to call for help before ambulating. Patient able to make needs known and denies physical/emotional needs at this time. Bed locked and in lowest position, alarm on, side rails up x2. Call light and all belongings within reach. Will continue to check on patient every two hours and as needed to monitor safety and assess needs.  Electronically signed by Ash Connor RN on 10/25/2022 at 1:28 PM

## 2022-10-25 NOTE — PROGRESS NOTES
[facility] Orthopedic Surgery   Progress Note      S/P :  SUBJECTIVE  in bed Alert and oriented. Family at bedside. . Pain is   described in right hip and with the intensity of moderate. Pain is described as aching. OBJECTIVE              Physical                      VITALS:  BP (!) 146/59   Pulse 71   Temp 97.3 °F (36.3 °C) (Oral)   Resp 14   Ht 5' 6\" (1.676 m)   Wt 197 lb 8.5 oz (89.6 kg)   SpO2 97%   BMI 31.88 kg/m²                     MUSCULOSKELETAL:  right foot NVI. Wiggles toes to command. Able to plantarflex and dorsiflex ankle Pedal pulses are palpable. NEUROLOGIC:                                  Sensory:  Touch:  Right Lower Extremity:  normal                                                 Surgical wound appears clean and dry right anterior hip with gauze and tape . Ice pack on. TEMO hose on.      Data       CBC:   Lab Results   Component Value Date/Time    WBC 5.2 10/17/2022 11:35 AM    RBC 4.08 10/17/2022 11:35 AM    HGB 12.8 10/17/2022 11:35 AM    HCT 37.2 10/17/2022 11:35 AM    MCV 91.2 10/17/2022 11:35 AM    MCH 31.5 10/17/2022 11:35 AM    MCHC 34.5 10/17/2022 11:35 AM    RDW 13.0 10/17/2022 11:35 AM     10/17/2022 11:35 AM    MPV 6.8 10/17/2022 11:35 AM        WBC:    Lab Results   Component Value Date/Time    WBC 5.2 10/17/2022 11:35 AM        Hemoglobin/Hematocrit:    Lab Results   Component Value Date/Time    HGB 12.8 10/17/2022 11:35 AM    HCT 37.2 10/17/2022 11:35 AM        PT/INR:    Lab Results   Component Value Date/Time    PROTIME 13.1 10/17/2022 11:35 AM    INR 1.00 10/17/2022 11:35 AM              Current Inpatient Medications             Current Facility-Administered Medications: [START ON 10/26/2022] amLODIPine (NORVASC) tablet 5 mg, 5 mg, Oral, Daily  [START ON 10/26/2022] levothyroxine (SYNTHROID) tablet 75 mcg, 75 mcg, Oral, Daily  metoprolol tartrate (LOPRESSOR) tablet 50 mg, 50 mg, Oral, BID  insulin lispro (HUMALOG) injection vial 0-4 Units, 0-4 Units, SubCUTAneous, TID WC  insulin lispro (HUMALOG) injection vial 0-4 Units, 0-4 Units, SubCUTAneous, Nightly  glucose chewable tablet 16 g, 4 tablet, Oral, PRN  dextrose bolus 10% 125 mL, 125 mL, IntraVENous, PRN **OR** dextrose bolus 10% 250 mL, 250 mL, IntraVENous, PRN  glucagon (rDNA) injection 1 mg, 1 mg, SubCUTAneous, PRN  dextrose 10 % infusion, , IntraVENous, Continuous PRN  0.45 % sodium chloride infusion, , IntraVENous, Continuous  sodium chloride flush 0.9 % injection 5-40 mL, 5-40 mL, IntraVENous, 2 times per day  sodium chloride flush 0.9 % injection 5-40 mL, 5-40 mL, IntraVENous, PRN  0.9 % sodium chloride infusion, , IntraVENous, PRN  ceFAZolin (ANCEF) 2,000 mg in dextrose 5 % 50 mL IVPB (mini-bag), 2,000 mg, IntraVENous, Q8H  acetaminophen (TYLENOL) tablet 650 mg, 650 mg, Oral, Q6H  ketorolac (TORADOL) injection 15 mg, 15 mg, IntraVENous, Once PRN  oxyCODONE (ROXICODONE) immediate release tablet 5 mg, 5 mg, Oral, Q4H PRN **OR** oxyCODONE HCl (OXY-IR) immediate release tablet 10 mg, 10 mg, Oral, Q4H PRN  morphine (PF) injection 2 mg, 2 mg, IntraVENous, Q3H PRN **OR** morphine (PF) injection 4 mg, 4 mg, IntraVENous, Q3H PRN  ondansetron (ZOFRAN-ODT) disintegrating tablet 4 mg, 4 mg, Oral, Q8H PRN **OR** ondansetron (ZOFRAN) injection 4 mg, 4 mg, IntraVENous, Q6H PRN  aspirin EC tablet 81 mg, 81 mg, Oral, BID  calcium carbonate (TUMS) chewable tablet 500 mg, 500 mg, Oral, TID PRN  pregabalin (LYRICA) capsule 75 mg, 75 mg, Oral, BID  [START ON 10/26/2022] meloxicam (MOBIC) tablet 7.5 mg, 7.5 mg, Oral, Daily    ASSESSMENT AND PLAN    Post right RODRIGO, stable exam  DVT prophylaxis ordered, ASA 81mg twice at day for 30 days for DVT prophylaxis   PT OT for ADL's and ambulation as tolerated, WBAT  SS for DC planning, home with home care tomorrow  IV or PO pain med as ordered , Lyrica and Mobic added for postop pain control in an effort to reduce narcotic use per Dr Kimberly Martin protocol.     Cefadroxil x1 week at home per  [facility] BIRDIE LO protocol       Rakan Pena, APRN - CNP  10/25/2022  12:52 PM

## 2022-10-25 NOTE — PROGRESS NOTES
Met with patient and family at bedside, patient is alert and orientedx4. discussed role of nurse navigator. Reviewed reasons to call with questions or concerns, importance of TEDS, Incentive spirometer, pain medication, and physical and occupational therapy. 2/4 bed rails up, bed in lowest position, fall precautions in place, call light within reach. Pulses present bilaterally +2 pedal, no drainage or odor noted at surgical dressing right hip. dry Dressing clean, dry, and intact. Ice in place. Silviano and scds on BLEs. Neurovascular checks performed and moderate dorsi and plantar flexions noted bilaterally, toes appear appropriate to ethnicity in color, warm to touch, patient denies numbness or tingling. DC Plan: Home with  and daughters. Friend nick to transport patient.  [facility]/ Anaya Saad is providing [facility] home visits  DME needs:denies, already has a rolling walker, shower chair, and toilet safety frame    Zenobia Khalil  Orthopedic Nurse Navigator  Phone number: [phone]    Future Appointments   Date Time Provider Quentin Romo   11/7/2022  1:00 PM MD Nicole Vargas     Electronically signed by David Contreras RN on 10/25/2022 at 3:37 PM

## 2022-10-25 NOTE — ANESTHESIA POSTPROCEDURE EVALUATION
Department of Anesthesiology  Postprocedure Note    Patient: Roxy Bee  MRN: [id number]  YOB: 1945  Date of evaluation: 10/25/2022      Procedure Summary     Date: 10/25/22 Room / Location: [address]    Anesthesia Start: 3579 Anesthesia Stop: 8541    Procedure: RIGHT ANTERIOR TOTAL HIP REPLACEMENT WITH C-ARM (Right: Hip) Diagnosis:       Arthritis of right hip      (RIGHT HIP ARTHRITIS)    Surgeons: Tabitha Steele MD Responsible Provider: Chaim Brittle, MD    Anesthesia Type: spinal ASA Status: 3          Anesthesia Type: No value filed.     Clary Phase I: Clary Score: 9    Clary Phase II:        Anesthesia Post Evaluation    Patient location during evaluation: PACU  Level of consciousness: awake and alert  Airway patency: patent  Nausea & Vomiting: no nausea and no vomiting  Complications: no  Cardiovascular status: blood pressure returned to baseline  Respiratory status: acceptable  Hydration status: euvolemic  Comments: Postoperative Anesthesia Note    Name:    Roxy Bee  MRN:      [id number]    Patient Vitals in the past 12 hrs:  10/25/22 1400, Pulse:79, Resp:18, SpO2:99 %  10/25/22 1230, BP:(!) 146/59, Temp:97.3 °F (36.3 °C), Temp src:Oral, Pulse:71, Resp:14, SpO2:97 %  10/25/22 1158, Temp:98 °F (36.7 °C), Temp src:Temporal, Pulse:61, Resp:15, SpO2:99 %  10/25/22 1145, BP:(!) 144/54, Pulse:65, Resp:24, SpO2:100 %  10/25/22 1130, BP:(!) 144/68, Pulse:64, Resp:18, SpO2:98 %  10/25/22 1116, BP:(!) 143/59, Pulse:75, Resp:17, SpO2:99 %  10/25/22 1100, BP:(!) 142/62, Pulse:58, Resp:16, SpO2:99 %  10/25/22 1045, BP:(!) 133/51, Pulse:66, Resp:20, SpO2:99 %  10/25/22 1030, BP:(!) 126/52, Pulse:60, Resp:(!) 9, SpO2:100 %  10/25/22 1015, BP:(!) 107/46, Pulse:65, Resp:16, SpO2:100 %  10/25/22 1010, BP:(!) 101/48, Pulse:73, Resp:19, SpO2:100 %  10/25/22 1005, BP:(!) 107/51, Pulse:70, Resp:16, SpO2:100 %  10/25/22 1000, BP:(!) 95/54, Pulse:79, Resp:15, SpO2:99 %  10/25/22 0958, BP:(!) 105/39, Temp:97.2 °F (36.2 °C), Temp src:Temporal, Pulse:80, Resp:17, SpO2:100 %  10/25/22 0658, BP:(!) 143/72, Temp:97.3 °F (36.3 °C), Temp src:Temporal, Pulse:64, Resp:18, SpO2:99 %, Height:5' 6\" (1.676 m), Weight:197 lb 8.5 oz (89.6 kg)     LABS:    CBC  Lab Results       Component                Value               Date/Time                  WBC                      5.2                 10/17/2022 11:35 AM        HGB                      12.8                10/17/2022 11:35 AM        HCT                      37.2                10/17/2022 11:35 AM        PLT                      239                 10/17/2022 11:35 AM   RENAL  Lab Results       Component                Value               Date/Time                  NA                       135 (L)             10/17/2022 11:35 AM        K                        4.4                 10/17/2022 11:35 AM        K                        3.5                 01/30/2022 09:23 PM        CL                       98 (L)              10/17/2022 11:35 AM        CO2                      25                  10/17/2022 11:35 AM        BUN                      12                  10/17/2022 11:35 AM        CREATININE               0.7                 10/17/2022 11:35 AM        GLUCOSE                  85                  10/17/2022 11:35 AM   COAGS  Lab Results       Component                Value               Date/Time                  PROTIME                  13.1                10/17/2022 11:35 AM        INR                      1.00                10/17/2022 11:35 AM        APTT                     26.0                10/17/2022 11:35 AM     Intake & Output:  @04TQOF@    Nausea & Vomiting:  No    Level of Consciousness:  Awake    Pain Assessment:  Adequate analgesia    Anesthesia Complications:  No apparent anesthetic complications    SUMMARY      Vital signs stable  OK to discharge from Stage I post anesthesia care.   Care transferred from Anesthesiology department on discharge from perioperative area

## 2022-10-25 NOTE — H&P
Update History & Physical    The patient's History and Physical of October 2, 2022 was reviewed with the patient and I examined the patient. There was no change. The surgical site was confirmed by the patient and me. Plan: The risks, benefits, expected outcome, and alternative to the recommended procedure have been discussed with the patient. Patient understands and wants to proceed with the procedure.      Electronically signed by Korina Vergara MD on 10/25/2022 at 7:41 AM

## 2022-10-25 NOTE — PROGRESS NOTES
Physical Therapy  Facility/Department: [address] ORTHOPEDICS  Physical Therapy Initial Assessment  This note serves as patient discharge summary if pt discharges prior to next PT visit      Name: Qiana Ngo  : 1945  MRN: [id number]  Date of Service: 10/25/2022    Discharge Recommendations:  24 hour supervision or assist, Home with Home health PT   Qiana Ngo scored a 16/24 on the AM-PAC short mobility form. Current research shows that an AM-PAC score of 18 or greater is typically associated with a discharge to the patient's home setting. Based on the patient's AM-PAC score and their current functional mobility deficits, it is recommended that the patient have 2-3 sessions per week of Physical Therapy at d/c to increase the patient's independence. PT Equipment Recommendations  Equipment Needed: No  Other: has RW      Patient Diagnosis(es): The encounter diagnosis was Primary osteoarthritis of right hip. Past Medical History:  has a past medical history of Acid reflux, Arthritis, Cancer ([facility] [address] Chronic lymphocytic leukemia (CLL) genetic mutation variant ([facility] [address] Diabetes mellitus ([facility] [address] High blood pressure, Hyperlipidemia, Multiple drug resistant organism (MDRO) culture positive, Murmur, PONV (postoperative nausea and vomiting), Thyroid disease, UTI (urinary tract infection), and Wears glasses. Past Surgical History:  has a past surgical history that includes Cholecystectomy; Hysterectomy; Thyroid surgery (Left); cyst removal (Right); eye surgery; joint replacement (Left); joint replacement (Right, 2019); Total knee arthroplasty (Right, 2019); Nasal sinus surgery (N/A, 9/10/2021); laryngoscopy (N/A, 9/10/2021); and Total hip arthroplasty (Right, 10/25/2022). Assessment   Body Structures, Functions, Activity Limitations Requiring Skilled Therapeutic Intervention: Decreased functional mobility ; Decreased ADL status; Decreased strength; Increased pain  Assessment: Prior to elective R THR 10-25-22 via Dr. Bhavani Lomeli, patient reports living in home with family, and independent in ADLs, some IADLs, transfers, and gait with a cane. Status 10-25-22:  Bed Mobility: Up to Mod A (sit to supine). Transfers to RW CGA and initial cues. Gait with patient 's RW, 13', with CGA-SBA and Good quality. Distance limited by patient fatigue. Therapist demonstrates and verbally reviews hip positional precautions and rationale for these with patient and family. All verb understanding. Patient and family present at eval, and familiar with THR recovery from patient's prior L THR. Anticipate progression in therapy to the point that DC to home with initial 24 hour family support and home therapy will be appropriate. She has a RW for home. Treatment Diagnosis: Impaired functional mobility  Therapy Prognosis: Good  Decision Making: Low Complexity  History: as noted  Clinical Presentation: Stable  Requires PT Follow-Up: Yes  Activity Tolerance  Activity Tolerance: Patient tolerated evaluation without incident     Plan   Physcial Therapy Plan  General Plan: 3-5 times per week  Current Treatment Recommendations: Functional mobility training, Transfer training, Gait training, Stair training, Home exercise program, Safety education & training, Patient/Caregiver education & training, Equipment evaluation, education, & procurement, Positioning  Safety Devices  Type of Devices: Bed alarm in place, Call light within reach, Gait belt, Patient at risk for falls, Left in bed, Nurse notified (ice pack placed to R hip.  RN Carina May informed)     Restrictions  Restrictions/Precautions  Restrictions/Precautions: Fall Risk, Weight Bearing, ROM Restrictions  Lower Extremity Weight Bearing Restrictions  Right Lower Extremity Weight Bearing: Weight Bearing As Tolerated  Position Activity Restriction  Hip Precautions: No hip flexion > 90 degrees, No hip extension, No hip external rotation  Other position/activity restrictions: No SLR; Assume no hip flexion past 90 degrees. Subjective   General  Chart Reviewed: Yes  Additional Pertinent Hx: 75 yo female to hospital 10-25-22 for elective R THR via Dr. Alejandro Callaway. Spinal Anesthetic. PMHx as noted, including L THR 2015, R TKR 1-, COVID Jan 2022, and CLL. Response To Previous Treatment: Not applicable  Family / Caregiver Present: Yes ( and 2 dtrs)  Referring Practitioner: Hima Davis MD  Referral Date : 10/25/22  Subjective  Subjective: Patient in bed, awake. Agreeable to therapy. Reports R hip pain \"not too bad. \"         Social/Functional History  Social/Functional History  Lives With: Spouse  Type of Home: House  Home Layout: One level, Able to Live on Main level with bedroom/bathroom  Home Access: Ramped entrance (+ 1 small step)  Bathroom Shower/Tub: Walk-in shower, Shower chair without back  Bathroom Toilet: Handicap height  Bathroom Equipment: Toilet raiser  Bathroom Accessibility: Walker accessible  Home Equipment: REVENTIVEara, rolling, Walker, 4 wheeled, Cane  Ambulation Assistance: Independent (cane)  Transfer Assistance: Independent    Vision/Hearing  Vision  Vision: Impaired  Vision Exceptions: Wears glasses at all times  Hearing  Hearing: Within functional limits      Cognition   Orientation  Overall Orientation Status: Within Normal Limits  Cognition  Overall Cognitive Status: WFL     Objective   AROM RLE (degrees)  RLE AROM: WFL  AROM LLE (degrees)  LLE AROM : WNL  Strength RLE  Strength RLE: WFL  Comment: for transfers and limited gait. Strength LLE  Strength LLE: WNL  Bed mobility  Supine to Sit: Stand by assistance (HOB up, 1 rail. Min extra time)  Sit to Supine: Moderate assistance (for B LEs.)  Transfers  Sit to Stand: Contact guard assistance (EOB to stance at STEDY, and BSC (Over toilet) to stance at RW.)  Stand to Sit: Contact guard assistance (CGA and cues.  From STEDY to [facility] (over toilet), and From RW to EOB)  Bed to Chair: Dependent/Total (via STEDY, due to urge to use restroom in setting of spinal anesthetic.)  Ambulation  Surface: Level tile  Device: Rolling Walker (Patient's)  Assistance: Contact guard assistance  Quality of Gait: Min discontinuous, partial step through pattern. Good management of RW. Distance: 13'  Comments: Patient brought to commode via STEDY. Urinates on commode. Performs ralu care in sitting with SBA. Stands at sink to wash hands with SBA-CGA. Patient reports high fatigue, and requests return to bed at end of session. Stairs/Curb  Stairs?: No  Balance  Posture: Good  Sitting - Static: Good  Sitting - Dynamic: Good  Standing - Static: Good (supported)  Standing - Dynamic: Good (at RW)    AM-PAC Score  AM-PAC Inpatient Mobility Raw Score : 16 (10/25/22 1816)  AM-PAC Inpatient T-Scale Score : 40.78 (10/25/22 1816)  Mobility Inpatient CMS 0-100% Score: 54.16 (10/25/22 1816)  Mobility Inpatient CMS G-Code Modifier : CK (10/25/22 1816)    Goals  Short Term Goals  Time Frame for Short Term Goals: By Acute DC  Short Term Goal 1: Transfers SBA  Short Term Goal 2: Gait RW 50' SBA  Short Term Goal 3: Able to verbalize hip positional precautions, and rationale for these, with Min cues. Patient Goals   Patient Goals : \"To walk without pain. \"       Education  Patient Education  Education Provided Comments: Written THR HEP provided 10-25 to be reviewed 10-26-22:  (Written/pictoral hip positional precautions posted on board in room as well)  Access Code: [email or web address]  URL: [email or web address]  Date: 10/25/2022  Prepared by: Sadia Marks  Education Method: Demonstration;Verbal;Printed Information/Hand-outs  Barriers to Learning: None  Education Outcome: Verbalized understanding;Demonstrated understanding;Continued education needed      Therapy Time   Individual Concurrent Group Co-treatment   Time In 9662         Time Out 1530         Minutes 50            Ev 15; TA 21; Gt 13    Vilma Justice PT  Electronically signed by Davy Sandoval PT [id number] (#[phone])  on 10/25/2022 at 6:20 PM

## 2022-10-25 NOTE — ANESTHESIA PROCEDURE NOTES
Spinal Block    Patient location during procedure: OR  End time: 10/25/2022 8:55 AM  Reason for block: primary anesthetic  Staffing  Performed: resident/CRNA   Anesthesiologist: Kourtney Pulliam MD  Resident/CRNA: FAY Johnson CRNA  Spinal Block  Patient position: sitting  Prep: Betadine  Patient monitoring: continuous pulse ox  Location: L3/L4  Provider prep: mask and sterile gloves  Local infiltration: lidocaine  Needle  Needle type: Pencan   Needle gauge: 25 G  Needle length: 3.5 in  Assessment  Sensory level: T10  Swirl obtained: Yes  CSF: clear  Attempts: 1  Hemodynamics: stable  Preanesthetic Checklist  Completed: patient identified, IV checked, risks and benefits discussed, surgical/procedural consents, equipment checked, pre-op evaluation, timeout performed, anesthesia consent given, oxygen available and monitors applied/VS acknowledged

## 2022-10-25 NOTE — PROGRESS NOTES
Pt to pacu from OR. Pt awake, denies pain. Dressing to right hip dry and intact. Pedal pulses palpable. IV infusing. Monitor in sinus rhythm.

## 2022-10-25 NOTE — PLAN OF CARE
Problem: Chronic Conditions and Co-morbidities  Goal: Patient's chronic conditions and co-morbidity symptoms are monitored and maintained or improved  Recent Flowsheet Documentation  Taken 10/25/2022 1314 by Judah Meek 34 - Patient's Chronic Conditions and Co-Morbidity Symptoms are Monitored and Maintained or Improved:   Monitor and assess patient's chronic conditions and comorbid symptoms for stability, deterioration, or improvement   Collaborate with multidisciplinary team to address chronic and comorbid conditions and prevent exacerbation or deterioration   Update acute care plan with appropriate goals if chronic or comorbid symptoms are exacerbated and prevent overall improvement and discharge     Problem: Discharge Planning  Goal: Discharge to home or other facility with appropriate resources  Recent Flowsheet Documentation  Taken 10/25/2022 1314 by Patsy Wood RN  Discharge to home or other facility with appropriate resources:   Identify barriers to discharge with patient and caregiver   Arrange for needed discharge resources and transportation as appropriate   Identify discharge learning needs (meds, wound care, etc)   Arrange for interpreters to assist at discharge as needed   Refer to discharge planning if patient needs post-hospital services based on physician order or complex needs related to functional status, cognitive ability or social support system     Problem: Pain  Goal: Verbalizes/displays adequate comfort level or baseline comfort level  Recent Flowsheet Documentation  Taken 10/25/2022 1400 by Patsy Wood RN  Verbalizes/displays adequate comfort level or baseline comfort level:   Encourage patient to monitor pain and request assistance   Assess pain using appropriate pain scale   Administer analgesics based on type and severity of pain and evaluate response   Implement non-pharmacological measures as appropriate and evaluate response   Consider cultural and social influences on pain and pain management   Notify Licensed Independent Practitioner if interventions unsuccessful or patient reports new pain     Problem: Neurosensory - Adult  Goal: Achieves stable or improved neurological status  Recent Flowsheet Documentation  Taken 10/25/2022 1314 by Chloé Watson RN  Achieves stable or improved neurological status:   Assess for and report changes in neurological status   Initiate measures to prevent increased intracranial pressure   Maintain blood pressure and fluid volume within ordered parameters to optimize cerebral perfusion and minimize risk of hemorrhage   Monitor temperature, glucose, and sodium.  Initiate appropriate interventions as ordered     Problem: Skin/Tissue Integrity - Adult  Goal: Skin integrity remains intact  Recent Flowsheet Documentation  Taken 10/25/2022 1314 by Chloé Watson RN  Skin Integrity Remains Intact:   Monitor for areas of redness and/or skin breakdown   Assess vascular access sites hourly  Goal: Incisions, wounds, or drain sites healing without S/S of infection  Recent Flowsheet Documentation  Taken 10/25/2022 1314 by Chloé Watson RN  Incisions, Wounds, or Drain Sites Healing Without Sign and Symptoms of Infection:   ADMISSION and DAILY: Assess and document risk factors for pressure ulcer development   TWICE DAILY: Assess and document skin integrity   TWICE DAILY: Assess and document dressing/incision, wound bed, drain sites and surrounding tissue   Implement wound care per orders   Initiate isolation precautions as appropriate   Initiate pressure ulcer prevention bundle as indicated     Problem: Musculoskeletal - Adult  Goal: Return mobility to safest level of function  Recent Flowsheet Documentation  Taken 10/25/2022 1314 by Chloé Watson RN  Return Mobility to Safest Level of Function:   Assess patient stability and activity tolerance for standing, transferring and ambulating with or without assistive devices   Assist with transfers and ambulation using safe patient handling equipment as needed   Ensure adequate protection for wounds/incisions during mobilization   Obtain physical therapy/occupational therapy consults as needed   Apply continuous passive motion per provider or physical therapy orders to increase flexion toward goal   Instruct patient/family in ordered activity level     Problem: Infection - Adult  Goal: Absence of infection at discharge  Recent Flowsheet Documentation  Taken 10/25/2022 1314 by Maritza Gunn RN  Absence of infection at discharge:   Assess and monitor for signs and symptoms of infection   Monitor lab/diagnostic results   Monitor all insertion sites i.e., indwelling lines, tubes and drains   [place] appropriate cooling/warming therapies per order   Administer medications as ordered   Instruct and encourage patient and family to use good hand hygiene technique   Identify and instruct in appropriate isolation precautions for identified infection/condition  Goal: Absence of infection during hospitalization  Recent Flowsheet Documentation  Taken 10/25/2022 1314 by Maritza Gunn RN  Absence of infection during hospitalization:   Assess and monitor for signs and symptoms of infection   Monitor lab/diagnostic results   Monitor all insertion sites i.e., indwelling lines, tubes and drains   [place] appropriate cooling/warming therapies per order   Administer medications as ordered   Instruct and encourage patient and family to use good hand hygiene technique   Identify and instruct in appropriate isolation precautions for identified infection/condition     Problem: Metabolic/Fluid and Electrolytes - Adult  Goal: Glucose maintained within prescribed range  Recent Flowsheet Documentation  Taken 10/25/2022 1314 by Maritza Gunn RN  Glucose maintained within prescribed range:   Monitor blood glucose as ordered   Assess for signs and symptoms of hyperglycemia and hypoglycemia   Administer ordered medications to maintain glucose within target range   Assess barriers to adequate nutritional intake and initiate nutrition consult as needed   Instruct patient on self management of diabetes and initiate consult as needed

## 2022-10-25 NOTE — CARE COORDINATION
[facility]    Approved for 4 visits each PT/OT randell.      Jose Bolton RN, BSN CTN  [facility] [phone]

## 2022-10-25 NOTE — LETTER
[address] [facility]  Phone: [phone]    No name on file. October 26, 2022     Patient: Alma Mclain   YOB: 1945   Date of Visit: 7/1/2022       To Whom It May Concern: It is my medical opinion that Kasey Yancey requires a disability parking placard for the following reasons:  She cannot walk without assistance from another person or the use of an assistance device (cane, crutch, prosthetic device, wheelchair, etc.). Duration of need: permanent    If you have any questions or concerns, please don't hesitate to call.     Sincerely,        Electronically signed by Loyd CONRAD  10/26/22 8:56 AM  Dr Kayla Burgess [facility], [address] [place] [address]. [facility], [phone]

## 2022-10-25 NOTE — ANESTHESIA PRE PROCEDURE
Department of Anesthesiology  Preprocedure Note       Name:  Delaney Renee   Age:  76 y.o.  :  1945                                          MRN:  [id number]         Date:  10/25/2022      Surgeon: Lisette Canseco):  Dewey Price MD    Procedure: Procedure(s):  RIGHT ANTERIOR TOTAL HIP REPLACEMENT WITH C-ARM    Medications prior to admission:   Prior to Admission medications    Medication Sig Start Date End Date Taking? Authorizing Provider   lisinopril (PRINIVIL;ZESTRIL) 40 MG tablet Take 40 mg by mouth daily   Yes Historical Provider, MD   Cholecalciferol (VITAMIN D) 50 MCG (2000) CAPS capsule Take by mouth daily    Historical Provider, MD   b complex vitamins capsule Take 1 capsule by mouth in the morning and at bedtime    Historical Provider, MD   magnesium 200 MG TABS tablet Take 1 tablet by mouth daily  Patient taking differently: Take 200 mg by mouth daily 1/2 tablet 2/4/22 10/25/22  Bentley Harris MD   sodium chloride 1 g tablet Take 1 g by mouth 3 times daily    Historical Provider, MD   metoprolol tartrate (LOPRESSOR) 25 MG tablet Take 50 mg by mouth 2 times daily     Historical Provider, MD   amLODIPine (NORVASC) 5 MG tablet Take 5 mg by mouth daily    Historical Provider, MD   Cranberry 1000 MG CAPS Take 1 capsule by mouth 2 times daily. 4/8/15   FAY Cash - CNP   metFORMIN (GLUCOPHAGE) 500 MG tablet Take 500 mg by mouth 2 times daily (with meals). Historical Provider, MD   Ferrous Sulfate (IRON) 325 (65 FE) MG TABS Take 1 tablet by mouth every evening.     Historical Provider, MD   Ascorbic Acid (VITAMIN C) 500 MG CAPS Take 1 tablet by mouth daily     Historical Provider, MD   levothyroxine (SYNTHROID) 75 MCG tablet Take 75 mcg by mouth daily  14   Historical Provider, MD       Current medications:    Current Facility-Administered Medications   Medication Dose Route Frequency Provider Last Rate Last Admin    0.9 % sodium chloride infusion   IntraVENous Continuous Mendy Bueno  mL/hr at 10/25/22 0717 New Bag at 10/25/22 0717    sodium chloride flush 0.9 % injection 5-40 mL  5-40 mL IntraVENous 2 times per day Mendy Bueno MD        sodium chloride flush 0.9 % injection 5-40 mL  5-40 mL IntraVENous PRN Mendy Bueno MD        0.9 % sodium chloride infusion   IntraVENous PRN Mendy Bueno MD        ceFAZolin (ANCEF) 2,000 mg in dextrose 5 % 50 mL IVPB (mini-bag)  2,000 mg IntraVENous Once Dewey Price MD           Allergies:  No Known Allergies    Problem List:    Patient Active Problem List   Diagnosis Code    Primary localized osteoarthrosis, lower leg M17.10    Obesity (BMI 30-39. 9) E66.9    Primary localized osteoarthrosis, pelvic region and thigh M16.10    History of total hip replacement Z96.649    H/O total hip arthroplasty, left I14.616    Primary osteoarthritis of left hip M16.12    Arthritis of right knee M17.11    H/O total knee replacement, right 1/30/19 Z96.651    Right hip pain M25.551    2019 novel coronavirus disease (COVID-19) U07.1    Hyponatremia E87.1    Syncope R55    CLL (chronic lymphocytic leukemia) ([facility]) C91.10    Pneumonia due to COVID-19 virus U07.1, J12.82    Pneumonia J18.9    Neutropenic fever ([facility]) D70.9, R50.81    Sepsis ([facility] [facility] A41.9    Arthritis of right hip M16.11       Past Medical History:        Diagnosis Date    Acid reflux     Arthritis     Cancer ([facility]     Chronic lymphocytic leukemia (CLL) genetic mutation variant ([facility] [facility] 2018    Diabetes mellitus ([facility] [address]     BORDERLINE    High blood pressure     Hyperlipidemia     mild-no meds    Multiple drug resistant organism (MDRO) culture positive 01/25/2021    urine    Murmur     PONV (postoperative nausea and vomiting)     Thyroid disease     UTI (urinary tract infection) 01/2019    currently on cipro    Wears glasses        Past Surgical History:        Procedure Laterality Date    CHOLECYSTECTOMY      CYST REMOVAL Right     on foot    EYE SURGERY      cataract removal with lens implants    HYSTERECTOMY (CERVIX STATUS UNKNOWN)      JOINT REPLACEMENT Left     hip    JOINT REPLACEMENT Right 01/30/2019      Right Total Knee Replacement    LARYNGOSCOPY N/A 9/10/2021    DIRECT LARYNGOSCOPY WITH BIOPSY performed by Sandee Oliveira MD at [place] [address] N/A 9/10/2021    NASAL ENDOSCOPY WITH BIOPSY performed by Sandee Oliveira MD at [address] Left     TOTAL KNEE ARTHROPLASTY Right 1/30/2019    RIGHT TOTAL KNEE REPLACEMENT performed by Vincent Zuniga MD at [address] [place] [address] History:    Social History     Tobacco Use    Smoking status: Never    Smokeless tobacco: Never   Substance Use Topics    Alcohol use: No                                Counseling given: Not Answered      Vital Signs (Current):   Vitals:    10/14/22 1533 10/25/22 0658   BP:  (!) 143/72   Pulse:  64   Resp:  18   Temp:  97.3 °F (36.3 °C)   TempSrc:  Temporal   SpO2:  99%   Weight: 192 lb (87.1 kg) 197 lb 8.5 oz (89.6 kg)   Height: 5' 6\" (1.676 m) 5' 6\" (1.676 m)                                              BP Readings from Last 3 Encounters:   10/25/22 (!) 143/72   09/07/22 133/75   07/28/22 138/62       NPO Status: Time of last liquid consumption: 0330 (sip of water)                        Time of last solid consumption: 1900                        Date of last liquid consumption: 10/25/22                        Date of last solid food consumption: 10/24/22    BMI:   Wt Readings from Last 3 Encounters:   10/25/22 197 lb 8.5 oz (89.6 kg)   10/20/22 192 lb (87.1 kg)   09/07/22 192 lb (87.1 kg)     Body mass index is 31.88 kg/m².     CBC:   Lab Results   Component Value Date/Time    WBC 5.2 10/17/2022 11:35 AM    RBC 4.08 10/17/2022 11:35 AM    HGB 12.8 10/17/2022 11:35 AM    HCT 37.2 10/17/2022 11:35 AM    MCV 91.2 10/17/2022 11:35 AM    RDW 13.0 10/17/2022 11:35 AM     10/17/2022 11:35 AM       CMP:   Lab Results Component Value Date/Time     10/17/2022 11:35 AM    K 4.4 10/17/2022 11:35 AM    K 3.5 01/30/2022 09:23 PM    CL 98 10/17/2022 11:35 AM    CO2 25 10/17/2022 11:35 AM    BUN 12 10/17/2022 11:35 AM    CREATININE 0.7 10/17/2022 11:35 AM    GFRAA >60 10/17/2022 11:35 AM    AGRATIO 0.9 02/03/2022 05:54 AM    LABGLOM >60 10/17/2022 11:35 AM    GLUCOSE 85 10/17/2022 11:35 AM    PROT 6.0 02/03/2022 05:54 AM    CALCIUM 9.7 10/17/2022 11:35 AM    BILITOT 0.4 02/03/2022 05:54 AM    ALKPHOS 87 02/03/2022 05:54 AM    AST 24 02/03/2022 05:54 AM    ALT 19 02/03/2022 05:54 AM       POC Tests:   Recent Labs     10/25/22  0724   POCGLU 96       Coags:   Lab Results   Component Value Date/Time    PROTIME 13.1 10/17/2022 11:35 AM    INR 1.00 10/17/2022 11:35 AM    APTT 26.0 10/17/2022 11:35 AM       HCG (If Applicable): No results found for: PREGTESTUR, PREGSERUM, HCG, HCGQUANT     ABGs: No results found for: PHART, PO2ART, RZD3VAG, MKA9GSN, BEART, I5LSOYKL     Type & Screen (If Applicable):  No results found for: LABABO, LABRH    Drug/Infectious Status (If Applicable):  No results found for: HIV, HEPCAB    COVID-19 Screening (If Applicable):   Lab Results   Component Value Date/Time    COVID19 DETECTED 01/30/2022 10:10 PM           Anesthesia Evaluation  Patient summary reviewed   history of anesthetic complications: PONV.   Airway: Mallampati: II  TM distance: >3 FB   Neck ROM: full  Mouth opening: > = 3 FB   Dental: normal exam   (+) upper dentures and lower dentures      Pulmonary:normal exam  breath sounds clear to auscultation      (-) shortness of breath and not a current smoker                           Cardiovascular:  Exercise tolerance: good (>4 METS),   (+) hypertension:, valvular problems/murmurs: AS,         Rhythm: regular  Rate: normal                 ROS comment: ECHO 8/2022 shows EF 60-65, mod AS     Neuro/Psych:   Negative Neuro/Psych ROS              GI/Hepatic/Renal:   (+) GERD:,           Endo/Other: (+) Diabetes, : arthritis:., electrolyte abnormalities, malignancy/cancer. Abdominal:             Vascular: negative vascular ROS. Other Findings:   Preop emend, tylenol          Anesthesia Plan      spinal     ASA 3       Induction: intravenous. MIPS: Postoperative opioids intended and Prophylactic antiemetics administered. Anesthetic plan and risks discussed with patient. Plan discussed with CRNA.     Attending anesthesiologist reviewed and agrees with Augusto Kennedy MD   10/25/2022

## 2022-10-25 NOTE — OP NOTE
Patient: Ganga Gonzalez  YOB: 1945  MRN: [id number]    Date of Procedure: 10/25/2022      Pre-Op Diagnosis: Osteoarthritis, right hip     Post-Op Diagnosis: Same       Procedure Performed: Right anterior total hip arthroplasty     Surgeon: Sophie Samuels MD     Physician Assistant: FOREIGN Browning     Anesthesia: General     Estimated Blood Loss: 291 mL      Complications: None     Specimens: Femoral head    Implants:  Depuy [place] Gription cup, 52 mm  36 mm polyethylene liner, neutral offset  Depuy Actis stem, size 7 standard offset  36 mm diameter ceramic femoral head, +1.5 mm offset    Indications: This is a 76 y.o. female with osteoarthritis of the hip that continues to be painful despite conservative management. We discussed the diagnosis and treatment options and I recommended total hip arthroplasty. The operative procedure, alternatives, and risks were discussed in detail with the patient. The risks include but are not limited to: Infection, vessel injury, nerve injury, DVT, pulmonary embolism, implant loosening, need for revision surgery, leg length discrepancy, dislocation, lateral femoral cutaneous nerve palsy, intraoperative fracture. Informed consent for surgery was signed by the patient. Details: The patient was seen in the preoperative holding area where the site of surgery was marked and informed consent was confirmed. The patient was brought back to the operating room by OR personnel. Anesthesia was administered. The patient was positioned supine on the [place] table. The right lower extremity was then prepped and draped in a standard and sterile fashion. A final and formal timeout was then performed which confirmed the correct patient, correct position, and correct site of surgery. IV antibiotics were administered within 1 hour of the skin incision. A direct anterior supine approach was utilized.  The skin and subcutaneous tissue were dissected down to the body of the tensor fascia dale. Incision into the fascia of the TFL was made and dissection was carried medial to the tensor and lateral to the rectus femoris and sartorius. The anterior femoral circumflex vessels were identified and coagulated. The anterior capsule of the hip was exposed and an anterior capsulotomy was made. The femoral neck was exposed by two cobra retractors. A proximal femoral osteotomy was performed and the femoral head was removed. The acetabulum was exposed and debrided of labral and capsular structures. Osteophytes and other acetabular debris were removed. The acetabulum was reamed under direct fluroscopic evaluation. A press fit 52 mm cup was impacted into place in the appropriate abduction and anteversion. This was checked with the C arm and was found to be in satisfacfory position. There were no screws used to secure the fixation of the implant to the floor of the acetabulum. A neutral 36 mm liner was placed. The proximal femur was exposed by using the hook from the [place] table, externally rotating, extending, and adducting the leg. The proximal femur was prepared to accept a 7 standard offset trial stem. The hip was reduced after placing the +1.5 mm 36 mm head segment over the Vicente Myriam taper of the trial implant. The C arm was brought in to confirm satisfactory hardware placement and leg length equality. The hip was dislocated and the trial implants were removed. Again using the hook from the [place] table and extending, externally rotating, and adducting the hip, the number 7 standard offset Actis stem was hammered into place in the appropriate anteversion. The +1.5 mm 36 mm ceramic head segment was placed over the Vicente Myriam taper. The hip was reduced. The C arm was brought in and confirmed satisfactory postion of the implants and leg length equity. She was 13 mm longer on the operative leg compared to the contralateral leg prior to the surgery.  She was made to be 10 mm longer than the contralateral leg with this construct. Excessive shortening to match leg lengths was avoided so as to avoid instability. The wound was irrigated and hemostasis was obtained. The wound was injected with local anesthetic. The wound was bathed with betadine. The wound was closed in layers, a dressing was applied, and the patient was brought to recovery in satisfactory condition. FOREIGN Low was essential in patient positioning, surgical assistance during the arthroplasty, and in wound closure.     Andrea De La Cruz MD  10/25/2022

## 2022-10-26 VITALS
OXYGEN SATURATION: 94 % | BODY MASS INDEX: 31.21 KG/M2 | HEART RATE: 73 BPM | TEMPERATURE: 97.9 F | WEIGHT: 194.22 LBS | DIASTOLIC BLOOD PRESSURE: 65 MMHG | RESPIRATION RATE: 14 BRPM | HEIGHT: 66 IN | SYSTOLIC BLOOD PRESSURE: 109 MMHG

## 2022-10-26 LAB
ANION GAP SERPL CALCULATED.3IONS-SCNC: 15 MMOL/L (ref 3–16)
BUN BLDV-MCNC: 16 MG/DL (ref 7–20)
CALCIUM SERPL-MCNC: 8.7 MG/DL (ref 8.3–10.6)
CHLORIDE BLD-SCNC: 99 MMOL/L (ref 99–110)
CO2: 20 MMOL/L (ref 21–32)
CREAT SERPL-MCNC: 0.8 MG/DL (ref 0.6–1.2)
GFR SERPL CREATININE-BSD FRML MDRD: >60 ML/MIN/{1.73_M2}
GLUCOSE BLD-MCNC: 126 MG/DL (ref 70–99)
GLUCOSE BLD-MCNC: 126 MG/DL (ref 70–99)
GLUCOSE BLD-MCNC: 135 MG/DL (ref 70–99)
PERFORMED ON: ABNORMAL
PERFORMED ON: ABNORMAL
POTASSIUM SERPL-SCNC: 4.6 MMOL/L (ref 3.5–5.1)
SODIUM BLD-SCNC: 134 MMOL/L (ref 136–145)

## 2022-10-26 PROCEDURE — 97535 SELF CARE MNGMENT TRAINING: CPT

## 2022-10-26 PROCEDURE — 97166 OT EVAL MOD COMPLEX 45 MIN: CPT

## 2022-10-26 PROCEDURE — 2580000003 HC RX 258: Performed by: ORTHOPAEDIC SURGERY

## 2022-10-26 PROCEDURE — 6360000002 HC RX W HCPCS: Performed by: ORTHOPAEDIC SURGERY

## 2022-10-26 PROCEDURE — 80048 BASIC METABOLIC PNL TOTAL CA: CPT

## 2022-10-26 PROCEDURE — 94760 N-INVAS EAR/PLS OXIMETRY 1: CPT

## 2022-10-26 PROCEDURE — 97110 THERAPEUTIC EXERCISES: CPT

## 2022-10-26 PROCEDURE — 6370000000 HC RX 637 (ALT 250 FOR IP): Performed by: ORTHOPAEDIC SURGERY

## 2022-10-26 PROCEDURE — 97116 GAIT TRAINING THERAPY: CPT

## 2022-10-26 PROCEDURE — 97530 THERAPEUTIC ACTIVITIES: CPT

## 2022-10-26 PROCEDURE — 36415 COLL VENOUS BLD VENIPUNCTURE: CPT

## 2022-10-26 RX ADMIN — PREGABALIN 75 MG: 75 CAPSULE ORAL at 08:55

## 2022-10-26 RX ADMIN — MELOXICAM 7.5 MG: 7.5 TABLET ORAL at 06:54

## 2022-10-26 RX ADMIN — AMLODIPINE BESYLATE 5 MG: 5 TABLET ORAL at 08:56

## 2022-10-26 RX ADMIN — Medication 10 ML: at 08:56

## 2022-10-26 RX ADMIN — LEVOTHYROXINE SODIUM 75 MCG: 0.07 TABLET ORAL at 08:55

## 2022-10-26 RX ADMIN — OXYCODONE 5 MG: 5 TABLET ORAL at 09:00

## 2022-10-26 RX ADMIN — OXYCODONE 5 MG: 5 TABLET ORAL at 04:39

## 2022-10-26 RX ADMIN — ACETAMINOPHEN 650 MG: 325 TABLET ORAL at 00:26

## 2022-10-26 RX ADMIN — CEFAZOLIN 2000 MG: 2 INJECTION, POWDER, FOR SOLUTION INTRAMUSCULAR; INTRAVENOUS at 00:30

## 2022-10-26 RX ADMIN — ASPIRIN 81 MG: 81 TABLET, COATED ORAL at 08:55

## 2022-10-26 RX ADMIN — ACETAMINOPHEN 650 MG: 325 TABLET ORAL at 06:52

## 2022-10-26 RX ADMIN — METOPROLOL TARTRATE 50 MG: 50 TABLET ORAL at 08:55

## 2022-10-26 ASSESSMENT — PAIN - FUNCTIONAL ASSESSMENT
PAIN_FUNCTIONAL_ASSESSMENT: PREVENTS OR INTERFERES SOME ACTIVE ACTIVITIES AND ADLS

## 2022-10-26 ASSESSMENT — PAIN DESCRIPTION - DESCRIPTORS
DESCRIPTORS: DISCOMFORT
DESCRIPTORS: ACHING
DESCRIPTORS: ACHING;THROBBING
DESCRIPTORS: ACHING

## 2022-10-26 ASSESSMENT — PAIN DESCRIPTION - PAIN TYPE: TYPE: ACUTE PAIN;SURGICAL PAIN

## 2022-10-26 ASSESSMENT — PAIN DESCRIPTION - LOCATION
LOCATION: HIP

## 2022-10-26 ASSESSMENT — PAIN DESCRIPTION - ORIENTATION
ORIENTATION: RIGHT

## 2022-10-26 ASSESSMENT — PAIN SCALES - GENERAL
PAINLEVEL_OUTOF10: 1
PAINLEVEL_OUTOF10: 4
PAINLEVEL_OUTOF10: 3
PAINLEVEL_OUTOF10: 0
PAINLEVEL_OUTOF10: 0

## 2022-10-26 ASSESSMENT — PAIN DESCRIPTION - ONSET: ONSET: ON-GOING

## 2022-10-26 ASSESSMENT — PAIN DESCRIPTION - FREQUENCY: FREQUENCY: CONTINUOUS

## 2022-10-26 NOTE — PROGRESS NOTES
Occupational Therapy  Facility/Department: [address] ORTHOPEDICS  Occupational Therapy Initial Assessment    Name: Mell Vivar  : 1945  MRN: [id number]  Date of Service: 10/26/2022    Discharge Recommendations:  2-3 sessions per week, Patient would benefit from continued therapy after discharge, Home with Home health OT, 24 hour supervision or assist        Mell Vivar scored a 18/24 on the AM-PAC ADL Inpatient form. Current research shows that an AM-PAC score of 18 or greater is typically associated with a discharge to the patient's home setting. Based on the patient's AM-PAC score, and their current ADL deficits, it is recommended that the patient have 2-3 sessions per week of Occupational Therapy at d/c to increase the patient's independence. At this time, this patient demonstrates the endurance and safety to discharge home with Joy Robert (home vs OP services) and a follow up treatment frequency of 2-3x/wk. Please see assessment section for further patient specific details. If patient discharges prior to next session this note will serve as a discharge summary. Please see below for the latest assessment towards goals. Patient Diagnosis(es): The encounter diagnosis was Primary osteoarthritis of right hip. Past Medical History:  has a past medical history of Acid reflux, Arthritis, Cancer ([facility] [address] Chronic lymphocytic leukemia (CLL) genetic mutation variant ([facility] [address] Diabetes mellitus ([facility] [address] High blood pressure, Hyperlipidemia, Multiple drug resistant organism (MDRO) culture positive, Murmur, PONV (postoperative nausea and vomiting), Thyroid disease, UTI (urinary tract infection), and Wears glasses. Past Surgical History:  has a past surgical history that includes Cholecystectomy; Hysterectomy; Thyroid surgery (Left); cyst removal (Right); eye surgery; joint replacement (Left); joint replacement (Right, 2019); Total knee arthroplasty (Right, 2019);  Nasal sinus surgery (N/A, 9/10/2021); laryngoscopy (N/A, 9/10/2021); and Total hip arthroplasty (Right, 10/25/2022). Treatment Diagnosis: impaired ADL/fxl mobility    Assessment   Performance deficits / Impairments: Decreased functional mobility ; Decreased endurance;Decreased ADL status; Decreased high-level IADLs;Decreased balance  Assessment: 75 yo female admitted 10/25 for elective R RODRIGO- anterior approach. PMH: L RODRIGO 2015, R TKA 2019. PTA, pt lives with family and fully IND with ADL, IADL and cane for fxl mobility. Today, pt functioning just below baseline limited by decreased balance and R hip pain. Pt, spouse and dtr educated on hip precautions, LB bathing/dressing adpative equipment, seated bathing/dressing, mandeep hose wear/dressing, and expectation of hourly fxl mobility. pt completes bed mobility CGA, fxl tx SBA, fxl mobility household distances with RW SBA/CGA, toileting SBA, sink side grooming SBA, and UB dressing set up. Pt does required Max A mandeep hose and LB dressing as pt with little interest in using adaptive equipment- family to assist. Cont acute OT to address above deficits.  Rec return home with family for 24 hr SUP and OT 2-3x/week to ensure return to PLOF  Treatment Diagnosis: impaired ADL/fxl mobility  Prognosis: Good  Decision Making: Medium Complexity  REQUIRES OT FOLLOW-UP: Yes  Activity Tolerance  Activity Tolerance: Patient Tolerated treatment well        Plan   Occupational Therapy Plan  Times Per Week: 2-3 sessions  Current Treatment Recommendations: Strengthening, ROM, Balance training, Functional mobility training, Endurance training, Pain management, Safety education & training, Self-Care / ADL, Home management training, Modalities, Positioning, Patient/Caregiver education & training     Restrictions  Restrictions/Precautions  Restrictions/Precautions: Fall Risk, Weight Bearing, ROM Restrictions  Lower Extremity Weight Bearing Restrictions  Right Lower Extremity Weight Bearing: Weight Bearing As Tolerated  Position Activity Restriction  Hip Precautions: No hip flexion > 90 degrees, No hip extension, No hip external rotation  Other position/activity restrictions: No SLR; Assume no hip flexion past 90 degrees. Subjective   General  Chart Reviewed: Yes  Patient assessed for rehabilitation services?: Yes  Additional Pertinent Hx: 75 yo female admitted 10/25 for elective R RODRIGO- anterior approach. PMH: L RODRIGO 2015, R TKA 2019  Family / Caregiver Present: Yes  Referring Practitioner: Leslie Gudino MD  Diagnosis: R RODRIGO  Subjective  Subjective: Pt resting in bed upon arrival and agreeable to OT eval. pt reporting improvements in R hip pain. Pt noted to have 2 instances of R knee bukcling- reports \"that happened a few other times this morning\"  General Comment  Comments: RN ok to see       Social/Functional History  Social/Functional History  Lives With: Spouse  Type of Home: House  Home Layout: One level, Able to Live on Main level with bedroom/bathroom  Home Access: Ramped entrance (+ 1 small step)  Bathroom Shower/Tub: Walk-in shower, Shower chair without back  Bathroom Toilet: Handicap height  Bathroom Equipment: Toilet raiser  Bathroom Accessibility: Walker accessible  Home Equipment: Dyan Case, rolling, Walker, 4 wheeled, Cane  Has the patient had two or more falls in the past year or any fall with injury in the past year?: No  ADL Assistance: Independent  Homemaking Assistance: Independent  Ambulation Assistance: Independent (cane)  Transfer Assistance: Independent       Objective            Safety Devices  Type of Devices: Call light within reach;Gait belt;Patient at risk for falls;Nurse notified; Left in chair;Chair alarm in place       Toilet Transfers  Toilet - Technique: Ambulating (RW)  Equipment Used: Standard bedside commode (over toilet)  Toilet Transfer: Stand by assistance    AROM: Within functional limits  PROM: Within functional limits  Strength:  Within functional limits  Coordination: Within functional limits  Tone: Normal    ADL  Equipment Provided: Reacher;Sock aid;Long-handled shoe horn;Long-handled sponge (Pt educated on and issued. Pt preferred not to use AE during session)  Grooming: Stand by assistance  Grooming Skilled Clinical Factors: sink side grooming  UE Dressing: Setup  UE Dressing Skilled Clinical Factors: seated  LE Dressing: Maximum assistance  LE Dressing Skilled Clinical Factors: Max A mandeep hose. (pt already has depends on and wears dress)  Toileting: Stand by assistance  Toileting Skilled Clinical Factors: manages pericare and pant management       Activity Tolerance  Activity Tolerance: Patient tolerated treatment well  Bed mobility  Supine to Sit: Contact guard assistance (HOB elevated. gentle assist at RLE)    Transfers  Sit to stand: Contact guard assistance;Stand by assistance  Stand to sit: Contact guard assistance;Stand by assistance  Transfer Comments: RW. cues hand placement. Standing balance: SBA -sink side grooming x3 min + PRN tx pant management                     Fxl mobility: SBA/CGA - EOB>bathroom>recliner with RW. 2 brief R knee buckling, able to self correct    Vision  Vision: Impaired  Vision Exceptions: Wears glasses at all times  Hearing  Hearing: Within functional limits  Cognition  Overall Cognitive Status: WFL  Cognition Comment: Slight fatigue during instructions. Orientation  Overall Orientation Status: Within Normal Limits                    Education Given To: Patient  Education Provided: Role of Therapy;Plan of Care;Transfer Training;Precautions; ADL Adaptive Strategies  Education Provided Comments: Pt, spouse and dtr educated on hip precautions, LB bathing/dressing adpative equipment, seated bathing/dressing, mandeep hose wear/dressing, and expectation of hourly fxl mobility  Education Method: Demonstration;Verbal  Barriers to Learning: None  Education Outcome: Verbalized understanding;Demonstrated understanding;Continued education needed AM-PAC Score        AM-PAC Inpatient Daily Activity Raw Score: 18 (10/26/22 1247)  AM-PAC Inpatient ADL T-Scale Score : 38.66 (10/26/22 1247)  ADL Inpatient CMS 0-100% Score: 46.65 (10/26/22 1247)  ADL Inpatient CMS G-Code Modifier : CK (10/26/22 1247)    Goals  Short Term Goals  Time Frame for Short Term Goals: prior to d/c  Short Term Goal 1: toileting SUP  Short Term Goal 2: LB dressing SUP  Short Term Goal 3: tolerate 5 min fxl standing task SUP  Short Term Goal 4: fxl tx and mobility with RW household distances SUP  Patient Goals   Patient goals : return home with family       Therapy Time   Individual Concurrent Group Co-treatment   Time In 1025         Time Out 1110         Minutes 45         Timed Code Treatment Minutes: 30 Minutes (15 eval. 30 ADL)       GEOVANY Vega, OTR/L

## 2022-10-26 NOTE — PROGRESS NOTES
Data- discharge order received, patient verbalized agreement to discharge, disposition to previous residence, needs noted for [facility] and informed Seng Serrano NP. Action- discharge instructions prepared and given to patient, , and daughter, patient verbalized understanding. Medication information packet given r/t NEW and/or CHANGED prescriptions emphasizing name/purpose/side effects, pt verbalized understanding. Discharge instruction summary: Diet- general, Activity- wbat, Primary Care Physician as follows: Andreas Galeana, APRN - [phone]. f/u appointment with orthopedic office noted below, immunizations reviewed and discussed with patient, prescription medications to be filled by retail pharmacy and then delivered. Inpatient surgical procedure precautions reviewed: anterior hip precautions. Educated patient on using incentive spirometer post-discharge per surgeon's instructions. Neurovascular checks performed and moderate dorsi and plantar flexions noted bilaterally, toes appear appropriate to ethnicity in color, warm to touch, patient denies numbness or tingling. Incision site  prineo glue dressing assessed and is  clean,dry, and intact, no signs of redness, drainage, or odor noted. patient's bedside RN meghana notified of patient completing discharge instructions and iv removal. Nurse Navigator and Orthopedic Office contact information on discharge instructions and provided to patient. Response- IV removed. Medications delivered to patient via meds to bed program. Disposition is home with [facility] , to be transported by friend aliza .     Future Appointments   Date Time Provider Quentin Romo   11/7/2022  1:00 PM MD [facility]

## 2022-10-26 NOTE — PROGRESS NOTES
Gauze drsg removed from right hip . Prineo drsg remains intact. Patient education given about drsg/incision care.  Electronically signed by David Keita RN on 10/26/2022 at 8:22 AM

## 2022-10-26 NOTE — PROGRESS NOTES
Patient resting quietly in bed. Alert and oriented. Rates pain 4/10 in the left hip. Oxycodone given for pain. Prineo dressing clean and dry. Denies numbness and tingling. Pedal pulses 2+. Tolerating ambulation with walker. Family at bedside. Bed alarm on. Call light and belongings within reach. Will continue to monitor.      Electronically signed by Samuel Marie RN on 10/26/2022 at 9:08 AM

## 2022-10-26 NOTE — PLAN OF CARE
Problem: Pain  Goal: Verbalizes/displays adequate comfort level or baseline comfort level  Flowsheets  Taken 10/26/2022 0303 by Naeem Foster RN  Verbalizes/displays adequate comfort level or baseline comfort level:   Encourage patient to monitor pain and request assistance   Assess pain using appropriate pain scale   Administer analgesics based on type and severity of pain and evaluate response   Implement non-pharmacological measures as appropriate and evaluate response   Consider cultural and social influences on pain and pain management   Notify Licensed Independent Practitioner if interventions unsuccessful or patient reports new pain  Taken 10/25/2022 1400 by Anitha Jackson RN  Verbalizes/displays adequate comfort level or baseline comfort level:   Encourage patient to monitor pain and request assistance   Assess pain using appropriate pain scale   Administer analgesics based on type and severity of pain and evaluate response   Implement non-pharmacological measures as appropriate and evaluate response   Consider cultural and social influences on pain and pain management   Notify Licensed Independent Practitioner if interventions unsuccessful or patient reports new pain

## 2022-10-26 NOTE — PROGRESS NOTES
Physical Therapy  Facility/Department: [address] ORTHOPEDICS  Daily Treatment / Discharge      Name: Bee Florian  : 1945  MRN: [id number]  Date of Service: 10/26/2022    Discharge Recommendations:  24 hour supervision or assist, Home with Home health PT   Bee Florian scored a 18/24 on the AM-PAC short mobility form. Current research shows that an AM-PAC score of 18 or greater is typically associated with a discharge to the patient's home setting. Based on the patient's AM-PAC score and their current functional mobility deficits, it is recommended that the patient have 2-3 sessions per week of Physical Therapy at d/c to increase the patient's independence. PT Equipment Recommendations  Equipment Needed: No  Other: has RW      Patient Diagnosis(es): The encounter diagnosis was Primary osteoarthritis of right hip. Past Medical History:  has a past medical history of Acid reflux, Arthritis, Cancer ([facility] [address] Chronic lymphocytic leukemia (CLL) genetic mutation variant ([facility] [address] Diabetes mellitus ([facility] [address] High blood pressure, Hyperlipidemia, Multiple drug resistant organism (MDRO) culture positive, Murmur, PONV (postoperative nausea and vomiting), Thyroid disease, UTI (urinary tract infection), and Wears glasses. Past Surgical History:  has a past surgical history that includes Cholecystectomy; Hysterectomy; Thyroid surgery (Left); cyst removal (Right); eye surgery; joint replacement (Left); joint replacement (Right, 2019); Total knee arthroplasty (Right, 2019); Nasal sinus surgery (N/A, 9/10/2021); laryngoscopy (N/A, 9/10/2021); and Total hip arthroplasty (Right, 10/25/2022). Assessment   Body Structures, Functions, Activity Limitations Requiring Skilled Therapeutic Intervention: Decreased functional mobility ; Decreased ADL status; Decreased strength; Increased pain  Assessment: Prior to elective R THR 10-25-22 via Dr. Lisa Virk, patient reports living in home with family, and independent in ADLs, some IADLs, transfers, and gait with a cane. Status 10-26-22:  Transfers to RW SBA. Gait with patient 's RW, 25' x 2, with SBA and Good quality. Therapist demonstrates and verbally reviews hip positional precautions and rationale for these with patient and family. All verb understanding. Written THR HEP, including activity expectations, provided to patient and reviewed. Patient and family present at eval, and familiar with THR recovery from patient's prior L THR. Patient appears appropriate for DC to home with initial 24 hour family support and home therapy. She has a RW for home. Treatment Diagnosis: Impaired functional mobility  Therapy Prognosis: Good  History: as noted  Requires PT Follow-Up: No  Activity Tolerance  Activity Tolerance: Patient tolerated treatment well     Plan   Physcial Therapy Plan  General Plan: 3-5 times per week  Current Treatment Recommendations: Functional mobility training, Transfer training, Gait training, Stair training, Home exercise program, Safety education & training, Patient/Caregiver education & training, Equipment evaluation, education, & procurement, Positioning  Safety Devices  Type of Devices: Call light within reach, Gait belt, Patient at risk for falls, Left in chair, Chair alarm in place (ice pack refilled and placed to R anterior thigh.)     Restrictions  Restrictions/Precautions  Restrictions/Precautions: Fall Risk, Weight Bearing, ROM Restrictions  Lower Extremity Weight Bearing Restrictions  Right Lower Extremity Weight Bearing: Weight Bearing As Tolerated  Position Activity Restriction  Hip Precautions: No hip flexion > 90 degrees, No hip extension, No hip external rotation  Other position/activity restrictions: No SLR; Assume no hip flexion past 90 degrees. Subjective   General  Chart Reviewed: Yes  Patient assessed for rehabilitation services?: Yes  Additional Pertinent Hx: 77 yo female to hospital 10-25-22 for elective R THR via Dr. Josee Galeas. Spinal Anesthetic. PMHx as noted, including L THR 2015, R TKR 1-, COVID Jan 2022, and CLL. Response To Previous Treatment: Not applicable  Family / Caregiver Present: Yes ( and 1 dtr)  Referring Practitioner: Dewey Price MD  Referral Date : 10/25/22  Subjective  Subjective: Patient in bedside chair. Agreeable to therapy. Reports R lateral and anterior thigh pain 2-4/10, highest with mobility. Social/Functional History  Social/Functional History  Lives With: Spouse  Type of Home: House  Home Layout: One level, Able to Live on Main level with bedroom/bathroom  Home Access: Ramped entrance (+ 1 small step)  Bathroom Shower/Tub: Walk-in shower, Shower chair without back  Bathroom Toilet: Handicap height  Bathroom Equipment: Toilet raiser  Bathroom Accessibility: Walker accessible  Home Equipment: [employer], rolling, Walker, 4 wheeled, Cane  Has the patient had two or more falls in the past year or any fall with injury in the past year?: No  ADL Assistance: Independent  Homemaking Assistance: Independent  Ambulation Assistance: Independent (cane)  Transfer Assistance: Independent    Vision/Hearing  Vision  Vision: Impaired  Vision Exceptions: Wears glasses at all times  Hearing  Hearing: Within functional limits      Cognition   Orientation  Overall Orientation Status: Within Normal Limits  Cognition  Overall Cognitive Status: WFL  Cognition Comment: Slight fatigue during instructions. Objective   Transfers  Sit to Stand: Stand by assistance  Stand to Sit: Stand by assistance (Min decreased eccentric control)  Ambulation  Surface: Level tile  Device: Rolling Walker (Patient's)  Assistance: Stand by assistance  Quality of Gait: Min discontinuous, partial step through pattern. Good management of RW. Controlled and planned cameron. No R knee buckling noted, as seen with OT earlier this date. Distance: 22' x 2  Comments: Urinates on commode. Performs raul care in stance with SBA.  Stands at sink to wash hands with SBA.  Patient manages briefs and clothing with SBA. Stairs/Curb  Stairs?: No  Balance  Posture: Good  Sitting - Static: Good  Sitting - Dynamic: Good  Standing - Static: Good (supported)  Standing - Dynamic: Good (at RW)  A/AROM Exercises: ankle pumps, glut/quad/adductor sets x 5, with cues for 5 second hold. AM-PAC Score  AM-PAC Inpatient Mobility Raw Score : 18 (10/26/22 1253)  AM-PAC Inpatient T-Scale Score : 43.63 (10/26/22 1253)  Mobility Inpatient CMS 0-100% Score: 46.58 (10/26/22 1253)  Mobility Inpatient CMS G-Code Modifier : CK (10/26/22 1253)    Goals  Short Term Goals  Time Frame for Short Term Goals: By Acute DC.  10-26-22: all goals met  Short Term Goal 1: Transfers SBA  Short Term Goal 2: Gait RW 50' SBA  Short Term Goal 3: Able to verbalize hip positional precautions, and rationale for these, with Min cues. Patient Goals   Patient Goals : \"To walk without pain. \"       Education  Patient Education  Education Given To: Patient; Family  Education Provided: Role of Therapy;Plan of Care;Home Exercise Program;Transfer Training  Education Provided Comments: Written THR HEP provided 10-25 and reviewed 10-26-22:  (Written/pictoral hip positional precautions  provided as well)  Access Code: [email or web address]  URL: [email or web address]  Date: 10/25/2022  Prepared by: Hakeem Marks  Education Method: Demonstration;Verbal;Printed Information/Hand-outs  Barriers to Learning: None  Education Outcome: Verbalized understanding;Demonstrated understanding;Continued education needed      Therapy Time   Individual Concurrent Group Co-treatment   Time In 1309         Time Out 9012 [facility]; TE 15; Gt 12    Giovanni Cruz PT  Electronically signed by Izaiah Sullivan, [address] [place] [address] (#[phone])  on 10/26/2022 at 12:53 PM

## 2022-10-26 NOTE — PROGRESS NOTES
CLINICAL PHARMACY NOTE: MEDS TO BEDS    Total # of Prescriptions Filled: 5   The following medications were delivered to the patient:  Current Discharge Medication List        START taking these medications    Details   oxyCODONE (ROXICODONE) 5 MG immediate release tablet Take 1 tablet by mouth every 6 hours as needed for Pain for up to 5 days. Qty: 40 tablet, Refills: 0    Comments: Reduce doses taken as pain becomes manageable  Associated Diagnoses: Primary osteoarthritis of right hip      aspirin 81 MG EC tablet Take 1 tablet by mouth 2 times daily  Qty: 60 tablet, Refills: 0      meloxicam (MOBIC) 7.5 MG tablet Take 1 tablet by mouth daily for 5 days  Qty: 5 tablet, Refills: 0      pregabalin (LYRICA) 75 MG capsule Take 1 capsule by mouth 2 times daily for 14 days.   Qty: 28 capsule, Refills: 0    Associated Diagnoses: Primary osteoarthritis of right hip      cefUROXime (CEFTIN) 250 MG tablet Take 1 tablet by mouth 2 times daily for 7 days  Qty: 14 tablet, Refills: 0               Additional Documentation:

## 2022-10-26 NOTE — CARE COORDINATION
DISCHARGE SUMMARY     DATE OF DISCHARGE: 10/26/22    DISCHARGE DESTINATION: home    HOME CARE: Yes    Agency Name:    [facility]  Address: [address] [place] [address] [address] [facility] [id number]    Notified: RN, Family, and Facility/Agency      TRANSPORTATION: Private Car    NEW DME ORDERED: no    Electronically signed by Tayla Melendez on 10/26/2022 at 10:37 AM  #[phone]

## 2022-10-26 NOTE — PROGRESS NOTES
[facility] Orthopedic Surgery   Progress Note      S/P :  SUBJECTIVE  in bed. Alert and oriented  and daughter at bedside. . Pain is   described in right hip and with the intensity of moderate. Pain is described as aching. OBJECTIVE              Physical                      VITALS:  /72   Pulse 66   Temp 97.9 °F (36.6 °C) (Oral)   Resp 16   Ht 5' 6\" (1.676 m)   Wt 194 lb 3.6 oz (88.1 kg)   SpO2 96%   BMI 31.35 kg/m²                     MUSCULOSKELETAL:  right foot NVI. Wiggles toes to command. Able to plantarflex and dorsiflex ankle Pedal pulses are palpable. NEUROLOGIC:                                  Sensory:  Touch:  Right Lower Extremity:  normal                                                 Surgical wound appears clean and dry right hip with Prineo dressing. TEMO hose on.      Data       CBC:   Lab Results   Component Value Date/Time    WBC 5.2 10/17/2022 11:35 AM    RBC 4.08 10/17/2022 11:35 AM    HGB 12.8 10/17/2022 11:35 AM    HCT 37.2 10/17/2022 11:35 AM    MCV 91.2 10/17/2022 11:35 AM    MCH 31.5 10/17/2022 11:35 AM    MCHC 34.5 10/17/2022 11:35 AM    RDW 13.0 10/17/2022 11:35 AM     10/17/2022 11:35 AM    MPV 6.8 10/17/2022 11:35 AM        WBC:    Lab Results   Component Value Date/Time    WBC 5.2 10/17/2022 11:35 AM        Hemoglobin/Hematocrit:    Lab Results   Component Value Date/Time    HGB 12.8 10/17/2022 11:35 AM    HCT 37.2 10/17/2022 11:35 AM        PT/INR:    Lab Results   Component Value Date/Time    PROTIME 13.1 10/17/2022 11:35 AM    INR 1.00 10/17/2022 11:35 AM              Current Inpatient Medications             Current Facility-Administered Medications: amLODIPine (NORVASC) tablet 5 mg, 5 mg, Oral, Daily  levothyroxine (SYNTHROID) tablet 75 mcg, 75 mcg, Oral, Daily  metoprolol tartrate (LOPRESSOR) tablet 50 mg, 50 mg, Oral, BID  glucose chewable tablet 16 g, 4 tablet, Oral, PRN  dextrose bolus 10% 125 mL, 125 mL, IntraVENous, PRN **OR** dextrose bolus 10% 250 mL, 250 mL, IntraVENous, PRN  glucagon (rDNA) injection 1 mg, 1 mg, SubCUTAneous, PRN  dextrose 10 % infusion, , IntraVENous, Continuous PRN  0.45 % sodium chloride infusion, , IntraVENous, Continuous  sodium chloride flush 0.9 % injection 5-40 mL, 5-40 mL, IntraVENous, 2 times per day  sodium chloride flush 0.9 % injection 5-40 mL, 5-40 mL, IntraVENous, PRN  0.9 % sodium chloride infusion, , IntraVENous, PRN  acetaminophen (TYLENOL) tablet 650 mg, 650 mg, Oral, Q6H  ketorolac (TORADOL) injection 15 mg, 15 mg, IntraVENous, Once PRN  oxyCODONE (ROXICODONE) immediate release tablet 5 mg, 5 mg, Oral, Q4H PRN **OR** oxyCODONE HCl (OXY-IR) immediate release tablet 10 mg, 10 mg, Oral, Q4H PRN  morphine (PF) injection 2 mg, 2 mg, IntraVENous, Q3H PRN **OR** morphine (PF) injection 4 mg, 4 mg, IntraVENous, Q3H PRN  ondansetron (ZOFRAN-ODT) disintegrating tablet 4 mg, 4 mg, Oral, Q8H PRN **OR** ondansetron (ZOFRAN) injection 4 mg, 4 mg, IntraVENous, Q6H PRN  aspirin EC tablet 81 mg, 81 mg, Oral, BID  calcium carbonate (TUMS) chewable tablet 500 mg, 500 mg, Oral, TID PRN  pregabalin (LYRICA) capsule 75 mg, 75 mg, Oral, BID  meloxicam (MOBIC) tablet 7.5 mg, 7.5 mg, Oral, Daily    ASSESSMENT AND PLAN    Post right RODRIGO, stable exam  DVT prophylaxis ordered, ASA 81mg twice at day for 30 days for DVT prophylaxis   PT OT for ADL's and ambulation as tolerated, anterior  hip precautions  SS for DC planning, home with home care today  IV or PO pain med as ordered   Lyrica and Mobic added for postop pain control in an effort to reduce narcotic use per Dr Kimberly Martin protocol.       FAY Dodge - CNP  10/26/2022  8:56 AM

## 2022-10-26 NOTE — CARE COORDINATION
[facility]    DC order noted, all docs needed have been faxed to [address] [place] [address].  for home care services.     Home care to see patient within 24-48 hrs    Juliano Holguin RN, BSN CTN  [facility] [phone]

## 2022-10-26 NOTE — ACP (ADVANCE CARE PLANNING)
Advance Care Planning     Advance Care Planning Inpatient Note  [facility] Department    Today's Date: 10/26/2022  Unit: [facility] ORTHOPEDICS    Received request from [employer]. Upon review of chart and communication with care team, patient's decision making abilities are not in question. . Patient, Spouse, and Child/Children was/were present in the room during visit. Goals of ACP Conversation:  Discuss advance care planning documents    Health Care Decision Makers:       Primary Decision Maker: Kelton Low [phone]    Secondary Decision Maker: Becky Reeves Child - [phone]    Supplemental (Other) Decision Maker: Benjie Hoskins Child - [phone]    Supplemental (Other) Decision Maker: Brendan Mello Child - [phone]  Summary:  No Decision Maker named by patient at this time    [address] (Patient Wishes):  None     Assessment:  Per pt request, gave pt [place] and [address] [place] [address] as pt lives in [place] but gets most of her healthcare in [facility]. Pt would like to review AD docs before completing them.      Interventions:  Provided education on documents for clarity and greater understanding  Discussed and provided education on state decision maker hierarchy  Encouraged ongoing ACP conversation with future decision makers and loved ones  Reviewed but did not complete ACP document    Care Preferences Communicated:   No    Outcomes/Plan:  ACP Discussion: Postponed  Pt to follow-up    Electronically signed by Lolita Khan on 10/26/2022 at 8:58 AM

## 2022-10-26 NOTE — PROGRESS NOTES
Clinical Pharmacy Note  Medication Counseling    Reviewed new medications started during hospital admission: cefuroxime, pregabalin, meloxicam, aspirin, oxycodone. Indications and side effects were emphasized during counseling. All medication-related questions addressed. Patient verbalized understanding of education. Should the patient express any additional questions or concerns regarding their medications, please do not hesitate to contact the pharmacy department. Patient/caregiver aware they may refuse medications during hospital stay. 10 minutes spent educating patient regarding medications.

## 2022-10-26 NOTE — PROGRESS NOTES
Patient discharged with belongings and durable medical equipment with family via wheelchair via Aparna Enciso RN to private resident. IV D/Cd patient tolerated well, no complications.

## 2022-10-26 NOTE — PROGRESS NOTES
Patient resting and sleeping at intervals. Patient denies need for prn pain medication at present   time. Call light in reach. Siderails up x 3. Fall Precautions in place.  Electronically signed by Jp Breaux RN on 10/26/2022 at 3:07 AM

## 2022-10-26 NOTE — PLAN OF CARE
Problem: Chronic Conditions and Co-morbidities  Goal: Patient's chronic conditions and co-morbidity symptoms are monitored and maintained or improved  Outcome: Progressing  Flowsheets (Taken 10/26/2022 0815)  Care Plan - Patient's Chronic Conditions and Co-Morbidity Symptoms are Monitored and Maintained or Improved:   Monitor and assess patient's chronic conditions and comorbid symptoms for stability, deterioration, or improvement   Collaborate with multidisciplinary team to address chronic and comorbid conditions and prevent exacerbation or deterioration     Problem: Discharge Planning  Goal: Discharge to home or other facility with appropriate resources  Outcome: Progressing  Flowsheets (Taken 10/26/2022 0815)  Discharge to home or other facility with appropriate resources:   Identify discharge learning needs (meds, wound care, etc)   Identify barriers to discharge with patient and caregiver   Arrange for needed discharge resources and transportation as appropriate     Problem: Pain  Goal: Verbalizes/displays adequate comfort level or baseline comfort level  10/26/2022 0815 by Princess Sharma RN  Outcome: Progressing  Flowsheets (Taken 10/26/2022 0815)  Verbalizes/displays adequate comfort level or baseline comfort level:   Encourage patient to monitor pain and request assistance   Administer analgesics based on type and severity of pain and evaluate response   Assess pain using appropriate pain scale   Implement non-pharmacological measures as appropriate and evaluate response  10/26/2022 0303 by Marika Wade RN  Flowsheets  Taken 10/26/2022 0303 by Marika Wade RN  Verbalizes/displays adequate comfort level or baseline comfort level:   Encourage patient to monitor pain and request assistance   Assess pain using appropriate pain scale   Administer analgesics based on type and severity of pain and evaluate response   Implement non-pharmacological measures as appropriate and evaluate response   Consider cultural and social influences on pain and pain management   Notify Licensed Independent Practitioner if interventions unsuccessful or patient reports new pain  Taken 10/25/2022 1400 by Ash Connor RN  Verbalizes/displays adequate comfort level or baseline comfort level:   Encourage patient to monitor pain and request assistance   Assess pain using appropriate pain scale   Administer analgesics based on type and severity of pain and evaluate response   Implement non-pharmacological measures as appropriate and evaluate response   Consider cultural and social influences on pain and pain management   Notify Licensed Independent Practitioner if interventions unsuccessful or patient reports new pain     Problem: Neurosensory - Adult  Goal: Achieves stable or improved neurological status  Outcome: Progressing  Flowsheets (Taken 10/26/2022 0815)  Achieves stable or improved neurological status:   Assess for and report changes in neurological status   Initiate measures to prevent increased intracranial pressure   Maintain blood pressure and fluid volume within ordered parameters to optimize cerebral perfusion and minimize risk of hemorrhage     Problem: Cardiovascular - Adult  Goal: Maintains optimal cardiac output and hemodynamic stability  Outcome: Progressing  Flowsheets (Taken 10/26/2022 0815)  Maintains optimal cardiac output and hemodynamic stability:   Monitor blood pressure and heart rate   Monitor urine output and notify Licensed Independent Practitioner for values outside of normal range   Assess for signs of decreased cardiac output     Problem: Skin/Tissue Integrity - Adult  Goal: Skin integrity remains intact  Outcome: Progressing  Flowsheets (Taken 10/26/2022 0815)  Skin Integrity Remains Intact:   Monitor for areas of redness and/or skin breakdown   Assess vascular access sites hourly   Every 4-6 hours minimum: Change oxygen saturation probe site     Problem: Musculoskeletal - Adult  Goal: Return mobility to safest level of function  Outcome: Progressing  Flowsheets (Taken 10/26/2022 0815)  Return Mobility to Safest Level of Function:   Assess patient stability and activity tolerance for standing, transferring and ambulating with or without assistive devices   Assist with transfers and ambulation using safe patient handling equipment as needed   Ensure adequate protection for wounds/incisions during mobilization     Problem: Infection - Adult  Goal: Absence of infection at discharge  Outcome: Progressing  Flowsheets (Taken 10/26/2022 0815)  Absence of infection at discharge:   Assess and monitor for signs and symptoms of infection   Monitor lab/diagnostic results   Monitor all insertion sites i.e., indwelling lines, tubes and drains     Problem: Metabolic/Fluid and Electrolytes - Adult  Goal: Glucose maintained within prescribed range  Outcome: Progressing  Flowsheets (Taken 10/26/2022 0815)  Glucose maintained within prescribed range:   Monitor blood glucose as ordered   Assess for signs and symptoms of hyperglycemia and hypoglycemia   Administer ordered medications to maintain glucose within target range     Problem: Safety - Adult  Goal: Free from fall injury  Outcome: Progressing  Flowsheets (Taken 10/26/2022 0815)  Free From Fall Injury: Instruct family/caregiver on patient safety     Problem: ABCDS Injury Assessment  Goal: Absence of physical injury  Outcome: Progressing  Flowsheets (Taken 10/26/2022 0815)  Absence of Physical Injury: Implement safety measures based on patient assessment

## 2022-10-26 NOTE — DISCHARGE INSTRUCTIONS
If you use a cpap for sleep apnea, please wear when sleeping while on narcotics. Do not drink alcohol while taking your blood thinner or narcotic pain medication. Do not take any sleep aids while on narcotics. Wear mandeep hoses (compression stockings) for 2 weeks and to only remove when showering or at bedtime. Please wear Teds to follow up appointment with orthopedic surgeon. Use your Incentive Spirometer 4 times a day for 1 week after surgery to prevent pneumonia. Use ice packs as needed for swelling and pain. DO NOT apply directly to your skin. Use a clean towel or pillow case as a barrier between your skin and the ice pack. Pain pills and activity changes may lead to constipation. You may need to use a laxative such as Dulcolax, Senokot, Miralax, or Milk of Magnesia. If you do not have a bowel movement daily then we recommend to take a laxative that same evening. When to clean your hands: For patients  In the hospital or in your home, you can come in contact with many harmful germs. To help prevent infection, wash your hands with soap and water often, especially:  Before and After touching or changing a dressing or bandage  After using the bathroom  Before and after eating  After coughing or sneezing  After using a tissue  After touching any object or surface that may be contaminated  After touching an animal during a pet therapy session (hospital)  After touching an animal, cleaning up after a pet, or preparing food for pets (home)    Please contact Mary Marrero or the Orthopedic office with any questions or concerns after your discharge. If you have any issues or concerns after hours please call the Orthopedic Office to reach the Surgeon on call first at  [phone]. If you need a pain medication refill please contact your surgeon's office.      [facility]:    Monday-Friday 8am- 5pm      [address] [facility] Nurse Navigator: Petrona Waite 8am- 5pm, Thursday 8am-3pm  [phone]    After Hours Clinic Walk in [facility]  [address]   Edson Mercedes, [address]    Monday-Friday 5pm-9pm  &  Saturday 9am-1pm          [phone]      If you have a medical emergency please call 911 or seek immediate medical help. [address]   [phone]    Call 911 anytime you think you may need emergency care. For example, call if:  You passed out (lost consciousness). You have severe trouble breathing. You have sudden chest pain and shortness of breath, or you cough up blood. Call your doctor now or seek immediate medical care if:    Your leg or foot turns cold or changes color. You have symptoms of a blood clot in your leg (called a deep vein thrombosis), such as:  Pain in your calf, back of the knee, thigh, or groin. Redness and swelling in your leg or groin. You have symptoms of infection, such as: Increased pain, swelling, warmth, or redness. Red streaks leading from the wound. Pus draining from the wound. A fever. easy bruising, unusual bleeding (nose, mouth, vagina, or rectum), bleeding from wounds or needle injections, any bleeding that will not stop;  heavy menstrual periods;  urine that looks red, pink, or brown; or  black or bloody stools, coughing up blood or vomit that looks like coffee grounds.

## 2022-10-26 NOTE — PROGRESS NOTES
Huddle performed this morning including Nurse navigator Meghana Lobato, Physical therapist Luis Orona, and Occupational therapist Avery Chong. Discussed plan of care, discharge plan, and dme needs if applicable for orthopedic total joint patient.

## 2022-10-27 ENCOUNTER — TELEPHONE (OUTPATIENT)
Dept: ORTHOPEDIC SURGERY | Age: 77
End: 2022-10-27

## 2022-10-27 NOTE — TELEPHONE ENCOUNTER
[address] [place] Name: [address] [place] [address]  Contact Name: Elana Mely Number: [phone]  Request or Information:       STATES THAT THEY WILL SEE PATIENT TOMORROW PER PATIENT REQ.

## 2022-10-28 ENCOUNTER — TELEPHONE (OUTPATIENT)
Dept: ORTHOPEDIC SURGERY | Age: 77
End: 2022-10-28

## 2022-11-02 NOTE — DISCHARGE SUMMARY
Physician Discharge Summary     Patient ID:  Elvia Bledsoe  [id number]  47 y.o.  1945    Admit date: 10/25/2022    Discharge date and time: 10/26/2022 12:56 PM     Admitting Physician: Enma Lehman MD     Discharge Physician: Sridhar Gonzalez    Admission Diagnoses: Arthritis of right hip [M16.11]  Primary osteoarthritis of right hip [M16.11]    Discharge Diagnoses: right hip OA    Admission Condition: good    Discharged Condition: good    Indication for Admission: Failed conservative treatment as outpatient for joint pain including PT and pain meds. This patient was then electively scheduled for total joint replacement surgery    Surgical procedure: right RODRIGO    Consults: PT OT SS    This patient had no postoperative complications. They has PT and OT for ADL's . IV and PO pain med for pain control and was eventually DC in stable condition    Treatments: analgesia,  therapies: PT OT,  and surgery      Disposition: home    Patient Instructions:   [unfilled]  Activity: activity as tolerated  Diet: regular diet  Wound Care: keep wound clean and dry    Follow-up with RADHA Harvey in 2 weeks.     Signed:  FAY Fitzgerald CNP  11/2/2022  1:59 PM

## 2022-11-07 ENCOUNTER — OFFICE VISIT (OUTPATIENT)
Dept: ORTHOPEDIC SURGERY | Age: 77
End: 2022-11-07

## 2022-11-07 VITALS — WEIGHT: 194 LBS | BODY MASS INDEX: 31.18 KG/M2 | HEIGHT: 66 IN | RESPIRATION RATE: 18 BRPM

## 2022-11-07 DIAGNOSIS — Z96.641 STATUS POST TOTAL REPLACEMENT OF RIGHT HIP: Primary | ICD-10-CM

## 2022-11-07 PROCEDURE — 99024 POSTOP FOLLOW-UP VISIT: CPT | Performed by: ORTHOPAEDIC SURGERY

## 2022-11-07 NOTE — PROGRESS NOTES
[facility]  Office Visit    Chief Complaint: Follow-up s/p right total hip arthroplasty    HPI:  Rachel Fuller is a 76 y.o. who is here in follow-up of right total hip arthroplasty performed on October 25, 2022. She is doing well postoperatively and walks with use of a cane and sometimes without assistive device. She reports minimal pain. She also reports that she is able to lift her leg on the right side better than she ever got on the left after surgery. She has been active in home physical therapy. She is not taking any pain medication. Patient Active Problem List   Diagnosis    Primary localized osteoarthrosis, lower leg    Obesity (BMI 30-39. 9)    Primary localized osteoarthrosis, pelvic region and thigh    History of total hip replacement    H/O total hip arthroplasty, left    Primary osteoarthritis of left hip    Arthritis of right knee    H/O total knee replacement, right 1/30/19    Right hip pain    2019 novel coronavirus disease (COVID-19)    Hyponatremia    Syncope    CLL (chronic lymphocytic leukemia) ([facility] [address]    Pneumonia due to COVID-19 virus    Pneumonia    Neutropenic fever (HCC)    Sepsis ([facility] [address]    Arthritis of right hip    Primary osteoarthritis of right hip       ROS:  Constitutional: denies fever, chills, weight loss  MSK: denies pain in other joints, muscle aches  Neurological: denies numbness, tingling, weakness    Exam:  Appearance: sitting in exam room chair, appears to be in no acute distress, awake and alert  Resp: unlabored breathing on room air  Skin: warm, dry and intact with out erythema or significant increased temperature  Neuro: grossly intact both lower extremities. Intact sensation to light touch. Motor exam 4+ to 5/5 in all major motor groups. Right hip: Incision is healing as expected. Examination demonstrates negative logroll and negative Stinchfield. There is brisk capillary refill.  Strength is 5/5 in hamstrings, quads, hip flexors. Imaging:  3 views of the right hip were performed and reviewed today. Significant for total hip arthroplasty prosthesis in place with no signs of osteolysis, loosening, fracture, or dislocation. Assessment:  S/p right total hip arthroplasty    Plan:  She is doing well postoperatively. She will continue home physical therapy exercise program and wean off of the use of a cane. She will continue pain medication as needed. She will follow-up in 6 weeks for repeat assessment. This dictation was done with Dragon dictation and may contain mechanical errors related to translation.

## 2022-12-05 ENCOUNTER — TELEPHONE (OUTPATIENT)
Dept: CARDIOLOGY CLINIC | Age: 77
End: 2022-12-05

## 2022-12-05 NOTE — TELEPHONE ENCOUNTER
Patient called cath lab about her procedure on Wednesday and she had some questions. I called patient and answered her questions. She has not had blood work done, but instructed labs can be done on Wednesday. Patient thought she would get her valve replacement on Wednesday. I informed her that Dr. David Salcedo will not be replacing the valve on Wednesday, he will be checking the valve and arteries and then develop a plan. She verbalized understanding. Instructed to call [phone] for further questions or concerns.

## 2022-12-07 ENCOUNTER — HOSPITAL ENCOUNTER (OUTPATIENT)
Dept: CARDIAC CATH/INVASIVE PROCEDURES | Age: 77
Discharge: HOME | End: 2022-12-07

## 2022-12-07 VITALS
TEMPERATURE: 97 F | WEIGHT: 194 LBS | HEART RATE: 69 BPM | OXYGEN SATURATION: 99 % | DIASTOLIC BLOOD PRESSURE: 64 MMHG | RESPIRATION RATE: 20 BRPM | BODY MASS INDEX: 31.18 KG/M2 | HEIGHT: 66 IN | SYSTOLIC BLOOD PRESSURE: 144 MMHG

## 2022-12-07 PROBLEM — I35.0 SEVERE AORTIC STENOSIS: Status: ACTIVE | Noted: 2022-12-07

## 2022-12-07 LAB
ANION GAP SERPL CALCULATED.3IONS-SCNC: 11 MMOL/L (ref 3–16)
BUN BLDV-MCNC: 14 MG/DL (ref 7–20)
CALCIUM SERPL-MCNC: 9.7 MG/DL (ref 8.3–10.6)
CHLORIDE BLD-SCNC: 100 MMOL/L (ref 99–110)
CO2: 27 MMOL/L (ref 21–32)
CREAT SERPL-MCNC: 0.6 MG/DL (ref 0.6–1.2)
EKG ATRIAL RATE: 64 BPM
EKG DIAGNOSIS: NORMAL
EKG P AXIS: 0 DEGREES
EKG P-R INTERVAL: 148 MS
EKG Q-T INTERVAL: 412 MS
EKG QRS DURATION: 80 MS
EKG QTC CALCULATION (BAZETT): 425 MS
EKG R AXIS: 8 DEGREES
EKG T AXIS: 20 DEGREES
EKG VENTRICULAR RATE: 64 BPM
GFR SERPL CREATININE-BSD FRML MDRD: >60 ML/MIN/{1.73_M2}
GLUCOSE BLD-MCNC: 93 MG/DL (ref 70–99)
HCT VFR BLD CALC: 36.4 % (ref 36–48)
HEMOGLOBIN: 12.2 G/DL (ref 12–16)
MCH RBC QN AUTO: 29.9 PG (ref 26–34)
MCHC RBC AUTO-ENTMCNC: 33.5 G/DL (ref 31–36)
MCV RBC AUTO: 89.4 FL (ref 80–100)
PDW BLD-RTO: 13.6 % (ref 12.4–15.4)
PLATELET # BLD: 243 K/UL (ref 135–450)
PMV BLD AUTO: 6.6 FL (ref 5–10.5)
POTASSIUM SERPL-SCNC: 4.3 MMOL/L (ref 3.5–5.1)
RBC # BLD: 4.07 M/UL (ref 4–5.2)
SODIUM BLD-SCNC: 138 MMOL/L (ref 136–145)
WBC # BLD: 8 K/UL (ref 4–11)

## 2022-12-07 PROCEDURE — 80048 BASIC METABOLIC PNL TOTAL CA: CPT

## 2022-12-07 PROCEDURE — 2580000003 HC RX 258

## 2022-12-07 PROCEDURE — C1769 GUIDE WIRE: HCPCS

## 2022-12-07 PROCEDURE — C1894 INTRO/SHEATH, NON-LASER: HCPCS

## 2022-12-07 PROCEDURE — 2500000003 HC RX 250 WO HCPCS

## 2022-12-07 PROCEDURE — 85027 COMPLETE CBC AUTOMATED: CPT

## 2022-12-07 PROCEDURE — 6360000004 HC RX CONTRAST MEDICATION: Performed by: INTERNAL MEDICINE

## 2022-12-07 PROCEDURE — 6360000002 HC RX W HCPCS

## 2022-12-07 PROCEDURE — 6370000000 HC RX 637 (ALT 250 FOR IP)

## 2022-12-07 PROCEDURE — 2709999900 HC NON-CHARGEABLE SUPPLY

## 2022-12-07 PROCEDURE — 99153 MOD SED SAME PHYS/QHP EA: CPT

## 2022-12-07 PROCEDURE — 99152 MOD SED SAME PHYS/QHP 5/>YRS: CPT

## 2022-12-07 PROCEDURE — 93458 L HRT ARTERY/VENTRICLE ANGIO: CPT

## 2022-12-07 RX ORDER — SODIUM CHLORIDE 9 MG/ML
INJECTION, SOLUTION INTRAVENOUS CONTINUOUS
Status: DISCONTINUED | OUTPATIENT
Start: 2022-12-07 | End: 2022-12-08 | Stop reason: HOSPADM

## 2022-12-07 RX ORDER — SODIUM CHLORIDE 0.9 % (FLUSH) 0.9 %
5-40 SYRINGE (ML) INJECTION PRN
Status: DISCONTINUED | OUTPATIENT
Start: 2022-12-07 | End: 2022-12-08 | Stop reason: HOSPADM

## 2022-12-07 RX ORDER — METHYLPREDNISOLONE SODIUM SUCCINATE 125 MG/2ML
125 INJECTION, POWDER, LYOPHILIZED, FOR SOLUTION INTRAMUSCULAR; INTRAVENOUS DAILY
Status: DISCONTINUED | OUTPATIENT
Start: 2022-12-07 | End: 2022-12-08 | Stop reason: HOSPADM

## 2022-12-07 RX ORDER — SODIUM CHLORIDE 9 MG/ML
INJECTION, SOLUTION INTRAVENOUS PRN
Status: DISCONTINUED | OUTPATIENT
Start: 2022-12-07 | End: 2022-12-08 | Stop reason: HOSPADM

## 2022-12-07 RX ORDER — DIPHENHYDRAMINE HYDROCHLORIDE 50 MG/ML
25 INJECTION INTRAMUSCULAR; INTRAVENOUS ONCE
Status: DISCONTINUED | OUTPATIENT
Start: 2022-12-07 | End: 2022-12-08 | Stop reason: HOSPADM

## 2022-12-07 RX ORDER — ACETAMINOPHEN 325 MG/1
650 TABLET ORAL EVERY 4 HOURS PRN
Status: DISCONTINUED | OUTPATIENT
Start: 2022-12-07 | End: 2022-12-08 | Stop reason: HOSPADM

## 2022-12-07 RX ORDER — ONDANSETRON 2 MG/ML
4 INJECTION INTRAMUSCULAR; INTRAVENOUS EVERY 6 HOURS PRN
Status: DISCONTINUED | OUTPATIENT
Start: 2022-12-07 | End: 2022-12-08 | Stop reason: HOSPADM

## 2022-12-07 RX ORDER — ASPIRIN 325 MG
325 TABLET ORAL ONCE
Status: DISCONTINUED | OUTPATIENT
Start: 2022-12-07 | End: 2022-12-08 | Stop reason: HOSPADM

## 2022-12-07 RX ORDER — SODIUM CHLORIDE 0.9 % (FLUSH) 0.9 %
5-40 SYRINGE (ML) INJECTION EVERY 12 HOURS SCHEDULED
Status: DISCONTINUED | OUTPATIENT
Start: 2022-12-07 | End: 2022-12-08 | Stop reason: HOSPADM

## 2022-12-07 RX ADMIN — IOPAMIDOL 30 ML: 755 INJECTION, SOLUTION INTRAVENOUS at 10:43

## 2022-12-07 NOTE — DISCHARGE INSTRUCTIONS
CARDIAC ANGIOGRAM (LEFT HEART CATH) - RADIAL ACCESS    Care of your puncture site:  Remove clear bandage 24 hours after the procedure. May shower in 24 hours  Inspect the site daily and gently clean using soap and water. Dry thoroughly and apply a Band-Aid. Normal Observations:  Soreness or tenderness which may last one week. Mild oozing from the incision site. Possible bruising that could last 2 weeks. Activity:  You may resume driving 24 hours following the procedure. You may resume normal activity in 3 days or after the wound heals. Avoid lifting more than 10 pounds for 3 days with affected arm. Do not make important / legal decisions within 24 hours after procedure. Nutrition:  Regular diet or ***. Drink at least 8 to 10 glasses of decaffeinated, non-alcoholic fluid for the next 24 hours to flush the x-ray dye used for your angiogram out of your body. Call your doctor immediately if your condition worsens, for any other concerns, for a follow-up appointment or if you experience any of the following:  Significant bleeding that does not stop after 10 minutes of applying firm pressure on the puncture site. Increased swelling of the wrist.  Unusual pain, numbness, or tingling of the wrist/arm. Any signs of infection such as: redness, yellow drainage at the site, swelling or pain. IF experiencing any recurrent chest pain, arm or jaw pain, shortness of breath,sweating,nausea & vomiting, lighteheadedness or sudden weak. Other Instructions:  Hold Metformin or Metformin containing drugs for 48 hours after procedure.   Resume

## 2022-12-07 NOTE — H&P
Reason for Referral: Aortic Stenosis     Referring physician: Scott Gipson      CC: \"I am here to discuss my heart valve. \"        Subjective:      History of Present Illness:     Nicki Treadwell is a 76 y.o. patient with a PMH significant for diabetes, hypertension, hyperlipidemia, and aortic stenosis. Today, she is here to discuss aortic stenosis. She follows with Dr Kim Milner for CLL. She states that she does notice that she cannot do as much as she could. She does have to move a little slower. She is planning to have right hip replacement on 10/25/2022. Patient denies exertional chest pain/pressure, dyspnea at rest, PND, orthopnea, palpitations, lightheadedness, weight changes, changes in LE edema, and syncope. Patient reports compliance to her medications. Past Medical History:   has a past medical history of Acid reflux, Arthritis, Cancer ([facility] [address] Chronic lymphocytic leukemia (CLL) genetic mutation variant ([facility] [address] Diabetes mellitus ([facility] [address] High blood pressure, Hyperlipidemia, Multiple drug resistant organism (MDRO) culture positive, PONV (postoperative nausea and vomiting), Thyroid disease, UTI (urinary tract infection), and Wears glasses. Surgical History:   has a past surgical history that includes Cholecystectomy; Hysterectomy; Thyroid surgery (Left); cyst removal (Right); eye surgery; joint replacement (Left); joint replacement (Right, 01/30/2019); Total knee arthroplasty (Right, 1/30/2019); Nasal sinus surgery (N/A, 9/10/2021); and laryngoscopy (N/A, 9/10/2021). Social History:   reports that she has never smoked. She has never used smokeless tobacco. She reports that she does not drink alcohol and does not use drugs. Family History:  family history includes Cancer in her father; Heart Disease in her mother; High Blood Pressure in her mother.      Home Medications:  Were reviewed and are listed in nursing record and/or below  Home Medications           Prior to Admission medications Medication Sig Start Date End Date Taking? Authorizing Provider   Cholecalciferol (VITAMIN D) 50 MCG (2000 UT) CAPS capsule Take by mouth daily     Yes Historical Provider, MD   b complex vitamins capsule Take 1 capsule by mouth in the morning and at bedtime     Yes Historical Provider, MD   magnesium 200 MG TABS tablet Take 1 tablet by mouth daily 2/4/22 7/28/22 Yes Janeen Hernández MD   sodium chloride 1 g tablet Take 1 g by mouth 3 times daily     Yes Historical Provider, MD   metoprolol tartrate (LOPRESSOR) 25 MG tablet Take 50 mg by mouth 2 times daily      Yes Historical Provider, MD   amLODIPine (NORVASC) 5 MG tablet Take 5 mg by mouth daily     Yes Historical Provider, MD   Cranberry 1000 MG CAPS Take 1 capsule by mouth 2 times daily. 4/8/15   Yes FAY Flores CNP   metFORMIN (GLUCOPHAGE) 500 MG tablet Take 500 mg by mouth 2 times daily (with meals). Yes Historical Provider, MD   Ferrous Sulfate (IRON) 325 (65 FE) MG TABS Take 1 tablet by mouth every evening. Yes Historical Provider, MD   Ascorbic Acid (VITAMIN C) 500 MG CAPS Take 1 tablet by mouth daily      Yes Historical Provider, MD   levothyroxine (SYNTHROID) 75 MCG tablet Take 75 mcg by mouth daily  12/30/14   Yes Historical Provider, MD            CURRENT Medications:  Current Meds Link used for Sign Out Report   No current facility-administered medications for this visit. Allergies:  Patient has no known allergies. Review of Systems: SEE HPI   Constitutional: no unanticipated weight loss. There's been no change in energy level, sleep pattern, or activity level. No fevers, chills. Eyes: No visual changes or diplopia. No scleral icterus. ENT: No Headaches, hearing loss or vertigo. No mouth sores or sore throat. Cardiovascular: No Chest pain, tightness or discomfort. No Shortness of breath.    No Dyspnea on exertion, Orthopnea, Paroxysmal nocturnal dyspnea or breathlessness at rest.  No Palpitations. No Syncope ('blackouts', 'faints', 'collapse') or dizziness. Respiratory: No cough or wheezing, no sputum production. No hematemesis. Gastrointestinal: No abdominal pain, appetite loss, blood in stools. No change in bowel or bladder habits. Genitourinary: No dysuria, trouble voiding, or hematuria. Musculoskeletal:  No gait disturbance, no joint complaints. Integumentary: No rash or pruritis. Neurological: No headache, diplopia, change in muscle strength, numbness or tingling. Psychiatric: No anxiety or depression. Endocrine: No temperature intolerance. No excessive thirst, fluid intake, or urination. No tremor. Hematologic/Lymphatic: No abnormal bruising or bleeding, blood clots or swollen lymph nodes. Allergic/Immunologic: No nasal congestion or hives. Objective:      PHYSICAL EXAM:           Vitals:     07/28/22 1523   BP: 138/62   Pulse: 68   SpO2: 99%    Weight: 193 lb 3.2 oz (87.6 kg)       General Appearance:  Alert, cooperative, no distress, appears stated age. Head:  Normocephalic, without obvious abnormality, atraumatic. Eyes:  Pupils equal and round. No scleral icterus. Mouth: Moist mucosa, no pharyngeal erythema. Nose: Nares normal. No drainage or sinus tenderness. Neck: Supple, symmetrical, trachea midline. No adenopathy. No tenderness/mass/nodules. No carotid bruit or elevated JVD. Lungs:   Respiratory Effort: Normal   Auscultation: Clear to auscultation bilaterally, respirations unlabored. No wheeze, rales   Chest Wall:  No tenderness or deformity. Cardiovascular:     Pulses  Palpation: normal   Ascultation: Regular rate, S1/ S2 normal. No murmur, rub, or gallop. 2+ radial and pedal pulses, symmetric  Carotid  Femoral   Abdomen and Gastrointestinal:   Soft, non-tender, bowel sounds active. Liver and Spleen  Masses   Musculoskeletal: No muscle wasting  Back  Gait   Extremities: Extremities normal, atraumatic. No cyanosis or edema.  No cyanosis clubbing Skin: Inspection and palpation performed, no rashes or lesions. Pysch: Normal mood and affect. Alert and oriented to time place person   Neurologic: Normal gross motor and sensory exam.       Labs      All labs have been reviewed           Lab Results   Component Value Date/Time     WBC 12.8 2022 06:14 AM     RBC 4.07 2022 06:14 AM     HGB 11.8 2022 06:14 AM     HCT 34.3 2022 06:14 AM     MCV 84.2 2022 06:14 AM     RDW 15.0 2022 06:14 AM      2022 06:14 AM            Lab Results   Component Value Date/Time      2022 06:14 AM     K 3.7 2022 06:14 AM     K 3.5 2022 09:23 PM     CL 91 2022 06:14 AM     CO2 25 2022 06:14 AM     BUN 7 2022 06:14 AM     CREATININE 0.9 2022 07:37 AM     CREATININE <0.5 2022 06:14 AM     GFRAA >60 2022 07:37 AM     AGRATIO 0.9 2022 05:54 AM     LABGLOM >60 2022 07:37 AM     GLUCOSE 102 2022 06:14 AM     PROT 6.0 2022 05:54 AM     CALCIUM 8.7 2022 06:14 AM     BILITOT 0.4 2022 05:54 AM     ALKPHOS 87 2022 05:54 AM     AST 24 2022 05:54 AM     ALT 19 2022 05:54 AM      No results found for: PTINR        Lab Results   Component Value Date     LABA1C 6.2 2021            Lab Results   Component Value Date     TROPONINI <0.01 2022         Cardiac, Vascular and Imaging Data all Personally Reviewed in Detail by Myself       EK2022 Sinus rhythm      Echocardiogram:   ECHO 2022  Ejection fraction is visually estimated to be 60-65%. No regional wall motion abnormalities are noted. Grade II diastoli  The left atrium is mildly dilated. Moderate aortic stenosis with a peak velocity of 3.4m/s and a mean pressure  gradient of 30mmHg. No evidence of aortic valve regurgitation. Normal right ventricular size. Right ventricular systolic function is normal.     Stress Test:      Cath:      Other imaging: Assessment and Plan      Preoperative risk assessment  She is planning to have right hip replacement on 10/25/2022. Will repeat echocardiogram prior to making decision if she can proceed with the hip replacement. Aortic stenosis MOD-SEVERE   Moderate. Increased dyspnea with exertion. Murmur 3/6 SYSTOLIC on clinical exam. Repeat echocardiogram.      Hypertension  Controlled. Continue current medical management. Follow up as directed by TAVR coordinator. Thank you for allowing us to participate in the care of Luis Alferdo Hutson. Please do not hesitate to contact me if you have any questions.

## 2022-12-07 NOTE — PROCEDURES
[address]   Procedure Note    CLINICAL HISTORY:       Katherine Reynolds is a 76 y.o. female with a history of AS    Patient Active Problem List   Diagnosis    Primary localized osteoarthrosis, lower leg    Obesity (BMI 30-39. 9)    Primary localized osteoarthrosis, pelvic region and thigh    History of total hip replacement    H/O total hip arthroplasty, left    Primary osteoarthritis of left hip    Arthritis of right knee    H/O total knee replacement, right 1/30/19    Right hip pain    2019 novel coronavirus disease (COVID-19)    Hyponatremia    Syncope    CLL (chronic lymphocytic leukemia) (HCC)    Pneumonia due to COVID-19 virus    Pneumonia    Neutropenic fever (HCC)    Sepsis (HCC)    Arthritis of right hip    Primary osteoarthritis of right hip       Current Outpatient Medications   Medication Sig Dispense Refill    lisinopril (PRINIVIL;ZESTRIL) 40 MG tablet Take 40 mg by mouth daily      aspirin 81 MG EC tablet Take 1 tablet by mouth 2 times daily 60 tablet 0    Cholecalciferol (VITAMIN D) 50 MCG (2000 UT) CAPS capsule Take by mouth daily      b complex vitamins capsule Take 1 capsule by mouth in the morning and at bedtime      magnesium 200 MG TABS tablet Take 1 tablet by mouth daily 30 tablet 1    sodium chloride 1 g tablet Take 1 g by mouth 3 times daily      metoprolol tartrate (LOPRESSOR) 25 MG tablet Take 50 mg by mouth 2 times daily       amLODIPine (NORVASC) 5 MG tablet Take 5 mg by mouth daily      Cranberry 1000 MG CAPS Take 1 capsule by mouth 2 times daily. 30 capsule 1    metFORMIN (GLUCOPHAGE) 500 MG tablet Take 500 mg by mouth 2 times daily (with meals). Ferrous Sulfate (IRON) 325 (65 FE) MG TABS Take 1 tablet by mouth every evening.       Ascorbic Acid (VITAMIN C) 500 MG CAPS Take 1 tablet by mouth daily       levothyroxine (SYNTHROID) 75 MCG tablet Take 75 mcg by mouth daily        Current Facility-Administered Medications   Medication Dose Route Frequency Provider Last Rate Last Admin    sodium chloride flush 0.9 % injection 5-40 mL  5-40 mL IntraVENous 2 times per day Anum Burk MD        sodium chloride flush 0.9 % injection 5-40 mL  5-40 mL IntraVENous PRN Anum Burk MD        0.9 % sodium chloride infusion   IntraVENous PRN Anum Burk MD        aspirin tablet 325 mg  325 mg Oral Once Anum Burk MD        diphenhydrAMINE (BENADRYL) injection 25 mg  25 mg IntraVENous Once Anum Burk MD        methylPREDNISolone sodium (SOLU-MEDROL) injection 125 mg  125 mg IntraVENous Daily Anum Burk MD             The risks, benefits, and details of the procedure were explained to the patient. The patient verbalized understanding and wanted to proceed. Informed written consent was obtained. INDICATION:    AS    PROCEDURES PERFORMED:     [facility]    PROCEDURE TECHNIQUE:  The patient was approached from the right radial artery using a 5-6  [place] slender sheath. Left coronary angiography was done using a Robert L3.5 diagnostic catheter. Right coronary angiography was done using a Robert R4 guide catheter. Left ventriculography was done using a pigtail catheter. COMPLICATIONS:  None. At the end of the procedure a radial band device was used for hemostasis. EBL: 30 cc    Moderate Sedation:  Start time: 10:14  Stop time: 10:40 am  100 mg versed   2 mcg fentanyl   An independent trained observer pushed medications at my direction. We monitored the patient's level of consciousness and vital signs/physiologic status throughout the procedure duration (see start and start times above). HEMODYNAMICS:  Aortic pressure was 130/70      ANGIOGRAM/CORONARY ARTERIOGRAM:       The left main coronary artery is NL. The left anterior descending artery is NL.    D1 is NL    The left circumflex artery is NL.   OM1 is NL    The right coronary artery is dominant vessel with NL.   RPDA is NL    LEFT VENTRICULOGRAM:  Left ventricular angiogram not done did AV study     AV mean gradient greater than 40 mmHg      SUMMARY:     Normal coronaries      RECOMMENDATION:  .    1.  Recommend post op care  Amanda Ariza MD

## 2022-12-12 NOTE — TELEPHONE ENCOUNTER
Patient was told to call our office today if she did not hear from [facility] about anther possible procedure.   Had [address] [facility] on 12/7/2022    Can be reached at   [phone]

## 2022-12-13 ENCOUNTER — TELEPHONE (OUTPATIENT)
Dept: CARDIOLOGY CLINIC | Age: 77
End: 2022-12-13

## 2022-12-13 DIAGNOSIS — I35.0 NONRHEUMATIC AORTIC VALVE STENOSIS: Primary | ICD-10-CM

## 2022-12-13 DIAGNOSIS — R42 DIZZINESS: ICD-10-CM

## 2022-12-13 NOTE — TELEPHONE ENCOUNTER
Spoke to pt re severe aortic stenosis and workup needed to prepare for TAVR. Let her know that I will arrange to get her scheduled for her TAVR CTA, carotid US and CVTS consult. She is agreeable to proceed.  Evelyne LESLIE

## 2022-12-19 ENCOUNTER — OFFICE VISIT (OUTPATIENT)
Dept: ORTHOPEDIC SURGERY | Age: 77
End: 2022-12-19

## 2022-12-19 ENCOUNTER — TELEPHONE (OUTPATIENT)
Dept: CARDIOLOGY CLINIC | Age: 77
End: 2022-12-19

## 2022-12-19 VITALS — BODY MASS INDEX: 31.31 KG/M2 | HEIGHT: 66 IN

## 2022-12-19 DIAGNOSIS — Z96.642 H/O TOTAL HIP ARTHROPLASTY, LEFT: Primary | ICD-10-CM

## 2022-12-19 PROCEDURE — 99024 POSTOP FOLLOW-UP VISIT: CPT | Performed by: PHYSICIAN ASSISTANT

## 2022-12-19 NOTE — PROGRESS NOTES
This dictation was done with [employer] dictation and may contain mechanical errors related to translation. Height 5' 6\" (1.676 m). This is a very pleasant 70-year-old female who recently had a right total hip replacement she is doing very good. We talked about wound care incisions healing nicely no signs of infection. She is neurologically intact to her right foot with good dorsiflexion plantarflexion strength. Three views of the total Hip arthroplasty were done today including an AP pelvis and a 2 view hip. This reveals satisfactory alignment of the prosthesis with good sizing and fit. There is good version of the cup and no subsiding of the stem. . No signs of significant polyethylene wear or failure. No progressive radiolucencies,fractures, tumors or dislocations.       And happy with the x-rays the incision and neurologically she is intact so she will finish out physical therapy transition to outpatient physical therapy and follow-up with us in 3 to 4 weeks

## 2022-12-19 NOTE — TELEPHONE ENCOUNTER
TAVR testing is scheduled for 1-11-23 per below:    11:30 am - [facility] CTA CHEST ABD PELVIC W/CONTRAST  PREPS:  * NPO 4 hours prior to procedure  * Arrive 30 minutes prior to exam (11:00 am)  * No necklaces, underwire bras, body piercing, no pants with zippers, belts,or suspenders  * Bring a complete list of medications or the test cannot be completed. 12:30 pm  - CAROTID STUDY  * Please wear easy to remove clothing  * Please take all medication, unless otherwise instructed  * You will be here for approximately 1 hour      Lab work was drawn the morning of 1-11-23 at [facility] prior to coming to [place].

## 2023-01-11 ENCOUNTER — HOSPITAL ENCOUNTER (OUTPATIENT)
Dept: VASCULAR LAB | Age: 78
Discharge: HOME | End: 2023-01-11

## 2023-01-11 ENCOUNTER — HOSPITAL ENCOUNTER (OUTPATIENT)
Dept: CT IMAGING | Age: 78
Discharge: HOME | End: 2023-01-11

## 2023-01-11 DIAGNOSIS — R42 DIZZINESS: ICD-10-CM

## 2023-01-11 DIAGNOSIS — I35.0 NONRHEUMATIC AORTIC VALVE STENOSIS: ICD-10-CM

## 2023-01-11 PROCEDURE — 93880 EXTRACRANIAL BILAT STUDY: CPT

## 2023-01-11 PROCEDURE — 6360000004 HC RX CONTRAST MEDICATION: Performed by: INTERNAL MEDICINE

## 2023-01-11 PROCEDURE — 74174 CTA ABD&PLVS W/CONTRAST: CPT

## 2023-01-11 RX ADMIN — IOPAMIDOL 150 ML: 755 INJECTION, SOLUTION INTRAVENOUS at 10:11

## 2023-01-13 NOTE — RESULT ENCOUNTER NOTE
Spoke with the patient and advised her of the results below. She voiced understanding .  Call complete

## 2023-01-16 ENCOUNTER — TELEPHONE (OUTPATIENT)
Dept: CARDIOLOGY CLINIC | Age: 78
End: 2023-01-16

## 2023-01-16 NOTE — TELEPHONE ENCOUNTER
Faxed CTA results (for TAVR workup) to Dr. Hoda washington [facility] due to right kidney nodule.  Evelyne LESLIE

## 2023-01-18 ENCOUNTER — OFFICE VISIT (OUTPATIENT)
Dept: CARDIOTHORACIC SURGERY | Age: 78
End: 2023-01-18

## 2023-01-18 VITALS
WEIGHT: 194 LBS | TEMPERATURE: 97.5 F | SYSTOLIC BLOOD PRESSURE: 128 MMHG | HEIGHT: 66 IN | BODY MASS INDEX: 31.18 KG/M2 | HEART RATE: 70 BPM | DIASTOLIC BLOOD PRESSURE: 60 MMHG | OXYGEN SATURATION: 99 %

## 2023-01-18 DIAGNOSIS — I35.0 AORTIC STENOSIS, SEVERE: Primary | ICD-10-CM

## 2023-01-18 DIAGNOSIS — C91.10 CLL (CHRONIC LYMPHOCYTIC LEUKEMIA) (HCC): ICD-10-CM

## 2023-01-18 PROCEDURE — 1123F ACP DISCUSS/DSCN MKR DOCD: CPT | Performed by: THORACIC SURGERY (CARDIOTHORACIC VASCULAR SURGERY)

## 2023-01-18 PROCEDURE — 99204 OFFICE O/P NEW MOD 45 MIN: CPT | Performed by: THORACIC SURGERY (CARDIOTHORACIC VASCULAR SURGERY)

## 2023-01-18 RX ORDER — METOPROLOL TARTRATE 50 MG/1
50 TABLET, FILM COATED ORAL 2 TIMES DAILY
COMMUNITY

## 2023-01-18 ASSESSMENT — ENCOUNTER SYMPTOMS
SHORTNESS OF BREATH: 1
BACK PAIN: 0
CHEST TIGHTNESS: 0
ALLERGIC/IMMUNOLOGIC NEGATIVE: 1
EYE REDNESS: 0
TROUBLE SWALLOWING: 0
GASTROINTESTINAL NEGATIVE: 1
EYE PAIN: 0

## 2023-01-18 NOTE — PROGRESS NOTES
Subjective:      Patient ID: Shree Luke is a [de-identified] y.o. female. Chief Complaint   Patient presents with    New Patient     Mrs. Kenny Mckeon is being seen today at the request of Dr. Lidia Munguia for TAVR consult. HPI Mrs. Kenny Mckeon is a very pleasant 66-year-old [place] woman who has had progressive exertional dyspnea that has been much more noticeable to her over the last year or a bit more. She has never had any episode of overt congestive heart failure. She has been followed for known aortic valve stenosis. Other than her aortic valve, her only other outstanding health problem relates to osteoarthritis for which she has had a right knee replacement in the past.  She is also followed for chronic lymphocytic leukemia. Review of Systems   Constitutional:  Positive for activity change and fatigue. HENT:  Negative for dental problem (+ upper and lower dentures), nosebleeds and trouble swallowing. Eyes:  Negative for pain, redness and visual disturbance. Wears glasses   Respiratory:  Positive for shortness of breath (w/exertion). Negative for chest tightness. Cardiovascular:  Positive for chest pain (Occ. right sided chest pain). Negative for palpitations and leg swelling. Gastrointestinal: Negative. Endocrine: Negative. Genitourinary: Negative. Musculoskeletal:  Positive for gait problem (Uses a cane for ambulation). Negative for back pain and neck pain.        + recent R hip replacement 10/25/23   Skin: Negative. Allergic/Immunologic: Negative. Neurological:  Negative for dizziness, syncope and light-headedness. Hematological:  Bruises/bleeds easily. Psychiatric/Behavioral: Negative.      Past Medical History:   Diagnosis Date    Acid reflux     Arthritis     Cancer ([facility]     Chronic lymphocytic leukemia (CLL) genetic mutation variant ([facility] 2018    Diabetes mellitus ([facility]     BORDERLINE    High blood pressure     Hyperlipidemia     mild-no meds    Multiple drug resistant organism (MDRO) culture positive 01/25/2021    urine    Murmur     PONV (postoperative nausea and vomiting)     Thyroid disease     UTI (urinary tract infection) 01/2019    currently on cipro    Wears glasses      Past Surgical History:   Procedure Laterality Date    CHOLECYSTECTOMY      CYST REMOVAL Right     on foot    EYE SURGERY      cataract removal with lens implants    HYSTERECTOMY (CERVIX STATUS UNKNOWN)      JOINT REPLACEMENT Left     hip    JOINT REPLACEMENT Right 01/30/2019      Right Total Knee Replacement    LARYNGOSCOPY N/A 9/10/2021    DIRECT LARYNGOSCOPY WITH BIOPSY performed by Prince Prajapati MD at [place] [address] 9/10/2021    NASAL ENDOSCOPY WITH BIOPSY performed by Prince Prajapati MD at [address] [place] [address] Right 10/25/2022    RIGHT ANTERIOR TOTAL HIP REPLACEMENT WITH C-ARM performed by Meghana Andres MD at [address] [place] [address] Right 1/30/2019    RIGHT TOTAL KNEE REPLACEMENT performed by Luberta Osler, MD at [place] [address]     No Known Allergies  Current Outpatient Medications   Medication Sig Dispense Refill    metoprolol tartrate (LOPRESSOR) 50 MG tablet Take 50 mg by mouth 2 times daily      lisinopril (PRINIVIL;ZESTRIL) 40 MG tablet Take 40 mg by mouth daily      Cholecalciferol (VITAMIN D) 50 MCG (2000 UT) CAPS capsule Take by mouth daily      b complex vitamins capsule Take 1 capsule by mouth in the morning and at bedtime      sodium chloride 1 g tablet Take 1 g by mouth in the morning and at bedtime      amLODIPine (NORVASC) 5 MG tablet Take 5 mg by mouth daily      Cranberry 1000 MG CAPS Take 1 capsule by mouth 2 times daily. 30 capsule 1    metFORMIN (GLUCOPHAGE) 500 MG tablet Take 500 mg by mouth 2 times daily (with meals). Ferrous Sulfate (IRON) 325 (65 FE) MG TABS Take 1 tablet by mouth every evening.       Ascorbic Acid (VITAMIN C) 500 MG CAPS Take 1 tablet by mouth daily       levothyroxine (SYNTHROID) 75 MCG tablet Take 75 mcg by mouth daily       magnesium 200 MG TABS tablet Take 1 tablet by mouth daily (Patient taking differently: Take 100 mg by mouth daily) 30 tablet 1     No current facility-administered medications for this visit. Social History     Socioeconomic History    Marital status:      Spouse name: Not on file    Number of children: Not on file    Years of education: Not on file    Highest education level: Not on file   Occupational History    Occupation: Light house work   Tobacco Use    Smoking status: Never    Smokeless tobacco: Never   Vaping Use    Vaping Use: Never used   Substance and Sexual Activity    Alcohol use: No    Drug use: Never    Sexual activity: Never   Other Topics Concern    Not on file   Social History Narrative    Not on file     Social Determinants of Health     Financial Resource Strain: Not on file   Food Insecurity: Not on file   Transportation Needs: Not on file   Physical Activity: Not on file   Stress: Not on file   Social Connections: Not on file   Intimate Partner Violence: Not on file   Housing Stability: Not on file     Family History   Problem Relation Age of Onset    High Blood Pressure Mother     Heart Disease Mother     Cancer Father         acute leukemia      Objective:   Physical Exam  Constitutional:       General: She is not in acute distress. Appearance: Normal appearance. She is well-developed. She is obese. She is not diaphoretic. HENT:      Head: Normocephalic. Nose: Nose normal.   Eyes:      General: No scleral icterus. Conjunctiva/sclera: Conjunctivae normal.      Pupils: Pupils are equal, round, and reactive to light. Neck:      Thyroid: No thyromegaly. Vascular: No JVD. Trachea: No tracheal deviation. Cardiovascular:      Rate and Rhythm: Normal rate and regular rhythm. Pulses: Normal pulses. Heart sounds: No murmur heard. Pulmonary:      Effort: Pulmonary effort is normal. No respiratory distress. Breath sounds: No stridor.  No wheezing. Abdominal:      General: Bowel sounds are normal. There is no distension. Palpations: Abdomen is soft. Musculoskeletal:         General: Swelling present. No deformity. Normal range of motion. Cervical back: Normal range of motion. Right lower leg: No edema. Left lower leg: No edema. Skin:     General: Skin is warm and dry. Capillary Refill: Capillary refill takes less than 2 seconds. Coloration: Skin is not jaundiced or pale. Neurological:      Mental Status: She is alert and oriented to person, place, and time. Cranial Nerves: No cranial nerve deficit. Coordination: Coordination normal.   Psychiatric:         Mood and Affect: Mood normal.         Behavior: Behavior normal.         Thought Content: Thought content normal.         Judgment: Judgment normal.     Vitals:    01/18/23 1050 01/18/23 1051   BP: 120/60 128/60   Site: Left Upper Arm Right Upper Arm   Position: Sitting Sitting   Pulse: 70    Temp: 97.5 °F (36.4 °C)    TempSrc: Infrared    SpO2: 99%    Weight: 194 lb (88 kg)    Height: 5' 6\" (1.676 m)       Assessment:      78-year-old female with slowly progressive aortic valve stenosis with worsening exertional dyspnea. Plan:      In an 78-year-old patient, transcatheter aortic valve replacement is a straightforward recommendation. Given her age and otherwise general reasonable health status, a transcatheter aortic valve replacement is the shortest distance between her and approved exertional capabilities. Talked with her and her  at length about the procedure and all their questions were answered. She has a niece who has ovarian cancer that she is very concerned with and involved in the care of. He would like to get her TAVR done as soon as possible so that she can continue and her caregiver role for her niece. Thank you for the opportunity to see Maeve Barnes in consultation today.   It was a pleasure meeting her and her .

## 2023-01-23 ENCOUNTER — TELEPHONE (OUTPATIENT)
Dept: CARDIOLOGY CLINIC | Age: 78
End: 2023-01-23

## 2023-01-23 ENCOUNTER — TELEPHONE (OUTPATIENT)
Dept: CASE MANAGEMENT | Age: 78
End: 2023-01-23

## 2023-01-23 NOTE — TELEPHONE ENCOUNTER
Call to PCP, left message that pt had a CTA Chest/abd/pel that has recommendations for f/u for incidental pulmonary nodule noted. Requested call back to fax report to them.

## 2023-01-24 ENCOUNTER — TELEPHONE (OUTPATIENT)
Dept: CASE MANAGEMENT | Age: 78
End: 2023-01-24

## 2023-01-24 NOTE — TELEPHONE ENCOUNTER
Rec'd message from Aime Menjivar, [address] [facility]. Fax# [phone] chest/abd/pel from 1/11/23 faxed for evaluation of incidental pulmonary nodule.

## 2023-01-30 ENCOUNTER — TELEPHONE (OUTPATIENT)
Dept: CARDIOLOGY CLINIC | Age: 78
End: 2023-01-30

## 2023-01-30 NOTE — TELEPHONE ENCOUNTER
Called [facility] to see if Dr. Rachna Stevens felt any further workup is needed on right kidney nodule (copy of CTA and message faxed to [facility] on 1/13/23). States that they will have a nurse call me back.  Evelyne LESLIE

## 2023-01-31 ENCOUNTER — TELEPHONE (OUTPATIENT)
Dept: CARDIOLOGY CLINIC | Age: 78
End: 2023-01-31

## 2023-01-31 NOTE — TELEPHONE ENCOUNTER
Spoke to Arnett Bosworth, nurse with Dr. Roz Gomez at [facility], re kidney nodule noted on CTA. Per Dr. Roz Gomez, she will continue to watch nodule and plans no further testing at this time. OK to proceed with TAVR.  Evelyne LESLIE

## 2023-03-06 ENCOUNTER — TELEPHONE (OUTPATIENT)
Dept: CARDIOLOGY CLINIC | Age: 78
End: 2023-03-06

## 2023-03-06 NOTE — TELEPHONE ENCOUNTER
Pt aware of tentative TAVR date of 4/11/23. States she will get back to me after talking with her family/community re date/payment. Evelyne LESLIE

## 2023-03-27 ENCOUNTER — TELEPHONE (OUTPATIENT)
Dept: CARDIOLOGY CLINIC | Age: 78
End: 2023-03-27

## 2023-03-27 NOTE — TELEPHONE ENCOUNTER
Pt called me on 3/24/23 stating that she wants to postpone her TAVR procedure until at least 4/25/23. States that she will get back with me on date JUSTIN Stubbs RN

## 2023-04-13 ENCOUNTER — HOSPITAL ENCOUNTER (OUTPATIENT)
Dept: PREADMISSION TESTING | Age: 78
Discharge: HOME | End: 2023-04-17

## 2023-04-13 ENCOUNTER — ANESTHESIA EVENT (OUTPATIENT)
Dept: OPERATING ROOM | Age: 78
DRG: 267 | End: 2023-04-13

## 2023-04-13 VITALS
BODY MASS INDEX: 32.14 KG/M2 | DIASTOLIC BLOOD PRESSURE: 59 MMHG | SYSTOLIC BLOOD PRESSURE: 136 MMHG | HEIGHT: 66 IN | TEMPERATURE: 96.6 F | WEIGHT: 200 LBS | HEART RATE: 54 BPM | OXYGEN SATURATION: 100 % | RESPIRATION RATE: 16 BRPM

## 2023-04-13 LAB
ABO + RH BLD: NORMAL
ALBUMIN SERPL-MCNC: 4.4 G/DL (ref 3.4–5)
ALP SERPL-CCNC: 98 U/L (ref 40–129)
ALT SERPL-CCNC: 14 U/L (ref 10–40)
ANION GAP SERPL CALCULATED.3IONS-SCNC: 15 MMOL/L (ref 3–16)
APTT BLD: 25.6 SEC (ref 22.7–35.9)
AST SERPL-CCNC: 16 U/L (ref 15–37)
BACTERIA URNS QL MICRO: ABNORMAL /HPF
BASOPHILS # BLD: 0.1 K/UL (ref 0–0.2)
BASOPHILS NFR BLD: 0.5 %
BILIRUB DIRECT SERPL-MCNC: <0.2 MG/DL (ref 0–0.3)
BILIRUB INDIRECT SERPL-MCNC: NORMAL MG/DL (ref 0–1)
BILIRUB SERPL-MCNC: <0.2 MG/DL (ref 0–1)
BILIRUB UR QL STRIP.AUTO: NEGATIVE
BLD GP AB SCN SERPL QL: NORMAL
BUN SERPL-MCNC: 17 MG/DL (ref 7–20)
CALCIUM SERPL-MCNC: 9.5 MG/DL (ref 8.3–10.6)
CHLORIDE SERPL-SCNC: 103 MMOL/L (ref 99–110)
CLARITY UR: CLEAR
CO2 SERPL-SCNC: 24 MMOL/L (ref 21–32)
COLOR UR: YELLOW
CREAT SERPL-MCNC: 0.5 MG/DL (ref 0.6–1.2)
DEPRECATED RDW RBC AUTO: 13.8 % (ref 12.4–15.4)
EKG ATRIAL RATE: 62 BPM
EKG DIAGNOSIS: NORMAL
EKG P AXIS: 13 DEGREES
EKG P-R INTERVAL: 146 MS
EKG Q-T INTERVAL: 414 MS
EKG QRS DURATION: 74 MS
EKG QTC CALCULATION (BAZETT): 420 MS
EKG R AXIS: 3 DEGREES
EKG T AXIS: 24 DEGREES
EKG VENTRICULAR RATE: 62 BPM
EOSINOPHIL # BLD: 0.1 K/UL (ref 0–0.6)
EOSINOPHIL NFR BLD: 1.5 %
EPI CELLS #/AREA URNS AUTO: 0 /HPF (ref 0–5)
GFR SERPLBLD CREATININE-BSD FMLA CKD-EPI: >60 ML/MIN/{1.73_M2}
GLUCOSE SERPL-MCNC: 82 MG/DL (ref 70–99)
GLUCOSE UR STRIP.AUTO-MCNC: NEGATIVE MG/DL
HCT VFR BLD AUTO: 36.9 % (ref 36–48)
HGB BLD-MCNC: 12.6 G/DL (ref 12–16)
HGB UR QL STRIP.AUTO: NEGATIVE
HYALINE CASTS #/AREA URNS AUTO: 0 /LPF (ref 0–8)
INR PPP: 1 (ref 0.84–1.16)
KETONES UR STRIP.AUTO-MCNC: NEGATIVE MG/DL
LEUKOCYTE ESTERASE UR QL STRIP.AUTO: ABNORMAL
LYMPHOCYTES # BLD: 1.4 K/UL (ref 1–5.1)
LYMPHOCYTES NFR BLD: 14.9 %
MAGNESIUM SERPL-MCNC: 1.5 MG/DL (ref 1.8–2.4)
MCH RBC QN AUTO: 30.7 PG (ref 26–34)
MCHC RBC AUTO-ENTMCNC: 34.1 G/DL (ref 31–36)
MCV RBC AUTO: 90 FL (ref 80–100)
MONOCYTES # BLD: 1 K/UL (ref 0–1.3)
MONOCYTES NFR BLD: 10.5 %
NEUTROPHILS # BLD: 7 K/UL (ref 1.7–7.7)
NEUTROPHILS NFR BLD: 72.6 %
NITRITE UR QL STRIP.AUTO: NEGATIVE
PH UR STRIP.AUTO: 5 [PH] (ref 5–8)
PHOSPHATE SERPL-MCNC: 4 MG/DL (ref 2.5–4.9)
PLATELET # BLD AUTO: 276 K/UL (ref 135–450)
PMV BLD AUTO: 6.8 FL (ref 5–10.5)
POTASSIUM SERPL-SCNC: 4.4 MMOL/L (ref 3.5–5.1)
PROT SERPL-MCNC: 6.5 G/DL (ref 6.4–8.2)
PROT UR STRIP.AUTO-MCNC: NEGATIVE MG/DL
PROTHROMBIN TIME: 13.2 SEC (ref 11.5–14.8)
RBC # BLD AUTO: 4.1 M/UL (ref 4–5.2)
RBC CLUMPS #/AREA URNS AUTO: 0 /HPF (ref 0–4)
SODIUM SERPL-SCNC: 142 MMOL/L (ref 136–145)
SP GR UR STRIP.AUTO: 1.01 (ref 1–1.03)
UA COMPLETE W REFLEX CULTURE PNL UR: ABNORMAL
UA DIPSTICK W REFLEX MICRO PNL UR: YES
URN SPEC COLLECT METH UR: ABNORMAL
UROBILINOGEN UR STRIP-ACNC: 0.2 E.U./DL
WBC # BLD AUTO: 9.7 K/UL (ref 4–11)
WBC #/AREA URNS AUTO: 5 /HPF (ref 0–5)

## 2023-04-13 PROCEDURE — 86900 BLOOD TYPING SEROLOGIC ABO: CPT

## 2023-04-13 PROCEDURE — 83735 ASSAY OF MAGNESIUM: CPT

## 2023-04-13 PROCEDURE — 2580000003 HC RX 258: Performed by: INTERNAL MEDICINE

## 2023-04-13 PROCEDURE — 80076 HEPATIC FUNCTION PANEL: CPT

## 2023-04-13 PROCEDURE — 93010 ELECTROCARDIOGRAM REPORT: CPT | Performed by: INTERNAL MEDICINE

## 2023-04-13 PROCEDURE — 93005 ELECTROCARDIOGRAM TRACING: CPT | Performed by: INTERNAL MEDICINE

## 2023-04-13 PROCEDURE — 81001 URINALYSIS AUTO W/SCOPE: CPT

## 2023-04-13 PROCEDURE — 87086 URINE CULTURE/COLONY COUNT: CPT

## 2023-04-13 PROCEDURE — 87088 URINE BACTERIA CULTURE: CPT

## 2023-04-13 PROCEDURE — 6360000002 HC RX W HCPCS: Performed by: INTERNAL MEDICINE

## 2023-04-13 PROCEDURE — 36415 COLL VENOUS BLD VENIPUNCTURE: CPT

## 2023-04-13 PROCEDURE — 87186 SC STD MICRODIL/AGAR DIL: CPT

## 2023-04-13 PROCEDURE — 80069 RENAL FUNCTION PANEL: CPT

## 2023-04-13 PROCEDURE — 86850 RBC ANTIBODY SCREEN: CPT

## 2023-04-13 PROCEDURE — 85610 PROTHROMBIN TIME: CPT

## 2023-04-13 PROCEDURE — 85025 COMPLETE CBC W/AUTO DIFF WBC: CPT

## 2023-04-13 PROCEDURE — 86901 BLOOD TYPING SEROLOGIC RH(D): CPT

## 2023-04-13 RX ORDER — NOREPINEPHRINE BIT/0.9 % NACL 16MG/250ML
1-100 INFUSION BOTTLE (ML) INTRAVENOUS CONTINUOUS PRN
Status: DISCONTINUED | OUTPATIENT
Start: 2023-04-13 | End: 2023-04-26 | Stop reason: HOSPADM

## 2023-04-13 ASSESSMENT — PAIN SCALES - GENERAL: PAINLEVEL_OUTOF10: 0

## 2023-04-13 NOTE — PROGRESS NOTES
Pt here for PAT visit. Consents for procedure and blood signed by pt and in chart. Labs drawn and urine collected and sent to lab for testing as ordered. Pt seen by Dr. Rosemary Reyes from Anesthesia and questions answered. Vitals stable and documented in flowsheet. Pt instructed to be NPO after midnight prior to procedure and states understanding. Pt provided with hibiclens and instructed to shower with entire bottle the night prior to procedure. Pt instructed to take the following medications the morning of procedure with a small sip of water: metoprolol. EKG completed and results in chart. Pt instructed to arrive to the hospital the morning of procedure at 1100 on 4/25/23. Pt in good condition and okay for discharge. Pt denies further questions at time of discharge. Pt instructed to call Dr. Lizarraga Paci office with any medical concerns or the heart office at [phone] with any further questions between now and day of procedure.

## 2023-04-15 LAB
BACTERIA UR CULT: ABNORMAL
ORGANISM: ABNORMAL

## 2023-04-18 ENCOUNTER — TELEPHONE (OUTPATIENT)
Dept: CARDIOLOGY CLINIC | Age: 78
End: 2023-04-18

## 2023-04-18 RX ORDER — CIPROFLOXACIN 250 MG/1
250 TABLET, FILM COATED ORAL 2 TIMES DAILY
Qty: 14 TABLET | Refills: 0 | Status: SHIPPED | OUTPATIENT
Start: 2023-04-18 | End: 2023-04-25

## 2023-04-25 ENCOUNTER — APPOINTMENT (OUTPATIENT)
Dept: CARDIAC CATH/INVASIVE PROCEDURES | Age: 78
DRG: 267 | End: 2023-04-25
Attending: INTERNAL MEDICINE

## 2023-04-25 ENCOUNTER — HOSPITAL ENCOUNTER (INPATIENT)
Age: 78
LOS: 1 days | Discharge: HOME | DRG: 267 | End: 2023-04-26
Attending: INTERNAL MEDICINE | Admitting: INTERNAL MEDICINE

## 2023-04-25 ENCOUNTER — ANESTHESIA (OUTPATIENT)
Dept: OPERATING ROOM | Age: 78
DRG: 267 | End: 2023-04-25

## 2023-04-25 PROBLEM — I35.0 AORTIC STENOSIS, SEVERE: Status: ACTIVE | Noted: 2023-04-25

## 2023-04-25 LAB
ABO + RH BLD: NORMAL
ACTIVATED CLOTTING TIME: 106 SEC (ref 99–130)
ACTIVATED CLOTTING TIME: 107 SEC (ref 99–130)
ACTIVATED CLOTTING TIME: 261 SEC (ref 99–130)
BASE EXCESS BLDA CALC-SCNC: 3 MMOL/L (ref -3–3)
BLD GP AB SCN SERPL QL: NORMAL
BLOOD BANK DISPENSE STATUS: NORMAL
BLOOD BANK PRODUCT CODE: NORMAL
BPU ID: NORMAL
CA-I BLD-SCNC: 1.17 MMOL/L (ref 1.12–1.32)
CO2 BLDA-SCNC: 29 MMOL/L
DESCRIPTION BLOOD BANK: NORMAL
GLUCOSE BLD-MCNC: 95 MG/DL (ref 70–99)
HCO3 BLDA-SCNC: 27.5 MMOL/L (ref 21–29)
HCT VFR BLD AUTO: 36 % (ref 36–48)
HGB BLD CALC-MCNC: 12.2 GM/DL (ref 12–16)
LACTATE BLD-SCNC: 0.59 MMOL/L (ref 0.4–2)
LV EF: 65 %
LVEF MODALITY: NORMAL
NT-PROBNP SERPL-MCNC: 704 PG/ML (ref 0–449)
PCO2 BLDA: 42.1 MM HG (ref 35–45)
PERFORMED ON: ABNORMAL
PH BLDA: 7.42 [PH] (ref 7.35–7.45)
PO2 BLDA: 125.9 MM HG (ref 75–108)
POC SAMPLE TYPE: ABNORMAL
POTASSIUM BLD-SCNC: 4.5 MMOL/L (ref 3.5–5.1)
SAO2 % BLDA: 99 % (ref 93–100)
SODIUM BLD-SCNC: 136 MMOL/L (ref 136–145)
TROPONIN, HIGH SENSITIVITY: 13 NG/L (ref 0–14)
TROPONIN, HIGH SENSITIVITY: 69 NG/L (ref 0–14)

## 2023-04-25 PROCEDURE — 2060000000 HC ICU INTERMEDIATE R&B

## 2023-04-25 PROCEDURE — 6360000002 HC RX W HCPCS: Performed by: INTERNAL MEDICINE

## 2023-04-25 PROCEDURE — C1894 INTRO/SHEATH, NON-LASER: HCPCS

## 2023-04-25 PROCEDURE — 86901 BLOOD TYPING SEROLOGIC RH(D): CPT

## 2023-04-25 PROCEDURE — 82803 BLOOD GASES ANY COMBINATION: CPT

## 2023-04-25 PROCEDURE — 2709999900 HC NON-CHARGEABLE SUPPLY: Performed by: INTERNAL MEDICINE

## 2023-04-25 PROCEDURE — 3700000001 HC ADD 15 MINUTES (ANESTHESIA): Performed by: INTERNAL MEDICINE

## 2023-04-25 PROCEDURE — 3700000000 HC ANESTHESIA ATTENDED CARE: Performed by: INTERNAL MEDICINE

## 2023-04-25 PROCEDURE — 33361 REPLACE AORTIC VALVE PERQ: CPT | Performed by: INTERNAL MEDICINE

## 2023-04-25 PROCEDURE — 85347 COAGULATION TIME ACTIVATED: CPT

## 2023-04-25 PROCEDURE — 33361 REPLACE AORTIC VALVE PERQ: CPT | Performed by: STUDENT IN AN ORGANIZED HEALTH CARE EDUCATION/TRAINING PROGRAM

## 2023-04-25 PROCEDURE — 82330 ASSAY OF CALCIUM: CPT

## 2023-04-25 PROCEDURE — 02RF38Z REPLACEMENT OF AORTIC VALVE WITH ZOOPLASTIC TISSUE, PERCUTANEOUS APPROACH: ICD-10-PCS | Performed by: STUDENT IN AN ORGANIZED HEALTH CARE EDUCATION/TRAINING PROGRAM

## 2023-04-25 PROCEDURE — 86850 RBC ANTIBODY SCREEN: CPT

## 2023-04-25 PROCEDURE — C1760 CLOSURE DEV, VASC: HCPCS

## 2023-04-25 PROCEDURE — 84295 ASSAY OF SERUM SODIUM: CPT

## 2023-04-25 PROCEDURE — 82947 ASSAY GLUCOSE BLOOD QUANT: CPT

## 2023-04-25 PROCEDURE — 84132 ASSAY OF SERUM POTASSIUM: CPT

## 2023-04-25 PROCEDURE — C1889 IMPLANT/INSERT DEVICE, NOC: HCPCS

## 2023-04-25 PROCEDURE — C1769 GUIDE WIRE: HCPCS

## 2023-04-25 PROCEDURE — 03HY32Z INSERTION OF MONITORING DEVICE INTO UPPER ARTERY, PERCUTANEOUS APPROACH: ICD-10-PCS | Performed by: ANESTHESIOLOGY

## 2023-04-25 PROCEDURE — 6370000000 HC RX 637 (ALT 250 FOR IP): Performed by: INTERNAL MEDICINE

## 2023-04-25 PROCEDURE — 93005 ELECTROCARDIOGRAM TRACING: CPT | Performed by: INTERNAL MEDICINE

## 2023-04-25 PROCEDURE — 3600000007 HC SURGERY HYBRID BASE

## 2023-04-25 PROCEDURE — 6360000002 HC RX W HCPCS: Performed by: ANESTHESIOLOGY

## 2023-04-25 PROCEDURE — 83605 ASSAY OF LACTIC ACID: CPT

## 2023-04-25 PROCEDURE — 86923 COMPATIBILITY TEST ELECTRIC: CPT

## 2023-04-25 PROCEDURE — 7100000011 HC PHASE II RECOVERY - ADDTL 15 MIN

## 2023-04-25 PROCEDURE — 6360000004 HC RX CONTRAST MEDICATION: Performed by: INTERNAL MEDICINE

## 2023-04-25 PROCEDURE — 2580000003 HC RX 258: Performed by: INTERNAL MEDICINE

## 2023-04-25 PROCEDURE — 84484 ASSAY OF TROPONIN QUANT: CPT

## 2023-04-25 PROCEDURE — 94761 N-INVAS EAR/PLS OXIMETRY MLT: CPT

## 2023-04-25 PROCEDURE — 85014 HEMATOCRIT: CPT

## 2023-04-25 PROCEDURE — 86900 BLOOD TYPING SEROLOGIC ABO: CPT

## 2023-04-25 PROCEDURE — 94150 VITAL CAPACITY TEST: CPT

## 2023-04-25 PROCEDURE — 3600000017 HC SURGERY HYBRID ADDL 15MIN

## 2023-04-25 PROCEDURE — 2709999900 HC NON-CHARGEABLE SUPPLY

## 2023-04-25 PROCEDURE — 4A133J1 MONITORING OF ARTERIAL PULSE, PERIPHERAL, PERCUTANEOUS APPROACH: ICD-10-PCS | Performed by: ANESTHESIOLOGY

## 2023-04-25 PROCEDURE — 4A133B1 MONITORING OF ARTERIAL PRESSURE, PERIPHERAL, PERCUTANEOUS APPROACH: ICD-10-PCS | Performed by: ANESTHESIOLOGY

## 2023-04-25 PROCEDURE — 7100000010 HC PHASE II RECOVERY - FIRST 15 MIN

## 2023-04-25 PROCEDURE — 36415 COLL VENOUS BLD VENIPUNCTURE: CPT

## 2023-04-25 PROCEDURE — 83880 ASSAY OF NATRIURETIC PEPTIDE: CPT

## 2023-04-25 RX ORDER — LEVOTHYROXINE SODIUM 0.07 MG/1
75 TABLET ORAL DAILY
Status: DISCONTINUED | OUTPATIENT
Start: 2023-04-25 | End: 2023-04-26 | Stop reason: HOSPADM

## 2023-04-25 RX ORDER — HEPARIN SODIUM 1000 [USP'U]/ML
INJECTION, SOLUTION INTRAVENOUS; SUBCUTANEOUS PRN
Status: DISCONTINUED | OUTPATIENT
Start: 2023-04-25 | End: 2023-04-25 | Stop reason: SDUPTHER

## 2023-04-25 RX ORDER — AMLODIPINE BESYLATE 5 MG/1
5 TABLET ORAL DAILY
Status: DISCONTINUED | OUTPATIENT
Start: 2023-04-25 | End: 2023-04-26 | Stop reason: HOSPADM

## 2023-04-25 RX ORDER — SODIUM CHLORIDE 0.9 % (FLUSH) 0.9 %
5-40 SYRINGE (ML) INJECTION PRN
Status: DISCONTINUED | OUTPATIENT
Start: 2023-04-25 | End: 2023-04-26 | Stop reason: HOSPADM

## 2023-04-25 RX ORDER — PROTAMINE SULFATE 10 MG/ML
INJECTION, SOLUTION INTRAVENOUS PRN
Status: DISCONTINUED | OUTPATIENT
Start: 2023-04-25 | End: 2023-04-25 | Stop reason: SDUPTHER

## 2023-04-25 RX ORDER — LIDOCAINE 4 G/G
1 PATCH TOPICAL ONCE
Status: COMPLETED | OUTPATIENT
Start: 2023-04-25 | End: 2023-04-26

## 2023-04-25 RX ORDER — ATROPINE SULFATE 0.4 MG/ML
0.5 AMPUL (ML) INJECTION
Status: DISCONTINUED | OUTPATIENT
Start: 2023-04-25 | End: 2023-04-26 | Stop reason: HOSPADM

## 2023-04-25 RX ORDER — FUROSEMIDE 10 MG/ML
20 INJECTION INTRAMUSCULAR; INTRAVENOUS ONCE
Status: COMPLETED | OUTPATIENT
Start: 2023-04-25 | End: 2023-04-25

## 2023-04-25 RX ORDER — SODIUM CHLORIDE 9 MG/ML
INJECTION, SOLUTION INTRAVENOUS PRN
Status: DISCONTINUED | OUTPATIENT
Start: 2023-04-25 | End: 2023-04-26 | Stop reason: HOSPADM

## 2023-04-25 RX ORDER — CLOPIDOGREL BISULFATE 75 MG/1
75 TABLET ORAL DAILY
Status: DISCONTINUED | OUTPATIENT
Start: 2023-04-25 | End: 2023-04-26 | Stop reason: HOSPADM

## 2023-04-25 RX ORDER — ASPIRIN 81 MG/1
81 TABLET ORAL DAILY
Status: DISCONTINUED | OUTPATIENT
Start: 2023-04-25 | End: 2023-04-26 | Stop reason: HOSPADM

## 2023-04-25 RX ORDER — LISINOPRIL 20 MG/1
40 TABLET ORAL DAILY
Status: DISCONTINUED | OUTPATIENT
Start: 2023-04-25 | End: 2023-04-26 | Stop reason: HOSPADM

## 2023-04-25 RX ORDER — LANOLIN ALCOHOL/MO/W.PET/CERES
200 CREAM (GRAM) TOPICAL DAILY
Status: DISCONTINUED | OUTPATIENT
Start: 2023-04-25 | End: 2023-04-26 | Stop reason: HOSPADM

## 2023-04-25 RX ORDER — ACETAMINOPHEN 325 MG/1
650 TABLET ORAL EVERY 4 HOURS PRN
Status: DISCONTINUED | OUTPATIENT
Start: 2023-04-25 | End: 2023-04-26 | Stop reason: HOSPADM

## 2023-04-25 RX ORDER — SODIUM CHLORIDE, SODIUM LACTATE, POTASSIUM CHLORIDE, CALCIUM CHLORIDE 600; 310; 30; 20 MG/100ML; MG/100ML; MG/100ML; MG/100ML
INJECTION, SOLUTION INTRAVENOUS ONCE
Status: COMPLETED | OUTPATIENT
Start: 2023-04-25 | End: 2023-04-25

## 2023-04-25 RX ORDER — ATROPINE SULFATE 0.4 MG/ML
AMPUL (ML) INJECTION PRN
Status: DISCONTINUED | OUTPATIENT
Start: 2023-04-25 | End: 2023-04-25 | Stop reason: SDUPTHER

## 2023-04-25 RX ORDER — DOCUSATE SODIUM 100 MG/1
100 CAPSULE, LIQUID FILLED ORAL DAILY
Status: DISCONTINUED | OUTPATIENT
Start: 2023-04-25 | End: 2023-04-26 | Stop reason: HOSPADM

## 2023-04-25 RX ORDER — METOPROLOL TARTRATE 50 MG/1
50 TABLET, FILM COATED ORAL 2 TIMES DAILY
Status: DISCONTINUED | OUTPATIENT
Start: 2023-04-26 | End: 2023-04-26 | Stop reason: HOSPADM

## 2023-04-25 RX ORDER — NITROGLYCERIN 20 MG/100ML
5 INJECTION INTRAVENOUS CONTINUOUS
Status: DISCONTINUED | OUTPATIENT
Start: 2023-04-25 | End: 2023-04-26 | Stop reason: HOSPADM

## 2023-04-25 RX ORDER — PROPOFOL 10 MG/ML
INJECTION, EMULSION INTRAVENOUS CONTINUOUS PRN
Status: DISCONTINUED | OUTPATIENT
Start: 2023-04-25 | End: 2023-04-25 | Stop reason: SDUPTHER

## 2023-04-25 RX ORDER — 0.9 % SODIUM CHLORIDE 0.9 %
500 INTRAVENOUS SOLUTION INTRAVENOUS PRN
Status: DISCONTINUED | OUTPATIENT
Start: 2023-04-25 | End: 2023-04-26 | Stop reason: HOSPADM

## 2023-04-25 RX ORDER — ONDANSETRON 2 MG/ML
4 INJECTION INTRAMUSCULAR; INTRAVENOUS EVERY 6 HOURS PRN
Status: DISCONTINUED | OUTPATIENT
Start: 2023-04-25 | End: 2023-04-26 | Stop reason: HOSPADM

## 2023-04-25 RX ORDER — SODIUM CHLORIDE 0.9 % (FLUSH) 0.9 %
5-40 SYRINGE (ML) INJECTION EVERY 12 HOURS SCHEDULED
Status: DISCONTINUED | OUTPATIENT
Start: 2023-04-25 | End: 2023-04-26 | Stop reason: HOSPADM

## 2023-04-25 RX ORDER — HYDRALAZINE HYDROCHLORIDE 20 MG/ML
10 INJECTION INTRAMUSCULAR; INTRAVENOUS EVERY 6 HOURS PRN
Status: DISCONTINUED | OUTPATIENT
Start: 2023-04-25 | End: 2023-04-26 | Stop reason: HOSPADM

## 2023-04-25 RX ADMIN — AMLODIPINE BESYLATE 5 MG: 5 TABLET ORAL at 17:48

## 2023-04-25 RX ADMIN — SODIUM CHLORIDE, PRESERVATIVE FREE 10 ML: 5 INJECTION INTRAVENOUS at 21:19

## 2023-04-25 RX ADMIN — DOCUSATE SODIUM 100 MG: 100 CAPSULE, LIQUID FILLED ORAL at 17:49

## 2023-04-25 RX ADMIN — ACETAMINOPHEN 650 MG: 325 TABLET ORAL at 22:35

## 2023-04-25 RX ADMIN — PROTAMINE SULFATE 50 MG: 10 INJECTION, SOLUTION INTRAVENOUS at 15:07

## 2023-04-25 RX ADMIN — CLOPIDOGREL BISULFATE 75 MG: 75 TABLET ORAL at 20:31

## 2023-04-25 RX ADMIN — SODIUM CHLORIDE, POTASSIUM CHLORIDE, SODIUM LACTATE AND CALCIUM CHLORIDE: 600; 310; 30; 20 INJECTION, SOLUTION INTRAVENOUS at 13:36

## 2023-04-25 RX ADMIN — IOPAMIDOL 78 ML: 755 INJECTION, SOLUTION INTRAVENOUS at 15:09

## 2023-04-25 RX ADMIN — ATROPINE SULFATE 0.5 MG: 0.4 INJECTION, SOLUTION INTRAMUSCULAR; INTRAVENOUS; SUBCUTANEOUS at 13:53

## 2023-04-25 RX ADMIN — CEFAZOLIN 2000 MG: 2 INJECTION, POWDER, FOR SOLUTION INTRAMUSCULAR; INTRAVENOUS at 13:45

## 2023-04-25 RX ADMIN — PROTAMINE SULFATE 50 MG: 10 INJECTION, SOLUTION INTRAVENOUS at 15:02

## 2023-04-25 RX ADMIN — PROPOFOL 75 MCG/KG/MIN: 10 INJECTION, EMULSION INTRAVENOUS at 13:44

## 2023-04-25 RX ADMIN — FUROSEMIDE 20 MG: 10 INJECTION, SOLUTION INTRAMUSCULAR; INTRAVENOUS at 20:30

## 2023-04-25 RX ADMIN — HEPARIN SODIUM 5000 UNITS: 1000 INJECTION INTRAVENOUS; SUBCUTANEOUS at 14:51

## 2023-04-25 RX ADMIN — LEVOTHYROXINE SODIUM 75 MCG: 0.07 TABLET ORAL at 17:49

## 2023-04-25 RX ADMIN — CEFAZOLIN 2000 MG: 2 INJECTION, POWDER, FOR SOLUTION INTRAMUSCULAR; INTRAVENOUS at 18:12

## 2023-04-25 RX ADMIN — HEPARIN SODIUM 11000 UNITS: 1000 INJECTION INTRAVENOUS; SUBCUTANEOUS at 14:44

## 2023-04-25 RX ADMIN — SODIUM CHLORIDE, PRESERVATIVE FREE 10 ML: 5 INJECTION INTRAVENOUS at 20:31

## 2023-04-25 RX ADMIN — ONDANSETRON 4 MG: 2 INJECTION INTRAMUSCULAR; INTRAVENOUS at 21:19

## 2023-04-25 RX ADMIN — Medication 200 MG: at 17:49

## 2023-04-25 RX ADMIN — LISINOPRIL 40 MG: 20 TABLET ORAL at 17:48

## 2023-04-25 RX ADMIN — PROTAMINE SULFATE 50 MG: 10 INJECTION, SOLUTION INTRAVENOUS at 15:04

## 2023-04-25 RX ADMIN — ASPIRIN 81 MG: 81 TABLET, COATED ORAL at 20:31

## 2023-04-25 RX ADMIN — ACETAMINOPHEN 650 MG: 325 TABLET ORAL at 17:48

## 2023-04-25 ASSESSMENT — PAIN DESCRIPTION - LOCATION
LOCATION: HEAD

## 2023-04-25 ASSESSMENT — PAIN DESCRIPTION - DESCRIPTORS
DESCRIPTORS: ACHING

## 2023-04-25 ASSESSMENT — PAIN DESCRIPTION - PAIN TYPE
TYPE: ACUTE PAIN

## 2023-04-25 ASSESSMENT — PAIN DESCRIPTION - ONSET
ONSET: ON-GOING

## 2023-04-25 ASSESSMENT — PAIN DESCRIPTION - ORIENTATION
ORIENTATION: ANTERIOR

## 2023-04-25 ASSESSMENT — PAIN DESCRIPTION - FREQUENCY
FREQUENCY: CONTINUOUS

## 2023-04-25 ASSESSMENT — PAIN SCALES - GENERAL
PAINLEVEL_OUTOF10: 3
PAINLEVEL_OUTOF10: 3
PAINLEVEL_OUTOF10: 2
PAINLEVEL_OUTOF10: 4
PAINLEVEL_OUTOF10: 3
PAINLEVEL_OUTOF10: 0
PAINLEVEL_OUTOF10: 3

## 2023-04-25 ASSESSMENT — PAIN - FUNCTIONAL ASSESSMENT
PAIN_FUNCTIONAL_ASSESSMENT: ACTIVITIES ARE NOT PREVENTED

## 2023-04-25 NOTE — H&P
[address]  H+P  Consult  OP Visit  FU Visit   CC/HPI   CC Followup visit for cardiac conditions detailed in assessment and plan below. Intervention NONE   General Doing fair. Concerned with sob. Cardiac Sx -CP, -dizziness, -syncope, -edema, -orthopnea, -pnd, +SOB   HISTORY/ALLERGY/ROS   MEDHx  has a past medical history of Acid reflux, Arthritis, Cancer (HCC), Chronic lymphocytic leukemia (CLL) genetic mutation variant ([facility] Diabetes mellitus ([facility] High blood pressure, Hyperlipidemia, Multiple drug resistant organism (MDRO) culture positive, Murmur, PONV (postoperative nausea and vomiting), Thyroid disease, UTI (urinary tract infection), and Wears glasses. SURGHx  has a past surgical history that includes Cholecystectomy; Hysterectomy; Thyroid surgery (Left); cyst removal (Right); eye surgery; joint replacement (Left); joint replacement (Right, 01/30/2019); Total knee arthroplasty (Right, 1/30/2019); Nasal sinus surgery (N/A, 9/10/2021); laryngoscopy (N/A, 9/10/2021); and Total hip arthroplasty (Right, 10/25/2022). SOCHx  reports that she has never smoked. She has never used smokeless tobacco. She reports that she does not drink alcohol and does not use drugs. FAMHx family history includes Cancer in her father; Heart Disease in her mother; High Blood Pressure in her mother. ALLERG Patient has no known allergies.    ROS Full ROS obtained and negative except as mentioned in HPI   MEDICATIONS   Current Facility-Administered Medications   Medication Dose Route Frequency Provider Last Rate Last Admin    lactated ringers IV soln infusion   IntraVENous Once Alexander Hall MD        lidocaine 4 % external patch 1 patch  1 patch TransDERmal Once Alexander Hall MD        ceFAZolin (ANCEF) 2,000 mg in sodium chloride 0.9 % 50 mL IVPB (mini-bag)  2,000 mg IntraVENous Once Alexander Hall MD        phenylephrine (ALEX-SYNEPHRINE) 50 mg in sodium chloride 0.9 % 250 mL infusion

## 2023-04-25 NOTE — OP NOTE
Operative Note      Patient: Agatha Thomas  YOB: 1945  MRN: [id number]    Date of Procedure: 4/25/2023    Pre-Op Diagnosis Codes:     * Aortic valve stenosis, etiology of cardiac valve disease unspecified [I35.0]    Post-Op Diagnosis: Same       Procedure(s):  TRANSCATHETER AORTIC VALVE REPLACEMENT FEMORAL APPROACH  TRANSCATHETER AORTIC VALVE REPLACEMENT FEMORAL APPROACH    Surgeon(s):  MD Sincere Moore MD    Assistant:   * No surgical staff found *    Anesthesia: General    Estimated Blood Loss (mL): less than 50     Complications: None    Specimens:   * No specimens in log *    Implants:  * No implants in log *      Drains: * No LDAs found *    Findings: 4/7 gradient, no PVL      Detailed Description of Procedure:   Procedure performed under MAC. she was prepped and draped in sterile fashion. After timeout was performed, the right common femoral artery was accessed under fluoroscopic guidance. Lidocaine had been injected at both sites. Catheter advanced followed by sheath which was flushed. A right femoral vein catheter was inserted without difficulty and flushed using seldinger technique. A left common femoral artery catheter was placed followed by the pigtail in the aortic root. A temporary pacing wire was advanced through the venous sheath under fluoroscopic guidance through the IVC into the right ventricle and tested. 2 Perclose devices were placed in the right femoral artery. Using a Amplatz wire the dry seal sheath was then placed. Heparin was given with satisfactory result of the ACT. The valve was crossed without difficulty. Wire exchanged for a stiff wire. The device was then brought into the descending thoracic aorta and prepared as per device recommendations. The 23 mm valve was then brought across the aortic arch across the valve in one fluid motion. Fluoroscopic guidance as well as angio confirmed positioning within the annulus.   Rapid pacing
[address]  TAVR Operative Note     PROCEDURE SUMMARY   Procedure Replacement of aortic valve with zooplastic tissue, percutaneous ([id number])   Valve Type Manzo Abeba 3 Ultra 23mm with 0additional cc of volume. Operators Sabine Beard MD (IC), Rafaela Craven MD (IC), Ulises Juarez MD (CTS)   Indication Nonrheumatic aortic valve stenosis (I35.0)   Consent Obtained, witnessed, documented in chart. Start Time 1418   Stop Time 1519   Contrast 78cc   Flouro Time 10.8min   EBL <98PA   Complications None   Specimens None   Bleed Risk Low   Sedation Sedation provided entirely by anesthesia. See record for details. TTE Performed preprocedure, intraprocedure and postprocedure. Access RCFA, LCFA, RCFV   Preclose Delivery side artery preclosed with 2 perclose devices   TVP Inserted via CFV into RV and threshold obtained and acceptable. Removed after valve implantation. Angiography Pigtail inserted through CFA into ascending aorta over wire   Delivery Sheath J wire advanced into descending Ao.  JR4 advanced over wire into descending Ao. Lunderquist wire advanced into descending Ao through JR4. Delivery sheath advanced over wire into descending aorta. AV Crossing AL1 and J/Straight wire combo used to cross AV. Amplatz extra stiff advanced through AL1 and curled in LV apex. BAV BAV not performed. Valve Deploy TAVR valve prepped by certified Judy Bernstein and advanced through the delivery sheath to the level of the aortic annulus. Valve localized under echo and fluoro guidance. Rapid pacing employed and valve deployed to profile for 4s. Balloon deflated and withdrawn into delivery sheath. Rapid pacing aborted. TTE reviewed for complications. Postdilation Post dilation was not performed   PostProcedure Wires removed and delivery catheter withdrawn. Delivery sheath removed and perclose sutures tied with hemostasis. Transitioned to postop/CVU.       CHF STATUS   Symptoms Onset: Onset:
0 = independent

## 2023-04-25 NOTE — ANESTHESIA PROCEDURE NOTES
Arterial Line:    An arterial line was placed using surface landmarks, in the holding area for the following indication(s): continuous blood pressure monitoring and blood sampling needed. A 20 gauge (size), 1 and 3/4 inch (length), Arrow (type) catheter was placed, Seldinger technique used, into the left radial artery, secured by tape and Tegaderm. Anesthesia type: Local  Local infiltration: Injection    Events:  patient tolerated procedure well with no complications and EBL < 5mL.     Additional notes:  Bong Gross SA  Anesthesiologist: Mary Taylor MD  Performed: Anesthesiologist   Preanesthetic Checklist  Completed: patient identified, IV checked, site marked, risks and benefits discussed, surgical/procedural consents, equipment checked, pre-op evaluation, timeout performed, anesthesia consent given, oxygen available and monitors applied/VS acknowledged

## 2023-04-25 NOTE — ANESTHESIA POSTPROCEDURE EVALUATION
Department of Anesthesiology  Postprocedure Note    Patient: Marisa Wiseman  MRN: [id number]  [place]: 1945  Date of evaluation: 4/25/2023      Procedure Summary     Date: 04/25/23 Room / Location: [address] [place] [address]    Anesthesia Start: 5225 Anesthesia Stop: 1537    Procedures:       TRANSCATHETER AORTIC VALVE REPLACEMENT FEMORAL APPROACH      TRANSCATHETER AORTIC VALVE REPLACEMENT FEMORAL APPROACH Diagnosis:       Aortic valve stenosis, etiology of cardiac valve disease unspecified      (Aortic valve stenosis, etiology of cardiac valve disease unspecified [I35.0])    Surgeons: Karla Paulson MD; Michelle Go MD Responsible Provider: Krista Lopes MD    Anesthesia Type: MAC ASA Status: 3          Anesthesia Type: No value filed.     Clary Phase I: Clary Score: 10    Clary Phase II:        Anesthesia Post Evaluation    Patient location during evaluation: PACU  Patient participation: complete - patient participated  Level of consciousness: awake and alert  Airway patency: patent  Nausea & Vomiting: no nausea and no vomiting  Complications: no  Cardiovascular status: blood pressure returned to baseline  Respiratory status: acceptable  Hydration status: euvolemic  Multimodal analgesia pain management approach

## 2023-04-25 NOTE — PROGRESS NOTES
Pt verified information regarding surgery, name, birth date, surgeon, procedure and allergies: NKA. Patient admitted to [address] [place] [address] for surgery. Appropriate antibiotics ordered: Ancef . Beta blocker: Metoprolol . DVT prophyaxis: TEMO's and SCD's in place. Lab work within normal limits, 4 units of RBC's on call to OR, vital signs stable, Mepilex sacral border applied and 2% chlorhexidine gluconate skin prep given. Patient and  educated about surgery and pain management. Arterial line placed in left radial by Renato. . To surgery per bed at 1336.

## 2023-04-26 VITALS
BODY MASS INDEX: 30.79 KG/M2 | HEART RATE: 70 BPM | SYSTOLIC BLOOD PRESSURE: 108 MMHG | RESPIRATION RATE: 16 BRPM | TEMPERATURE: 98.4 F | WEIGHT: 191.6 LBS | HEIGHT: 66 IN | DIASTOLIC BLOOD PRESSURE: 42 MMHG | OXYGEN SATURATION: 95 %

## 2023-04-26 LAB
ANION GAP SERPL CALCULATED.3IONS-SCNC: 10 MMOL/L (ref 3–16)
BUN SERPL-MCNC: 15 MG/DL (ref 7–20)
CALCIUM SERPL-MCNC: 9 MG/DL (ref 8.3–10.6)
CHLORIDE SERPL-SCNC: 94 MMOL/L (ref 99–110)
CO2 SERPL-SCNC: 27 MMOL/L (ref 21–32)
CREAT SERPL-MCNC: 0.9 MG/DL (ref 0.6–1.2)
DEPRECATED RDW RBC AUTO: 13 % (ref 12.4–15.4)
EKG ATRIAL RATE: 64 BPM
EKG DIAGNOSIS: NORMAL
EKG P AXIS: 53 DEGREES
EKG P-R INTERVAL: 188 MS
EKG Q-T INTERVAL: 458 MS
EKG QRS DURATION: 128 MS
EKG QTC CALCULATION (BAZETT): 472 MS
EKG R AXIS: -11 DEGREES
EKG T AXIS: 85 DEGREES
EKG VENTRICULAR RATE: 64 BPM
GFR SERPLBLD CREATININE-BSD FMLA CKD-EPI: >60 ML/MIN/{1.73_M2}
GLUCOSE SERPL-MCNC: 108 MG/DL (ref 70–99)
HCT VFR BLD AUTO: 34.7 % (ref 36–48)
HGB BLD-MCNC: 11.7 G/DL (ref 12–16)
LV EF: 70 %
LVEF MODALITY: NORMAL
MCH RBC QN AUTO: 30.4 PG (ref 26–34)
MCHC RBC AUTO-ENTMCNC: 33.7 G/DL (ref 31–36)
MCV RBC AUTO: 90.1 FL (ref 80–100)
PLATELET # BLD AUTO: 258 K/UL (ref 135–450)
PMV BLD AUTO: 6.5 FL (ref 5–10.5)
POTASSIUM SERPL-SCNC: 4.3 MMOL/L (ref 3.5–5.1)
RBC # BLD AUTO: 3.86 M/UL (ref 4–5.2)
SODIUM SERPL-SCNC: 131 MMOL/L (ref 136–145)
WBC # BLD AUTO: 14 K/UL (ref 4–11)

## 2023-04-26 PROCEDURE — 99239 HOSP IP/OBS DSCHRG MGMT >30: CPT | Performed by: INTERNAL MEDICINE

## 2023-04-26 PROCEDURE — 2580000003 HC RX 258: Performed by: INTERNAL MEDICINE

## 2023-04-26 PROCEDURE — 80048 BASIC METABOLIC PNL TOTAL CA: CPT

## 2023-04-26 PROCEDURE — 93306 TTE W/DOPPLER COMPLETE: CPT

## 2023-04-26 PROCEDURE — 6370000000 HC RX 637 (ALT 250 FOR IP): Performed by: INTERNAL MEDICINE

## 2023-04-26 PROCEDURE — 85027 COMPLETE CBC AUTOMATED: CPT

## 2023-04-26 RX ORDER — CLOPIDOGREL BISULFATE 75 MG/1
75 TABLET ORAL DAILY
Qty: 30 TABLET | Refills: 3 | Status: SHIPPED | OUTPATIENT
Start: 2023-04-26

## 2023-04-26 RX ORDER — ASPIRIN 81 MG/1
81 TABLET ORAL DAILY
Qty: 30 TABLET | Refills: 3 | Status: SHIPPED | OUTPATIENT
Start: 2023-04-26

## 2023-04-26 RX ADMIN — ASPIRIN 81 MG: 81 TABLET, COATED ORAL at 08:43

## 2023-04-26 RX ADMIN — LISINOPRIL 40 MG: 20 TABLET ORAL at 08:43

## 2023-04-26 RX ADMIN — Medication 200 MG: at 08:43

## 2023-04-26 RX ADMIN — ACETAMINOPHEN 650 MG: 325 TABLET ORAL at 08:44

## 2023-04-26 RX ADMIN — METOPROLOL TARTRATE 50 MG: 50 TABLET ORAL at 08:43

## 2023-04-26 RX ADMIN — LEVOTHYROXINE SODIUM 75 MCG: 0.07 TABLET ORAL at 08:43

## 2023-04-26 RX ADMIN — CLOPIDOGREL BISULFATE 75 MG: 75 TABLET ORAL at 08:43

## 2023-04-26 RX ADMIN — SODIUM CHLORIDE, PRESERVATIVE FREE 10 ML: 5 INJECTION INTRAVENOUS at 08:43

## 2023-04-26 RX ADMIN — DOCUSATE SODIUM 100 MG: 100 CAPSULE, LIQUID FILLED ORAL at 08:43

## 2023-04-26 RX ADMIN — AMLODIPINE BESYLATE 5 MG: 5 TABLET ORAL at 08:43

## 2023-04-26 ASSESSMENT — PAIN DESCRIPTION - LOCATION
LOCATION: HEAD
LOCATION: HEAD

## 2023-04-26 ASSESSMENT — PAIN DESCRIPTION - ORIENTATION: ORIENTATION: ANTERIOR

## 2023-04-26 ASSESSMENT — PAIN SCALES - GENERAL
PAINLEVEL_OUTOF10: 0
PAINLEVEL_OUTOF10: 0
PAINLEVEL_OUTOF10: 3
PAINLEVEL_OUTOF10: 1

## 2023-04-26 ASSESSMENT — PAIN DESCRIPTION - ONSET
ONSET: ON-GOING
ONSET: ON-GOING

## 2023-04-26 ASSESSMENT — PAIN DESCRIPTION - DESCRIPTORS
DESCRIPTORS: ACHING
DESCRIPTORS: ACHING

## 2023-04-26 ASSESSMENT — PAIN - FUNCTIONAL ASSESSMENT: PAIN_FUNCTIONAL_ASSESSMENT: ACTIVITIES ARE NOT PREVENTED

## 2023-04-26 ASSESSMENT — PAIN DESCRIPTION - FREQUENCY: FREQUENCY: CONTINUOUS

## 2023-04-26 ASSESSMENT — PAIN DESCRIPTION - PAIN TYPE: TYPE: ACUTE PAIN

## 2023-04-26 NOTE — PLAN OF CARE
Problem: Chronic Conditions and Co-morbidities  Goal: Patient's chronic conditions and co-morbidity symptoms are monitored and maintained or improved  Outcome: Progressing     Problem: Discharge Planning  Goal: Discharge to home or other facility with appropriate resources  Outcome: Progressing     Problem: Pain  Goal: Verbalizes/displays adequate comfort level or baseline comfort level  Outcome: Progressing     Problem: Safety - Adult  Goal: Free from fall injury  Outcome: Progressing     Problem: ABCDS Injury Assessment  Goal: Absence of physical injury  Outcome: Progressing     TAVR yesterday. Pt doing well post-op. Up to chair this morning. Up to bathroom multiple times overnight. VSS. Echo completed at bedside this am, awaiting results. Mild headache this morning, tylenol administered.

## 2023-04-26 NOTE — PROGRESS NOTES
Data- discharge order received, pt verbalized agreement to discharge, disposition to previous residence, no needs for HHC/DME. Action- discharge instructions prepared and given to pt, pt verbalized understanding. Medication information packet given r/t NEW and/or CHANGED prescriptions emphasizing name/purpose/side effects, pt verbalized understanding. Discharge instruction summary: Diet- regular, Activity- up as tolerated with cane, Primary Care Physician as follows: Gracie Hargrove, FAY - [phone] f/u appointment with cardiology scheduled, immunizations reviewed, prescription medications filled at outpatient pharmacy. Inpatient surgical procedure precautions reviewed: TAVR. CHF Education reviewed. Pt/ Family has had a total of 60 minutes CHF education this admission encounter. 1. WEIGHT: Admit Weight: 200 lb (90.7 kg) (04/25/23 1300)        Today  Weight: 191 lb 9.6 oz (86.9 kg) (04/26/23 0442)       2. O2 SAT.: SpO2: 95 % (04/26/23 1200)    Response- Pt belongings gathered, IV removed. Disposition is home (no HHC/DME needs), transported with belongings, taken to lobby via w/c w/ family and staff, no complications.

## 2023-04-26 NOTE — PROGRESS NOTES
Incentive Spirometry education and demonstration completed by Respiratory Therapy Yes      Response to education: Excellent     Teaching Time: 5 minutes    Minimum Predicted Vital Capacity - 593 mL. Patient's Actual Vital Capacity - 1000 mL. Turning over to Nursing for routine follow-up Yes. Comments: pt performed IS very well.     Electronically signed by Norman Engel RCP on 4/26/2023 at 12:35 AM

## 2023-04-26 NOTE — CARE COORDINATION
Discharge Planning  Note;  Readmission Risk  8%  Patient admitted status post TAVR. Chart reviewed for discharge needs  and it appears that patient has minimal needs for discharge at this time. Discussed with patients nurse and requested that case management be notified if discharge needs are identified. No insurance listed. Financial Counselor consulted who stated patient is self pay. PCP;  FAY Mccurdy CNP     Case management will continue to follow progress and update discharge plan as needed.

## 2023-04-26 NOTE — PROGRESS NOTES
CLINICAL PHARMACY NOTE: MEDS TO BEDS    Total # of Prescriptions Filled: 2   The following medications were delivered to the patient:  Plavix 75 mg  Aspirin 81 mg    Additional Documentation:  Delivered to patient=signed  Ok to be delivered per Carmen Arce CPhT

## 2023-04-28 NOTE — DISCHARGE SUMMARY
[address]   TAVR DISCHARGE SUMMARY    Admit Date 4/25/2023   Discharge Date 4/26/2023   Admit MD Rashad Chong MD   Admit Diagnosis Aortic valve stenosis, etiology of cardiac valve disease unspecified [I35.0]  Aortic stenosis, severe [I35.0]   Discharge Diagnosis Patient Active Problem List   Diagnosis    Primary localized osteoarthrosis, lower leg    Obesity (BMI 30-39. 9)    Primary localized osteoarthrosis, pelvic region and thigh    History of total hip replacement    H/O total hip arthroplasty, left    Primary osteoarthritis of left hip    Arthritis of right knee    H/O total knee replacement, right 1/30/19    Right hip pain    2019 novel coronavirus disease (COVID-19)    Hyponatremia    Syncope    CLL (chronic lymphocytic leukemia) ([facility] [address]    Pneumonia due to COVID-19 virus    Pneumonia    Neutropenic fever (HCC)    Sepsis ([facility] [address]    Arthritis of right hip    Primary osteoarthritis of right hip    Nonrheumatic aortic valve stenosis    Aortic stenosis, severe      Discharge Condition good   Admit CHF Type Chronic diastolic. See operative note for further detail. Admit NYHA III   Admit [place] Major: None  Minor: Dyspnea on exertion   Hospital CHF Treated with TAVR. Discharge CHF Improved - NYHA Class II   Procedure [facility] None   Hospital Course Admitted for TAVR for severe aortic stenosis. Patient tolerated procedure and postoperative period well without evidence for complication. Access site(s) assessed and stable prior to discharge. Consult IP CONSULT TO CARDIAC REHAB   Subjective Patient seen and examined. Notes, labs, tele and recent testing reviewed. No acute issue overnight and patient without concern.      Exam Gen Alert, coop, no distress Heart  RRR, 1/6   Head NC, AT, no abnorm Abd  Soft, NT, +BS, no mass, no OM   Eyes PER, conj/corn clear Ext  Ext nl, AT, no C/C/E   Nose Nares nl, no drain, NT Pulse 2+ and symmetric   Throat Lips, mucosa, tongue nl Skin Col/text/turg nl, no vis

## 2023-05-04 ENCOUNTER — OFFICE VISIT (OUTPATIENT)
Dept: CARDIOLOGY CLINIC | Age: 78
End: 2023-05-04

## 2023-05-04 VITALS
HEART RATE: 71 BPM | WEIGHT: 198 LBS | OXYGEN SATURATION: 100 % | BODY MASS INDEX: 31.82 KG/M2 | SYSTOLIC BLOOD PRESSURE: 114 MMHG | DIASTOLIC BLOOD PRESSURE: 62 MMHG | HEIGHT: 66 IN

## 2023-05-04 DIAGNOSIS — Z95.2 S/P TAVR (TRANSCATHETER AORTIC VALVE REPLACEMENT): ICD-10-CM

## 2023-05-04 DIAGNOSIS — I10 ESSENTIAL HYPERTENSION: ICD-10-CM

## 2023-05-04 DIAGNOSIS — I35.0 AORTIC VALVE STENOSIS, NONRHEUMATIC: Primary | ICD-10-CM

## 2023-05-04 PROCEDURE — 99213 OFFICE O/P EST LOW 20 MIN: CPT | Performed by: INTERNAL MEDICINE

## 2023-05-04 PROCEDURE — 93000 ELECTROCARDIOGRAM COMPLETE: CPT | Performed by: INTERNAL MEDICINE

## 2023-05-04 PROCEDURE — 3074F SYST BP LT 130 MM HG: CPT | Performed by: INTERNAL MEDICINE

## 2023-05-04 PROCEDURE — 3078F DIAST BP <80 MM HG: CPT | Performed by: INTERNAL MEDICINE

## 2023-05-04 PROCEDURE — 1123F ACP DISCUSS/DSCN MKR DOCD: CPT | Performed by: INTERNAL MEDICINE

## 2023-05-11 ENCOUNTER — APPOINTMENT (OUTPATIENT)
Dept: GENERAL RADIOLOGY | Age: 78
End: 2023-05-11

## 2023-05-11 ENCOUNTER — HOSPITAL ENCOUNTER (EMERGENCY)
Age: 78
Discharge: HOME | End: 2023-05-12
Attending: EMERGENCY MEDICINE

## 2023-05-11 DIAGNOSIS — I10 ELEVATED BLOOD PRESSURE READING IN OFFICE WITH DIAGNOSIS OF HYPERTENSION: ICD-10-CM

## 2023-05-11 DIAGNOSIS — R00.2 PALPITATIONS: Primary | ICD-10-CM

## 2023-05-11 LAB
ALBUMIN SERPL-MCNC: 4.3 G/DL (ref 3.4–5)
ALBUMIN/GLOB SERPL: 2 {RATIO} (ref 1.1–2.2)
ALP SERPL-CCNC: 92 U/L (ref 40–129)
ALT SERPL-CCNC: 9 U/L (ref 10–40)
ANION GAP SERPL CALCULATED.3IONS-SCNC: 11 MMOL/L (ref 3–16)
AST SERPL-CCNC: 14 U/L (ref 15–37)
BASOPHILS # BLD: 0 K/UL (ref 0–0.2)
BASOPHILS NFR BLD: 0.2 %
BILIRUB SERPL-MCNC: <0.2 MG/DL (ref 0–1)
BUN SERPL-MCNC: 21 MG/DL (ref 7–20)
CALCIUM SERPL-MCNC: 9.5 MG/DL (ref 8.3–10.6)
CHLORIDE SERPL-SCNC: 105 MMOL/L (ref 99–110)
CO2 SERPL-SCNC: 23 MMOL/L (ref 21–32)
CREAT SERPL-MCNC: 0.6 MG/DL (ref 0.6–1.2)
DEPRECATED RDW RBC AUTO: 13.2 % (ref 12.4–15.4)
EOSINOPHIL # BLD: 0.1 K/UL (ref 0–0.6)
EOSINOPHIL NFR BLD: 0.5 %
GFR SERPLBLD CREATININE-BSD FMLA CKD-EPI: >60 ML/MIN/{1.73_M2}
GLUCOSE SERPL-MCNC: 126 MG/DL (ref 70–99)
HCT VFR BLD AUTO: 36.2 % (ref 36–48)
HGB BLD-MCNC: 12.2 G/DL (ref 12–16)
LYMPHOCYTES # BLD: 0.9 K/UL (ref 1–5.1)
LYMPHOCYTES NFR BLD: 6.9 %
MCH RBC QN AUTO: 30.5 PG (ref 26–34)
MCHC RBC AUTO-ENTMCNC: 33.6 G/DL (ref 31–36)
MCV RBC AUTO: 90.6 FL (ref 80–100)
MONOCYTES # BLD: 0.9 K/UL (ref 0–1.3)
MONOCYTES NFR BLD: 7.3 %
NEUTROPHILS # BLD: 10.4 K/UL (ref 1.7–7.7)
NEUTROPHILS NFR BLD: 85.1 %
PLATELET # BLD AUTO: 245 K/UL (ref 135–450)
PMV BLD AUTO: 6.6 FL (ref 5–10.5)
POTASSIUM SERPL-SCNC: 4.2 MMOL/L (ref 3.5–5.1)
PROT SERPL-MCNC: 6.5 G/DL (ref 6.4–8.2)
RBC # BLD AUTO: 3.99 M/UL (ref 4–5.2)
SODIUM SERPL-SCNC: 139 MMOL/L (ref 136–145)
TROPONIN, HIGH SENSITIVITY: 18 NG/L (ref 0–14)
TROPONIN, HIGH SENSITIVITY: 18 NG/L (ref 0–14)
WBC # BLD AUTO: 12.3 K/UL (ref 4–11)

## 2023-05-11 PROCEDURE — 71046 X-RAY EXAM CHEST 2 VIEWS: CPT

## 2023-05-11 PROCEDURE — 93005 ELECTROCARDIOGRAM TRACING: CPT | Performed by: EMERGENCY MEDICINE

## 2023-05-11 PROCEDURE — 84484 ASSAY OF TROPONIN QUANT: CPT

## 2023-05-11 PROCEDURE — 80053 COMPREHEN METABOLIC PANEL: CPT

## 2023-05-11 PROCEDURE — 85025 COMPLETE CBC W/AUTO DIFF WBC: CPT

## 2023-05-11 PROCEDURE — 93005 ELECTROCARDIOGRAM TRACING: CPT | Performed by: NURSE PRACTITIONER

## 2023-05-11 PROCEDURE — 99285 EMERGENCY DEPT VISIT HI MDM: CPT

## 2023-05-11 ASSESSMENT — LIFESTYLE VARIABLES
HOW OFTEN DO YOU HAVE A DRINK CONTAINING ALCOHOL: NEVER
HOW MANY STANDARD DRINKS CONTAINING ALCOHOL DO YOU HAVE ON A TYPICAL DAY: PATIENT DOES NOT DRINK

## 2023-05-12 ENCOUNTER — TELEPHONE (OUTPATIENT)
Dept: CARDIOLOGY CLINIC | Age: 78
End: 2023-05-12

## 2023-05-12 VITALS
HEIGHT: 66 IN | DIASTOLIC BLOOD PRESSURE: 50 MMHG | SYSTOLIC BLOOD PRESSURE: 148 MMHG | RESPIRATION RATE: 22 BRPM | HEART RATE: 91 BPM | TEMPERATURE: 98.1 F | BODY MASS INDEX: 31.57 KG/M2 | OXYGEN SATURATION: 98 % | WEIGHT: 196.43 LBS

## 2023-05-12 LAB
EKG ATRIAL RATE: 82 BPM
EKG ATRIAL RATE: 86 BPM
EKG DIAGNOSIS: NORMAL
EKG DIAGNOSIS: NORMAL
EKG P AXIS: 53 DEGREES
EKG P AXIS: 55 DEGREES
EKG P-R INTERVAL: 156 MS
EKG P-R INTERVAL: 162 MS
EKG Q-T INTERVAL: 360 MS
EKG Q-T INTERVAL: 368 MS
EKG QRS DURATION: 78 MS
EKG QRS DURATION: 80 MS
EKG QTC CALCULATION (BAZETT): 429 MS
EKG QTC CALCULATION (BAZETT): 430 MS
EKG R AXIS: -3 DEGREES
EKG R AXIS: -9 DEGREES
EKG T AXIS: 17 DEGREES
EKG T AXIS: 30 DEGREES
EKG VENTRICULAR RATE: 82 BPM
EKG VENTRICULAR RATE: 86 BPM

## 2023-05-12 PROCEDURE — 93010 ELECTROCARDIOGRAM REPORT: CPT | Performed by: INTERNAL MEDICINE

## 2023-05-12 ASSESSMENT — PAIN - FUNCTIONAL ASSESSMENT: PAIN_FUNCTIONAL_ASSESSMENT: NONE - DENIES PAIN

## 2023-05-12 NOTE — ED NOTES
Patient discharged home in stable condition. VSS and IV removed . Discharge paperwork reviewed and verbally understood by patient. Follow up care provided.  Ok macario Medrano RN  05/12/23 [id number]

## 2023-05-12 NOTE — TELEPHONE ENCOUNTER
Javier jeronimo called in  today she stated that she was in the Hospital yesterday and they told her at discharge to call cardiology and see if her doctor would like to schedule a f/u or not.      Raquel Montaño can be reached at [phone]

## 2023-05-12 NOTE — ED PROVIDER NOTES
[address] [facility]        Pt Name: Marshall Castañeda  MRN: [id number]  [place] 1945  Date of evaluation: 5/11/2023  Provider: FAY Doe CNP  PCP: FAY Leigh CNP  Note Started: 5:23 AM EDT 5/12/23      {Shared Not Shared:45417}      CHIEF COMPLAINT       Chief Complaint   Patient presents with    Palpitations     States feels like heart is skipping a beat; states had a heart valve replaced last week; states son is EMT and states it could be a fib, no prior hx       HISTORY OF PRESENT ILLNESS: 1 or more Elements     {History from (Optional):56973}    {Limitations to history (Optional):26209}    Marshall Castañeda is a [de-identified] y.o. female who presents ***    Nursing Notes were all reviewed and agreed with or any disagreements were addressed in the HPI. REVIEW OF SYSTEMS :      Review of Systems    Positives and Pertinent negatives as per HPI.      SURGICAL HISTORY     Past Surgical History:   Procedure Laterality Date    AORTIC VALVE REPLACEMENT N/A 4/25/2023    TRANSCATHETER AORTIC VALVE REPLACEMENT FEMORAL APPROACH performed by Mitali Quarles MD at [address] [facility] N/A 4/25/2023    TRANSCATHETER AORTIC VALVE REPLACEMENT FEMORAL APPROACH performed by Chaparro Alcazar MD at [place] [address] Right     on foot    EYE SURGERY      cataract removal with lens implants    HYSTERECTOMY (CERVIX STATUS UNKNOWN)      JOINT REPLACEMENT Left     hip    JOINT REPLACEMENT Right 01/30/2019      Right Total Knee Replacement    LARYNGOSCOPY N/A 9/10/2021    DIRECT LARYNGOSCOPY WITH BIOPSY performed by Liza Rosario MD at [address] [facility] N/A 9/10/2021    NASAL ENDOSCOPY WITH BIOPSY performed by Liza Rosario MD at [address] [facility] Right 10/25/2022    RIGHT ANTERIOR TOTAL HIP REPLACEMENT WITH C-ARM performed by Lakhwinder Willingham MD at
blood pressure. Physical exam reveals a rate and rhythm with a 1/6 systolic murmur at the upper left sternal border but no clicks or rubs. Lungs are clear auscultation equal bilaterally. Abdomen soft and nontender. Extremities there is 1+ edema of the right lower extremity and trace edema of the left lower extremity. EKG as noted below. Her troponin was mildly elevated at 18. Repeat troponin is being obtained. Repeat EKG is also pending at this time. White count is mildly elevated at 12.3. Chest x-ray is nonacute. This case was turned over to Dr. Gissel Ang pending repeat trop and EKG.  NP, Madhuri Cotter will make the disposition    Vitals:    05/11/23 2209   BP: (!) 148/75   Pulse: 84   Resp:    Temp:    SpO2: 97%       Lab results  Labs Reviewed   CBC WITH AUTO DIFFERENTIAL - Abnormal; Notable for the following components:       Result Value    WBC 12.3 (*)     RBC 3.99 (*)     Neutrophils Absolute 10.4 (*)     Lymphocytes Absolute 0.9 (*)     All other components within normal limits   COMPREHENSIVE METABOLIC PANEL W/ REFLEX TO MG FOR LOW K - Abnormal; Notable for the following components:    Glucose 126 (*)     BUN 21 (*)     ALT 9 (*)     AST 14 (*)     All other components within normal limits   TROPONIN - Abnormal; Notable for the following components:    Troponin, High Sensitivity 18 (*)     All other components within normal limits   TROPONIN       EKG Results  EKG Interpretation    Interpreted by emergency department physician  Time performed: 2055  Time read: 2058    Rhythm: Sinus  Ventricular Rate: 86  QRS Axis: -3  Ectopy: Frequent PACs  Conduction: Sinus rhythm with frequent PACs and LVH by voltage with early repolarization abnormalities  ST Segments: Consistent with early repolarization abnormalities  T Waves: Consistent with early repolarization abnormalities  Q Waves: None noted    Other findings: Motion artifact but EKG is readable    Compared to EKG on: 5/4/2023 and appears unchanged

## 2023-05-12 NOTE — DISCHARGE INSTRUCTIONS
Return to the emergency department for new or worsening symptoms including, not limited to, developing chest pain accompanied by nausea, vomiting, dizziness, shortness of breath, sweats, passing out, feeling as if you are going to pass out, or other symptoms/concerns. Follow-up with your PCP for reevaluation in the next 1-2 days. Also on tomorrow please call your cardiologist-Dr. Feliciano Espino to schedule an appointment for evaluation. Remember to avoid caffeine including chocolates.

## 2023-05-12 NOTE — TELEPHONE ENCOUNTER
Discussed ER visit and ECG with Dr. Alejandra Martinez. Pt today states that she is doing fine but was just scared something was wrong with her new valve when she felt palpitations. Assured her that her ECG was within normal limits and PACs are not abnormal. Pt on BB already. Let her know that if she has persistent high heart rate, palps, LH or dizziness to let us know. Verbalized understanding.  Evelyne LESLIE

## 2023-05-15 ASSESSMENT — ENCOUNTER SYMPTOMS
ABDOMINAL PAIN: 0
EYE PAIN: 0
SHORTNESS OF BREATH: 0
ANAL BLEEDING: 0
NAUSEA: 1
VOMITING: 1
COUGH: 0
SORE THROAT: 0
BACK PAIN: 0

## 2023-05-23 NOTE — TELEPHONE ENCOUNTER
Medication Refill    Medication needing refilled: CLOPIDIGREL    Dosage of the medication:75mg    How are you taking this medication (QD, BID, TID, QID, PRN):Take 1 tablet by mouth daily    30 or 90 day supply called in:90    When will you run out of your medication:thursday    Which Pharmacy are we sending the medication to?:    [id number] [address] [facility] [address] - F [phone]     [phone]

## 2023-05-24 RX ORDER — CLOPIDOGREL BISULFATE 75 MG/1
75 TABLET ORAL DAILY
Qty: 90 TABLET | Refills: 3 | Status: SHIPPED | OUTPATIENT
Start: 2023-05-24

## 2023-06-08 ENCOUNTER — PROCEDURE VISIT (OUTPATIENT)
Dept: CARDIOLOGY CLINIC | Age: 78
End: 2023-06-08

## 2023-06-08 DIAGNOSIS — Z95.2 S/P TAVR (TRANSCATHETER AORTIC VALVE REPLACEMENT): ICD-10-CM

## 2023-06-08 DIAGNOSIS — I35.0 AORTIC VALVE STENOSIS, NONRHEUMATIC: ICD-10-CM

## 2023-06-08 LAB
LV EF: 68 %
LVEF MODALITY: NORMAL

## 2023-06-08 PROCEDURE — 93306 TTE W/DOPPLER COMPLETE: CPT | Performed by: INTERNAL MEDICINE

## 2023-12-05 ENCOUNTER — OFFICE VISIT (OUTPATIENT)
Dept: ENT CLINIC | Age: 78
End: 2023-12-05

## 2023-12-05 VITALS
HEIGHT: 66 IN | OXYGEN SATURATION: 98 % | DIASTOLIC BLOOD PRESSURE: 76 MMHG | HEART RATE: 76 BPM | SYSTOLIC BLOOD PRESSURE: 177 MMHG | WEIGHT: 199 LBS | BODY MASS INDEX: 31.98 KG/M2

## 2023-12-05 DIAGNOSIS — J34.2 DEVIATED NASAL SEPTUM: ICD-10-CM

## 2023-12-05 DIAGNOSIS — C91.10 CLL (CHRONIC LYMPHOCYTIC LEUKEMIA) (HCC): ICD-10-CM

## 2023-12-05 DIAGNOSIS — J31.0 CHRONIC RHINITIS: Primary | ICD-10-CM

## 2023-12-05 PROCEDURE — 31231 NASAL ENDOSCOPY DX: CPT | Performed by: STUDENT IN AN ORGANIZED HEALTH CARE EDUCATION/TRAINING PROGRAM

## 2023-12-05 NOTE — PROGRESS NOTES
Morteza Malik (:  1945) is a 78 y.o. female, here for evaluation of the following chief complaint(s):  Follow-up (Neck mass )      ASSESSMENT/PLAN:  1. Chronic rhinitis  2. CLL (chronic lymphocytic leukemia) ([address])        This is a very pleasant 78 y.o. female here today for evaluation of the the above-noted complaints. She was taken to the operating room on 9/10/2021 for direct laryngoscopy, bilateral nasal endoscopy with debridement and bilateral inferior turbinate outfracture. Biopsies were taken of bilateral nasopharynx and a debridement was performed of her nasopharynx of the obstructing tissue. Additionally, a biopsy was taken of her right tonsil. All the biopsies were consistent with chronic lymphocytic leukemia. No evidence of recurrent disease on exam.    Ears look clear. If she continues to have issues related to ear fullness I recommend we get an audiogram.    Follow-up in 1 year for recheck    She will continue to follow with Dr. Tristian Sheldon for management of her CLL. She knows to contact me should she experience a return of her symptoms. If her cough persists, we discussed that we could try treating her for laryngopharyngeal reflux. Is also possible this represents something such as bronchitis. SUBJECTIVE/OBJECTIVE:  MIRI Chen is here today for evaluation of issues related not being able to breathe through her nose. This is been going on since at least memorial day but maybe before that. She gets constant nasal drainage which is clear. She has been treated with antibiotics with no significant improvement. She is now having some difficulty swallowing and this is affecting her speech. She does not have any pain. She is having difficulty hearing out of her left ear. Update 2021:    Patient was taken to the operating room for procedure to biopsy her nose and tonsils.   Her

## 2024-01-18 NOTE — PROGRESS NOTES
[facility]      Cardiology Consult    Alyssia Chaudhary  1945 January 25, 2024    Referring Physician: Thaddeus Dwyer MD  Reason for Referral: Aortic stenosis     CC: \"I feel good\"     HPI:  The patient is 79 y.o. female with a past medical history significant for diabetes, hypertension, thrombocytopenia, aortic stenosis s/p JOHANNY 4/25/2023, and hyperlipidemia who presents for management of aortic stenosis. She is accompanied by her . She states that overall she is feeling well. She denies any new sounding cardiac complaints. She denies any chest pains or worsening shortness of breath. She reports medication compliance and is tolerating. She reports chronic lower extremity edema but denies any acute changes. She denies any abnormal bleeding or bruising. She denies exertional chest pain/pressure, dyspnea at rest, worsening AKHTAR, PND, orthopnea, palpitations, lightheadedness, weight changes, and syncope.    Past Medical History:   Diagnosis Date    Acid reflux     Aortic stenosis     Arthritis     Cancer (HCC)     Chronic lymphocytic leukemia (CLL) genetic mutation variant (HCC) 2018    Diabetes mellitus (HCC)     BORDERLINE    High blood pressure     Hyperlipidemia     mild-no meds    Multiple drug resistant organism (MDRO) culture positive 01/25/2021    urine    PONV (postoperative nausea and vomiting)     Thyroid disease     UTI (urinary tract infection) 01/2019    currently on cipro    Wears glasses      Past Surgical History:   Procedure Laterality Date    AORTIC VALVE REPLACEMENT N/A 4/25/2023    TRANSCATHETER AORTIC VALVE REPLACEMENT FEMORAL APPROACH performed by Lorenzo Pressley MD at [facility]    AORTIC VALVE REPLACEMENT N/A 4/25/2023    TRANSCATHETER AORTIC VALVE REPLACEMENT FEMORAL APPROACH performed by Lc Hammond MD at [facility]    CHOLECYSTECTOMY      CYST REMOVAL Right     on foot    EYE SURGERY      cataract removal with lens implants    HYSTERECTOMY (CERVIX STATUS

## 2024-01-25 ENCOUNTER — OFFICE VISIT (OUTPATIENT)
Dept: CARDIOLOGY CLINIC | Age: 79
End: 2024-01-25

## 2024-01-25 VITALS
SYSTOLIC BLOOD PRESSURE: 148 MMHG | OXYGEN SATURATION: 97 % | DIASTOLIC BLOOD PRESSURE: 70 MMHG | BODY MASS INDEX: 33.41 KG/M2 | WEIGHT: 207 LBS | HEART RATE: 83 BPM

## 2024-01-25 DIAGNOSIS — I10 ESSENTIAL HYPERTENSION: ICD-10-CM

## 2024-01-25 DIAGNOSIS — Z95.2 HISTORY OF TRANSCATHETER AORTIC VALVE IMPLANTATION (TAVI): ICD-10-CM

## 2024-01-25 DIAGNOSIS — I35.0 NONRHEUMATIC AORTIC VALVE STENOSIS: Primary | ICD-10-CM

## 2024-01-25 PROCEDURE — 3077F SYST BP >= 140 MM HG: CPT | Performed by: INTERNAL MEDICINE

## 2024-01-25 PROCEDURE — 99214 OFFICE O/P EST MOD 30 MIN: CPT | Performed by: INTERNAL MEDICINE

## 2024-01-25 PROCEDURE — 3078F DIAST BP <80 MM HG: CPT | Performed by: INTERNAL MEDICINE

## 2024-01-25 PROCEDURE — 1123F ACP DISCUSS/DSCN MKR DOCD: CPT | Performed by: INTERNAL MEDICINE

## 2024-03-13 RX ORDER — CLOPIDOGREL BISULFATE 75 MG/1
75 TABLET ORAL DAILY
Qty: 90 TABLET | Refills: 3 | OUTPATIENT
Start: 2024-03-13

## 2024-03-29 ENCOUNTER — TELEPHONE (OUTPATIENT)
Dept: CARDIOLOGY CLINIC | Age: 79
End: 2024-03-29

## 2024-03-29 DIAGNOSIS — Z95.2 S/P TAVR (TRANSCATHETER AORTIC VALVE REPLACEMENT): Primary | ICD-10-CM

## 2024-03-29 NOTE — TELEPHONE ENCOUNTER
Francesca, can you schedule pt for an echo sometime in May for her 1 year post TAVR check? She can have it done in [place] or [place], wherever convenient for her will be fine as she lived in [place]. Thanks, Evelyne LESLIE

## 2024-04-01 ENCOUNTER — HOSPITAL ENCOUNTER (OUTPATIENT)
Dept: CT IMAGING | Age: 79
Discharge: HOME | End: 2024-04-01
Attending: INTERNAL MEDICINE

## 2024-04-01 DIAGNOSIS — C65.9 MALIGNANT NEOPLASM OF RENAL PELVIS, UNSPECIFIED LATERALITY (HCC): ICD-10-CM

## 2024-04-01 LAB
PERFORMED ON: NORMAL
POC CREATININE: 0.7 MG/DL (ref 0.6–1.2)
POC SAMPLE TYPE: NORMAL

## 2024-04-01 PROCEDURE — 74177 CT ABD & PELVIS W/CONTRAST: CPT

## 2024-04-01 PROCEDURE — 82565 ASSAY OF CREATININE: CPT

## 2024-04-01 PROCEDURE — 6360000004 HC RX CONTRAST MEDICATION: Performed by: INTERNAL MEDICINE

## 2024-04-01 RX ADMIN — IOPAMIDOL 50 ML: 612 INJECTION, SOLUTION INTRAVENOUS at 07:45

## 2024-04-01 RX ADMIN — IOPAMIDOL 75 ML: 755 INJECTION, SOLUTION INTRAVENOUS at 07:45

## 2024-05-17 ENCOUNTER — HOSPITAL ENCOUNTER (OUTPATIENT)
Dept: CARDIOLOGY | Age: 79
Discharge: HOME | End: 2024-05-17

## 2024-05-17 VITALS
SYSTOLIC BLOOD PRESSURE: 148 MMHG | HEIGHT: 66 IN | BODY MASS INDEX: 33.27 KG/M2 | WEIGHT: 207 LBS | DIASTOLIC BLOOD PRESSURE: 70 MMHG

## 2024-05-17 DIAGNOSIS — Z95.2 S/P TAVR (TRANSCATHETER AORTIC VALVE REPLACEMENT): ICD-10-CM

## 2024-05-17 LAB
ECHO AO ASC DIAM: 3.5 CM
ECHO AO ASCENDING AORTA INDEX: 1.72 CM/M2
ECHO AO ROOT DIAM: 2.9 CM
ECHO AO ROOT INDEX: 1.43 CM/M2
ECHO AV AREA PEAK VELOCITY: 1.7 CM2
ECHO AV AREA VTI: 1.8 CM2
ECHO AV AREA/BSA PEAK VELOCITY: 0.8 CM2/M2
ECHO AV AREA/BSA VTI: 0.9 CM2/M2
ECHO AV CUSP MM: 1.4 CM
ECHO AV MEAN GRADIENT: 23 MMHG
ECHO AV MEAN VELOCITY: 2.2 M/S
ECHO AV PEAK GRADIENT: 40 MMHG
ECHO AV PEAK VELOCITY: 3.2 M/S
ECHO AV VELOCITY RATIO: 0.53
ECHO AV VTI: 71.7 CM
ECHO BSA: 2.09 M2
ECHO EST RA PRESSURE: 3 MMHG
ECHO LA AREA 2C: 21.5 CM2
ECHO LA AREA 4C: 21.2 CM2
ECHO LA MAJOR AXIS: 5.9 CM
ECHO LA MINOR AXIS: 5.3 CM
ECHO LA VOL BP: 69 ML (ref 22–52)
ECHO LA VOL MOD A2C: 72 ML (ref 22–52)
ECHO LA VOL MOD A4C: 75 ML (ref 22–52)
ECHO LA VOL/BSA BIPLANE: 34 ML/M2 (ref 16–34)
ECHO LA VOLUME INDEX MOD A2C: 35 ML/M2 (ref 16–34)
ECHO LA VOLUME INDEX MOD A4C: 37 ML/M2 (ref 16–34)
ECHO LV E' LATERAL VELOCITY: 4 CM/S
ECHO LV E' SEPTAL VELOCITY: 3 CM/S
ECHO LV EDV A2C: 70 ML
ECHO LV EDV A4C: 73 ML
ECHO LV EDV INDEX A4C: 36 ML/M2
ECHO LV EDV NDEX A2C: 34 ML/M2
ECHO LV EJECTION FRACTION A2C: 56 %
ECHO LV EJECTION FRACTION A4C: 67 %
ECHO LV EJECTION FRACTION BIPLANE: 58 % (ref 55–100)
ECHO LV ESV A2C: 31 ML
ECHO LV ESV A4C: 24 ML
ECHO LV ESV INDEX A2C: 15 ML/M2
ECHO LV ESV INDEX A4C: 12 ML/M2
ECHO LV FRACTIONAL SHORTENING: 45 % (ref 28–44)
ECHO LV GLOBAL LONGITUDINAL STRAIN (GLS): -13.4 %
ECHO LV INTERNAL DIMENSION DIASTOLE INDEX: 2.07 CM/M2
ECHO LV INTERNAL DIMENSION DIASTOLIC: 4.2 CM (ref 3.9–5.3)
ECHO LV INTERNAL DIMENSION SYSTOLIC INDEX: 1.13 CM/M2
ECHO LV INTERNAL DIMENSION SYSTOLIC: 2.3 CM
ECHO LV IVSD: 1.1 CM (ref 0.6–0.9)
ECHO LV MASS 2D: 157.1 G (ref 67–162)
ECHO LV MASS INDEX 2D: 77.4 G/M2 (ref 43–95)
ECHO LV POSTERIOR WALL DIASTOLIC: 1.1 CM (ref 0.6–0.9)
ECHO LV RELATIVE WALL THICKNESS RATIO: 0.52
ECHO LVOT AREA: 3.1 CM2
ECHO LVOT AV VTI INDEX: 0.58
ECHO LVOT DIAM: 2 CM
ECHO LVOT MEAN GRADIENT: 7 MMHG
ECHO LVOT PEAK GRADIENT: 11 MMHG
ECHO LVOT PEAK VELOCITY: 1.7 M/S
ECHO LVOT STROKE VOLUME INDEX: 64.5 ML/M2
ECHO LVOT SV: 130.9 ML
ECHO LVOT VTI: 41.7 CM
ECHO MV A VELOCITY: 1.57 M/S
ECHO MV AREA VTI: 2.3 CM2
ECHO MV E DECELERATION TIME (DT): 320 MS
ECHO MV E VELOCITY: 1.38 M/S
ECHO MV E/A RATIO: 0.88
ECHO MV E/E' LATERAL: 34.5
ECHO MV E/E' RATIO (AVERAGED): 40.25
ECHO MV E/E' SEPTAL: 46
ECHO MV LVOT VTI INDEX: 1.39
ECHO MV MAX VELOCITY: 1.7 M/S
ECHO MV MEAN GRADIENT: 5 MMHG
ECHO MV MEAN VELOCITY: 1 M/S
ECHO MV PEAK GRADIENT: 12 MMHG
ECHO MV VTI: 57.9 CM
ECHO PV MAX VELOCITY: 1.1 M/S
ECHO PV MEAN GRADIENT: 3 MMHG
ECHO PV MEAN VELOCITY: 0.8 M/S
ECHO PV PEAK GRADIENT: 5 MMHG
ECHO PV VTI: 25.7 CM
ECHO RA AREA 4C: 10.2 CM2
ECHO RA END SYSTOLIC VOLUME APICAL 4 CHAMBER INDEX BSA: 9 ML/M2
ECHO RA VOLUME: 18 ML
ECHO RIGHT VENTRICULAR SYSTOLIC PRESSURE (RVSP): 33 MMHG
ECHO RV INTERNAL DIMENSION: 3.5 CM
ECHO RV TAPSE: 2.3 CM (ref 1.7–?)
ECHO TV REGURGITANT MAX VELOCITY: 2.76 M/S
ECHO TV REGURGITANT PEAK GRADIENT: 30 MMHG

## 2024-05-17 PROCEDURE — 93306 TTE W/DOPPLER COMPLETE: CPT

## 2024-05-17 PROCEDURE — 93306 TTE W/DOPPLER COMPLETE: CPT | Performed by: INTERNAL MEDICINE

## 2024-05-20 DIAGNOSIS — Z95.2 HISTORY OF TRANSCATHETER AORTIC VALVE REPLACEMENT (TAVR): Primary | ICD-10-CM

## 2025-04-09 ENCOUNTER — HOSPITAL ENCOUNTER (OUTPATIENT)
Dept: CT IMAGING | Age: 80
Discharge: HOME | End: 2025-04-09
Attending: INTERNAL MEDICINE

## 2025-04-09 DIAGNOSIS — C91.10 CLL (CHRONIC LYMPHOCYTIC LEUKEMIA) (HCC): ICD-10-CM

## 2025-04-09 DIAGNOSIS — C65.9 MALIGNANT NEOPLASM OF RENAL PELVIS, UNSPECIFIED LATERALITY (HCC): ICD-10-CM

## 2025-04-09 LAB
PERFORMED ON: NORMAL
POC CREATININE: 0.9 MG/DL (ref 0.6–1.2)
POC SAMPLE TYPE: NORMAL

## 2025-04-09 PROCEDURE — 6360000004 HC RX CONTRAST MEDICATION: Performed by: INTERNAL MEDICINE

## 2025-04-09 PROCEDURE — 82565 ASSAY OF CREATININE: CPT

## 2025-04-09 PROCEDURE — 74177 CT ABD & PELVIS W/CONTRAST: CPT

## 2025-04-09 RX ORDER — IOPAMIDOL 612 MG/ML
50 INJECTION, SOLUTION INTRAVASCULAR
Status: COMPLETED | OUTPATIENT
Start: 2025-04-09 | End: 2025-04-09

## 2025-04-09 RX ORDER — IOPAMIDOL 755 MG/ML
75 INJECTION, SOLUTION INTRAVASCULAR
Status: COMPLETED | OUTPATIENT
Start: 2025-04-09 | End: 2025-04-09

## 2025-04-09 RX ADMIN — IOPAMIDOL 75 ML: 755 INJECTION, SOLUTION INTRAVENOUS at 12:39

## 2025-04-09 RX ADMIN — IOPAMIDOL 50 ML: 612 INJECTION, SOLUTION INTRAVENOUS at 12:40

## (undated) DEVICE — STERILE PATIENT PROTECTIVE PAD FOR IMP® KNEE POSITIONERS & COHESIVE WRAP (10 / CASE): Brand: DE MAYO KNEE POSITIONER®

## (undated) DEVICE — GLOVE,SURG,SENSICARE SLT,LF,PF,8.5: Brand: MEDLINE

## (undated) DEVICE — 3M™ IOBAN™ 2 ANTIMICROBIAL INCISE DRAPE 6650EZ: Brand: IOBAN™ 2

## (undated) DEVICE — SUTURE ABSORBABLE MONOFILAMENT 1-0 OS8 14 IN STRATAFIX SPRL SXPD2B202

## (undated) DEVICE — TOTAL BASIC: Brand: MEDLINE INDUSTRIES, INC.

## (undated) DEVICE — GLOVE ORANGE PI 7 1/2   MSG9075

## (undated) DEVICE — TOWEL,OR,DSP,ST,BLUE,STD,4/PK,20PK/CS: Brand: MEDLINE

## (undated) DEVICE — SOLUTION IV IRRIG POUR BRL 0.9% SODIUM CHL 2F7124

## (undated) DEVICE — DUAL CUT SAGITTAL BLADE

## (undated) DEVICE — PRESSURE MONITORING SET: Brand: TRUWAVE

## (undated) DEVICE — SPONGE SURG 0.5X0.5 IN POLICOT POLYESTER LF

## (undated) DEVICE — KIT,ANTI FOG,W/SPONGE & FLUID,SOFT PACK: Brand: MEDLINE

## (undated) DEVICE — 3M™ STERI-DRAPE™ INSTRUMENT POUCH 1018: Brand: STERI-DRAPE™

## (undated) DEVICE — TOTAL BASIC PK

## (undated) DEVICE — GLOVE ORTHO 8   MSG9480

## (undated) DEVICE — STERILE TOTAL KNEE DRAPE PACK: Brand: CARDINAL HEALTH

## (undated) DEVICE — MERCY HEALTH WEST TURNOVER: Brand: MEDLINE INDUSTRIES, INC.

## (undated) DEVICE — SUPPORT WRST AD W3.5XL9IN DIA14.5IN ART SFT ADJ HK AND LOOP

## (undated) DEVICE — SOLUTION PREP POVIDONE IOD FOR SKIN MUCOUS MEM PRIOR TO

## (undated) DEVICE — MAT FLR W32XL58IN

## (undated) DEVICE — COVER LT HNDL BLU PLAS

## (undated) DEVICE — KIT STEREOTAXIC POD DISPOSABLE IASSIST

## (undated) DEVICE — GLOVE SURG SZ 8 L12IN FNGR THK79MIL GRN LTX FREE

## (undated) DEVICE — HANDPIECE SET WITH HIGH FLOW TIP AND SUCTION TUBE: Brand: INTERPULSE

## (undated) DEVICE — ELECTRODE PT RET AD L9FT HI MOIST COND ADH HYDRGEL CORDED

## (undated) DEVICE — SYSTEM SKIN CLSR 22CM DERMBND PRINEO

## (undated) DEVICE — GOWN,REINF,POLY,AURORA,XLNG/XXL,STRL: Brand: MEDLINE

## (undated) DEVICE — HYPODERMIC SAFETY NEEDLE: Brand: MAGELLAN

## (undated) DEVICE — SYRINGE MED 30ML STD CLR PLAS LUERLOCK TIP N CTRL DISP

## (undated) DEVICE — SUTURE VCRL SZ 1 L18IN ABSRB UD L36MM CT-1 1/2 CIR J841D

## (undated) DEVICE — CLIP LIG M TI 6 SIL HNDL FOR OPN AND ENDOSCP SGL APPL

## (undated) DEVICE — PAD,NON-ADHERENT,3X8,STERILE,LF,1/PK: Brand: MEDLINE

## (undated) DEVICE — GLOVE SURG SZ 85 L12IN FNGR THK13MIL WHT ISOLEX POLYISOPRENE

## (undated) DEVICE — SUTURE STRATAFIX SPRL SZ 2 0 L14IN ABSRB UD MH L36MM 1 2 CIR SXMD2B401

## (undated) DEVICE — SYRINGE IRRIG 60ML SFT PLIABLE BLB EZ TO GRP 1 HND USE W/

## (undated) DEVICE — SET CATH 20GA L1.75IN RAD ART POLYUR RADPQ W/ INTEGR

## (undated) DEVICE — SOLUTION IV IRRIG 250ML ST LF 0.9% SODIUM 2F7122

## (undated) DEVICE — ZIMMER® STERILE DISPOSABLE TOURNIQUET CUFF WITH PLC, DUAL PORT, SINGLE BLADDER, 42 IN. (107 CM)

## (undated) DEVICE — BIPOLAR SEALER 23-112-1 AQM 6.0: Brand: AQUAMANTYS ®

## (undated) DEVICE — RETRACTOR SURG WIDE FLAT LO PROF LTWT PHOTONGUIDE

## (undated) DEVICE — NEEDLE HYPO 18GA L1.5IN THN WALL PIVOTING SHLD BVL ORIENTED

## (undated) DEVICE — FIRM 8CM: Brand: NASOPORE

## (undated) DEVICE — SHEET,DRAPE,53X77,STERILE: Brand: MEDLINE

## (undated) DEVICE — BLADE SAW W12.5XL70MM THK1MM RECIP DBL SIDE OFFSET

## (undated) DEVICE — OPTIFOAM GENTLE LIQUITRAP, SACRUM, 7"X7": Brand: MEDLINE

## (undated) DEVICE — BANDAGE COMPR W6INXL12FT SMOOTH FOR LIMB EXSANG ESMARCH

## (undated) DEVICE — COVER,MAYO STAND,STERILE: Brand: MEDLINE

## (undated) DEVICE — SPONGE LAP W18XL18IN WHT COT 4 PLY FLD STRUNG RADPQ DISP ST

## (undated) DEVICE — GLOVE SURG SZ 85 L12IN FNGR ORTHO 126MIL CRM LTX FREE

## (undated) DEVICE — CODMAN® SURGICAL PATTIES 1/2" X 3" (1.27CM X 7.62CM): Brand: CODMAN®

## (undated) DEVICE — BASIC SINGLE BASIN 1-LF: Brand: MEDLINE INDUSTRIES, INC.

## (undated) DEVICE — KIT POS FOAM HANA TBL

## (undated) DEVICE — 6619 2 PTNT ISO SYS INCISE AREA&LT;(&GT;&&LT;)&GT;P: Brand: STERI-DRAPE™ IOBAN™ 2

## (undated) DEVICE — ELECTRODE BLDE L4IN NONINSULATED EDGE

## (undated) DEVICE — 3M™ MICROPORE™ TAPE, 1530-1: Brand: 3M™ MICROPORE™

## (undated) DEVICE — TUBING, SUCTION, 1/4" X 12', STRAIGHT: Brand: MEDLINE

## (undated) DEVICE — GLOVE,SURG,SENSICARE SLT,LF,PF,8: Brand: MEDLINE

## (undated) DEVICE — COVER,TABLE,77X90,STERILE: Brand: MEDLINE

## (undated) DEVICE — KIT OR ROOM TURNOVER W/STRAP

## (undated) DEVICE — Z DISCONTINUED USE 2716304 SUTURE STRATAFIX SPRL SZ 3-0 L12IN ABSRB UD FS-1 L24MM 3/8

## (undated) DEVICE — TUBING SUCT 12FR MAL ALUM SHFT FN CAP VENT UNIV CONN W/ OBT

## (undated) DEVICE — GUARD TOOTH SM

## (undated) DEVICE — SYRINGE 60ML BULB IRRIG ST LF

## (undated) DEVICE — BANDAGE COMPR W4INXL15FT BGE E SGL LAYERED CLP CLSR

## (undated) DEVICE — SPECIMEN COLLECTION 4-PORT MANIFOLD: Brand: NEPTUNE 2

## (undated) DEVICE — DECANTER BAG 9": Brand: MEDLINE INDUSTRIES, INC.

## (undated) DEVICE — CHLORAPREP 26ML ORANGE

## (undated) DEVICE — ENT I-LF: Brand: MEDLINE INDUSTRIES, INC.

## (undated) DEVICE — Device: Brand: POWER-FLO®

## (undated) DEVICE — MATTRESS MAXI AIR TRNSF SGL PT USE DCS 37 45 48 52 75

## (undated) DEVICE — 3M™ COBAN™ NL STERILE NON-LATEX SELF-ADHERENT WRAP, 2083S, 3 IN X 5 YD (7,5 CM X 4,5 M), 24 ROLLS/CASE: Brand: 3M™ COBAN™

## (undated) DEVICE — DRESSING WND SM W2.5XL4IN BRTH W/ CATH SECUREMENT AND

## (undated) DEVICE — COAGULATOR SUCT 10FR L6IN HND FT SWCH VALLEYLAB

## (undated) DEVICE — SUTURE STRATAFIX SPRL SZ 2 0 L14IN ABSRB UD MH L36MM 1 2 CIR SXMP2B401

## (undated) DEVICE — BANDAGE COMPR W6INXL4.5YD LTWT E EC SGL LAYERED CLP CLSR

## (undated) DEVICE — SUTURE STRATAFIX SPRL SZ 3-0 L12IN ABSRB UD FS-1 L30X30CM SXMP2B410

## (undated) DEVICE — TUBING SUCT 10FR MAL ALUM SHFT FN CAP VENT UNIV CONN W/ OBT

## (undated) DEVICE — 1010 S-DRAPE TOWEL DRAPE 10/BX: Brand: STERI-DRAPE™

## (undated) DEVICE — SYRINGE MED 10ML LUERLOCK TIP W/O SFTY DISP

## (undated) DEVICE — C-ARM: Brand: UNBRANDED

## (undated) DEVICE — COTTON UNDERCAST PADDING,CRIMPED FINISH: Brand: WEBRIL

## (undated) DEVICE — ELECTRODE ES L6.5IN DIA2.4MM TIP L0.2IN S STL EXT INSUL NDL

## (undated) DEVICE — TUBING SUCT 8FR MAL ALUM SHFT FN CAP VENT UNIV CONN W/ OBT

## (undated) DEVICE — STAPLER SKIN H3.9MM WIRE DIA0.58MM CRWN 6.9MM 35 STPL ROT

## (undated) DEVICE — SOLUTION IV 1000ML 0.9% SOD CHL FOR IRRIG PLAS CONT